# Patient Record
Sex: FEMALE | Race: WHITE | URBAN - METROPOLITAN AREA
[De-identification: names, ages, dates, MRNs, and addresses within clinical notes are randomized per-mention and may not be internally consistent; named-entity substitution may affect disease eponyms.]

---

## 2017-01-19 ENCOUNTER — DOCTOR'S OFFICE (OUTPATIENT)
Dept: URBAN - METROPOLITAN AREA CLINIC 137 | Facility: CLINIC | Age: 52
Setting detail: OPHTHALMOLOGY
End: 2017-01-19
Payer: COMMERCIAL

## 2017-01-19 ENCOUNTER — RX ONLY (RX ONLY)
Age: 52
End: 2017-01-19

## 2017-01-19 DIAGNOSIS — H25.13: ICD-10-CM

## 2017-01-19 DIAGNOSIS — E10.3313: ICD-10-CM

## 2017-01-19 DIAGNOSIS — H52.4: ICD-10-CM

## 2017-01-19 PROCEDURE — 92004 COMPRE OPH EXAM NEW PT 1/>: CPT | Performed by: OPHTHALMOLOGY

## 2017-01-19 ASSESSMENT — REFRACTION_AUTOREFRACTION
OD_SPHERE: -0.25
OS_SPHERE: NO
OD_CYLINDER: +2.25
OS_CYLINDER: CENTER
OD_AXIS: 152

## 2017-01-19 ASSESSMENT — REFRACTION_CURRENTRX
OD_ADD: +1.75
OD_AXIS: 175
OD_SPHERE: -0.50
OD_OVR_VA: 20/
OS_OVR_VA: 20/
OD_VPRISM_DIRECTION: BF
OS_CYLINDER: +1.25
OD_OVR_VA: 20/
OS_SPHERE: -1.25
OS_AXIS: 178
OS_OVR_VA: 20/
OS_VPRISM_DIRECTION: BF
OD_OVR_VA: 20/
OD_CYLINDER: +0.75
OS_OVR_VA: 20/
OS_ADD: +1.75

## 2017-01-19 ASSESSMENT — REFRACTION_MANIFEST
OD_VA3: 20/
OD_VA2: 20/
OS_VA3: 20/
OD_ADD: +1.75
OS_VA1: 20/NI
OD_CYLINDER: +0.75
OS_ADD: +1.75
OS_VA1: 20/
OS_VA2: 20/
OS_VA1: 20/
OU_VA: 20/
OD_VA1: 20/NI
OD_VA2: 20/NI
OD_VA2: 20/
OD_VA1: 20/
OS_CYLINDER: +1.25
OS_VA2: 20/NI
OU_VA: 20/
OD_VA3: 20/
OD_VA3: 20/
OU_VA: 20/
OS_AXIS: 178
OD_VA1: 20/
OD_SPHERE: -0.50
OS_VA3: 20/
OD_AXIS: 175
OS_SPHERE: -1.25
OS_VA3: 20/
OS_VA2: 20/

## 2017-01-19 ASSESSMENT — SPHEQUIV_DERIVED
OD_SPHEQUIV: -0.125
OS_SPHEQUIV: -0.625
OD_SPHEQUIV: 0.875

## 2017-01-19 ASSESSMENT — VISUAL ACUITY
OS_BCVA: 20/60-1
OD_BCVA: 20/60

## 2017-01-19 ASSESSMENT — CONFRONTATIONAL VISUAL FIELD TEST (CVF)
OD_FINDINGS: FULL
OS_FINDINGS: FULL

## 2017-02-10 ENCOUNTER — DOCTOR'S OFFICE (OUTPATIENT)
Dept: URBAN - METROPOLITAN AREA CLINIC 136 | Facility: CLINIC | Age: 52
Setting detail: OPHTHALMOLOGY
End: 2017-02-10
Payer: COMMERCIAL

## 2017-02-10 DIAGNOSIS — H25.13: ICD-10-CM

## 2017-02-10 DIAGNOSIS — E10.3513: ICD-10-CM

## 2017-02-10 PROCEDURE — 99215 OFFICE O/P EST HI 40 MIN: CPT | Performed by: OPHTHALMOLOGY

## 2017-02-10 PROCEDURE — 92134 CPTRZ OPH DX IMG PST SGM RTA: CPT | Performed by: OPHTHALMOLOGY

## 2017-02-10 ASSESSMENT — REFRACTION_MANIFEST
OS_VA1: 20/
OS_AXIS: 178
OU_VA: 20/
OD_VA3: 20/
OD_VA1: 20/
OS_ADD: +1.75
OD_VA2: 20/
OD_CYLINDER: +0.75
OD_VA1: 20/
OS_SPHERE: -1.25
OU_VA: 20/
OD_VA2: 20/
OD_VA3: 20/
OD_VA1: 20/NI
OD_AXIS: 175
OD_VA3: 20/
OU_VA: 20/
OD_VA2: 20/NI
OS_VA3: 20/
OD_ADD: +1.75
OS_VA3: 20/
OS_VA1: 20/
OS_VA3: 20/
OS_CYLINDER: +1.25
OS_VA2: 20/
OS_VA2: 20/
OD_SPHERE: -0.50
OS_VA1: 20/NI
OS_VA2: 20/NI

## 2017-02-10 ASSESSMENT — SPHEQUIV_DERIVED
OS_SPHEQUIV: -0.625
OD_SPHEQUIV: 0.875
OD_SPHEQUIV: -0.125

## 2017-02-10 ASSESSMENT — REFRACTION_CURRENTRX
OS_SPHERE: -1.25
OD_SPHERE: -0.50
OS_VPRISM_DIRECTION: BF
OS_OVR_VA: 20/
OD_OVR_VA: 20/
OD_AXIS: 175
OD_CYLINDER: +0.75
OD_OVR_VA: 20/
OS_CYLINDER: +1.25
OD_ADD: +1.75
OS_ADD: +1.75
OD_OVR_VA: 20/
OS_OVR_VA: 20/
OD_VPRISM_DIRECTION: BF
OS_OVR_VA: 20/
OS_AXIS: 178

## 2017-02-10 ASSESSMENT — VISUAL ACUITY
OD_BCVA: 20/40-1
OS_BCVA: 20/60-1

## 2017-02-10 ASSESSMENT — CONFRONTATIONAL VISUAL FIELD TEST (CVF)
OD_FINDINGS: FULL
OS_FINDINGS: FULL

## 2017-02-10 ASSESSMENT — REFRACTION_AUTOREFRACTION
OD_CYLINDER: +2.25
OS_CYLINDER: CENTER
OD_AXIS: 152
OD_SPHERE: -0.25
OS_SPHERE: NO

## 2017-04-19 ENCOUNTER — DOCTOR'S OFFICE (OUTPATIENT)
Dept: URBAN - METROPOLITAN AREA CLINIC 136 | Facility: CLINIC | Age: 52
Setting detail: OPHTHALMOLOGY
End: 2017-04-19
Payer: COMMERCIAL

## 2017-04-19 DIAGNOSIS — H25.013: ICD-10-CM

## 2017-04-19 DIAGNOSIS — E10.3513: ICD-10-CM

## 2017-04-19 PROCEDURE — 92014 COMPRE OPH EXAM EST PT 1/>: CPT | Performed by: OPHTHALMOLOGY

## 2017-04-19 ASSESSMENT — REFRACTION_MANIFEST
OS_VA1: 20/
OU_VA: 20/
OS_VA3: 20/
OS_VA2: 20/NI
OS_VA3: 20/
OD_ADD: +1.75
OD_VA3: 20/
OD_VA1: 20/NI
OD_VA2: 20/
OD_VA3: 20/
OS_VA1: 20/
OS_AXIS: 178
OS_CYLINDER: +1.25
OD_CYLINDER: +0.75
OU_VA: 20/
OS_VA2: 20/
OS_VA3: 20/
OD_AXIS: 175
OD_SPHERE: -0.50
OD_VA2: 20/NI
OD_VA1: 20/
OS_SPHERE: -1.25
OS_ADD: +1.75
OS_VA1: 20/NI
OD_VA1: 20/
OD_VA3: 20/
OS_VA2: 20/
OD_VA2: 20/
OU_VA: 20/

## 2017-04-19 ASSESSMENT — REFRACTION_CURRENTRX
OS_OVR_VA: 20/
OS_SPHERE: -1.25
OS_CYLINDER: +1.25
OD_OVR_VA: 20/
OD_AXIS: 175
OS_AXIS: 178
OD_ADD: +1.75
OS_VPRISM_DIRECTION: BF
OD_SPHERE: -0.50
OS_ADD: +1.75
OD_OVR_VA: 20/
OS_OVR_VA: 20/
OD_OVR_VA: 20/
OD_VPRISM_DIRECTION: BF
OD_CYLINDER: +0.75
OS_OVR_VA: 20/

## 2017-04-19 ASSESSMENT — SPHEQUIV_DERIVED
OS_SPHEQUIV: -0.375
OD_SPHEQUIV: 0.5
OS_SPHEQUIV: -0.625
OD_SPHEQUIV: -0.125

## 2017-04-19 ASSESSMENT — AXIALLENGTH_DERIVED
OS_AL: 22.8926
OS_AL: 22.8012

## 2017-04-19 ASSESSMENT — REFRACTION_AUTOREFRACTION
OS_CYLINDER: -1.25
OS_SPHERE: +0.25
OD_AXIS: 074
OD_CYLINDER: -2.50
OD_SPHERE: +1.75
OS_AXIS: 098

## 2017-04-19 ASSESSMENT — VISUAL ACUITY
OS_BCVA: 20/100
OD_BCVA: 20/70+2

## 2017-04-19 ASSESSMENT — SUPERFICIAL PUNCTATE KERATITIS (SPK)
OD_SPK: 2+
OS_SPK: 2+

## 2017-04-19 ASSESSMENT — KERATOMETRY
OS_K2POWER_DIOPTERS: 47.25
OS_AXISANGLE_DEGREES: 088
OS_K1POWER_DIOPTERS: 45.00

## 2017-04-19 ASSESSMENT — CONFRONTATIONAL VISUAL FIELD TEST (CVF)
OD_FINDINGS: FULL
OS_FINDINGS: FULL

## 2017-05-16 ENCOUNTER — DOCTOR'S OFFICE (OUTPATIENT)
Dept: URBAN - METROPOLITAN AREA CLINIC 137 | Facility: CLINIC | Age: 52
Setting detail: OPHTHALMOLOGY
End: 2017-05-16
Payer: COMMERCIAL

## 2017-05-16 DIAGNOSIS — H25.011: ICD-10-CM

## 2017-05-16 PROCEDURE — 92136 OPHTHALMIC BIOMETRY: CPT | Performed by: OPHTHALMOLOGY

## 2017-06-09 ENCOUNTER — AMBUL SURGICAL CARE (OUTPATIENT)
Dept: URBAN - METROPOLITAN AREA SURGERY 32 | Facility: SURGERY | Age: 52
Setting detail: OPHTHALMOLOGY
End: 2017-06-09
Payer: COMMERCIAL

## 2017-06-09 DIAGNOSIS — H25.041: ICD-10-CM

## 2017-06-09 DIAGNOSIS — H25.011: ICD-10-CM

## 2017-06-09 DIAGNOSIS — H25.11: ICD-10-CM

## 2017-06-09 PROCEDURE — 66984 XCAPSL CTRC RMVL W/O ECP: CPT | Performed by: OPHTHALMOLOGY

## 2017-06-09 PROCEDURE — G8907 PT DOC NO EVENTS ON DISCHARG: HCPCS | Performed by: OPHTHALMOLOGY

## 2017-06-09 PROCEDURE — G8918 PT W/O PREOP ORDER IV AB PRO: HCPCS | Performed by: OPHTHALMOLOGY

## 2017-06-10 ENCOUNTER — RX ONLY (RX ONLY)
Age: 52
End: 2017-06-10

## 2017-06-10 ENCOUNTER — DOCTOR'S OFFICE (OUTPATIENT)
Dept: URBAN - METROPOLITAN AREA CLINIC 136 | Facility: CLINIC | Age: 52
Setting detail: OPHTHALMOLOGY
End: 2017-06-10
Payer: COMMERCIAL

## 2017-06-10 DIAGNOSIS — H25.013: ICD-10-CM

## 2017-06-10 DIAGNOSIS — Z96.1: ICD-10-CM

## 2017-06-10 DIAGNOSIS — E10.3513: ICD-10-CM

## 2017-06-10 PROCEDURE — 99024 POSTOP FOLLOW-UP VISIT: CPT | Performed by: OPHTHALMOLOGY

## 2017-06-10 ASSESSMENT — REFRACTION_MANIFEST
OD_VA3: 20/
OD_VA1: 20/
OS_SPHERE: -1.25
OS_VA3: 20/
OS_ADD: +1.75
OS_VA2: 20/NI
OS_VA1: 20/
OD_VA1: 20/NI
OS_VA1: 20/NI
OS_VA3: 20/
OU_VA: 20/
OS_VA3: 20/
OS_AXIS: 178
OD_AXIS: 175
OD_VA3: 20/
OD_SPHERE: -0.50
OD_VA2: 20/NI
OS_VA2: 20/
OU_VA: 20/
OD_VA2: 20/
OD_ADD: +1.75
OS_VA1: 20/
OD_VA1: 20/
OU_VA: 20/
OD_CYLINDER: +0.75
OS_CYLINDER: +1.25
OS_VA2: 20/
OD_VA2: 20/
OD_VA3: 20/

## 2017-06-10 ASSESSMENT — REFRACTION_CURRENTRX
OS_OVR_VA: 20/
OD_CYLINDER: +0.75
OS_AXIS: 178
OD_SPHERE: -0.50
OD_AXIS: 175
OS_ADD: +1.75
OD_OVR_VA: 20/
OS_CYLINDER: +1.25
OD_OVR_VA: 20/
OS_SPHERE: -1.25
OD_VPRISM_DIRECTION: BF
OS_OVR_VA: 20/
OS_OVR_VA: 20/
OS_VPRISM_DIRECTION: BF
OD_OVR_VA: 20/
OD_ADD: +1.75

## 2017-06-10 ASSESSMENT — KERATOMETRY
OS_K2POWER_DIOPTERS: 47.25
OS_K1POWER_DIOPTERS: 45.00
OS_AXISANGLE_DEGREES: 088

## 2017-06-10 ASSESSMENT — REFRACTION_AUTOREFRACTION
OS_AXIS: 098
OS_CYLINDER: -1.25
OS_SPHERE: +0.25
OD_AXIS: 89
OD_CYLINDER: -2.00
OD_SPHERE: +1.50

## 2017-06-10 ASSESSMENT — AXIALLENGTH_DERIVED
OS_AL: 22.8926
OS_AL: 22.8012

## 2017-06-10 ASSESSMENT — VISUAL ACUITY
OS_BCVA: 20/40-1
OD_BCVA: 20/70+2

## 2017-06-10 ASSESSMENT — SUPERFICIAL PUNCTATE KERATITIS (SPK)
OS_SPK: 2+
OD_SPK: 2+

## 2017-06-10 ASSESSMENT — SPHEQUIV_DERIVED
OS_SPHEQUIV: -0.625
OS_SPHEQUIV: -0.375
OD_SPHEQUIV: -0.125
OD_SPHEQUIV: 0.5

## 2017-06-21 ENCOUNTER — DOCTOR'S OFFICE (OUTPATIENT)
Dept: URBAN - METROPOLITAN AREA CLINIC 137 | Facility: CLINIC | Age: 52
Setting detail: OPHTHALMOLOGY
End: 2017-06-21

## 2017-06-21 DIAGNOSIS — E10.3593: ICD-10-CM

## 2017-06-21 DIAGNOSIS — Z96.1: ICD-10-CM

## 2017-06-21 PROCEDURE — 99024 POSTOP FOLLOW-UP VISIT: CPT | Performed by: OPHTHALMOLOGY

## 2017-06-21 ASSESSMENT — REFRACTION_CURRENTRX
OD_OVR_VA: 20/
OS_CYLINDER: +1.25
OS_VPRISM_DIRECTION: BF
OD_AXIS: 175
OD_OVR_VA: 20/
OS_SPHERE: -1.25
OS_OVR_VA: 20/
OD_CYLINDER: +0.75
OS_AXIS: 178
OD_ADD: +1.75
OS_OVR_VA: 20/
OD_OVR_VA: 20/
OS_ADD: +1.75
OD_SPHERE: -0.50
OD_VPRISM_DIRECTION: BF
OS_OVR_VA: 20/

## 2017-06-21 ASSESSMENT — SPHEQUIV_DERIVED
OS_SPHEQUIV: -0.625
OD_SPHEQUIV: 0.5
OS_SPHEQUIV: -0.375
OD_SPHEQUIV: -0.125

## 2017-06-21 ASSESSMENT — REFRACTION_MANIFEST
OS_VA1: 20/
OS_CYLINDER: +1.25
OD_VA3: 20/
OD_VA2: 20/
OS_VA3: 20/
OS_VA1: 20/
OD_VA3: 20/
OS_VA1: 20/NI
OS_SPHERE: -1.25
OD_VA3: 20/
OS_AXIS: 178
OU_VA: 20/
OD_VA1: 20/NI
OS_VA3: 20/
OS_VA2: 20/
OD_ADD: +1.75
OD_VA2: 20/
OU_VA: 20/
OS_VA3: 20/
OD_SPHERE: -0.50
OU_VA: 20/
OD_CYLINDER: +0.75
OD_AXIS: 175
OS_VA2: 20/NI
OD_VA1: 20/
OD_VA2: 20/NI
OS_VA2: 20/
OS_ADD: +1.75
OD_VA1: 20/

## 2017-06-21 ASSESSMENT — VISUAL ACUITY
OD_BCVA: 20/70+2
OS_BCVA: 20/200

## 2017-06-21 ASSESSMENT — REFRACTION_AUTOREFRACTION
OD_AXIS: 89
OD_CYLINDER: -2.00
OD_SPHERE: +1.50
OS_CYLINDER: -1.25
OS_SPHERE: +0.25
OS_AXIS: 098

## 2017-06-21 ASSESSMENT — SUPERFICIAL PUNCTATE KERATITIS (SPK)
OS_SPK: 2+
OD_SPK: 2+

## 2017-07-05 ENCOUNTER — AMBUL SURGICAL CARE (OUTPATIENT)
Dept: URBAN - METROPOLITAN AREA SURGERY 32 | Facility: SURGERY | Age: 52
Setting detail: OPHTHALMOLOGY
End: 2017-07-05
Payer: COMMERCIAL

## 2017-07-05 DIAGNOSIS — H25.012: ICD-10-CM

## 2017-07-05 DIAGNOSIS — H25.042: ICD-10-CM

## 2017-07-05 DIAGNOSIS — H25.12: ICD-10-CM

## 2017-07-05 PROCEDURE — 66984 XCAPSL CTRC RMVL W/O ECP: CPT | Performed by: OPHTHALMOLOGY

## 2017-07-05 PROCEDURE — G8918 PT W/O PREOP ORDER IV AB PRO: HCPCS | Performed by: OPHTHALMOLOGY

## 2017-07-05 PROCEDURE — G8907 PT DOC NO EVENTS ON DISCHARG: HCPCS | Performed by: OPHTHALMOLOGY

## 2017-07-06 ENCOUNTER — DOCTOR'S OFFICE (OUTPATIENT)
Dept: URBAN - METROPOLITAN AREA CLINIC 137 | Facility: CLINIC | Age: 52
Setting detail: OPHTHALMOLOGY
End: 2017-07-06
Payer: COMMERCIAL

## 2017-07-06 DIAGNOSIS — Z96.1: ICD-10-CM

## 2017-07-06 PROCEDURE — 99024 POSTOP FOLLOW-UP VISIT: CPT | Performed by: OPHTHALMOLOGY

## 2017-07-06 ASSESSMENT — REFRACTION_MANIFEST
OS_AXIS: 178
OS_ADD: +1.75
OS_VA2: 20/
OD_ADD: +1.75
OS_VA3: 20/
OD_VA1: 20/
OU_VA: 20/
OS_SPHERE: -1.25
OU_VA: 20/
OS_VA1: 20/
OD_VA3: 20/
OS_VA2: 20/
OD_SPHERE: -0.50
OD_VA1: 20/NI
OD_VA2: 20/
OD_AXIS: 175
OD_VA2: 20/
OD_VA2: 20/NI
OD_VA3: 20/
OS_VA3: 20/
OS_CYLINDER: +1.25
OU_VA: 20/
OS_VA1: 20/
OS_VA2: 20/NI
OD_VA3: 20/
OD_VA1: 20/
OS_VA1: 20/NI
OS_VA3: 20/
OD_CYLINDER: +0.75

## 2017-07-06 ASSESSMENT — VISUAL ACUITY
OD_BCVA: 20/40-1
OS_BCVA: 20/200

## 2017-07-06 ASSESSMENT — REFRACTION_CURRENTRX
OD_AXIS: 175
OS_VPRISM_DIRECTION: BF
OD_OVR_VA: 20/
OD_ADD: +1.75
OS_AXIS: 178
OS_OVR_VA: 20/
OD_CYLINDER: +0.75
OD_OVR_VA: 20/
OS_SPHERE: -1.25
OS_OVR_VA: 20/
OS_CYLINDER: +1.25
OD_SPHERE: -0.50
OS_ADD: +1.75
OS_OVR_VA: 20/
OD_OVR_VA: 20/
OD_VPRISM_DIRECTION: BF

## 2017-07-06 ASSESSMENT — SPHEQUIV_DERIVED
OD_SPHEQUIV: -0.125
OS_SPHEQUIV: -0.625
OS_SPHEQUIV: -0.375
OD_SPHEQUIV: 0.5

## 2017-07-06 ASSESSMENT — REFRACTION_AUTOREFRACTION
OS_CYLINDER: -1.25
OD_SPHERE: +1.50
OD_CYLINDER: -2.00
OD_AXIS: 89
OS_SPHERE: +0.25
OS_AXIS: 098

## 2017-07-06 ASSESSMENT — SUPERFICIAL PUNCTATE KERATITIS (SPK)
OD_SPK: 2+
OS_SPK: 2+

## 2017-07-17 ENCOUNTER — DOCTOR'S OFFICE (OUTPATIENT)
Dept: URBAN - METROPOLITAN AREA CLINIC 137 | Facility: CLINIC | Age: 52
Setting detail: OPHTHALMOLOGY
End: 2017-07-17

## 2017-07-17 DIAGNOSIS — Z96.1: ICD-10-CM

## 2017-07-17 PROCEDURE — 99024 POSTOP FOLLOW-UP VISIT: CPT | Performed by: OPHTHALMOLOGY

## 2017-07-17 ASSESSMENT — REFRACTION_CURRENTRX
OD_OVR_VA: 20/
OD_CYLINDER: +0.75
OS_CYLINDER: +1.25
OD_ADD: +1.75
OD_VPRISM_DIRECTION: BF
OS_OVR_VA: 20/
OS_SPHERE: -1.25
OD_AXIS: 175
OS_AXIS: 178
OD_OVR_VA: 20/
OS_VPRISM_DIRECTION: BF
OS_ADD: +1.75
OD_OVR_VA: 20/
OD_SPHERE: -0.50

## 2017-07-17 ASSESSMENT — REFRACTION_MANIFEST
OS_VA3: 20/
OD_VA3: 20/
OD_VA2: 20/
OD_VA1: 20/
OD_VA2: 20/
OS_VA3: 20/
OD_VA3: 20/
OS_VA2: 20/
OS_VA1: 20/
OD_VA2: 20/NI
OS_SPHERE: -1.25
OS_VA1: 20/
OU_VA: 20/
OS_CYLINDER: +1.25
OD_ADD: +1.75
OU_VA: 20/
OD_AXIS: 175
OU_VA: 20/
OD_SPHERE: -0.50
OD_CYLINDER: +0.75
OS_VA2: 20/NI
OD_VA1: 20/NI
OS_VA1: 20/NI
OS_VA3: 20/
OS_VA2: 20/
OD_VA1: 20/
OS_ADD: +1.75
OD_VA3: 20/
OS_AXIS: 178

## 2017-07-17 ASSESSMENT — VISUAL ACUITY
OD_BCVA: 20/40
OS_BCVA: 20/50

## 2017-07-17 ASSESSMENT — SUPERFICIAL PUNCTATE KERATITIS (SPK)
OS_SPK: 2+
OD_SPK: 2+

## 2017-07-17 ASSESSMENT — REFRACTION_AUTOREFRACTION
OD_SPHERE: +1.50
OD_AXIS: 89
OD_CYLINDER: -2.00
OS_SPHERE: +0.25
OS_CYLINDER: -1.25
OS_AXIS: 098

## 2017-07-17 ASSESSMENT — SPHEQUIV_DERIVED
OD_SPHEQUIV: -0.125
OS_SPHEQUIV: -0.625
OD_SPHEQUIV: 0.5
OS_SPHEQUIV: -0.375

## 2017-08-23 ENCOUNTER — DOCTOR'S OFFICE (OUTPATIENT)
Dept: URBAN - METROPOLITAN AREA CLINIC 137 | Facility: CLINIC | Age: 52
Setting detail: OPHTHALMOLOGY
End: 2017-08-23
Payer: COMMERCIAL

## 2017-08-23 DIAGNOSIS — H25.012: ICD-10-CM

## 2017-08-23 PROCEDURE — 92136 OPHTHALMIC BIOMETRY: CPT | Performed by: OPHTHALMOLOGY

## 2017-09-05 ENCOUNTER — DOCTOR'S OFFICE (OUTPATIENT)
Dept: URBAN - METROPOLITAN AREA CLINIC 137 | Facility: CLINIC | Age: 52
Setting detail: OPHTHALMOLOGY
End: 2017-09-05
Payer: COMMERCIAL

## 2017-09-05 DIAGNOSIS — H52.4: ICD-10-CM

## 2017-09-05 PROCEDURE — 92015 DETERMINE REFRACTIVE STATE: CPT | Performed by: OPHTHALMOLOGY

## 2017-09-05 ASSESSMENT — REFRACTION_CURRENTRX
OD_AXIS: 175
OS_CYLINDER: +1.25
OD_OVR_VA: 20/
OD_SPHERE: -0.50
OS_SPHERE: -1.25
OD_VPRISM_DIRECTION: BF
OS_OVR_VA: 20/
OS_OVR_VA: 20/
OS_ADD: +1.75
OD_CYLINDER: +0.75
OD_OVR_VA: 20/
OS_VPRISM_DIRECTION: BF
OD_ADD: +1.75
OS_AXIS: 178
OD_OVR_VA: 20/
OS_OVR_VA: 20/

## 2017-09-05 ASSESSMENT — REFRACTION_OUTSIDERX
OS_VA3: 20/
OD_SPHERE: -0.25
OD_AXIS: 178
OS_VA1: 20/30-1
OD_ADD: +2.50
OS_SPHERE: -1.00
OD_VA3: 20/
OD_VA1: 20/30
OS_VA2: 20/20(J1+)
OS_ADD: +2.50
OD_VA2: 20/20(J1+)
OS_CYLINDER: +1.50
OD_CYLINDER: +1.50
OU_VA: 20/30-1
OS_AXIS: 167

## 2017-09-05 ASSESSMENT — VISUAL ACUITY
OD_BCVA: 20/40
OS_BCVA: 20/30

## 2017-09-05 ASSESSMENT — REFRACTION_MANIFEST
OD_VA3: 20/
OD_VA2: 20/
OS_VA1: 20/
OS_VA2: 20/
OD_VA1: 20/
OU_VA: 20/
OD_VA1: 20/
OD_VA3: 20/
OS_VA2: 20/
OU_VA: 20/
OD_VA2: 20/
OS_VA3: 20/
OS_VA3: 20/
OS_VA1: 20/

## 2017-09-05 ASSESSMENT — KERATOMETRY
OS_AXISANGLE_DEGREES: 088
OS_K1POWER_DIOPTERS: 45.00
OS_K2POWER_DIOPTERS: 47.25

## 2017-09-05 ASSESSMENT — SUPERFICIAL PUNCTATE KERATITIS (SPK)
OS_SPK: 2+
OD_SPK: 2+

## 2017-09-05 ASSESSMENT — REFRACTION_AUTOREFRACTION
OS_CYLINDER: +1.50
OD_CYLINDER: +1.75
OS_SPHERE: -0.25
OD_AXIS: 168
OD_SPHERE: -0.50
OS_AXIS: 004

## 2017-09-05 ASSESSMENT — SPHEQUIV_DERIVED
OS_SPHEQUIV: 0.5
OD_SPHEQUIV: 0.375

## 2017-09-05 ASSESSMENT — AXIALLENGTH_DERIVED: OS_AL: 22.4872

## 2017-09-08 ENCOUNTER — DOCTOR'S OFFICE (OUTPATIENT)
Dept: URBAN - METROPOLITAN AREA CLINIC 137 | Facility: CLINIC | Age: 52
Setting detail: OPHTHALMOLOGY
End: 2017-09-08
Payer: COMMERCIAL

## 2017-09-08 DIAGNOSIS — Z96.1: ICD-10-CM

## 2017-09-08 DIAGNOSIS — E10.3593: ICD-10-CM

## 2017-09-08 DIAGNOSIS — H52.4: ICD-10-CM

## 2017-09-08 PROCEDURE — 99024 POSTOP FOLLOW-UP VISIT: CPT | Performed by: OPHTHALMOLOGY

## 2017-09-08 ASSESSMENT — CONFRONTATIONAL VISUAL FIELD TEST (CVF)
OD_FINDINGS: FULL
OS_FINDINGS: FULL

## 2017-09-08 ASSESSMENT — SUPERFICIAL PUNCTATE KERATITIS (SPK)
OS_SPK: 2+
OD_SPK: 2+

## 2017-09-10 ASSESSMENT — REFRACTION_OUTSIDERX
OD_VA1: 20/30
OD_AXIS: 178
OS_SPHERE: -1.00
OS_CYLINDER: +1.50
OS_ADD: +2.50
OS_VA2: 20/20(J1+)
OD_VA2: 20/20(J1+)
OD_CYLINDER: +1.50
OD_SPHERE: -0.25
OS_VA3: 20/
OD_ADD: +2.50
OD_VA3: 20/
OS_AXIS: 167
OU_VA: 20/30-1
OS_VA1: 20/30-1

## 2017-09-10 ASSESSMENT — REFRACTION_MANIFEST
OD_VA3: 20/
OU_VA: 20/
OS_VA3: 20/
OS_VA1: 20/
OS_VA1: 20/
OU_VA: 20/
OD_VA2: 20/
OS_VA2: 20/
OD_VA3: 20/
OD_VA1: 20/
OS_VA3: 20/
OD_VA1: 20/
OD_VA2: 20/
OS_VA2: 20/

## 2017-09-10 ASSESSMENT — REFRACTION_AUTOREFRACTION
OS_SPHERE: -0.25
OD_AXIS: 168
OD_CYLINDER: +1.75
OD_SPHERE: -0.50
OS_CYLINDER: +1.50
OS_AXIS: 004

## 2017-09-10 ASSESSMENT — SPHEQUIV_DERIVED
OD_SPHEQUIV: 0.375
OS_SPHEQUIV: 0.5

## 2017-09-10 ASSESSMENT — REFRACTION_CURRENTRX
OS_OVR_VA: 20/
OD_CYLINDER: +0.75
OD_OVR_VA: 20/
OS_CYLINDER: +1.25
OS_AXIS: 178
OS_OVR_VA: 20/
OD_VPRISM_DIRECTION: BF
OD_OVR_VA: 20/
OS_SPHERE: -1.25
OS_OVR_VA: 20/
OD_AXIS: 175
OD_OVR_VA: 20/
OD_SPHERE: -0.50
OD_ADD: +1.75
OS_VPRISM_DIRECTION: BF
OS_ADD: +1.75

## 2017-09-10 ASSESSMENT — VISUAL ACUITY
OS_BCVA: 20/30
OD_BCVA: 20/40

## 2017-11-06 ENCOUNTER — DOCTOR'S OFFICE (OUTPATIENT)
Dept: URBAN - METROPOLITAN AREA CLINIC 136 | Facility: CLINIC | Age: 52
Setting detail: OPHTHALMOLOGY
End: 2017-11-06
Payer: COMMERCIAL

## 2017-11-06 DIAGNOSIS — E10.3593: ICD-10-CM

## 2017-11-06 DIAGNOSIS — Z96.1: ICD-10-CM

## 2017-11-06 PROCEDURE — 92014 COMPRE OPH EXAM EST PT 1/>: CPT | Performed by: OPHTHALMOLOGY

## 2017-11-06 ASSESSMENT — REFRACTION_CURRENTRX
OD_OVR_VA: 20/
OS_ADD: +1.75
OS_OVR_VA: 20/
OD_CYLINDER: +0.75
OS_OVR_VA: 20/
OD_SPHERE: -0.50
OD_OVR_VA: 20/
OD_ADD: +1.75
OD_OVR_VA: 20/
OD_VPRISM_DIRECTION: BF
OS_OVR_VA: 20/
OS_SPHERE: -1.25
OS_VPRISM_DIRECTION: BF
OD_AXIS: 175
OS_AXIS: 178
OS_CYLINDER: +1.25

## 2017-11-06 ASSESSMENT — REFRACTION_MANIFEST
OD_VA2: 20/
OD_VA3: 20/
OS_VA3: 20/
OD_VA2: 20/
OD_VA1: 20/
OS_VA2: 20/
OS_VA3: 20/
OD_VA1: 20/
OD_VA3: 20/
OU_VA: 20/
OU_VA: 20/
OS_VA2: 20/
OS_VA1: 20/
OS_VA1: 20/

## 2017-11-06 ASSESSMENT — REFRACTION_AUTOREFRACTION
OS_CYLINDER: +1.50
OD_AXIS: 168
OD_CYLINDER: +1.75
OS_AXIS: 004
OD_SPHERE: -0.50
OS_SPHERE: -0.25

## 2017-11-06 ASSESSMENT — REFRACTION_OUTSIDERX
OD_VA3: 20/
OS_VA3: 20/
OU_VA: 20/30-1
OS_VA1: 20/30-1
OS_CYLINDER: +1.50
OD_ADD: +2.50
OD_VA1: 20/30
OS_SPHERE: -1.00
OD_AXIS: 178
OD_SPHERE: -0.25
OD_CYLINDER: +1.50
OS_AXIS: 167
OS_VA2: 20/20(J1+)
OD_VA2: 20/20(J1+)
OS_ADD: +2.50

## 2017-11-06 ASSESSMENT — SUPERFICIAL PUNCTATE KERATITIS (SPK)
OD_SPK: 2+
OS_SPK: 2+

## 2017-11-06 ASSESSMENT — SPHEQUIV_DERIVED
OS_SPHEQUIV: 0.5
OD_SPHEQUIV: 0.375

## 2017-11-06 ASSESSMENT — VISUAL ACUITY
OS_BCVA: 20/30
OD_BCVA: 20/40

## 2017-11-06 ASSESSMENT — CONFRONTATIONAL VISUAL FIELD TEST (CVF)
OD_FINDINGS: FULL
OS_FINDINGS: FULL

## 2017-12-08 ENCOUNTER — RX ONLY (RX ONLY)
Age: 52
End: 2017-12-08

## 2017-12-08 ENCOUNTER — DOCTOR'S OFFICE (OUTPATIENT)
Dept: URBAN - METROPOLITAN AREA CLINIC 137 | Facility: CLINIC | Age: 52
Setting detail: OPHTHALMOLOGY
End: 2017-12-08
Payer: COMMERCIAL

## 2017-12-08 DIAGNOSIS — E10.3593: ICD-10-CM

## 2017-12-08 DIAGNOSIS — H01.001: ICD-10-CM

## 2017-12-08 DIAGNOSIS — H01.002: ICD-10-CM

## 2017-12-08 DIAGNOSIS — Z96.1: ICD-10-CM

## 2017-12-08 DIAGNOSIS — H01.005: ICD-10-CM

## 2017-12-08 DIAGNOSIS — H01.004: ICD-10-CM

## 2017-12-08 PROCEDURE — 92014 COMPRE OPH EXAM EST PT 1/>: CPT | Performed by: OPHTHALMOLOGY

## 2017-12-08 PROCEDURE — 92134 CPTRZ OPH DX IMG PST SGM RTA: CPT | Performed by: OPHTHALMOLOGY

## 2017-12-08 ASSESSMENT — LID EXAM ASSESSMENTS
OD_BLEPHARITIS: RLL RUL
OS_BLEPHARITIS: LLL LUL

## 2017-12-08 ASSESSMENT — CONFRONTATIONAL VISUAL FIELD TEST (CVF)
OD_FINDINGS: FULL
OS_FINDINGS: FULL

## 2017-12-08 ASSESSMENT — SUPERFICIAL PUNCTATE KERATITIS (SPK)
OS_SPK: 2+
OD_SPK: 2+

## 2017-12-10 ASSESSMENT — REFRACTION_MANIFEST
OS_VA2: 20/
OS_VA1: 20/
OD_VA3: 20/
OS_VA2: 20/
OS_VA3: 20/
OU_VA: 20/
OS_VA3: 20/
OD_VA2: 20/
OD_VA3: 20/
OS_VA1: 20/
OD_VA1: 20/
OD_VA1: 20/
OU_VA: 20/
OD_VA2: 20/

## 2017-12-10 ASSESSMENT — REFRACTION_OUTSIDERX
OD_SPHERE: -0.25
OU_VA: 20/30-1
OS_VA3: 20/
OD_CYLINDER: +1.50
OD_ADD: +2.50
OS_ADD: +2.50
OS_VA2: 20/20(J1+)
OD_AXIS: 178
OS_CYLINDER: +1.50
OD_VA2: 20/20(J1+)
OS_VA1: 20/30-1
OD_VA1: 20/30
OD_VA3: 20/
OS_AXIS: 167
OS_SPHERE: -1.00

## 2017-12-10 ASSESSMENT — REFRACTION_CURRENTRX
OD_OVR_VA: 20/
OD_ADD: +1.75
OS_OVR_VA: 20/
OD_AXIS: 175
OD_OVR_VA: 20/
OS_SPHERE: -1.25
OS_OVR_VA: 20/
OD_CYLINDER: +0.75
OS_ADD: +1.75
OS_CYLINDER: +1.25
OD_SPHERE: -0.50
OD_VPRISM_DIRECTION: BF
OD_OVR_VA: 20/
OS_OVR_VA: 20/
OS_AXIS: 178
OS_VPRISM_DIRECTION: BF

## 2017-12-10 ASSESSMENT — SPHEQUIV_DERIVED
OS_SPHEQUIV: 0.5
OD_SPHEQUIV: 0.375

## 2017-12-10 ASSESSMENT — REFRACTION_AUTOREFRACTION
OD_AXIS: 168
OS_AXIS: 004
OS_SPHERE: -0.25
OD_SPHERE: -0.50
OD_CYLINDER: +1.75
OS_CYLINDER: +1.50

## 2017-12-10 ASSESSMENT — VISUAL ACUITY
OS_BCVA: 20/30
OD_BCVA: 20/40

## 2018-03-23 ENCOUNTER — DOCTOR'S OFFICE (OUTPATIENT)
Dept: URBAN - METROPOLITAN AREA CLINIC 137 | Facility: CLINIC | Age: 53
Setting detail: OPHTHALMOLOGY
End: 2018-03-23
Payer: COMMERCIAL

## 2018-03-23 DIAGNOSIS — E10.3593: ICD-10-CM

## 2018-03-23 DIAGNOSIS — Z96.1: ICD-10-CM

## 2018-03-23 PROBLEM — H52.4 PRESBYOPIA: Status: ACTIVE | Noted: 2017-02-10

## 2018-03-23 PROBLEM — H47.393: Status: ACTIVE | Noted: 2018-03-23

## 2018-03-23 PROCEDURE — 92134 CPTRZ OPH DX IMG PST SGM RTA: CPT | Performed by: OPHTHALMOLOGY

## 2018-03-23 PROCEDURE — 92012 INTRM OPH EXAM EST PATIENT: CPT | Performed by: OPHTHALMOLOGY

## 2018-03-23 ASSESSMENT — SUPERFICIAL PUNCTATE KERATITIS (SPK)
OS_SPK: 2+
OD_SPK: 2+

## 2018-03-23 ASSESSMENT — LID EXAM ASSESSMENTS
OD_BLEPHARITIS: RLL RUL
OS_BLEPHARITIS: LLL LUL

## 2018-03-23 ASSESSMENT — CONFRONTATIONAL VISUAL FIELD TEST (CVF)
OD_FINDINGS: FULL
OS_FINDINGS: FULL

## 2018-03-25 ASSESSMENT — REFRACTION_CURRENTRX
OD_OVR_VA: 20/
OS_AXIS: 178
OS_ADD: +1.75
OS_OVR_VA: 20/
OS_SPHERE: -1.25
OD_AXIS: 175
OD_VPRISM_DIRECTION: BF
OS_OVR_VA: 20/
OD_ADD: +1.75
OD_OVR_VA: 20/
OS_VPRISM_DIRECTION: BF
OS_OVR_VA: 20/
OD_OVR_VA: 20/
OS_CYLINDER: +1.25
OD_CYLINDER: +0.75
OD_SPHERE: -0.50

## 2018-03-25 ASSESSMENT — REFRACTION_MANIFEST
OD_VA2: 20/
OS_VA3: 20/
OD_VA3: 20/
OD_VA1: 20/
OS_VA3: 20/
OS_VA2: 20/
OD_VA2: 20/
OU_VA: 20/
OS_VA1: 20/
OS_VA2: 20/
OU_VA: 20/
OS_VA1: 20/
OD_VA3: 20/
OD_VA1: 20/

## 2018-03-25 ASSESSMENT — REFRACTION_AUTOREFRACTION
OD_SPHERE: -0.50
OD_CYLINDER: +1.75
OS_AXIS: 004
OD_AXIS: 168
OS_CYLINDER: +1.50
OS_SPHERE: -0.25

## 2018-03-25 ASSESSMENT — REFRACTION_OUTSIDERX
OS_VA3: 20/
OU_VA: 20/30-1
OS_CYLINDER: +1.50
OS_VA2: 20/20(J1+)
OD_CYLINDER: +1.50
OD_VA2: 20/20(J1+)
OD_VA3: 20/
OD_ADD: +2.50
OD_VA1: 20/30
OS_SPHERE: -1.00
OD_AXIS: 178
OD_SPHERE: -0.25
OS_VA1: 20/30-1
OS_AXIS: 167
OS_ADD: +2.50

## 2018-03-25 ASSESSMENT — VISUAL ACUITY
OS_BCVA: 20/25-2
OD_BCVA: 20/40

## 2018-03-25 ASSESSMENT — SPHEQUIV_DERIVED
OD_SPHEQUIV: 0.375
OS_SPHEQUIV: 0.5

## 2018-08-13 ENCOUNTER — HOSPITAL ENCOUNTER (EMERGENCY)
Facility: HOSPITAL | Age: 53
Discharge: HOME | End: 2018-08-13
Attending: EMERGENCY MEDICINE
Payer: COMMERCIAL

## 2018-08-13 VITALS
DIASTOLIC BLOOD PRESSURE: 67 MMHG | TEMPERATURE: 98.7 F | OXYGEN SATURATION: 98 % | HEART RATE: 72 BPM | BODY MASS INDEX: 44.13 KG/M2 | RESPIRATION RATE: 18 BRPM | SYSTOLIC BLOOD PRESSURE: 155 MMHG | WEIGHT: 249.12 LBS

## 2018-08-13 DIAGNOSIS — R42 LIGHTHEADEDNESS: Primary | ICD-10-CM

## 2018-08-13 LAB
ANION GAP SERPL CALCULATED.3IONS-SCNC: 8 MMOL/L (ref 4–13)
BASOPHILS # BLD AUTO: 0.02 THOUSANDS/ΜL (ref 0–0.1)
BASOPHILS NFR BLD AUTO: 0 % (ref 0–1)
BUN SERPL-MCNC: 34 MG/DL (ref 5–25)
CALCIUM SERPL-MCNC: 8.5 MG/DL (ref 8.3–10.1)
CHLORIDE SERPL-SCNC: 96 MMOL/L (ref 100–108)
CO2 SERPL-SCNC: 30 MMOL/L (ref 21–32)
CREAT SERPL-MCNC: 3.18 MG/DL (ref 0.6–1.3)
EOSINOPHIL # BLD AUTO: 0.5 THOUSAND/ΜL (ref 0–0.61)
EOSINOPHIL NFR BLD AUTO: 6 % (ref 0–6)
ERYTHROCYTE [DISTWIDTH] IN BLOOD BY AUTOMATED COUNT: 15.9 % (ref 11.6–15.1)
GFR SERPL CREATININE-BSD FRML MDRD: 16 ML/MIN/1.73SQ M
GLUCOSE SERPL-MCNC: 254 MG/DL (ref 65–140)
HCT VFR BLD AUTO: 24 % (ref 34.8–46.1)
HGB BLD-MCNC: 7.6 G/DL (ref 11.5–15.4)
IMM GRANULOCYTES # BLD AUTO: 0.06 THOUSAND/UL (ref 0–0.2)
IMM GRANULOCYTES NFR BLD AUTO: 1 % (ref 0–2)
INR PPP: 0.97 (ref 0.86–1.17)
LYMPHOCYTES # BLD AUTO: 2.12 THOUSANDS/ΜL (ref 0.6–4.47)
LYMPHOCYTES NFR BLD AUTO: 23 % (ref 14–44)
MCH RBC QN AUTO: 34.2 PG (ref 26.8–34.3)
MCHC RBC AUTO-ENTMCNC: 31.7 G/DL (ref 31.4–37.4)
MCV RBC AUTO: 108 FL (ref 82–98)
MONOCYTES # BLD AUTO: 0.99 THOUSAND/ΜL (ref 0.17–1.22)
MONOCYTES NFR BLD AUTO: 11 % (ref 4–12)
NEUTROPHILS # BLD AUTO: 5.36 THOUSANDS/ΜL (ref 1.85–7.62)
NEUTS SEG NFR BLD AUTO: 59 % (ref 43–75)
NRBC BLD AUTO-RTO: 0 /100 WBCS
PLATELET # BLD AUTO: 189 THOUSANDS/UL (ref 149–390)
PMV BLD AUTO: 9.8 FL (ref 8.9–12.7)
POTASSIUM SERPL-SCNC: 3.8 MMOL/L (ref 3.5–5.3)
PROTHROMBIN TIME: 12.6 SECONDS (ref 11.8–14.2)
RBC # BLD AUTO: 2.22 MILLION/UL (ref 3.81–5.12)
SODIUM SERPL-SCNC: 134 MMOL/L (ref 136–145)
WBC # BLD AUTO: 9.05 THOUSAND/UL (ref 4.31–10.16)

## 2018-08-13 PROCEDURE — 85025 COMPLETE CBC W/AUTO DIFF WBC: CPT | Performed by: EMERGENCY MEDICINE

## 2018-08-13 PROCEDURE — 99284 EMERGENCY DEPT VISIT MOD MDM: CPT

## 2018-08-13 PROCEDURE — 93005 ELECTROCARDIOGRAM TRACING: CPT

## 2018-08-13 PROCEDURE — 36415 COLL VENOUS BLD VENIPUNCTURE: CPT | Performed by: EMERGENCY MEDICINE

## 2018-08-13 PROCEDURE — 85610 PROTHROMBIN TIME: CPT | Performed by: EMERGENCY MEDICINE

## 2018-08-13 PROCEDURE — 80048 BASIC METABOLIC PNL TOTAL CA: CPT | Performed by: EMERGENCY MEDICINE

## 2018-08-13 NOTE — DISCHARGE INSTRUCTIONS
Diagnosis; lighTeadedness    - activity aS TOLERATED     - CONTINUE TO TAKE ALL OF YOUR MEDICATIONS AS BEFORE    - PLEASE RETURN TO  THE ER FOR ANY NEW/ WORSENING/CONCERNING SYMPTOMS TO YOU

## 2018-08-13 NOTE — ED NOTES
[facility] called with p/u time for 8pm  Pt working on arranging transport at this time      Scar Kearney RN  08/13/18 [address] [place] [address]

## 2018-08-13 NOTE — ED NOTES
Pt states to "always need central lines" on previous visits to hospital  Pt limb alert on left arm   Midline extension paged at this time      Jackson Wood, RN  08/13/18 9615

## 2018-08-13 NOTE — ED NOTES
[facility] called at this time to arrange for transport   Will call back     Dewayne Ramires RN  08/13/18 1339

## 2018-08-13 NOTE — ED PROCEDURE NOTE
PROCEDURE  ECG 12 Lead Documentation  Date/Time: 8/13/2018 1:00 PM  Performed by: Blane Woodson  Authorized by: Sima SYLVESTER     Indications / Diagnosis:  Cp/left arm pasin/ ligtheaded  ECG reviewed by me, the ED Provider: yes    Patient location:  ED and bedside  Previous ECG:     Previous ECG:  Unavailable    Comparison to cardiac monitor: Yes    Interpretation:     Interpretation: non-specific    Rate:     ECG rate:  73    ECG rate assessment: normal    Rhythm:     Rhythm: sinus rhythm    Ectopy:     Ectopy: none    QRS:     QRS axis:  Normal    QRS intervals:  Normal  Conduction:     Conduction: normal    ST segments:     ST segments:  Normal  T waves:     T waves: flattening      Flattening:  III and V1  Other findings:     Other findings: U wave    Comments:      No ecg signs of  Ischemia/ injury / r heart strain / hyperkalemia    - ems ecg reviewed by er md- no chart-- no change         Abel Stephen MD  08/13/18 4255

## 2018-08-13 NOTE — ED PROVIDER NOTES
History  Chief Complaint   Patient presents with    Post-op Problem     pt had port placed on right chest one week ago  pt had a graft added to previous fistula on left arm one week ago  pt states to have dialysis today, but had to end early due to cramping  pt has c/o swelling/pressure in neck, pressure in left arm      50 yr female with esrd on hd-- has r hd cvp access- recently had left av fistulal put in --  At hd today c/o having carmapign in body /legs stopped 1 hr short-- c/o neck and left arm pressure since left av fistula nto new- cramping at hd is not new-- no other comps         History provided by:  Patient   used: No        None       Past Medical History:   Diagnosis Date    Asthma     Cardiac disease     CHF    [facility]     CPAP (continuous positive airway pressure) dependence     Diabetes mellitus ([facility] [address] )     type 1    Dialysis patient ([facility] )     Disease of thyroid gland     hypothyroidism    GERD (gastroesophageal reflux disease)     Hyperlipidemia     Hypertension     Migraine     Psychiatric disorder     PTSD, Depression, panic attacks    Renal disorder     stage 4 kidney disease       Past Surgical History:   Procedure Laterality Date    CATARACT EXTRACTION, BILATERAL       SECTION      CHOLECYSTECTOMY      HERNIA REPAIR      RETINOPATHY SURGERY      TONSILECTOMY AND ADNOIDECTOMY      TONSILLECTOMY      TYMPANOSTOMY TUBE PLACEMENT         History reviewed  No pertinent family history  I have reviewed and agree with the history as documented  Social History   Substance Use Topics    Smoking status: Never Smoker    Smokeless tobacco: Never Used    Alcohol use No        Review of Systems   Constitutional: Negative  HENT: Negative  Eyes: Negative  Respiratory: Negative  Cardiovascular: Negative  Gastrointestinal: Negative  Endocrine: Negative  Genitourinary: Negative  Musculoskeletal: Negative  Skin: Negative  Allergic/Immunologic: Negative  Neurological: Negative  Hematological: Negative  Psychiatric/Behavioral: Negative  Physical Exam  Physical Exam   Constitutional: She is oriented to person, place, and time  No distress  avss- htnsive-- pulse ox 98 % on ra- interpretation is normal- no intervention   HENT:   Head: Normocephalic and atraumatic  Eyes: Conjunctivae and EOM are normal  Pupils are equal, round, and reactive to light  Right eye exhibits no discharge  Left eye exhibits no discharge  No scleral icterus  Mm pink   Neck: Normal range of motion  Neck supple  No JVD present  No tracheal deviation present  No thyromegaly present  Cardiovascular: Normal rate, regular rhythm, normal heart sounds and intact distal pulses  Exam reveals no gallop and no friction rub  No murmur heard  Pulmonary/Chest: Effort normal and breath sounds normal  No stridor  No respiratory distress  She has no wheezes  She has no rales  She exhibits no tenderness  r cvp hd access site- no d/c- no signs of infection    Abdominal: Soft  Bowel sounds are normal  She exhibits no distension and no mass  There is no tenderness  There is no rebound and no guarding  No hernia  Musculoskeletal: Normal range of motion  She exhibits no tenderness or deformity  Pos left av fistula site sutures c/d/i- no signs of cellulitis/abscess- pos thrill/bruit   Lymphadenopathy:     She has no cervical adenopathy  Neurological: She is alert and oriented to person, place, and time  No cranial nerve deficit or sensory deficit  She exhibits normal muscle tone  Coordination normal    Skin: Skin is warm  Capillary refill takes less than 2 seconds  No rash noted  She is not diaphoretic  No erythema  No pallor  Psychiatric: She has a normal mood and affect  Her behavior is normal    Nursing note and vitals reviewed        Vital Signs  ED Triage Vitals   Temperature Pulse Respirations Blood Pressure SpO2   08/13/18 1120 08/13/18 1118 08/13/18 1118 08/13/18 1120 08/13/18 1118   98 7 °F (37 1 °C) 75 18 142/65 100 %      Temp Source Heart Rate Source Patient Position - Orthostatic VS BP Location FiO2 (%)   08/13/18 1120 08/13/18 1118 08/13/18 1118 08/13/18 1118 --   Oral Monitor Sitting Right arm       Pain Score       08/13/18 1118       6           Vitals:    08/13/18 1118 08/13/18 1120 08/13/18 1256   BP:  142/65 155/67   Pulse: 75  72   Patient Position - Orthostatic VS: Sitting  Lying       Visual Acuity      ED Medications  Medications - No data to display    Diagnostic Studies  Results Reviewed     Procedure Component Value Units Date/Time    CBC and differential [[id number]]  (Abnormal) Collected:  08/13/18 1245    Lab Status:  Final result Specimen:  Blood from Arm, Right Updated:  08/13/18 1250     WBC 9 05 Thousand/uL      RBC 2 22 (L) Million/uL      Hemoglobin 7 6 (L) g/dL      Hematocrit 24 0 (L) %       (H) fL      MCH 34 2 pg      MCHC 31 7 g/dL      RDW 15 9 (H) %      MPV 9 8 fL      Platelets 699 Thousands/uL      nRBC 0 /100 WBCs      Neutrophils Relative 59 %      Immat GRANS % 1 %      Lymphocytes Relative 23 %      Monocytes Relative 11 %      Eosinophils Relative 6 %      Basophils Relative 0 %      Neutrophils Absolute 5 36 Thousands/µL      Immature Grans Absolute 0 06 Thousand/uL      Lymphocytes Absolute 2 12 Thousands/µL      Monocytes Absolute 0 99 Thousand/µL      Eosinophils Absolute 0 50 Thousand/µL      Basophils Absolute 0 02 Thousands/µL     Basic metabolic panel [[id number]] Collected:  08/13/18 1245    Lab Status: In process Specimen:  Blood from Arm, Right Updated:  08/13/18 [address] [[id number]] Collected:  08/13/18 1245    Lab Status:   In process Specimen:  Blood from Arm, Right Updated:  08/13/18 1247                 No orders to display              Procedures  Procedures       Phone Contacts  ED Phone Contact    ED Course  ED Course as of Aug 13 1337   Mon Aug 13, 2018 0 - er md note- recent lvh labs/ imaging studies rev iewed by er md     26 - ER MD NOTE- PT IS INCREASING COUMADIN FROM 2 TO 5 MFG AS PER PT-                                 MDM  CritCare Time    Disposition  Final diagnoses:   None     ED Disposition     None      Follow-up Information    None         Patient's Medications    No medications on file     No discharge procedures on file      ED Provider  Electronically Signed by           Moris Sebastian MD  08/16/18 9772

## 2018-08-14 LAB
ATRIAL RATE: 73 BPM
P AXIS: 47 DEGREES
PR INTERVAL: 174 MS
QRS AXIS: 25 DEGREES
QRSD INTERVAL: 76 MS
QT INTERVAL: 368 MS
QTC INTERVAL: 405 MS
T WAVE AXIS: 51 DEGREES
VENTRICULAR RATE: 73 BPM

## 2018-08-14 PROCEDURE — 93010 ELECTROCARDIOGRAM REPORT: CPT | Performed by: INTERNAL MEDICINE

## 2019-07-20 ENCOUNTER — HOSPITAL ENCOUNTER (INPATIENT)
Facility: HOSPITAL | Age: 54
LOS: 3 days | Discharge: NURSING, REHAB OR LONG-TERM CARE | DRG: 637 | End: 2019-07-23
Attending: EMERGENCY MEDICINE | Admitting: INTERNAL MEDICINE
Payer: COMMERCIAL

## 2019-07-20 DIAGNOSIS — I48.0 PAF (PAROXYSMAL ATRIAL FIBRILLATION) (HCC): ICD-10-CM

## 2019-07-20 DIAGNOSIS — J45.909 ASTHMA: ICD-10-CM

## 2019-07-20 DIAGNOSIS — Z99.2 ESRD (END STAGE RENAL DISEASE) ON DIALYSIS (HCC): ICD-10-CM

## 2019-07-20 DIAGNOSIS — IMO0002 UNCONTROLLED TYPE 1 DIABETES MELLITUS: ICD-10-CM

## 2019-07-20 DIAGNOSIS — N18.6 ESRD (END STAGE RENAL DISEASE) ON DIALYSIS (HCC): ICD-10-CM

## 2019-07-20 DIAGNOSIS — E78.5 HYPERLIPIDEMIA: ICD-10-CM

## 2019-07-20 DIAGNOSIS — R73.9 HYPERGLYCEMIA: Primary | ICD-10-CM

## 2019-07-20 DIAGNOSIS — G62.9 NEUROPATHY: ICD-10-CM

## 2019-07-20 PROBLEM — I16.0 HYPERTENSIVE URGENCY: Status: ACTIVE | Noted: 2019-07-20

## 2019-07-20 PROBLEM — I10 HYPERTENSION: Status: RESOLVED | Noted: 2019-07-20 | Resolved: 2019-07-20

## 2019-07-20 PROBLEM — I10 HYPERTENSION: Status: ACTIVE | Noted: 2019-07-20

## 2019-07-20 PROBLEM — E87.1 HYPONATREMIA: Status: ACTIVE | Noted: 2019-07-20

## 2019-07-20 LAB
ANION GAP SERPL CALCULATED.3IONS-SCNC: 6 MMOL/L (ref 4–13)
BASE EXCESS BLDA CALC-SCNC: 9 MMOL/L (ref -2–3)
BETA-HYDROXYBUTYRATE: 0.1 MMOL/L
BUN BLD-MCNC: 65 MG/DL (ref 5–25)
BUN SERPL-MCNC: 43 MG/DL (ref 5–25)
CA-I BLD-SCNC: 0.91 MMOL/L (ref 1.12–1.32)
CALCIUM SERPL-MCNC: 9 MG/DL (ref 8.3–10.1)
CHLORIDE BLD-SCNC: 90 MMOL/L (ref 100–108)
CHLORIDE SERPL-SCNC: 92 MMOL/L (ref 100–108)
CO2 SERPL-SCNC: 30 MMOL/L (ref 21–32)
CREAT BLD-MCNC: 4.7 MG/DL (ref 0.6–1.3)
CREAT SERPL-MCNC: 4.42 MG/DL (ref 0.6–1.3)
GFR SERPL CREATININE-BSD FRML MDRD: 10 ML/MIN/1.73SQ M
GFR SERPL CREATININE-BSD FRML MDRD: 11 ML/MIN/1.73SQ M
GLUCOSE SERPL-MCNC: 173 MG/DL (ref 65–140)
GLUCOSE SERPL-MCNC: 220 MG/DL (ref 65–140)
GLUCOSE SERPL-MCNC: 321 MG/DL (ref 65–140)
GLUCOSE SERPL-MCNC: 476 MG/DL (ref 65–140)
GLUCOSE SERPL-MCNC: 486 MG/DL (ref 65–140)
GLUCOSE SERPL-MCNC: 64 MG/DL (ref 65–140)
GLUCOSE SERPL-MCNC: 65 MG/DL (ref 65–140)
GLUCOSE SERPL-MCNC: 77 MG/DL (ref 65–140)
HCO3 BLDA-SCNC: 32.3 MMOL/L (ref 24–30)
HCT VFR BLD CALC: 37 % (ref 34.8–46.1)
HGB BLDA-MCNC: 12.6 G/DL (ref 11.5–15.4)
PCO2 BLD: 32 MMOL/L (ref 21–32)
PCO2 BLD: 33 MMOL/L (ref 21–32)
PCO2 BLD: 38.5 MM HG (ref 42–50)
PH BLD: 7.53 [PH] (ref 7.3–7.4)
PO2 BLD: 45 MM HG (ref 35–45)
POTASSIUM BLD-SCNC: >8 MMOL/L (ref 3.5–5.3)
POTASSIUM SERPL-SCNC: 5.3 MMOL/L (ref 3.5–5.3)
SAO2 % BLD FROM PO2: 85 % (ref 95–98)
SODIUM BLD-SCNC: 126 MMOL/L (ref 136–145)
SODIUM SERPL-SCNC: 128 MMOL/L (ref 136–145)
SPECIMEN SOURCE: ABNORMAL
SPECIMEN SOURCE: ABNORMAL

## 2019-07-20 PROCEDURE — 82948 REAGENT STRIP/BLOOD GLUCOSE: CPT

## 2019-07-20 PROCEDURE — 36415 COLL VENOUS BLD VENIPUNCTURE: CPT | Performed by: EMERGENCY MEDICINE

## 2019-07-20 PROCEDURE — 96374 THER/PROPH/DIAG INJ IV PUSH: CPT

## 2019-07-20 PROCEDURE — 80048 BASIC METABOLIC PNL TOTAL CA: CPT | Performed by: EMERGENCY MEDICINE

## 2019-07-20 PROCEDURE — 82803 BLOOD GASES ANY COMBINATION: CPT

## 2019-07-20 PROCEDURE — 99223 1ST HOSP IP/OBS HIGH 75: CPT | Performed by: NURSE PRACTITIONER

## 2019-07-20 PROCEDURE — 96375 TX/PRO/DX INJ NEW DRUG ADDON: CPT

## 2019-07-20 PROCEDURE — 85014 HEMATOCRIT: CPT

## 2019-07-20 PROCEDURE — 99285 EMERGENCY DEPT VISIT HI MDM: CPT

## 2019-07-20 PROCEDURE — 80047 BASIC METABLC PNL IONIZED CA: CPT

## 2019-07-20 PROCEDURE — 87081 CULTURE SCREEN ONLY: CPT | Performed by: EMERGENCY MEDICINE

## 2019-07-20 PROCEDURE — 82010 KETONE BODYS QUAN: CPT | Performed by: EMERGENCY MEDICINE

## 2019-07-20 PROCEDURE — 99285 EMERGENCY DEPT VISIT HI MDM: CPT | Performed by: EMERGENCY MEDICINE

## 2019-07-20 RX ORDER — ACETAMINOPHEN 325 MG/1
650 TABLET ORAL EVERY 6 HOURS PRN
Status: DISCONTINUED | OUTPATIENT
Start: 2019-07-20 | End: 2019-07-23 | Stop reason: HOSPADM

## 2019-07-20 RX ORDER — ONDANSETRON 2 MG/ML
4 INJECTION INTRAMUSCULAR; INTRAVENOUS ONCE
Status: COMPLETED | OUTPATIENT
Start: 2019-07-20 | End: 2019-07-20

## 2019-07-20 RX ORDER — HEPARIN SODIUM 5000 [USP'U]/ML
5000 INJECTION, SOLUTION INTRAVENOUS; SUBCUTANEOUS EVERY 8 HOURS SCHEDULED
Status: DISCONTINUED | OUTPATIENT
Start: 2019-07-21 | End: 2019-07-21

## 2019-07-20 RX ORDER — DEXTROSE MONOHYDRATE 25 G/50ML
25 INJECTION, SOLUTION INTRAVENOUS ONCE
Status: DISCONTINUED | OUTPATIENT
Start: 2019-07-20 | End: 2019-07-20

## 2019-07-20 RX ORDER — ONDANSETRON 2 MG/ML
4 INJECTION INTRAMUSCULAR; INTRAVENOUS EVERY 6 HOURS PRN
Status: DISCONTINUED | OUTPATIENT
Start: 2019-07-20 | End: 2019-07-23 | Stop reason: HOSPADM

## 2019-07-20 RX ORDER — SODIUM CHLORIDE 9 MG/ML
75 INJECTION, SOLUTION INTRAVENOUS CONTINUOUS
Status: DISCONTINUED | OUTPATIENT
Start: 2019-07-20 | End: 2019-07-21

## 2019-07-20 RX ORDER — HYDRALAZINE HYDROCHLORIDE 20 MG/ML
10 INJECTION INTRAMUSCULAR; INTRAVENOUS EVERY 6 HOURS PRN
Status: DISCONTINUED | OUTPATIENT
Start: 2019-07-20 | End: 2019-07-23 | Stop reason: HOSPADM

## 2019-07-20 RX ORDER — DEXTROSE MONOHYDRATE 25 G/50ML
50 INJECTION, SOLUTION INTRAVENOUS ONCE
Status: COMPLETED | OUTPATIENT
Start: 2019-07-20 | End: 2019-07-20

## 2019-07-20 RX ADMIN — INSULIN HUMAN 10 UNITS: 100 INJECTION, SOLUTION PARENTERAL at 18:15

## 2019-07-20 RX ADMIN — DEXTROSE 50 % IN WATER (D50W) INTRAVENOUS SYRINGE 50 ML: at 22:48

## 2019-07-20 RX ADMIN — SODIUM CHLORIDE 75 ML/HR: 0.9 INJECTION, SOLUTION INTRAVENOUS at 17:40

## 2019-07-20 RX ADMIN — ONDANSETRON 4 MG: 2 INJECTION INTRAMUSCULAR; INTRAVENOUS at 17:40

## 2019-07-20 NOTE — ED PROVIDER NOTES
History  Chief Complaint   Patient presents with    Hyperglycemia - no symptoms     pt presents to ED via EMS from [facility] for hyperglycemia  Pt BS in ambulance was 486     Patient brought over by ambulance from [facility] for elevated blood sugar above 500 just pta  She had low BS this morning at dialysis and they stopped her insulin pump  She went on her way, ate lunch earlier, developed mild nausea this afternoon  They never restarted the insulin pump until the elevated BS just pta before dinner  Prior to Admission Medications   Prescriptions Last Dose Informant Patient Reported? Taking?    Ascorbic Acid (VITAMIN C) 1000 MG tablet   Yes Yes   Sig: Take 1,000 mg by mouth daily   FLUoxetine (PROzac) 40 MG capsule   Yes Yes   Sig: Take 40 mg by mouth daily   HYDROcodone-acetaminophen (NORCO) 5-325 mg per tablet   Yes Yes   Sig: Take 1 tablet by mouth every 8 (eight) hours as needed for pain   acetaminophen (TYLENOL) 325 mg tablet   Yes Yes   Sig: Take 650 mg by mouth every 6 (six) hours as needed for mild pain   albuterol (PROVENTIL HFA,VENTOLIN HFA) 90 mcg/act inhaler   Yes Yes   Sig: Inhale 2 puffs every 6 (six) hours as needed for wheezing   allopurinol (ZYLOPRIM) 100 mg tablet   Yes Yes   Sig: Take 100 mg by mouth daily   atorvastatin (LIPITOR) 20 mg tablet   Yes Yes   Sig: Take 20 mg by mouth daily   b complex vitamins capsule   Yes Yes   Sig: Take 1 capsule by mouth daily   bisacodyl (DULCOLAX) 5 mg EC tablet   Yes Yes   Sig: Take 5 mg by mouth daily as needed for constipation   cholecalciferol (VITAMIN D3) 1,000 units tablet   Yes Yes   Sig: Take 1,000 Units by mouth daily   diltiazem (TIAZAC) 180 MG 24 hr capsule   Yes Yes   Sig: Take 180 mg by mouth daily   diphenhydrAMINE (BENADRYL) 25 mg tablet   Yes Yes   Sig: Take 25 mg by mouth every 6 (six) hours as needed for itching   famotidine (PEPCID) 20 mg tablet   Yes Yes   Sig: Take 20 mg by mouth daily   fluticasone-salmeterol (ADVAIR DISKUS, WIXELA INHUB) 100-50 mcg/dose inhaler   Yes Yes   Sig: Inhale 1 puff 2 (two) times a day Rinse mouth after use  insulin lispro (HumaLOG) 100 units/mL injection   Yes Yes   Sig: Inject under the skin 3 (three) times a day before meals   levothyroxine 75 mcg tablet   Yes Yes   Sig: Take 75 mcg by mouth daily   loratadine (CLARITIN) 10 mg tablet   Yes Yes   Sig: Take 10 mg by mouth daily   omeprazole (PriLOSEC) 20 mg delayed release capsule   Yes Yes   Sig: Take 20 mg by mouth 2 (two) times a day   ondansetron (ZOFRAN) 4 mg tablet   Yes Yes   Sig: Take 4 mg by mouth every 6 (six) hours as needed for nausea or vomiting   sevelamer carbonate (RENVELA) 800 mg tablet   Yes Yes   Sig: Take 800 mg by mouth 2 (two) times a day   traMADol (ULTRAM) 50 mg tablet   Yes Yes   Sig: Take 50 mg by mouth every 6 (six) hours as needed for moderate pain      Facility-Administered Medications: None       Past Medical History:   Diagnosis Date    Asthma     Cardiac disease     CHF    [facility]     CPAP (continuous positive airway pressure) dependence     Diabetes mellitus ([facility])     type 1    [facility]     Disease of thyroid gland     hypothyroidism    GERD (gastroesophageal reflux disease)     Hyperlipidemia     Hypertension     Migraine     Psychiatric disorder     PTSD, Depression, panic attacks    Renal disorder     stage 4 kidney disease       Past Surgical History:   Procedure Laterality Date    CATARACT EXTRACTION, BILATERAL       SECTION      CHOLECYSTECTOMY      HERNIA REPAIR      RETINOPATHY SURGERY      TONSILECTOMY AND ADNOIDECTOMY      TONSILLECTOMY      TYMPANOSTOMY TUBE PLACEMENT         History reviewed  No pertinent family history  I have reviewed and agree with the history as documented      Social History     Tobacco Use    Smoking status: Never Smoker    Smokeless tobacco: Never Used   Substance Use Topics    Alcohol use: Not Currently    Drug use: No        Review of Systems   All other systems reviewed and are negative  Physical Exam  Physical Exam   Constitutional: She is oriented to person, place, and time  She appears well-developed and well-nourished  HENT:   Mouth/Throat: Oropharynx is clear and moist    Eyes: Pupils are equal, round, and reactive to light  Conjunctivae and EOM are normal    Neck: Normal range of motion  Neck supple  No spinous process tenderness present  Cardiovascular: Normal rate, regular rhythm, normal heart sounds and intact distal pulses  Pulmonary/Chest: Effort normal and breath sounds normal  No respiratory distress  She has no wheezes  Abdominal: Soft  Bowel sounds are normal  She exhibits no distension  There is no tenderness  Musculoskeletal: Normal range of motion  boot on right lower leg   Neurological: She is alert and oriented to person, place, and time  She has normal strength  No sensory deficit  GCS eye subscore is 4  GCS verbal subscore is 5  GCS motor subscore is 6  Skin: Skin is warm and dry  No rash noted  Psychiatric: She has a normal mood and affect  Nursing note and vitals reviewed        Vital Signs  ED Triage Vitals   Temperature Pulse Respirations Blood Pressure SpO2   07/20/19 1729 07/20/19 1729 07/20/19 1729 07/20/19 1745 07/20/19 1729   98 7 °F (37 1 °C) 87 17 (!) 198/81 99 %      Temp Source Heart Rate Source Patient Position - Orthostatic VS BP Location FiO2 (%)   07/20/19 1729 07/20/19 1729 07/20/19 1729 07/20/19 1729 --   Temporal Monitor Lying Left leg       Pain Score       07/20/19 1729       No Pain           Vitals:    07/20/19 2115 07/20/19 2245 07/20/19 2357 07/21/19 0715   BP: (!) 219/86 (!) 219/91 143/71 135/63   Pulse:  83 82 88   Patient Position - Orthostatic VS:   Lying Lying         Visual Acuity      ED Medications  Medications   sodium chloride 0 9 % infusion (75 mL/hr Intravenous New Bag 7/21/19 0015)   ondansetron (ZOFRAN) injection 4 mg (has no administration in time range)   heparin (porcine) subcutaneous injection 5,000 Units (5,000 Units Subcutaneous Not Given 7/21/19 0509)   insulin lispro (HumaLOG) 100 units/mL subcutaneous injection 1-5 Units (has no administration in time range)   insulin lispro (HumaLOG) 100 units/mL subcutaneous injection 1-5 Units (1 Units Subcutaneous Given 7/21/19 0019)   acetaminophen (TYLENOL) tablet 650 mg (has no administration in time range)   insulin regular (HumuLIN R,NovoLIN R) 1 Units/mL in sodium chloride 0 9 % 100 mL infusion (1 Units/hr Intravenous Rate/Dose Change 7/21/19 0704)   hydrALAZINE (APRESOLINE) injection 10 mg (has no administration in time range)   ondansetron (ZOFRAN) injection 4 mg (4 mg Intravenous Given 7/20/19 1740)   insulin regular (HumuLIN R,NovoLIN R) injection 10 Units (10 Units Intravenous Given 7/20/19 1815)   dextrose 50 % IV solution 50 mL (50 mL Intravenous Given 7/20/19 2248)       Diagnostic Studies  Results Reviewed     Procedure Component Value Units Date/Time    Basic metabolic panel [[id number]] Collected:  07/21/19 0455    Lab Status:  No result Specimen:  Blood from Vein     Hemoglobin A1C w/ EAG Estimation [[id number]] Collected:  07/21/19 0453    Lab Status:  No result Specimen:  Blood from Vein     Fingerstick Glucose (POCT) [[id number]]  (Abnormal) Collected:  07/20/19 2307    Lab Status:  Final result Updated:  07/20/19 2308     POC Glucose 220 mg/dl     MRSA culture [[id number]] Collected:  07/20/19 2251    Lab Status:   In process Specimen:  Nares from Nose Updated:  07/20/19 2259    Fingerstick Glucose (POCT) [[id number]]  (Abnormal) Collected:  07/20/19 2226    Lab Status:  Final result Updated:  07/20/19 2228     POC Glucose 64 mg/dl     Fingerstick Glucose (POCT) [[id number]]  (Normal) Collected:  07/20/19 2215    Lab Status:  Final result Updated:  07/20/19 2215     POC Glucose 65 mg/dl     Fingerstick Glucose (POCT) [[id number]]  (Normal) Collected:  07/20/19 2159    Lab Status:  Final result Updated:  07/20/19 2200     POC Glucose 77 mg/dl     Fingerstick Glucose (POCT) [[id number]]  (Abnormal) Collected:  07/20/19 1845    Lab Status:  Final result Updated:  07/20/19 1845     POC Glucose 321 mg/dl     Beta Hydroxybutyrate [[id number]]  (Normal) Collected:  07/20/19 1740    Lab Status:  Final result Specimen:  Blood from Vein Updated:  07/20/19 1821     BETA-HYDROXYBUTYRATE 0 1 mmol/L     Basic metabolic panel [[id number]]  (Abnormal) Collected:  07/20/19 1745    Lab Status:  Final result Specimen:  Blood from Arm, Left Updated:  07/20/19 1814     Sodium 128 mmol/L      Potassium 5 3 mmol/L      Chloride 92 mmol/L      CO2 30 mmol/L      ANION GAP 6 mmol/L      BUN 43 mg/dL      Creatinine 4 42 mg/dL      Glucose 486 mg/dL      Calcium 9 0 mg/dL      eGFR 11 ml/min/1 73sq m     Narrative:       [place] guidelines for Chronic Kidney Disease (CKD):     Stage 1 with normal or high GFR (GFR > 90 mL/min/1 73 square meters)    Stage 2 Mild CKD (GFR = 60-89 mL/min/1 73 square meters)    Stage 3A Moderate CKD (GFR = 45-59 mL/min/1 73 square meters)    Stage 3B Moderate CKD (GFR = 30-44 mL/min/1 73 square meters)    Stage 4 Severe CKD (GFR = 15-29 mL/min/1 73 square meters)    Stage 5 End Stage CKD (GFR <15 mL/min/1 73 square meters)  Note: GFR calculation is accurate only with a steady state creatinine    POCT Blood Gas (G3+) [[id number]]  (Abnormal) Collected:  07/20/19 1740    Lab Status:  Final result Specimen:  Venous Updated:  07/20/19 1804     ph, Mario ISTAT 7 532     pCO2, Mario i-STAT 38 5 mm HG      pO2, Mario i-STAT 45 0 mm HG      BE, i-STAT 9 mmol/L      HCO3, Mario i-STAT 32 3 mmol/L      CO2, i-STAT 33 mmol/L      O2 Sat, i-STAT 85 %      Specimen Type VENOUS    POCT Chem 8+ [[id number]]  (Abnormal) Collected:  07/20/19 1739    Lab Status:  Final result Specimen:  Venous Updated:  07/20/19 1744     SODIUM, I-STAT 126 mmol/l      Potassium, i-STAT >8 0 mmol/L      Chloride, istat 90 mmol/L      CO2, i-STAT 32 mmol/L      Anion Gap, i-STAT -- mmol/L      Calcium, Ionized i-STAT 0 91 mmol/L      BUN, I-STAT 65 mg/dl      Creatinine, i-STAT 4 7 mg/dl      eGFR 10 ml/min/1 73sq m      Glucose, i-STAT 476 mg/dl      Hct, i-STAT 37 %      Hgb, i-STAT 12 6 g/dl      Specimen Type VENOUS    Narrative:       National Kidney Disease Foundation guidelines for Chronic Kidney Disease (CKD):     Stage 1 with normal or high GFR (GFR > 90 mL/min/1 73 square meters)    Stage 2 Mild CKD (GFR = 60-89 mL/min/1 73 square meters)    Stage 3A Moderate CKD (GFR = 45-59 mL/min/1 73 square meters)    Stage 3B Moderate CKD (GFR = 30-44 mL/min/1 73 square meters)    Stage 4 Severe CKD (GFR = 15-29 mL/min/1 73 square meters)    Stage 5 End Stage CKD (GFR <15 mL/min/1 73 square meters)  Note: GFR calculation is accurate only with a steady state creatinine                 No orders to display              Procedures  Procedures       ED Course                               MDM  Number of Diagnoses or Management Options  Hyperglycemia: new and requires workup     Amount and/or Complexity of Data Reviewed  Clinical lab tests: ordered and reviewed  Obtain history from someone other than the patient: yes    Patient Progress  Patient progress: improved      Disposition  Final diagnoses:   Hyperglycemia     Time reflects when diagnosis was documented in both MDM as applicable and the Disposition within this note     Time User Action Codes Description Comment    7/20/2019  7:12 PM Missael Rayo Add [R73 9] Hyperglycemia     7/20/2019 10:52 PM Rober Garcia Add [E10 65] Uncontrolled type 1 diabetes mellitus ([facility] [address] )     7/20/2019 10:52 PM Rober Garcia Add [N18 6,  Z99 2] ESRD (end stage renal disease) on dialysis [facility])       ED Disposition     ED Disposition Condition Date/Time Comment    Admit Stable Sat Jul 20, 2019 10:41 PM Case was discussed with **NP covering Dr Yodit Allen* and the patient's admission status was agreed to be Admission Status: observation status to the service of Dr Funmilayo Black   Follow-up Information    None         Current Discharge Medication List      CONTINUE these medications which have NOT CHANGED    Details   acetaminophen (TYLENOL) 325 mg tablet Take 650 mg by mouth every 6 (six) hours as needed for mild pain      albuterol (PROVENTIL HFA,VENTOLIN HFA) 90 mcg/act inhaler Inhale 2 puffs every 6 (six) hours as needed for wheezing    Comments: Substitution to a formulary equivalent within the same pharmaceutical class is authorized  allopurinol (ZYLOPRIM) 100 mg tablet Take 100 mg by mouth daily      Ascorbic Acid (VITAMIN C) 1000 MG tablet Take 1,000 mg by mouth daily      atorvastatin (LIPITOR) 20 mg tablet Take 20 mg by mouth daily      b complex vitamins capsule Take 1 capsule by mouth daily      bisacodyl (DULCOLAX) 5 mg EC tablet Take 5 mg by mouth daily as needed for constipation      cholecalciferol (VITAMIN D3) 1,000 units tablet Take 1,000 Units by mouth daily      diltiazem (TIAZAC) 180 MG 24 hr capsule Take 180 mg by mouth daily      diphenhydrAMINE (BENADRYL) 25 mg tablet Take 25 mg by mouth every 6 (six) hours as needed for itching      famotidine (PEPCID) 20 mg tablet Take 20 mg by mouth daily      FLUoxetine (PROzac) 40 MG capsule Take 40 mg by mouth daily      fluticasone-salmeterol (ADVAIR DISKUS, WIXELA INHUB) 100-50 mcg/dose inhaler Inhale 1 puff 2 (two) times a day Rinse mouth after use  Comments: Substitution to a formulary equivalent within the same pharmaceutical class is authorized        HYDROcodone-acetaminophen (NORCO) 5-325 mg per tablet Take 1 tablet by mouth every 8 (eight) hours as needed for pain      insulin lispro (HumaLOG) 100 units/mL injection Inject under the skin 3 (three) times a day before meals      levothyroxine 75 mcg tablet Take 75 mcg by mouth daily      loratadine (CLARITIN) 10 mg tablet Take 10 mg by mouth daily omeprazole (PriLOSEC) 20 mg delayed release capsule Take 20 mg by mouth 2 (two) times a day      ondansetron (ZOFRAN) 4 mg tablet Take 4 mg by mouth every 6 (six) hours as needed for nausea or vomiting      sevelamer carbonate (RENVELA) 800 mg tablet Take 800 mg by mouth 2 (two) times a day      traMADol (ULTRAM) 50 mg tablet Take 50 mg by mouth every 6 (six) hours as needed for moderate pain           No discharge procedures on file      ED Provider  Electronically Signed by           Aura Jansen DO  07/21/19 5670

## 2019-07-21 PROBLEM — I82.90 THROMBUS: Status: ACTIVE | Noted: 2019-07-21

## 2019-07-21 PROBLEM — E66.01 MORBID OBESITY (HCC): Status: ACTIVE | Noted: 2019-07-21

## 2019-07-21 LAB
ANION GAP SERPL CALCULATED.3IONS-SCNC: 10 MMOL/L (ref 4–13)
ANION GAP SERPL CALCULATED.3IONS-SCNC: 4 MMOL/L (ref 4–13)
BUN SERPL-MCNC: 50 MG/DL (ref 5–25)
BUN SERPL-MCNC: 52 MG/DL (ref 5–25)
CALCIUM SERPL-MCNC: 9 MG/DL (ref 8.3–10.1)
CALCIUM SERPL-MCNC: 9.2 MG/DL (ref 8.3–10.1)
CHLORIDE SERPL-SCNC: 96 MMOL/L (ref 100–108)
CHLORIDE SERPL-SCNC: 97 MMOL/L (ref 100–108)
CO2 SERPL-SCNC: 29 MMOL/L (ref 21–32)
CO2 SERPL-SCNC: 32 MMOL/L (ref 21–32)
CREAT SERPL-MCNC: 4.85 MG/DL (ref 0.6–1.3)
CREAT SERPL-MCNC: 5.06 MG/DL (ref 0.6–1.3)
EST. AVERAGE GLUCOSE BLD GHB EST-MCNC: 186 MG/DL
GFR SERPL CREATININE-BSD FRML MDRD: 10 ML/MIN/1.73SQ M
GFR SERPL CREATININE-BSD FRML MDRD: 9 ML/MIN/1.73SQ M
GLUCOSE SERPL-MCNC: 126 MG/DL (ref 65–140)
GLUCOSE SERPL-MCNC: 144 MG/DL (ref 65–140)
GLUCOSE SERPL-MCNC: 172 MG/DL (ref 65–140)
GLUCOSE SERPL-MCNC: 190 MG/DL (ref 65–140)
GLUCOSE SERPL-MCNC: 227 MG/DL (ref 65–140)
GLUCOSE SERPL-MCNC: 337 MG/DL (ref 65–140)
GLUCOSE SERPL-MCNC: 351 MG/DL (ref 65–140)
GLUCOSE SERPL-MCNC: 352 MG/DL (ref 65–140)
GLUCOSE SERPL-MCNC: 380 MG/DL (ref 65–140)
GLUCOSE SERPL-MCNC: 63 MG/DL (ref 65–140)
GLUCOSE SERPL-MCNC: 79 MG/DL (ref 65–140)
GLUCOSE SERPL-MCNC: 95 MG/DL (ref 65–140)
HBA1C MFR BLD: 8.1 % (ref 4.2–6.3)
POTASSIUM SERPL-SCNC: 4.5 MMOL/L (ref 3.5–5.3)
POTASSIUM SERPL-SCNC: 4.6 MMOL/L (ref 3.5–5.3)
SODIUM SERPL-SCNC: 133 MMOL/L (ref 136–145)
SODIUM SERPL-SCNC: 135 MMOL/L (ref 136–145)

## 2019-07-21 PROCEDURE — 83036 HEMOGLOBIN GLYCOSYLATED A1C: CPT | Performed by: NURSE PRACTITIONER

## 2019-07-21 PROCEDURE — 99222 1ST HOSP IP/OBS MODERATE 55: CPT | Performed by: INTERNAL MEDICINE

## 2019-07-21 PROCEDURE — 99233 SBSQ HOSP IP/OBS HIGH 50: CPT | Performed by: INTERNAL MEDICINE

## 2019-07-21 PROCEDURE — 80048 BASIC METABOLIC PNL TOTAL CA: CPT | Performed by: NURSE PRACTITIONER

## 2019-07-21 PROCEDURE — 94760 N-INVAS EAR/PLS OXIMETRY 1: CPT

## 2019-07-21 PROCEDURE — 94660 CPAP INITIATION&MGMT: CPT

## 2019-07-21 PROCEDURE — 82948 REAGENT STRIP/BLOOD GLUCOSE: CPT

## 2019-07-21 RX ORDER — ALBUTEROL SULFATE 90 UG/1
2 AEROSOL, METERED RESPIRATORY (INHALATION) 4 TIMES DAILY
Status: DISCONTINUED | OUTPATIENT
Start: 2019-07-21 | End: 2019-07-21

## 2019-07-21 RX ORDER — LEVOTHYROXINE SODIUM 0.07 MG/1
75 TABLET ORAL
Status: DISCONTINUED | OUTPATIENT
Start: 2019-07-21 | End: 2019-07-23 | Stop reason: HOSPADM

## 2019-07-21 RX ORDER — VITAMIN B COMPLEX
1 CAPSULE ORAL DAILY
COMMUNITY

## 2019-07-21 RX ORDER — MELATONIN
1000 DAILY
COMMUNITY

## 2019-07-21 RX ORDER — LEVOTHYROXINE SODIUM 0.07 MG/1
75 TABLET ORAL DAILY
COMMUNITY

## 2019-07-21 RX ORDER — ATORVASTATIN CALCIUM 40 MG/1
40 TABLET, FILM COATED ORAL
Status: DISCONTINUED | OUTPATIENT
Start: 2019-07-21 | End: 2019-07-23 | Stop reason: HOSPADM

## 2019-07-21 RX ORDER — LORATADINE 10 MG/1
10 TABLET ORAL DAILY
COMMUNITY

## 2019-07-21 RX ORDER — FLUTICASONE PROPIONATE 50 MCG
1 SPRAY, SUSPENSION (ML) NASAL DAILY
Status: DISCONTINUED | OUTPATIENT
Start: 2019-07-21 | End: 2019-07-23 | Stop reason: HOSPADM

## 2019-07-21 RX ORDER — DILTIAZEM HYDROCHLORIDE 180 MG/1
180 CAPSULE, COATED, EXTENDED RELEASE ORAL DAILY
Status: DISCONTINUED | OUTPATIENT
Start: 2019-07-21 | End: 2019-07-23 | Stop reason: HOSPADM

## 2019-07-21 RX ORDER — ALBUTEROL SULFATE 90 UG/1
2 AEROSOL, METERED RESPIRATORY (INHALATION) EVERY 6 HOURS PRN
COMMUNITY

## 2019-07-21 RX ORDER — PANTOPRAZOLE SODIUM 40 MG/1
40 TABLET, DELAYED RELEASE ORAL
Status: DISCONTINUED | OUTPATIENT
Start: 2019-07-21 | End: 2019-07-23 | Stop reason: HOSPADM

## 2019-07-21 RX ORDER — HYDROCODONE BITARTRATE AND ACETAMINOPHEN 5; 325 MG/1; MG/1
1 TABLET ORAL EVERY 8 HOURS PRN
Status: ON HOLD | COMMUNITY
End: 2019-07-23 | Stop reason: SDUPTHER

## 2019-07-21 RX ORDER — HYDROCODONE BITARTRATE AND ACETAMINOPHEN 5; 325 MG/1; MG/1
1 TABLET ORAL EVERY 6 HOURS PRN
Status: DISCONTINUED | OUTPATIENT
Start: 2019-07-21 | End: 2019-07-23 | Stop reason: HOSPADM

## 2019-07-21 RX ORDER — ATORVASTATIN CALCIUM 20 MG/1
20 TABLET, FILM COATED ORAL DAILY
COMMUNITY
End: 2019-07-23 | Stop reason: HOSPADM

## 2019-07-21 RX ORDER — ALLOPURINOL 100 MG/1
100 TABLET ORAL DAILY
COMMUNITY

## 2019-07-21 RX ORDER — GABAPENTIN 300 MG/1
300 CAPSULE ORAL
Status: DISCONTINUED | OUTPATIENT
Start: 2019-07-21 | End: 2019-07-23 | Stop reason: HOSPADM

## 2019-07-21 RX ORDER — SEVELAMER CARBONATE 800 MG/1
800 TABLET, FILM COATED ORAL 2 TIMES DAILY
COMMUNITY

## 2019-07-21 RX ORDER — MULTIVIT WITH MINERALS/LUTEIN
1000 TABLET ORAL DAILY
COMMUNITY

## 2019-07-21 RX ORDER — DIPHENHYDRAMINE HCL 25 MG
25 TABLET ORAL EVERY 6 HOURS PRN
COMMUNITY
End: 2019-11-22 | Stop reason: HOSPADM

## 2019-07-21 RX ORDER — ACETAMINOPHEN 325 MG/1
650 TABLET ORAL EVERY 6 HOURS PRN
COMMUNITY

## 2019-07-21 RX ORDER — FLUTICASONE PROPIONATE 50 MCG
1 SPRAY, SUSPENSION (ML) NASAL 2 TIMES DAILY
COMMUNITY

## 2019-07-21 RX ORDER — TRAMADOL HYDROCHLORIDE 50 MG/1
50 TABLET ORAL EVERY 6 HOURS PRN
COMMUNITY
End: 2019-07-23 | Stop reason: HOSPADM

## 2019-07-21 RX ORDER — ALBUTEROL SULFATE 90 UG/1
2 AEROSOL, METERED RESPIRATORY (INHALATION) EVERY 4 HOURS PRN
Status: DISCONTINUED | OUTPATIENT
Start: 2019-07-21 | End: 2019-07-23 | Stop reason: HOSPADM

## 2019-07-21 RX ORDER — FAMOTIDINE 20 MG/1
20 TABLET, FILM COATED ORAL DAILY
COMMUNITY

## 2019-07-21 RX ORDER — ONDANSETRON 4 MG/1
4 TABLET, FILM COATED ORAL EVERY 6 HOURS PRN
COMMUNITY
End: 2019-11-22 | Stop reason: HOSPADM

## 2019-07-21 RX ORDER — FLUOXETINE HYDROCHLORIDE 40 MG/1
40 CAPSULE ORAL DAILY
COMMUNITY

## 2019-07-21 RX ORDER — DILTIAZEM HYDROCHLORIDE 180 MG/1
180 CAPSULE, EXTENDED RELEASE ORAL DAILY
COMMUNITY
End: 2019-09-10 | Stop reason: HOSPADM

## 2019-07-21 RX ORDER — SEVELAMER HYDROCHLORIDE 800 MG/1
800 TABLET, FILM COATED ORAL
Status: DISCONTINUED | OUTPATIENT
Start: 2019-07-21 | End: 2019-07-23 | Stop reason: HOSPADM

## 2019-07-21 RX ORDER — OMEPRAZOLE 20 MG/1
40 CAPSULE, DELAYED RELEASE ORAL DAILY
COMMUNITY

## 2019-07-21 RX ORDER — FLUOXETINE HYDROCHLORIDE 20 MG/1
40 CAPSULE ORAL DAILY
Status: DISCONTINUED | OUTPATIENT
Start: 2019-07-21 | End: 2019-07-23 | Stop reason: HOSPADM

## 2019-07-21 RX ORDER — FAMOTIDINE 20 MG/1
20 TABLET, FILM COATED ORAL DAILY
Status: DISCONTINUED | OUTPATIENT
Start: 2019-07-22 | End: 2019-07-23 | Stop reason: HOSPADM

## 2019-07-21 RX ORDER — FLUTICASONE FUROATE AND VILANTEROL 200; 25 UG/1; UG/1
1 POWDER RESPIRATORY (INHALATION)
Status: DISCONTINUED | OUTPATIENT
Start: 2019-07-21 | End: 2019-07-23 | Stop reason: HOSPADM

## 2019-07-21 RX ADMIN — Medication 9 UNITS/HR: at 23:34

## 2019-07-21 RX ADMIN — SEVELAMER HYDROCHLORIDE 800 MG: 800 TABLET, FILM COATED PARENTERAL at 12:55

## 2019-07-21 RX ADMIN — SEVELAMER HYDROCHLORIDE 800 MG: 800 TABLET, FILM COATED PARENTERAL at 16:55

## 2019-07-21 RX ADMIN — ONDANSETRON 4 MG: 2 INJECTION INTRAMUSCULAR; INTRAVENOUS at 19:19

## 2019-07-21 RX ADMIN — HYDROCODONE BITARTRATE AND ACETAMINOPHEN 1 TABLET: 5; 325 TABLET ORAL at 20:40

## 2019-07-21 RX ADMIN — DILTIAZEM HYDROCHLORIDE 180 MG: 180 CAPSULE, COATED, EXTENDED RELEASE ORAL at 08:44

## 2019-07-21 RX ADMIN — SODIUM CHLORIDE 75 ML/HR: 0.9 INJECTION, SOLUTION INTRAVENOUS at 00:15

## 2019-07-21 RX ADMIN — FLUTICASONE PROPIONATE 1 SPRAY: 50 SPRAY, METERED NASAL at 12:33

## 2019-07-21 RX ADMIN — PANTOPRAZOLE SODIUM 40 MG: 40 TABLET, DELAYED RELEASE ORAL at 19:14

## 2019-07-21 RX ADMIN — Medication 1 UNITS/HR: at 00:21

## 2019-07-21 RX ADMIN — FLUOXETINE 40 MG: 20 CAPSULE ORAL at 08:44

## 2019-07-21 RX ADMIN — APIXABAN 2.5 MG: 2.5 TABLET, FILM COATED ORAL at 18:24

## 2019-07-21 RX ADMIN — INSULIN LISPRO 3 UNITS: 100 INJECTION, SOLUTION INTRAVENOUS; SUBCUTANEOUS at 22:51

## 2019-07-21 RX ADMIN — ATORVASTATIN CALCIUM 40 MG: 40 TABLET, FILM COATED ORAL at 16:54

## 2019-07-21 RX ADMIN — GABAPENTIN 300 MG: 300 CAPSULE ORAL at 22:51

## 2019-07-21 RX ADMIN — INSULIN LISPRO 1 UNITS: 100 INJECTION, SOLUTION INTRAVENOUS; SUBCUTANEOUS at 00:19

## 2019-07-21 RX ADMIN — LEVOTHYROXINE SODIUM 75 MCG: 75 TABLET ORAL at 08:44

## 2019-07-21 RX ADMIN — SEVELAMER HYDROCHLORIDE 800 MG: 800 TABLET, FILM COATED PARENTERAL at 08:59

## 2019-07-21 RX ADMIN — APIXABAN 2.5 MG: 2.5 TABLET, FILM COATED ORAL at 08:44

## 2019-07-21 NOTE — ED NOTES
BS continuing to drop, verbal order for IV   Transport canceled      Filippo Salazar RN  07/20/19 8002

## 2019-07-21 NOTE — CONSULTS
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, who was admitted to [facility] after presenting with uncontrolled diabetes on 07/20  A renal consultation is requested today for assistance in the management of ESRD  1) ESRD TTS    - pt follows with [address] [facility] currently while in rehab  - TTS schedule  - next HD on Tuesday - to note, Saturdays treatment was shortened by half an hour   - will need to obtain outpatient records for outpatient dialysis prescription for Tuesday  - access - LUE AVF is the fistula to utilize (has a maturing RUE AVF)  - BMP in AM    2) abnormal blood sugars    - history of type I DM  - Endocrine consulted  - on insulin gtt    3) HTN    - improved with restarting home meds    4) electrolytes    -Potassium on initial I-STAT was greater than 8, with hyperglycemia  Potassium improved to 5 3  And has remained in normal range since  Unclear if initial potassium was accurate? 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, who was admitted to [facility] after presenting with uncontrolled diabetes on 07/20  A renal consultation is requested today for assistance in the management of ESRD  Patient was noted to have hypoglycemia at dialysis and was given dextrose  Patient states that dialysis was short by half an hour  Was sent back to rehab    At rehab, the patient's blood sugars again decreased, and therefore patient was advised to hold the insulin pump  Then the patient was given food and went to sleep the blood sugars increased  And the patient was sent to the ER  Patient is currently a nursing home due to fractures of lower extremities after a fall      PAST MEDICAL HISTORY:  Past Medical History:   Diagnosis Date    Asthma     Cardiac disease     CHF    [facility]     CPAP (continuous positive airway pressure) dependence     Diabetes mellitus ([facility] )     type 1    Dialysis patient ([place] [address] )     Disease of thyroid gland     hypothyroidism    GERD (gastroesophageal reflux disease)     Hyperlipidemia     Hypertension     Migraine     Psychiatric disorder     PTSD, Depression, panic attacks    Renal disorder     stage 4 kidney disease       PAST SURGICAL HISTORY:  Past Surgical History:   Procedure Laterality Date    CATARACT EXTRACTION, BILATERAL       SECTION      CHOLECYSTECTOMY      HERNIA REPAIR      RETINOPATHY SURGERY      TONSILECTOMY AND ADNOIDECTOMY      TONSILLECTOMY      TYMPANOSTOMY TUBE PLACEMENT         ALLERGIES:  Allergies   Allergen Reactions    Codeine Edema    Shellfish Allergy Edema    Hydrocodone Itching and Vomiting    Morphine Itching and Vomiting    Oxycodone Itching and Vomiting       SOCIAL HISTORY:  Social History     Substance and Sexual Activity   Alcohol Use Not Currently     Social History     Substance and Sexual Activity   Drug Use No     Social History     Tobacco Use   Smoking Status Never Smoker   Smokeless Tobacco Never Used       FAMILY HISTORY:  Family History   Problem Relation Age of Onset    Pancreatic cancer Mother     Stroke Father        MEDICATIONS:    Current Facility-Administered Medications:     acetaminophen (TYLENOL) tablet 650 mg, 650 mg, Oral, Q6H PRN, CARLOS Merritt    albuterol (PROVENTIL HFA,VENTOLIN HFA) inhaler 2 puff, 2 puff, Inhalation, Q4H PRN, MD Soraya Kendall apixaban (ELIQUIS) tablet 2 5 mg, 2 5 mg, Oral, BID, Jared Franco MD, 2 5 mg at 07/21/19 0844    atorvastatin (LIPITOR) tablet 40 mg, 40 mg, Oral, Daily With Gurvinder Melvin MD, 40 mg at 07/21/19 1654    diltiazem (CARDIZEM CD) 24 hr capsule 180 mg, 180 mg, Oral, Daily, Jared Franco MD, 180 mg at 07/21/19 0844    FLUoxetine (PROzac) capsule 40 mg, 40 mg, Oral, Daily, Jared Franco MD, 40 mg at 07/21/19 0844    fluticasone (FLONASE) 50 mcg/act nasal spray 1 spray, 1 spray, Each Nare, Daily, Jared Franco MD, 1 spray at 07/21/19 1233    fluticasone-vilanterol (BREO ELLIPTA) 200-25 MCG/INH inhaler 1 puff, 1 puff, Inhalation, Daily, Jared Franco MD    hydrALAZINE (APRESOLINE) injection 10 mg, 10 mg, Intravenous, Q6H PRN, CARLOS Mason    insulin lispro (HumaLOG) 100 units/mL subcutaneous injection 1-5 Units, 1-5 Units, Subcutaneous, TID AC **AND** Fingerstick Glucose (POCT), , , TID AC, CARLOS Denny    insulin lispro (HumaLOG) 100 units/mL subcutaneous injection 1-5 Units, 1-5 Units, Subcutaneous, HS, CARLOS Denny, 1 Units at 07/21/19 0019    insulin regular (HumuLIN R,NovoLIN R) 1 Units/mL in sodium chloride 0 9 % 100 mL infusion, 0 3-21 Units/hr, Intravenous, Titrated, CARLOS Denny, Last Rate: 0 3 mL/hr at 07/21/19 1653, 0 3 Units/hr at 07/21/19 1653    levothyroxine tablet 75 mcg, 75 mcg, Oral, Early Morning, Jared Franco MD, 75 mcg at 07/21/19 0844    ondansetron (ZOFRAN) injection 4 mg, 4 mg, Intravenous, Q6H PRN, CARLOS Mason    sevelamer (RENAGEL) tablet 800 mg, 800 mg, Oral, TID With Meals, Jared Franco MD, 800 mg at 07/21/19 1655    REVIEW OF SYSTEMS:    All the systems were reviewed and were negative except as documented on the HPI      PHYSICAL EXAM:  Current Weight: Weight - Scale: 111 kg (244 lb 14 9 oz)  First Weight: Weight - Scale: 113 kg (249 lb 1 9 oz)  Vitals:    07/20/19 2357 07/21/19 0311 07/21/19 0715 07/21/19 1534   BP: 143/71 135/63 163/65   BP Location: Left leg  Left leg Right leg   Pulse: 82  88 85   Resp: 16  18 13   Temp: 98 3 °F (36 8 °C)  98 9 °F (37 2 °C) 98 6 °F (37 °C)   TempSrc: Oral  Oral Oral   SpO2: 98% 96% 99% 97%   Weight: 111 kg (244 lb 14 9 oz)      Height: 5' 4" (1 626 m)          Intake/Output Summary (Last 24 hours) at 7/21/2019 1713  Last data filed at 7/21/2019 5801  Gross per 24 hour   Intake 2047 5 ml   Output --   Net 2047 5 ml     Physical Exam  General: NAD  Skin: no rash  Eyes: anicteric sclera  ENT: moist mucous membrane  Neck: supple  Chest: CTA b/l, no ronchii, no wheeze, no rubs, no rales  CVS: s1s2, no murmur, no gallop, no rub  Abdomen: soft, nontender, nl sounds  Extremities: no edema LE b/l; RLE in boot  : no allen  Neuro: AAOX3  Psych: normal affect      Invasive Devices:      Lab Results:   Results from last 7 days   Lab Units 07/21/19  0455 07/21/19  0037 07/20/19  1745  07/20/19  1739   I STAT HEMOGLOBIN g/dl  --   --   --   --  12 6   HEMATOCRIT, ISTAT %  --   --   --   --  37   POTASSIUM mmol/L 4 6 4 5 5 3  --   --    CHLORIDE mmol/L 96* 97* 92*  --   --    CO2 mmol/L 29 32 30  --   --    CO2, I-STAT   --   --   --    < > 32   BUN mg/dL 52* 50* 43*  --   --    CREATININE mg/dL 5 06* 4 85* 4 42*  --   --    CALCIUM mg/dL 9 2 9 0 9 0  --   --    GLUCOSE, ISTAT mg/dl  --   --   --   --  476*    < > = values in this interval not displayed

## 2019-07-21 NOTE — ED NOTES
After removal of IV for transport patient stated that she felt dizzy  Checked patient sugar and it was 77   Gave patient orange juice and will recheck sugar in 15 minutes before sending patient back home      Ck Morales RN  07/20/19 6629

## 2019-07-21 NOTE — ASSESSMENT & PLAN NOTE
No results found for: HGBA1C    Recent Labs     07/20/19  2354 07/21/19  0203 07/21/19  0435 07/21/19  0700   POCGLU 173* 126 79 172*       Blood Sugar Average: Last 72 hrs:  (P) 144 [id number]     Patient has  type 1 diabetes with end-stage renal disease and neuropathy  Her blood sugars in her home prior to nursing home placement for rehab were running less than 150  She admits to not being able to control her diet and her blood sugars as she usually does at home in the nursing home setting  Will continue IV insulin drip pending endocrinology evaluation  She tells me that her Humalog cartridge is almost depleted and she needs other supplies associated with her insulin pump  Will ask Endocrinology to manage this      Will continue diabetic diet

## 2019-07-21 NOTE — ASSESSMENT & PLAN NOTE
No results found for: HGBA1C    Recent Labs     07/20/19  1845 07/20/19  2159 07/20/19  2215 07/20/19  2226   POCGLU 321* 77 65 64*       Blood Sugar Average: Last 72 hrs:  (P) 131 75     Patient was initially low while patient was at dialysis  Insulin pump was stopped  Patient then became hyperglycemic with blood glucose greater than 500 before dinner this evening  Pump was restarted by facility staff  Patient was given 10 units of regular insulin  Blood sugar dropped to 77 and then 64  Last blood glucose 222 after receiving juice and crackers  Patient follows with Dr Starr Duran and has appointment scheduled for 07/30/2019  · Suspend insulin pump  · Initiate regular insulin drip per noncritical care protocol  · Monitor blood glucose q 2 hours  · Inpatient consult to Endocrinology

## 2019-07-21 NOTE — ASSESSMENT & PLAN NOTE
Initial sodium 126  Patient was started on normal saline at 75 mL/hour  · Continue normal saline at 75 mL/hour  · Repeat BMP  · Maximum rate of correction should be no more than 8 mEq per liter in a 24 hour

## 2019-07-21 NOTE — ASSESSMENT & PLAN NOTE
Patient has systolic blood pressures more than 200, she had hypertensive urgency on admission  Blood pressure nicely came down to 973 systolic this morning    Will continue diltiazem  mg daily

## 2019-07-21 NOTE — ASSESSMENT & PLAN NOTE
Patient has history of paroxysmal atrial fibrillation, will continue diltiazem currently she is in sinus rhythm  Will continue Eliquis that she claims she is taking at home    She denies any signs of bleeding

## 2019-07-21 NOTE — ED CARE HANDOFF
Emergency Department Sign Out Note        Sign out and transfer of care from Dr Jesse Lawson  See Separate Emergency Department note  The patient, Jayson Doyle, was evaluated by the previous provider for blood sugar alterations  Workup Completed:  Blood sugar low again = 64 and symptomatic    ED Course / Workup Pending (followup): Admit for observation and blood sugar adjustment                             Procedures  MDM    Disposition  Final diagnoses:   Hyperglycemia     Time reflects when diagnosis was documented in both MDM as applicable and the Disposition within this note     Time User Action Codes Description Comment    7/20/2019  7:12 PM Jesse Lawson, [address] [place] [address] [R73 9] Hyperglycemia       ED Disposition     ED Disposition Condition Date/Time Comment    Discharge Stable Sat Jul 20, 2019  7:12 PM Jayson Doyle discharge to home/self care  Follow-up Information    None       Patient's Medications    No medications on file     No discharge procedures on file         ED Provider  Electronically Signed by     Angelica Choi DO  07/21/19 6571

## 2019-07-21 NOTE — PLAN OF CARE
Problem: Potential for Falls  Goal: Patient will remain free of falls  Description  INTERVENTIONS:  - Assess patient frequently for physical needs  -  Identify cognitive and physical deficits and behaviors that affect risk of falls    -  [place] fall precautions as indicated by assessment   - Educate patient/family on patient safety including physical limitations  - Instruct patient to call for assistance with activity based on assessment  - Modify environment to reduce risk of injury  - Consider OT/PT consult to assist with strengthening/mobility  Outcome: Progressing     Problem: PAIN - ADULT  Goal: Verbalizes/displays adequate comfort level or baseline comfort level  Description  Interventions:  - Encourage patient to monitor pain and request assistance  - Assess pain using appropriate pain scale  - Administer analgesics based on type and severity of pain and evaluate response  - Implement non-pharmacological measures as appropriate and evaluate response  - Consider cultural and social influences on pain and pain management  - Notify physician/advanced practitioner if interventions unsuccessful or patient reports new pain  Outcome: Progressing     Problem: INFECTION - ADULT  Goal: Absence or prevention of progression during hospitalization  Description  INTERVENTIONS:  - Assess and monitor for signs and symptoms of infection  - Monitor lab/diagnostic results  - Monitor all insertion sites, i e  indwelling lines, tubes, and drains  - Monitor endotracheal (as able) and nasal secretions for changes in amount and color  - [place] appropriate cooling/warming therapies per order  - Administer medications as ordered  - Instruct and encourage patient and family to use good hand hygiene technique  - Identify and instruct in appropriate isolation precautions for identified infection/condition  Outcome: Progressing  Goal: Absence of fever/infection during neutropenic period  Description  INTERVENTIONS:  - Monitor WBC  - Implement neutropenic guidelines  Outcome: Progressing     Problem: SAFETY ADULT  Goal: Patient will remain free of falls  Description  INTERVENTIONS:  - Assess patient frequently for physical needs  -  Identify cognitive and physical deficits and behaviors that affect risk of falls    -  [place] fall precautions as indicated by assessment   - Educate patient/family on patient safety including physical limitations  - Instruct patient to call for assistance with activity based on assessment  - Modify environment to reduce risk of injury  - Consider OT/PT consult to assist with strengthening/mobility  Outcome: Progressing  Goal: Maintain or return to baseline ADL function  Description  INTERVENTIONS:  -  Assess patient's ability to carry out ADLs; assess patient's baseline for ADL function and identify physical deficits which impact ability to perform ADLs (bathing, care of mouth/teeth, toileting, grooming, dressing, etc )  - Assess/evaluate cause of self-care deficits   - Assess range of motion  - Assess patient's mobility; develop plan if impaired  - Assess patient's need for assistive devices and provide as appropriate  - Encourage maximum independence but intervene and supervise when necessary  ¯ Involve family in performance of ADLs  ¯ Assess for home care needs following discharge   ¯ Request OT consult to assist with ADL evaluation and planning for discharge  ¯ Provide patient education as appropriate  Outcome: Progressing  Goal: Maintain or return mobility status to optimal level  Description  INTERVENTIONS:  - Assess patient's baseline mobility status (ambulation, transfers, stairs, etc )    - Identify cognitive and physical deficits and behaviors that affect mobility  - Identify mobility aids required to assist with transfers and/or ambulation (gait belt, sit-to-stand, lift, walker, cane, etc )  - [place] fall precautions as indicated by assessment  - Record patient progress and toleration of activity level on Mobility SBAR; progress patient to next Phase/Stage  - Instruct patient to call for assistance with activity based on assessment  - Request Rehabilitation consult to assist with strengthening/weightbearing, etc   Outcome: Progressing     Problem: DISCHARGE PLANNING  Goal: Discharge to home or other facility with appropriate resources  Description  INTERVENTIONS:  - Identify barriers to discharge w/patient and caregiver  - Arrange for needed discharge resources and transportation as appropriate  - Identify discharge learning needs (meds, wound care, etc )  - Arrange for interpretive services to assist at discharge as needed  - Refer to Case Management Department for coordinating discharge planning if the patient needs post-hospital services based on physician/advanced practitioner order or complex needs related to functional status, cognitive ability, or social support system  Outcome: Progressing     Problem: Knowledge Deficit  Goal: Patient/family/caregiver demonstrates understanding of disease process, treatment plan, medications, and discharge instructions  Description  Complete learning assessment and assess knowledge base    Interventions:  - Provide teaching at level of understanding  - Provide teaching via preferred learning methods  Outcome: Progressing     Problem: METABOLIC, FLUID AND ELECTROLYTES - ADULT  Goal: Glucose maintained within target range  Description  INTERVENTIONS:  - Monitor Blood Glucose as ordered  - Assess for signs and symptoms of hyperglycemia and hypoglycemia  - Administer ordered medications to maintain glucose within target range  - Assess nutritional intake and initiate nutrition service referral as needed  Outcome: Progressing

## 2019-07-21 NOTE — ASSESSMENT & PLAN NOTE
Patient currently receives dialysis on Tuesday Thursday Saturday       Will ask Nephrology to manage dialysis

## 2019-07-21 NOTE — UTILIZATION REVIEW
Initial Clinical Review    Admission: Date/Time/Statement: 7/20/19 @ 2323     Orders Placed This Encounter   Procedures    Inpatient Admission     Standing Status:   Standing     Number of Occurrences:   1     Order Specific Question:   Admitting Physician     Answer:   Vernetta Runner     Order Specific Question:   Level of Care     Answer:   Med Surg [16]     Order Specific Question:   Estimated length of stay     Answer:   More than 2 Midnights     Order Specific Question:   Certification     Answer:   I certify that inpatient services are medically necessary for this patient for a duration of greater than two midnights  See H&P and MD Progress Notes for additional information about the patient's course of treatment  ED Arrival Information     Expected Arrival Acuity Means of Arrival Escorted By Service Admission Type    - 7/20/2019 17:28 [facility]    Arrival Complaint    -        Chief Complaint   Patient presents with    Hyperglycemia - no symptoms     pt presents to ED via EMS from [facility] for hyperglycemia  Pt BS in ambulance was 486     Assessment/Plan: MS DE PAZ IS A 47 YO FEMALE WITH A H/O TYPE 1 DM PRESENTS TO THE ED VIA EMS WITH UNCONTROLLED DIABETES  SHE WAS AT DIALYSIS AND HER GLUCOSE WAS LOW AND HER INSULIN PUMP WAS TURNED OFF  SHE RETURNED TO HER FACILITY AND HER BLOOD SUGAR WAS > 500 AND HER PUMP WAS RESTARTED  SHE WAS SENT TO ED FOR EVALUATION  IN ED SHE GOT 10 U INSULIN AND BLOOD GLUCOSE WAS 64/  SHE WAS DIZZY AND GIVEN DEXTROSE  SHE IS AT THE FACILITY FOR TREATMENT OF REHAB FOR ORTHOPEDIC INJURIES  SHE IS ADMITTED TO INPATIENT STATUS [facility] UNCONTROLLED TYPE 1 DM - INSULIN DRIP, STOP INSULIN PUMP, POC GLUCOSE Q 2 HR, CONS ENDOCRINE  HYPONATREMIA - IV FLUIDS  HTN URGENCY - IV HYDRALAZINE PRN WITH BP PARAMETERS  PMH:  ESRD, PAF, S/P THROMBUS        ED Triage Vitals   Temperature Pulse Respirations Blood Pressure SpO2   07/20/19 1729 07/20/19 1729 07/20/19 1729 07/20/19 1745 07/20/19 1729   98 7 °F (37 1 °C) 87 17 (!) 198/81 99 %      Temp Source Heart Rate Source Patient Position - Orthostatic VS BP Location FiO2 (%)   07/20/19 1729 07/20/19 1729 07/20/19 1729 07/20/19 1729 --   Temporal Monitor Lying Left leg       Pain Score       07/20/19 1729       No Pain        Wt Readings from Last 1 Encounters:   07/20/19 111 kg (244 lb 14 9 oz)     Additional Vital Signs:   07/21/19 0715  98 9 °F (37 2 °C)  88  18  135/63  99 %  None (Room air)   07/21/19 0311  --  --  --  --  96 %  --   07/20/19 2357  98 3 °F (36 8 °C)  82  16  143/71  98 %  None (Room air)   07/20/19 2245  --  83  19  219/91Abnormal   96 %  --   07/20/19 2115  --  --  --  219/86Abnormal   --  --   07/20/19 2045  --  86  --  192/80Abnormal   95 %  --   07/20/19 2030  --  85  --  183/75Abnormal   94 %  --   07/20/19 2015  --  83  --  185/76Abnormal   93 %  --   07/20/19 2000  --  85  --  189/76Abnormal   97 %  --   07/20/19 1945  --  --  --  174/70Abnormal   --  --   07/20/19 1930  --  --  --  207/90Abnormal   --  --   07/20/19 1915  --  83  --  216/90Abnormal   93 %  --   07/20/19 1900  --  --  --  197/86Abnormal   --  --   07/20/19 1845  --  85  20  181/79Abnormal   97 %  --   07/20/19 1830  --  80  19  175/81Abnormal   96 %  --   07/20/19 1815  --  87  20  189/75Abnormal   95 %  --   07/20/19 1800  --  83  19  201/80Abnormal   93 %  --   07/20/19 1745  --  84  20  198/81Abnormal   100 %  --     Pertinent Labs/Diagnostic Test Results:     Results from last 7 days   Lab Units 07/20/19 1739   I STAT HEMOGLOBIN g/dl 12 6   HEMATOCRIT, ISTAT % 37     Results from last 7 days   Lab Units 07/21/19  0455 07/21/19  0037 07/20/19  1745 07/20/19  1740 07/20/19 1739   SODIUM mmol/L 135* 133* 128*  --   --    POTASSIUM mmol/L 4 6 4 5 5 3  --   --    CHLORIDE mmol/L 96* 97* 92*  --   --    CO2 mmol/L 29 32 30  --   --    CO2, I-STAT mmol/L  --   --   --  33* 32   ANION GAP mmol/L 10 4 6  -- --    BUN mg/dL 52* 50* 43*  --   --    CREATININE mg/dL 5 06* 4 85* 4 42*  --   --    EGFR ml/min/1 73sq m 9 10 11  --  10   CALCIUM mg/dL 9 2 9 0 9 0  --   --    CALCIUM, IONIZED, ISTAT mmol/L  --   --   --   --  0 91*     Results from last 7 days   Lab Units 07/21/19  1331 07/21/19  1116 07/21/19  0923 07/21/19  0700 07/21/19  0435 07/21/19  0203 07/20/19  2354 07/20/19  2307 07/20/19  2226 07/20/19  2215   POC GLUCOSE mg/dl 227* 337* 380* 172* 79 126 173* 220* 64* 65     Results from last 7 days   Lab Units 07/21/19  0455 07/21/19  0037 07/20/19  1745   GLUCOSE RANDOM mg/dL 63* 144* 486*     Results from last 7 days   Lab Units 07/21/19  0453   HEMOGLOBIN A1C % 8 1*   EAG mg/dl 186     Results from last 7 days   Lab Units 07/20/19  1740   PH, CHERRY I-STAT  7 532*   PCO2, CHERRY ISTAT mm HG 38 5*   PO2, CHERRY ISTAT mm HG 45 0   HCO3, CHERRY ISTAT mmol/L 32 3*   I STAT BASE EXC mmol/L 9*   I STAT O2 SAT % 85*     ED Treatment:   Medication Administration from 07/20/2019 1728 to 07/20/2019 2327    Date/Time Order Dose Route Action   07/20/2019 1740 ondansetron (ZOFRAN) injection 4 mg 4 mg Intravenous Given   07/20/2019 1740 sodium chloride 0 9 % infusion 75 mL/hr Intravenous New Bag   07/20/2019 1815 insulin regular (HumuLIN R,NovoLIN R) injection 10 Units 10 Units Intravenous Given   07/20/2019 2248 dextrose 50 % IV solution 50 mL 50 mL Intravenous Given        Past Medical History:   Diagnosis Date    Asthma     Cardiac disease     CHF    [facility]est[facility]     CPAP (continuous positive airway pressure) dependence     Diabetes mellitus ([facility] )     type 1    Dialysis patient ([facility] )     Disease of thyroid gland     hypothyroidism    GERD (gastroesophageal reflux disease)     Hyperlipidemia     Hypertension     Migraine     Psychiatric disorder     PTSD, Depression, panic attacks    Renal disorder     stage 4 kidney disease     Present on Admission:   Uncontrolled type 1 diabetes mellitus ([facility] )   Hypertensive urgency   Morbid obesity (HCC)    Admitting Diagnosis: High blood sugar [R73 9]  Hyperglycemia [R73 9]  ESRD (end stage renal disease) on dialysis ([facility] ) [N18 6, Z99 2]  Uncontrolled type 1 diabetes mellitus ([facility] ) [E10 65]     Age/Sex: 48 y o  female  Admission Orders:    Current Facility-Administered Medications:  acetaminophen 650 mg Oral    albuterol 2 puff Inhalation    apixaban 2 5 mg Oral    atorvastatin 40 mg Oral    diltiazem 180 mg Oral    FLUoxetine 40 mg Oral    fluticasone 1 spray Each Nare    fluticasone-vilanterol 1 puff Inhalation    hydrALAZINE 10 mg Intravenous    insulin lispro 1-5 Units Subcutaneous    insulin lispro 1-5 Units Subcutaneous    insulin regular (HumuLIN R,NovoLIN R) infusion 0 3-21 Units/hr Intravenous Last Rate: 7 Units/hr (07/21/19 1143)   levothyroxine 75 mcg Oral    ondansetron 4 mg Intravenous    sevelamer 800 mg Oral      UP W/ ASSIST   POC GLUCOSE Q  2HR  UP WITH ASSIST   ADA DIET WITH FL RESTRICTION   IP CONSULT TO INTERNAL MEDICINE  IP CONSULT TO ENDOCRINOLOGY  IP CONSULT TO NEPHROLOGY  CPAP AT [facility] Utilization Review Department  Phone: [phone]; Fax [phone]  [email or web address]  ATTENTION: Please call with any questions or concerns to [phone]  and carefully listen to the prompts so that you are directed to the right person  Send all requests for admission clinical reviews, approved or denied determinations and any other requests to fax [phone]   All voicemails are confidential

## 2019-07-21 NOTE — H&P
H&P- Royal Canseco 1965, 48 y o  female MRN: [id number]    Unit/Bed#: [address] [place] [address] Encounter: [id number]    Primary Care Provider: Shanell Topete DO   Date and time admitted to hospital: 7/20/2019  5:30 PM        * Uncontrolled type 1 diabetes mellitus [facility])  Assessment & Plan  No results found for: HGBA1C    Recent Labs     07/20/19  1845 07/20/19  2159 07/20/19  2215 07/20/19  2226   POCGLU 321* 77 65 64*       Blood Sugar Average: Last 72 hrs:  (P) 131 75     Patient was initially low while patient was at dialysis  Insulin pump was stopped  Patient then became hyperglycemic with blood glucose greater than 500 before dinner this evening  Pump was restarted by facility staff  Patient was given 10 units of regular insulin  Blood sugar dropped to 77 and then 64  Last blood glucose 222 after receiving juice and crackers  Patient follows with Dr Blake Gaspar and has appointment scheduled for 07/30/2019  · Suspend insulin pump  · Initiate regular insulin drip per noncritical care protocol  · Monitor blood glucose q 2 hours  · Inpatient consult to Endocrinology  Hyponatremia  Assessment & Plan  Initial sodium 126  Patient was started on normal saline at 75 mL/hour  · Continue normal saline at 75 mL/hour  · Repeat BMP  · Maximum rate of correction should be no more than 8 mEq per liter in a 24 hour  Hypertensive urgency  Assessment & Plan  Blood pressure ranging from 190s to 200s over 70 to 80s while in the ED  Patient does not receive any antihypertensive medications  Upon arrival to floor blood pressure was 143/71  Patient believes blood pressure was elevated due to anxiety  · Monitor BP per nursing unit  · Administer hydralazine 10 mg IV q 6 hours p r n  SBP greater than 170    ESRD (end stage renal disease) on dialysis [facility])  Assessment & Plan  Patient currently receives dialysis on Tuesday Thursday Saturday  Last dialysis treatment 07/20/2019  Patient sees Dr Wing Nolen    Patient has left brachiocephalic fistula which is currently being used and right fistula which has not matured  · Inpatient consult to Nephrology      PAF (paroxysmal atrial fibrillation) [facility])  Assessment & Plan  Currently in sinus rhythm  · Continue diltiazem 180 mg p o  Daily    Thrombus  Assessment & Plan  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   · Continue Eliquis    VTE Prophylaxis: Apixaban (Eliquis)  / reason for no mechanical VTE prophylaxis pt has bilateral LE fractures   Code Status: Full code  POLST: There is no POLST form on file for this patient (pre-hospital)  Discussion with family: No family present at time of admission    Anticipated Length of Stay:  Patient will be admitted on an Inpatient basis with an anticipated length of stay of  > 2 midnights  Justification for Hospital Stay: IV medication    Total Time for Visit, including Counseling / Coordination of Care: 30 minutes  Greater than 50% of this total time spent on direct patient counseling and coordination of care  Chief Complaint:   hyperglycemia    History of Present Illness:    Michele Mallory is a 48 y o  female with history of type 1 diabetes mellitus, end-stage renal disease on hemodialysis, hypertension, CHF, and 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 who presents with uncontrolled diabetes  According to patient her blood sugar was low while at dialysis this and her insulin pump was stopped  Patient returned to [facility] after dialysis  Prior to dinner her blood sugar glucose was greater than 500  Her insulin pump was restarted and she was sent to ED for evaluation  While in the ED she received 10 units of insulin and blood glucose dropped to 64  Patient became dizzy  She was then given dextrose  Initially patient was to be transferred back to [facility] but it was felt to be unsafe and she was admitted for blood glucose control    Patient is currently a resident of [facility] due to multiple fractures to bilateral lower extremities from of fall at home  Patient states that since being at [facility] her blood glucose has been uncontrolled due to the food she is being given  She states the giver peanut butter and jelly sandwiches multiple times a day and do not pay attention to her blood sugars  Review of Systems:    Review of Systems   Constitutional: Negative for appetite change, chills and fever  Respiratory: Negative for apnea, cough and shortness of breath  Cardiovascular: Negative for chest pain, palpitations and leg swelling  Gastrointestinal: Positive for nausea (Resolved)  Negative for abdominal distention, abdominal pain, constipation, diarrhea and vomiting  Genitourinary: Negative for dysuria  Musculoskeletal:        Occasional bilateral lower extremity pain due to fractures   Neurological: Positive for dizziness (Resolved)  Negative for seizures, facial asymmetry, weakness, light-headedness, numbness and headaches  Psychiatric/Behavioral: Negative for agitation and confusion  The patient is not nervous/anxious  All other systems reviewed and are negative        Past Medical and Surgical History:     Past Medical History:   Diagnosis Date    Asthma     Cardiac disease     CHF    [facility]) [facility]     CPAP (continuous positive airway pressure) dependence     Diabetes mellitus ([facility])     type 1    Dialysis patient ([facility] )     Disease of thyroid gland     hypothyroidism    GERD (gastroesophageal reflux disease)     Hyperlipidemia     Hypertension     Migraine     Psychiatric disorder     PTSD, Depression, panic attacks    Renal disorder     stage 4 kidney disease       Past Surgical History:   Procedure Laterality Date    CATARACT EXTRACTION, BILATERAL       SECTION      CHOLECYSTECTOMY      HERNIA REPAIR      RETINOPATHY SURGERY      TONSILECTOMY AND ADNOIDECTOMY      TONSILLECTOMY      TYMPANOSTOMY TUBE PLACEMENT Meds/Allergies:    Prior to Admission medications    Not on File     I have reviewed home medications with a medical source (PCP, Pharmacy, other)  Allergies: Allergies   Allergen Reactions    Codeine Edema    Shellfish Allergy Edema    Hydrocodone Itching and Vomiting    Morphine Itching and Vomiting    Oxycodone Itching and Vomiting       Social History:     Marital Status: /Civil Union   Occupation:  Disabled  Patient Pre-hospital Living Situation:  At [facility] for rehab  Patient Pre-hospital Level of Mobility:  Independent  Patient Pre-hospital Diet Restrictions:  Low carb  Substance Use History:   Social History     Substance and Sexual Activity   Alcohol Use No     Social History     Tobacco Use   Smoking Status Never Smoker   Smokeless Tobacco Never Used     Social History     Substance and Sexual Activity   Drug Use No       Family History:    non-contributory    Physical Exam:     Vitals:   Blood Pressure: 143/71 (07/20/19 2357)  Pulse: 82 (07/20/19 2357)  Temperature: 98 3 °F (36 8 °C) (07/20/19 2357)  Temp Source: Oral (07/20/19 2357)  Respirations: 16 (07/20/19 2357)  Height: 5' 4" (162 6 cm) (07/20/19 2357)  Weight - Scale: 111 kg (244 lb 14 9 oz) (07/20/19 2357)  SpO2: 98 % (07/20/19 2357)    Physical Exam   Constitutional: She is oriented to person, place, and time  She appears well-developed and well-nourished  No distress  HENT:   Head: Normocephalic and atraumatic  Eyes: Pupils are equal, round, and reactive to light  EOM are normal    Neck: Normal range of motion  Neck supple  Cardiovascular: Normal rate, regular rhythm, normal heart sounds and intact distal pulses  Exam reveals no gallop and no friction rub  No murmur heard  Pulmonary/Chest: Effort normal and breath sounds normal  No respiratory distress  She has no wheezes  She has no rales  Abdominal: Soft  Bowel sounds are normal  She exhibits no distension  There is no tenderness     Musculoskeletal: Normal range of motion  She exhibits no edema  Multiple fractures to bilateral lower extremities  Patient has walking boot on right lower extremity   Neurological: She is alert and oriented to person, place, and time  Skin: Skin is warm and dry  Psychiatric: She has a normal mood and affect  Judgment normal    Nursing note and vitals reviewed  Additional Data:     Lab Results: I have personally reviewed pertinent reports  Results from last 7 days   Lab Units 07/20/19  1739   I STAT HEMOGLOBIN g/dl 12 6   HEMATOCRIT, ISTAT % 37     Results from last 7 days   Lab Units 07/20/19  1745   SODIUM mmol/L 128*   POTASSIUM mmol/L 5 3   CHLORIDE mmol/L 92*   CO2 mmol/L 30   BUN mg/dL 43*   CREATININE mg/dL 4 42*   ANION GAP mmol/L 6   CALCIUM mg/dL 9 0   GLUCOSE RANDOM mg/dL 486*         Results from last 7 days   Lab Units 07/20/19  2354 07/20/19  2307 07/20/19  2226 07/20/19  2215 07/20/19  2159 07/20/19  1845   POC GLUCOSE mg/dl 173* 220* 64* 65 77 321*           EKG, Pathology, and Other Studies Reviewed on Admission:   · EKG:     Allscripts / Epic Records Reviewed: Yes     ** Please Note: This note has been constructed using a voice recognition system   **

## 2019-07-21 NOTE — ASSESSMENT & PLAN NOTE
Blood pressure ranging from 190s to 200s over 70 to 80s while in the ED  Patient does not receive any antihypertensive medications  Upon arrival to floor blood pressure was 143/71  Patient believes blood pressure was elevated due to anxiety  · Monitor BP per nursing unit  · Administer hydralazine 10 mg IV q 6 hours p r n   SBP greater than 170

## 2019-07-21 NOTE — ED NOTES
Rechecked patients sugar after giving crackers and juice  Patients sugar now 65  Doctor notified  More juice and crackers provided        Reta Lanes, RN  07/20/19 5544

## 2019-07-21 NOTE — PLAN OF CARE
Problem: Potential for Falls  Goal: Patient will remain free of falls  Description  INTERVENTIONS:  - Assess patient frequently for physical needs  -  Identify cognitive and physical deficits and behaviors that affect risk of falls    -  [place] fall precautions as indicated by assessment   - Educate patient/family on patient safety including physical limitations  - Instruct patient to call for assistance with activity based on assessment  - Modify environment to reduce risk of injury  - Consider OT/PT consult to assist with strengthening/mobility  Outcome: Progressing     Problem: PAIN - ADULT  Goal: Verbalizes/displays adequate comfort level or baseline comfort level  Description  Interventions:  - Encourage patient to monitor pain and request assistance  - Assess pain using appropriate pain scale  - Administer analgesics based on type and severity of pain and evaluate response  - Implement non-pharmacological measures as appropriate and evaluate response  - Consider cultural and social influences on pain and pain management  - Notify physician/advanced practitioner if interventions unsuccessful or patient reports new pain  Outcome: Progressing     Problem: INFECTION - ADULT  Goal: Absence or prevention of progression during hospitalization  Description  INTERVENTIONS:  - Assess and monitor for signs and symptoms of infection  - Monitor lab/diagnostic results  - Monitor all insertion sites, i e  indwelling lines, tubes, and drains  - Monitor endotracheal (as able) and nasal secretions for changes in amount and color  - [place] appropriate cooling/warming therapies per order  - Administer medications as ordered  - Instruct and encourage patient and family to use good hand hygiene technique  - Identify and instruct in appropriate isolation precautions for identified infection/condition  Outcome: Progressing  Goal: Absence of fever/infection during neutropenic period  Description  INTERVENTIONS:  - Monitor WBC  - Implement neutropenic guidelines  Outcome: Progressing     Problem: SAFETY ADULT  Goal: Patient will remain free of falls  Description  INTERVENTIONS:  - Assess patient frequently for physical needs  -  Identify cognitive and physical deficits and behaviors that affect risk of falls    -  [place] fall precautions as indicated by assessment   - Educate patient/family on patient safety including physical limitations  - Instruct patient to call for assistance with activity based on assessment  - Modify environment to reduce risk of injury  - Consider OT/PT consult to assist with strengthening/mobility  Outcome: Progressing  Goal: Maintain or return to baseline ADL function  Description  INTERVENTIONS:  -  Assess patient's ability to carry out ADLs; assess patient's baseline for ADL function and identify physical deficits which impact ability to perform ADLs (bathing, care of mouth/teeth, toileting, grooming, dressing, etc )  - Assess/evaluate cause of self-care deficits   - Assess range of motion  - Assess patient's mobility; develop plan if impaired  - Assess patient's need for assistive devices and provide as appropriate  - Encourage maximum independence but intervene and supervise when necessary  ¯ Involve family in performance of ADLs  ¯ Assess for home care needs following discharge   ¯ Request OT consult to assist with ADL evaluation and planning for discharge  ¯ Provide patient education as appropriate  Outcome: Progressing  Goal: Maintain or return mobility status to optimal level  Description  INTERVENTIONS:  - Assess patient's baseline mobility status (ambulation, transfers, stairs, etc )    - Identify cognitive and physical deficits and behaviors that affect mobility  - Identify mobility aids required to assist with transfers and/or ambulation (gait belt, sit-to-stand, lift, walker, cane, etc )  - [place] fall precautions as indicated by assessment  - Record patient progress and toleration of activity level on Mobility SBAR; progress patient to next Phase/Stage  - Instruct patient to call for assistance with activity based on assessment  - Request Rehabilitation consult to assist with strengthening/weightbearing, etc   Outcome: Progressing     Problem: DISCHARGE PLANNING  Goal: Discharge to home or other facility with appropriate resources  Description  INTERVENTIONS:  - Identify barriers to discharge w/patient and caregiver  - Arrange for needed discharge resources and transportation as appropriate  - Identify discharge learning needs (meds, wound care, etc )  - Arrange for interpretive services to assist at discharge as needed  - Refer to Case Management Department for coordinating discharge planning if the patient needs post-hospital services based on physician/advanced practitioner order or complex needs related to functional status, cognitive ability, or social support system  Outcome: Progressing     Problem: Knowledge Deficit  Goal: Patient/family/caregiver demonstrates understanding of disease process, treatment plan, medications, and discharge instructions  Description  Complete learning assessment and assess knowledge base    Interventions:  - Provide teaching at level of understanding  - Provide teaching via preferred learning methods  Outcome: Progressing     Problem: METABOLIC, FLUID AND ELECTROLYTES - ADULT  Goal: Glucose maintained within target range  Description  INTERVENTIONS:  - Monitor Blood Glucose as ordered  - Assess for signs and symptoms of hyperglycemia and hypoglycemia  - Administer ordered medications to maintain glucose within target range  - Assess nutritional intake and initiate nutrition service referral as needed  Outcome: Progressing     Problem: Prexisting or High Potential for Compromised Skin Integrity  Goal: Skin integrity is maintained or improved  Description  INTERVENTIONS:  - Identify patients at risk for skin breakdown  - Assess and monitor skin integrity  - Assess and monitor nutrition and hydration status  - Monitor labs (i e  albumin)  - Assess for incontinence   - Turn and reposition patient  - Assist with mobility/ambulation  - Relieve pressure over bony prominences  - Avoid friction and shearing  - Provide appropriate hygiene as needed including keeping skin clean and dry  - Evaluate need for skin moisturizer/barrier cream  - Collaborate with interdisciplinary team (i e  Nutrition, Rehabilitation, etc )   - Patient/family teaching  Outcome: Progressing     Problem: Nutrition/Hydration-ADULT  Goal: Nutrient/Hydration intake appropriate for improving, restoring or maintaining nutritional needs  Description  Monitor and assess patient's nutrition/hydration status for malnutrition (ex- brittle hair, bruises, dry skin, pale skin and conjunctiva, muscle wasting, smooth red tongue, and disorientation)  Collaborate with interdisciplinary team and initiate plan and interventions as ordered  Monitor patient's weight and dietary intake as ordered or per policy  Utilize nutrition screening tool and intervene per policy  Determine patient's food preferences and provide high-protein, high-caloric foods as appropriate       INTERVENTIONS:  - Monitor oral intake, urinary output, labs, and treatment plans  - Assess nutrition and hydration status and recommend course of action  - Evaluate amount of meals eaten  - Assist patient with eating if necessary   - Allow adequate time for meals  - Recommend/ encourage appropriate diets, oral nutritional supplements, and vitamin/mineral supplements  - Order, calculate, and assess calorie counts as needed  - Recommend, monitor, and adjust tube feedings and TPN/PPN based on assessed needs  - Assess need for intravenous fluids  - Provide specific nutrition/hydration education as appropriate  - Include patient/family/caregiver in decisions related to nutrition  Outcome: Progressing

## 2019-07-21 NOTE — MALNUTRITION/BMI
This medical record reflects one or more clinical indicators suggestive of malnutrition and/or morbid obesity  Malnutrition Findings:              BMI Findings:  BMI Classifications: Morbid Obesity 40-44 9     Body mass index is 42 04 kg/m²  See Nutrition note dated 07/21/2019 for additional details  Completed nutrition assessment is viewable in the nutrition documentation

## 2019-07-21 NOTE — ASSESSMENT & PLAN NOTE
Patient currently receives dialysis on Tuesday Thursday Saturday  Last dialysis treatment 07/20/2019  Patient sees Dr El Fiore  Patient has left brachiocephalic fistula which is currently being used and right fistula which has not matured    · Inpatient consult to Nephrology

## 2019-07-22 LAB
GLUCOSE SERPL-MCNC: 101 MG/DL (ref 65–140)
GLUCOSE SERPL-MCNC: 109 MG/DL (ref 65–140)
GLUCOSE SERPL-MCNC: 111 MG/DL (ref 65–140)
GLUCOSE SERPL-MCNC: 134 MG/DL (ref 65–140)
GLUCOSE SERPL-MCNC: 159 MG/DL (ref 65–140)
GLUCOSE SERPL-MCNC: 171 MG/DL (ref 65–140)
GLUCOSE SERPL-MCNC: 229 MG/DL (ref 65–140)
GLUCOSE SERPL-MCNC: 256 MG/DL (ref 65–140)
GLUCOSE SERPL-MCNC: 269 MG/DL (ref 65–140)
GLUCOSE SERPL-MCNC: 292 MG/DL (ref 65–140)
GLUCOSE SERPL-MCNC: 304 MG/DL (ref 65–140)
GLUCOSE SERPL-MCNC: 73 MG/DL (ref 65–140)
MRSA NOSE QL CULT: NORMAL

## 2019-07-22 PROCEDURE — 99223 1ST HOSP IP/OBS HIGH 75: CPT | Performed by: INTERNAL MEDICINE

## 2019-07-22 PROCEDURE — 82948 REAGENT STRIP/BLOOD GLUCOSE: CPT

## 2019-07-22 PROCEDURE — 94760 N-INVAS EAR/PLS OXIMETRY 1: CPT

## 2019-07-22 PROCEDURE — 99232 SBSQ HOSP IP/OBS MODERATE 35: CPT | Performed by: INTERNAL MEDICINE

## 2019-07-22 PROCEDURE — 99233 SBSQ HOSP IP/OBS HIGH 50: CPT | Performed by: HOSPITALIST

## 2019-07-22 RX ADMIN — FAMOTIDINE 20 MG: 20 TABLET ORAL at 08:34

## 2019-07-22 RX ADMIN — FLUOXETINE 40 MG: 20 CAPSULE ORAL at 08:33

## 2019-07-22 RX ADMIN — ATORVASTATIN CALCIUM 40 MG: 40 TABLET, FILM COATED ORAL at 17:33

## 2019-07-22 RX ADMIN — FLUTICASONE PROPIONATE 1 SPRAY: 50 SPRAY, METERED NASAL at 08:33

## 2019-07-22 RX ADMIN — APIXABAN 2.5 MG: 2.5 TABLET, FILM COATED ORAL at 08:34

## 2019-07-22 RX ADMIN — SEVELAMER HYDROCHLORIDE 800 MG: 800 TABLET, FILM COATED PARENTERAL at 11:33

## 2019-07-22 RX ADMIN — GABAPENTIN 300 MG: 300 CAPSULE ORAL at 22:04

## 2019-07-22 RX ADMIN — DILTIAZEM HYDROCHLORIDE 180 MG: 180 CAPSULE, COATED, EXTENDED RELEASE ORAL at 08:34

## 2019-07-22 RX ADMIN — APIXABAN 2.5 MG: 2.5 TABLET, FILM COATED ORAL at 17:33

## 2019-07-22 RX ADMIN — PANTOPRAZOLE SODIUM 40 MG: 40 TABLET, DELAYED RELEASE ORAL at 17:34

## 2019-07-22 RX ADMIN — SEVELAMER HYDROCHLORIDE 800 MG: 800 TABLET, FILM COATED PARENTERAL at 17:34

## 2019-07-22 RX ADMIN — LEVOTHYROXINE SODIUM 75 MCG: 75 TABLET ORAL at 05:59

## 2019-07-22 RX ADMIN — SEVELAMER HYDROCHLORIDE 800 MG: 800 TABLET, FILM COATED PARENTERAL at 08:37

## 2019-07-22 RX ADMIN — PANTOPRAZOLE SODIUM 40 MG: 40 TABLET, DELAYED RELEASE ORAL at 08:34

## 2019-07-22 NOTE — ASSESSMENT & PLAN NOTE
Lab Results   Component Value Date    HGBA1C 8 1 (H) 07/21/2019       Recent Labs     07/22/19  0558 07/22/19  0745 07/22/19  1022 07/22/19  1118   POCGLU 109 171* 269* 292*       Blood Sugar Average: Last 72 hrs:  (P) 902 [id number]     -Patient has  type 1 diabetes with end-stage renal disease and neuropathy   -Her blood sugars in her home prior to nursing home placement for rehab were running less than 150   -She admits to not being able to control her diet and her blood sugars as she usually does at home in the nursing home setting   -continue IV insulin drip pending endocrinology evaluation  She reports that her Humalog cartridge is almost depleted and she needs other supplies associated with her insulin pump  Will ask Endocrinology to manage this    -diabetic diet

## 2019-07-22 NOTE — PROGRESS NOTES
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   · Continue Eliquis    Hypertensive urgency  Assessment & Plan  -continue Cardizem CD 180mg daily   -patient had hypertensive urgency on admission, now resolved        PAF (paroxysmal atrial fibrillation) ([facility] [address] )  Assessment & Plan  -cont Cardizem/Eliquis    ESRD (end stage renal disease) on dialysis [facility])  Assessment & Plan  -cont HD Tuesday Thursday Saturday    -appreciate renal    Hyponatremia  Assessment & Plan  -Na now 135      * Uncontrolled type 1 diabetes mellitus [facility])  Assessment & Plan  Lab Results   Component Value Date    HGBA1C 8 1 (H) 07/21/2019       Recent Labs     07/22/19  0558 07/22/19  0745 07/22/19  1022 07/22/19  1118   POCGLU 109 171* 269* 292*       Blood Sugar Average: Last 72 hrs:  (P) 801 [id number]     -Patient has  type 1 diabetes with end-stage renal disease and neuropathy   -Her blood sugars in her home prior to nursing home placement for rehab were running less than 150   -She admits to not being able to control her diet and her blood sugars as she usually does at home in the nursing home setting   -continue IV insulin drip pending endocrinology evaluation  She reports that her Humalog cartridge is almost depleted and she needs other supplies associated with her insulin pump  Will ask Endocrinology to manage this  -diabetic diet      VTE Pharmacologic Prophylaxis:   Pharmacologic: Apixaban (Eliquis)  Mechanical VTE Prophylaxis in Place: No    Patient Centered Rounds: I have performed bedside rounds with nursing staff today      Education and Discussions with Family / Patient:  Patient and family at bedside    Time Spent for Care: 20 minutes  More than 50% of total time spent on counseling and coordination of care as described above  Current Length of Stay: 2 day(s)    Current Patient Status: Inpatient   Certification Statement: The patient will continue to require additional inpatient hospital stay due to hyperglycemia/ESRD    Discharge Plan: tomorrow    Code Status: Level 1 - Full Code      Subjective:   Patient without acute complaints  Objective:     Vitals:   Temp (24hrs), Av 4 °F (36 9 °C), Min:98 1 °F (36 7 °C), Max:98 6 °F (37 °C)    Temp:  [98 1 °F (36 7 °C)-98 6 °F (37 °C)] 98 1 °F (36 7 °C)  HR:  [85-89] 87  Resp:  [13-20] 20  BP: (138-163)/(55-66) 138/66  SpO2:  [96 %-98 %] 98 %  Body mass index is 42 04 kg/m²  Input and Output Summary (last 24 hours):        Intake/Output Summary (Last 24 hours) at 2019 1254  Last data filed at 2019 0900  Gross per 24 hour   Intake 300 ml   Output --   Net 300 ml       Physical Exam:     Physical Exam  Gen: NAD, AAOx3, well developed, well nourished  Eyes: EOMI, PERRLA, no scleral icterus  ENMT:  Oropharynx clear of erythema or exudates, no nasal discharge, no otic discharge, moist mucous membranes  Neck:  Supple  Lymph:  No anterior or posterior cervical or supraclavicular lymphadenopathy  Cardiovascular:  Regular rate and rhythm, normal S1-S2, no murmurs, rubs, or gallops  Lungs:  Clear to auscultation bilaterally, no wheezes, or rales, or rhonchi  Abdomen:  Positive bowel sounds, soft, nontender, nondistended, no palpable organomegaly   Skin:  Intact, no obvious lesions or rashes, no edema  Neuro: Cranial nerves 2-12 are intact, non-focal, 5/5 strength in all 4 extremities      Additional Data:     Labs:    Results from last 7 days   Lab Units 19  1739   I STAT HEMOGLOBIN g/dl 12 6   HEMATOCRIT, ISTAT % 37     Results from last 7 days   Lab Units 19  0455   SODIUM mmol/L 135*   POTASSIUM mmol/L 4  6   CHLORIDE mmol/L 96*   CO2 mmol/L 29   BUN mg/dL 52*   CREATININE mg/dL 5 06*   ANION GAP mmol/L 10   CALCIUM mg/dL 9 2   GLUCOSE RANDOM mg/dL 63*         Results from last 7 days   Lab Units 07/22/19  1118 07/22/19  1022 07/22/19  0745 07/22/19  0558 07/22/19  0421 07/22/19  0206 07/22/19  0006 07/21/19  2148 07/21/19  2029 07/21/19  1807 07/21/19  1646 07/21/19  1331   POC GLUCOSE mg/dl 292* 269* 171* 109 101 73 159* 352* 351* 190* 95 227*     Results from last 7 days   Lab Units 07/21/19  0453   HEMOGLOBIN A1C % 8 1*               * I Have Reviewed All Lab Data Listed Above  * Additional Pertinent Lab Tests Reviewed:  [address] Admission Reviewed      Recent Cultures (last 7 days):           Last 24 Hours Medication List:     Current Facility-Administered Medications:  acetaminophen 650 mg Oral Q6H PRN CARLOS Norman    albuterol 2 puff Inhalation Q4H PRN Mane Nolan MD    apixaban 2 5 mg Oral BID Mane Nolan MD    atorvastatin 40 mg Oral Daily With Nilo France MD    diltiazem 180 mg Oral Daily Mane Nolan MD    famotidine 20 mg Oral Daily CARLOS Denny    FLUoxetine 40 mg Oral Daily Mane Nolan MD    fluticasone 1 spray Each Nare Daily Mane Nolan MD    fluticasone-vilanterol 1 puff Inhalation Daily Mane Nolan MD    gabapentin 300 mg Oral HS CARLOS Denny    hydrALAZINE 10 mg Intravenous Q6H PRN CARLOS Norman    HYDROcodone-acetaminophen 1 tablet Oral Q6H PRN CARLOS Wylie    insulin lispro 1-5 Units Subcutaneous TID AC CARLOS Denny    insulin lispro 1-5 Units Subcutaneous HS CARLOS Denny    insulin regular (HumuLIN R,NovoLIN R) infusion 0 3-21 Units/hr Intravenous Titrated CARLOS Denny Last Rate: 10 Units/hr (07/22/19 1226)   levothyroxine 75 mcg Oral Early Morning Mane Nolan MD    ondansetron 4 mg Intravenous Q6H PRN CARLOS Denny    pantoprazole 40 mg Oral BID GIL Melendez CARLOS Garcia    sevelamer 800 mg Oral TID With Meals Alysha Holloway MD         Today, Patient Was Seen By: Miladis Morris MD    ** Please Note: Dictation voice to text software may have been used in the creation of this document   **

## 2019-07-22 NOTE — PLAN OF CARE
Problem: Potential for Falls  Goal: Patient will remain free of falls  Description  INTERVENTIONS:  - Assess patient frequently for physical needs  -  Identify cognitive and physical deficits and behaviors that affect risk of falls    -  [place] fall precautions as indicated by assessment   - Educate patient/family on patient safety including physical limitations  - Instruct patient to call for assistance with activity based on assessment  - Modify environment to reduce risk of injury  - Consider OT/PT consult to assist with strengthening/mobility  Outcome: Progressing     Problem: PAIN - ADULT  Goal: Verbalizes/displays adequate comfort level or baseline comfort level  Description  Interventions:  - Encourage patient to monitor pain and request assistance  - Assess pain using appropriate pain scale  - Administer analgesics based on type and severity of pain and evaluate response  - Implement non-pharmacological measures as appropriate and evaluate response  - Consider cultural and social influences on pain and pain management  - Notify physician/advanced practitioner if interventions unsuccessful or patient reports new pain  Outcome: Progressing     Problem: INFECTION - ADULT  Goal: Absence or prevention of progression during hospitalization  Description  INTERVENTIONS:  - Assess and monitor for signs and symptoms of infection  - Monitor lab/diagnostic results  - Monitor all insertion sites, i e  indwelling lines, tubes, and drains  - Monitor endotracheal (as able) and nasal secretions for changes in amount and color  - [place] appropriate cooling/warming therapies per order  - Administer medications as ordered  - Instruct and encourage patient and family to use good hand hygiene technique  - Identify and instruct in appropriate isolation precautions for identified infection/condition  Outcome: Progressing  Goal: Absence of fever/infection during neutropenic period  Description  INTERVENTIONS:  - Monitor WBC  - Implement neutropenic guidelines  Outcome: Progressing     Problem: SAFETY ADULT  Goal: Patient will remain free of falls  Description  INTERVENTIONS:  - Assess patient frequently for physical needs  -  Identify cognitive and physical deficits and behaviors that affect risk of falls    -  [place] fall precautions as indicated by assessment   - Educate patient/family on patient safety including physical limitations  - Instruct patient to call for assistance with activity based on assessment  - Modify environment to reduce risk of injury  - Consider OT/PT consult to assist with strengthening/mobility  Outcome: Progressing  Goal: Maintain or return to baseline ADL function  Description  INTERVENTIONS:  -  Assess patient's ability to carry out ADLs; assess patient's baseline for ADL function and identify physical deficits which impact ability to perform ADLs (bathing, care of mouth/teeth, toileting, grooming, dressing, etc )  - Assess/evaluate cause of self-care deficits   - Assess range of motion  - Assess patient's mobility; develop plan if impaired  - Assess patient's need for assistive devices and provide as appropriate  - Encourage maximum independence but intervene and supervise when necessary  ¯ Involve family in performance of ADLs  ¯ Assess for home care needs following discharge   ¯ Request OT consult to assist with ADL evaluation and planning for discharge  ¯ Provide patient education as appropriate  Outcome: Progressing  Goal: Maintain or return mobility status to optimal level  Description  INTERVENTIONS:  - Assess patient's baseline mobility status (ambulation, transfers, stairs, etc )    - Identify cognitive and physical deficits and behaviors that affect mobility  - Identify mobility aids required to assist with transfers and/or ambulation (gait belt, sit-to-stand, lift, walker, cane, etc )  - [place] fall precautions as indicated by assessment  - Record patient progress and toleration of activity level on Mobility SBAR; progress patient to next Phase/Stage  - Instruct patient to call for assistance with activity based on assessment  - Request Rehabilitation consult to assist with strengthening/weightbearing, etc   Outcome: Progressing     Problem: DISCHARGE PLANNING  Goal: Discharge to home or other facility with appropriate resources  Description  INTERVENTIONS:  - Identify barriers to discharge w/patient and caregiver  - Arrange for needed discharge resources and transportation as appropriate  - Identify discharge learning needs (meds, wound care, etc )  - Arrange for interpretive services to assist at discharge as needed  - Refer to Case Management Department for coordinating discharge planning if the patient needs post-hospital services based on physician/advanced practitioner order or complex needs related to functional status, cognitive ability, or social support system  Outcome: Progressing     Problem: Knowledge Deficit  Goal: Patient/family/caregiver demonstrates understanding of disease process, treatment plan, medications, and discharge instructions  Description  Complete learning assessment and assess knowledge base    Interventions:  - Provide teaching at level of understanding  - Provide teaching via preferred learning methods  Outcome: Progressing     Problem: METABOLIC, FLUID AND ELECTROLYTES - ADULT  Goal: Glucose maintained within target range  Description  INTERVENTIONS:  - Monitor Blood Glucose as ordered  - Assess for signs and symptoms of hyperglycemia and hypoglycemia  - Administer ordered medications to maintain glucose within target range  - Assess nutritional intake and initiate nutrition service referral as needed  Outcome: Progressing     Problem: Prexisting or High Potential for Compromised Skin Integrity  Goal: Skin integrity is maintained or improved  Description  INTERVENTIONS:  - Identify patients at risk for skin breakdown  - Assess and monitor skin integrity  - Assess and monitor nutrition and hydration status  - Monitor labs (i e  albumin)  - Assess for incontinence   - Turn and reposition patient  - Assist with mobility/ambulation  - Relieve pressure over bony prominences  - Avoid friction and shearing  - Provide appropriate hygiene as needed including keeping skin clean and dry  - Evaluate need for skin moisturizer/barrier cream  - Collaborate with interdisciplinary team (i e  Nutrition, Rehabilitation, etc )   - Patient/family teaching  Outcome: Progressing     Problem: Nutrition/Hydration-ADULT  Goal: Nutrient/Hydration intake appropriate for improving, restoring or maintaining nutritional needs  Description  Monitor and assess patient's nutrition/hydration status for malnutrition (ex- brittle hair, bruises, dry skin, pale skin and conjunctiva, muscle wasting, smooth red tongue, and disorientation)  Collaborate with interdisciplinary team and initiate plan and interventions as ordered  Monitor patient's weight and dietary intake as ordered or per policy  Utilize nutrition screening tool and intervene per policy  Determine patient's food preferences and provide high-protein, high-caloric foods as appropriate       INTERVENTIONS:  - Monitor oral intake, urinary output, labs, and treatment plans  - Assess nutrition and hydration status and recommend course of action  - Evaluate amount of meals eaten  - Assist patient with eating if necessary   - Allow adequate time for meals  - Recommend/ encourage appropriate diets, oral nutritional supplements, and vitamin/mineral supplements  - Order, calculate, and assess calorie counts as needed  - Recommend, monitor, and adjust tube feedings and TPN/PPN based on assessed needs  - Assess need for intravenous fluids  - Provide specific nutrition/hydration education as appropriate  - Include patient/family/caregiver in decisions related to nutrition  Outcome: Progressing

## 2019-07-22 NOTE — PLAN OF CARE
Problem: Potential for Falls  Goal: Patient will remain free of falls  Description  INTERVENTIONS:  - Assess patient frequently for physical needs  -  Identify cognitive and physical deficits and behaviors that affect risk of falls    -  [place] fall precautions as indicated by assessment   - Educate patient/family on patient safety including physical limitations  - Instruct patient to call for assistance with activity based on assessment  - Modify environment to reduce risk of injury  - Consider OT/PT consult to assist with strengthening/mobility  Outcome: Progressing     Problem: PAIN - ADULT  Goal: Verbalizes/displays adequate comfort level or baseline comfort level  Description  Interventions:  - Encourage patient to monitor pain and request assistance  - Assess pain using appropriate pain scale  - Administer analgesics based on type and severity of pain and evaluate response  - Implement non-pharmacological measures as appropriate and evaluate response  - Consider cultural and social influences on pain and pain management  - Notify physician/advanced practitioner if interventions unsuccessful or patient reports new pain  Outcome: Progressing     Problem: INFECTION - ADULT  Goal: Absence or prevention of progression during hospitalization  Description  INTERVENTIONS:  - Assess and monitor for signs and symptoms of infection  - Monitor lab/diagnostic results  - Monitor all insertion sites, i e  indwelling lines, tubes, and drains  - Monitor endotracheal (as able) and nasal secretions for changes in amount and color  - [place] appropriate cooling/warming therapies per order  - Administer medications as ordered  - Instruct and encourage patient and family to use good hand hygiene technique  - Identify and instruct in appropriate isolation precautions for identified infection/condition  Outcome: Progressing     Problem: SAFETY ADULT  Goal: Patient will remain free of falls  Description  INTERVENTIONS:  - Assess patient frequently for physical needs  -  Identify cognitive and physical deficits and behaviors that affect risk of falls    -  [place] fall precautions as indicated by assessment   - Educate patient/family on patient safety including physical limitations  - Instruct patient to call for assistance with activity based on assessment  - Modify environment to reduce risk of injury  - Consider OT/PT consult to assist with strengthening/mobility  Outcome: Progressing  Goal: Maintain or return to baseline ADL function  Description  INTERVENTIONS:  -  Assess patient's ability to carry out ADLs; assess patient's baseline for ADL function and identify physical deficits which impact ability to perform ADLs (bathing, care of mouth/teeth, toileting, grooming, dressing, etc )  - Assess/evaluate cause of self-care deficits   - Assess range of motion  - Assess patient's mobility; develop plan if impaired  - Assess patient's need for assistive devices and provide as appropriate  - Encourage maximum independence but intervene and supervise when necessary  ¯ Involve family in performance of ADLs  ¯ Assess for home care needs following discharge   ¯ Request OT consult to assist with ADL evaluation and planning for discharge  ¯ Provide patient education as appropriate  Outcome: Progressing  Goal: Maintain or return mobility status to optimal level  Description  INTERVENTIONS:  - Assess patient's baseline mobility status (ambulation, transfers, stairs, etc )    - Identify cognitive and physical deficits and behaviors that affect mobility  - Identify mobility aids required to assist with transfers and/or ambulation (gait belt, sit-to-stand, lift, walker, cane, etc )  - [place] fall precautions as indicated by assessment  - Record patient progress and toleration of activity level on Mobility SBAR; progress patient to next Phase/Stage  - Instruct patient to call for assistance with activity based on assessment  - Request Rehabilitation consult to assist with strengthening/weightbearing, etc   Outcome: Progressing     Problem: DISCHARGE PLANNING  Goal: Discharge to home or other facility with appropriate resources  Description  INTERVENTIONS:  - Identify barriers to discharge w/patient and caregiver  - Arrange for needed discharge resources and transportation as appropriate  - Identify discharge learning needs (meds, wound care, etc )  - Arrange for interpretive services to assist at discharge as needed  - Refer to Case Management Department for coordinating discharge planning if the patient needs post-hospital services based on physician/advanced practitioner order or complex needs related to functional status, cognitive ability, or social support system  Outcome: Progressing     Problem: Knowledge Deficit  Goal: Patient/family/caregiver demonstrates understanding of disease process, treatment plan, medications, and discharge instructions  Description  Complete learning assessment and assess knowledge base    Interventions:  - Provide teaching at level of understanding  - Provide teaching via preferred learning methods  Outcome: Progressing     Problem: METABOLIC, FLUID AND ELECTROLYTES - ADULT  Goal: Glucose maintained within target range  Description  INTERVENTIONS:  - Monitor Blood Glucose as ordered  - Assess for signs and symptoms of hyperglycemia and hypoglycemia  - Administer ordered medications to maintain glucose within target range  - Assess nutritional intake and initiate nutrition service referral as needed  Outcome: Progressing     Problem: Prexisting or High Potential for Compromised Skin Integrity  Goal: Skin integrity is maintained or improved  Description  INTERVENTIONS:  - Identify patients at risk for skin breakdown  - Assess and monitor skin integrity  - Assess and monitor nutrition and hydration status  - Monitor labs (i e  albumin)  - Assess for incontinence   - Turn and reposition patient  - Assist with mobility/ambulation  - Relieve pressure over bony prominences  - Avoid friction and shearing  - Provide appropriate hygiene as needed including keeping skin clean and dry  - Evaluate need for skin moisturizer/barrier cream  - Collaborate with interdisciplinary team (i e  Nutrition, Rehabilitation, etc )   - Patient/family teaching  Outcome: Progressing     Problem: Nutrition/Hydration-ADULT  Goal: Nutrient/Hydration intake appropriate for improving, restoring or maintaining nutritional needs  Description  Monitor and assess patient's nutrition/hydration status for malnutrition (ex- brittle hair, bruises, dry skin, pale skin and conjunctiva, muscle wasting, smooth red tongue, and disorientation)  Collaborate with interdisciplinary team and initiate plan and interventions as ordered  Monitor patient's weight and dietary intake as ordered or per policy  Utilize nutrition screening tool and intervene per policy  Determine patient's food preferences and provide high-protein, high-caloric foods as appropriate       INTERVENTIONS:  - Monitor oral intake, urinary output, labs, and treatment plans  - Assess nutrition and hydration status and recommend course of action  - Evaluate amount of meals eaten  - Assist patient with eating if necessary   - Allow adequate time for meals  - Recommend/ encourage appropriate diets, oral nutritional supplements, and vitamin/mineral supplements  - Order, calculate, and assess calorie counts as needed  - Recommend, monitor, and adjust tube feedings and TPN/PPN based on assessed needs  - Assess need for intravenous fluids  - Provide specific nutrition/hydration education as appropriate  - Include patient/family/caregiver in decisions related to nutrition  Outcome: Progressing

## 2019-07-22 NOTE — PROGRESS NOTES
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 who is admitted after presenting with uncontrolled diabetes    1  End-stage kidney disease on hemodialysis every Tuesday Thursday Saturday  2  Abnormal blood sugar  3  Hypertension  4  Hyperkalemia  5   CKD bone mineral disease    -next hemodialysis is on Tuesday  -continue glycemic control  -blood pressures are stable  -electrolytes and acid-base status acceptable  -continue phosphorus binding    SUBJECTIVE:    -patient was seen today breathing is stable denies any chest pain shortness of breath fevers chills    OBJECTIVE:  Current Weight: Weight - Scale: 111 kg (244 lb 14 9 oz)  Vitals:    07/22/19 1543   BP: 127/60   Pulse: 82   Resp: 15   Temp: 97 5 °F (36 4 °C)   SpO2: 99%       Intake/Output Summary (Last 24 hours) at 7/22/2019 1616  Last data filed at 7/22/2019 1424  Gross per 24 hour   Intake 448 5 ml   Output --   Net 448 5 ml       General: conscious, cooperative, in not acute distress, obese  Eyes: conjunctivae pink, anicteric sclerae  ENT: lips and mucous membranes moist  Neck: supple, no JVD  Chest: clear breath sounds bilateral, no crackles, ronchus or wheezings  CVS: distinct S1 & S2, normal rate, regular rhythm  Abdomen: soft, non-tender, non-distended, normoactive bowel sounds  Extremities: no edema of both legs right leg in boot, left upper extremity AV fistula with a positive thrill  Skin: no rash  Neuro: awake, alert, oriented        Medications:    Current Facility-Administered Medications:     acetaminophen (TYLENOL) tablet 650 mg, 650 mg, Oral, Q6H PRN, CARLOS Block    albuterol (PROVENTIL HFA,VENTOLIN HFA) inhaler 2 puff, 2 puff, Inhalation, Q4H PRN, Mg Ha MD    apixaban [facility]) tablet 2 5 mg, 2 5 mg, Oral, BID, Mg Ha MD, 2 5 mg at 07/22/19 0834    atorvastatin (LIPITOR) tablet 40 mg, 40 mg, Oral, Daily With Linden Ulrich MD, 40 mg at 07/21/19 1654    diltiazem (CARDIZEM CD) 24 hr capsule 180 mg, 180 mg, Oral, Daily, Mg Ha MD, 180 mg at 07/22/19 0834    famotidine (PEPCID) tablet 20 mg, 20 mg, Oral, Daily, CARLOS Denny, 20 mg at 07/22/19 0834    FLUoxetine (PROzac) capsule 40 mg, 40 mg, Oral, Daily, Mg Ha MD, 40 mg at 07/22/19 0833    fluticasone (FLONASE) 50 mcg/act nasal spray 1 spray, 1 spray, Each Nare, Daily, Mg Ha MD, 1 spray at 07/22/19 0833    fluticasone-vilanterol (BREO ELLIPTA) 200-25 MCG/INH inhaler 1 puff, 1 puff, Inhalation, Daily, Mg Ha MD    gabapentin (NEURONTIN) capsule 300 mg, 300 mg, Oral, HS, CARLOS Denny, 300 mg at 07/21/19 2251    hydrALAZINE (APRESOLINE) injection 10 mg, 10 mg, Intravenous, Q6H PRN, CARLOS Osborne    HYDROcodone-acetaminophen (NORCO) 5-325 mg per tablet 1 tablet, 1 tablet, Oral, Q6H PRN, CARLOS Osborne, 1 tablet at 07/21/19 2040    insulin lispro (HumaLOG) 100 units/mL subcutaneous injection 1-5 Units, 1-5 Units, Subcutaneous, TID AC **AND** Fingerstick Glucose (POCT), , , TID AC, CARLOS Denny    insulin lispro (HumaLOG) 100 units/mL subcutaneous injection 1-5 Units, 1-5 Units, Subcutaneous, HS, ACRLOS Denny, 3 Units at 07/21/19 2251    insulin regular (HumuLIN R,NovoLIN R) 1 Units/mL in sodium chloride 0 9 % 100 mL infusion, 0 3-21 Units/hr, Intravenous, Titrated, CARLOS Denny, Last Rate: 2 mL/hr at 07/22/19 1531, 2 Units/hr at 07/22/19 1531    levothyroxine tablet 75 mcg, 75 mcg, Oral, Early Morning, Mg Ha MD, 75 mcg at 07/22/19 0559    ondansetron (ZOFRAN) injection 4 mg, 4 mg, Intravenous, Q6H PRN, CARLOS Osborne, 4 mg at 07/21/19 6869   pantoprazole (PROTONIX) EC tablet 40 mg, 40 mg, Oral, BID AC, CARLOS Denny, 40 mg at 07/22/19 0834    sevelamer (RENAGEL) tablet 800 mg, 800 mg, Oral, TID With Meals, Ibrahima Burkett MD, 800 mg at 07/22/19 1133    Invasive Devices:      Lab Results:   Results from last 7 days   Lab Units 07/21/19  0455 07/21/19  0037 07/20/19  1745 07/20/19  1740 07/20/19  1739   I STAT HEMOGLOBIN g/dl  --   --   --   --  12 6   HEMATOCRIT, ISTAT %  --   --   --   --  37   POTASSIUM mmol/L 4 6 4 5 5 3  --   --    CHLORIDE mmol/L 96* 97* 92*  --   --    CO2 mmol/L 29 32 30  --   --    CO2, I-STAT mmol/L  --   --   --  33* 32   BUN mg/dL 52* 50* 43*  --   --    CREATININE mg/dL 5 06* 4 85* 4 42*  --   --    CALCIUM mg/dL 9 2 9 0 9 0  --   --    GLUCOSE, ISTAT mg/dl  --   --   --   --  476*       Previous work up:  Please see previous notes

## 2019-07-22 NOTE — SOCIAL WORK
LOS: 2  Patient is not a 30 day readmission  Or a Medicare Bundled patient  Met with patient to discuss the role of Care Management  Patient is a resident of [facility] and has been there for 2 weeks after being transferred from [place] [address] in [place]  She reports exhausting her SNF benefits from her insurance and is now a MA 15 day bed hold  She needs assistance with ADL's and uses a WC as she can not manitain balance being NWB status  She has no POA/AD, information on POA/AD given to patient by CM  Patient  Received dialysis treatment at [facility] in [facility]  Patient is interested in going back to an SNF in the [facility]  Provided a list of SNF's and she will make a decision on where she wants referrals sent

## 2019-07-22 NOTE — CONSULTS
Consultation - Delvis Alvarado 48 y o  female MRN: [id number]    Unit/Bed#: [address] [place] [address] Encounter: [id number]      Assessment/Plan     Assessment: This is a 48y o -year-old female with diabetes with hyperglycemia  She is a type 1 diabetic admitted with hyperglycemia  Blood sugars now in the 100-200 range  Will transition back to insulin pump therapy and discontinue IV insulin  Of note, hemoglobin A1c of 8 1% on 07/21/2019 continues to show poor control of her diabetes  Plan:  1  Will initiate insulin pump therapy utilizing Humalog insulin and then discontinue IV insulin 1 hour after pump initiated  2  Patient will maintain her on insulin pump based on the basal and bolus rates within the pump  3  Patient will be adjusting her insulin to carbohydrate ratio from 1 unit per 2 g carbohydrate to 1 unit per 3 g carbohydrate to prevent hypoglycemia post meals  4  Continue to check blood sugars at least 4 times a day the patient feels hypoglycemic and needs an extra test     CC: Diabetes Consult    History of Present Illness     HPI: Delvis Alvarado is a 48y o  year old female with type 1 diabetes for 36 years  She was admitted with hyperglycemia when she had been hypoglycemic at dialysis in the pump was discontinued temporarily and then never restarted a blood sugar went over 500  She was sent from [facility]  She is on insulin at home  She is on insulin pump therapy with a [employer] insulin pump  Basal rate from 12:00 a m  To 3:00 a m  1 2 units/hour, 3:00 a m  To 9:00 p m  1 7 units/hour, and 9:00 p m  to 12:00 a m  1 35 units/hour  Basal rates include an insulin carbohydrate ratio of 1 unit per 2 g carbohydrates all day  Insulin sensitivity factor is 1 unit for 20 blood sugar points for a target glucose of 110-130 from 12:00 a m  to 8:00 a m  And 1 unit per 10 blood sugar points for a target blood glucose of 100-120 from 8:00 a m   To 10:00 p m  and 1 unit per 20 blood sugar points for a target blood glucose of 110-130 from 10:00 p m  to 12:00 a m  With an insulin action of 4 hours  She denies any polyuria, polydipsia, nocturia and blurry vision  She has chronic numbness and tingling of her feet with burning and pain  She recently fractured her left femur and her right foot and lower leg in multiple places with from a fall  She denies heart attack, stroke and claudication but does admit to neuropathy, nephropathy and retinopathy  She admits to hypoglycemia at the nursing home, but not at her home residence  She reports that she was checking her blood sugars 4 times a day and 90% of the time she was hypoglycemic at the nursing home because they do not feet her properly, do not check her blood sugars at the right times, and do not treat low blood sugars appropriately  She is followed by [facility]  Inpatient consult to Endocrinology  Consult performed by: Keiry Young MD  Consult ordered by: CARLOS Fuller          Review of Systems   Constitutional: Negative for appetite change, fatigue and unexpected weight change  HENT: Negative for trouble swallowing  Eyes: Negative for visual disturbance  Respiratory: Negative for chest tightness and shortness of breath  Cardiovascular: Negative for chest pain  Gastrointestinal: Negative for abdominal pain, constipation, diarrhea and nausea  Endocrine: Negative for polydipsia, polyphagia and polyuria  No nocturia  Musculoskeletal:        Has a right walking boot  Skin: Negative for wound  Neurological: Positive for numbness  Negative for dizziness, weakness, light-headedness and headaches  Chronic numbness and tingling and burning and pain of her feet  Psychiatric/Behavioral: Negative for sleep disturbance         Historical Information   Past Medical History:   Diagnosis Date    Asthma     Cardiac disease     CHF    CHF (congestive heart failure) (HCC)     CPAP (continuous positive airway pressure) dependence     [facility]     type 1    D[facility] patient ([facility] )     Disease of thyroid gland     hypothyroidism    GERD (gastroesophageal reflux disease)     Hyperlipidemia     Hypertension     Migraine     Psychiatric disorder     PTSD, Depression, panic attacks    Renal disorder     stage 4 kidney disease     Past Surgical History:   Procedure Laterality Date    CATARACT EXTRACTION, BILATERAL       SECTION      CHOLECYSTECTOMY      HERNIA REPAIR      RETINOPATHY SURGERY      TONSILECTOMY AND ADNOIDECTOMY      TONSILLECTOMY      TYMPANOSTOMY TUBE PLACEMENT       Social History   Social History     Substance and Sexual Activity   Alcohol Use Not Currently     Social History     Substance and Sexual Activity   Drug Use No     Social History     Tobacco Use   Smoking Status Never Smoker   Smokeless Tobacco Never Used     Family History:   Family History   Problem Relation Age of Onset    Pancreatic cancer Mother     Stroke Father        Meds/Allergies   Current Facility-Administered Medications   Medication Dose Route Frequency Provider Last Rate Last Dose    acetaminophen (TYLENOL) tablet 650 mg  650 mg Oral Q6H PRN CARLOS Denny        albuterol (PROVENTIL HFA,VENTOLIN HFA) inhaler 2 puff  2 puff Inhalation Q4H PRN Meng Calvo MD        apixaban (ELIQUIS) tablet 2 5 mg  2 5 mg Oral BID Meng Calvo MD   2 5 mg at 19 0834    atorvastatin (LIPITOR) tablet 40 mg  40 mg Oral Daily With Rodríguez Perkins MD   40 mg at 19 1654    diltiazem (CARDIZEM CD) 24 hr capsule 180 mg  180 mg Oral Daily Meng Calvo MD   180 mg at 19 0834    famotidine (PEPCID) tablet 20 mg  20 mg Oral Daily CARLOS Denny   20 mg at 19 0834    FLUoxetine (PROzac) capsule 40 mg  40 mg Oral Daily Meng Calvo MD   40 mg at 19 0833    fluticasone (FLONASE) 50 mcg/act nasal spray 1 spray  1 spray Each Nare Daily Meng Calvo MD   1 spray at 07/22/19 0833    fluticasone-vilanterol (BREO ELLIPTA) 200-25 MCG/INH inhaler 1 puff  1 puff Inhalation Daily Remedios Fuentes MD        gabapentin (NEURONTIN) capsule 300 mg  300 mg Oral HS CARLOS Denny   300 mg at 07/21/19 2251    hydrALAZINE (APRESOLINE) injection 10 mg  10 mg Intravenous Q6H PRN CARLOS Gann        HYDROcodone-acetaminophen (NORCO) 5-325 mg per tablet 1 tablet  1 tablet Oral Q6H PRN CARLOS Gann   1 tablet at 07/21/19 2040    insulin lispro (HumaLOG) FOR PUMP REFILLS 300 Units  300 Units Subcutaneous Insulin Pump PRN Jillian Albarran MD        insulin regular (HumuLIN R,NovoLIN R) 1 Units/mL in sodium chloride 0 9 % 100 mL infusion  0 3-21 Units/hr Intravenous Titrated Jillian Albarran MD        levothyroxine tablet 75 mcg  75 mcg Oral Early Morning Remedios Fuentes MD   75 mcg at 07/22/19 0559    ondansetron (ZOFRAN) injection 4 mg  4 mg Intravenous Q6H PRN CARLOS Gann   4 mg at 07/21/19 1919    pantoprazole (PROTONIX) EC tablet 40 mg  40 mg Oral BID AC CARLOS Denny   40 mg at 07/22/19 0834    PATIENT MAINTAINED INSULIN PUMP 1 each  1 each Subcutaneous Q8H Jillian Albarran MD        sevelamer (RENAGEL) tablet 800 mg  800 mg Oral TID With Meals Remedios Fuentes MD   800 mg at 07/22/19 1133     Allergies   Allergen Reactions    Codeine Edema    Shellfish Allergy Edema    Hydrocodone Itching and Vomiting    Morphine Itching and Vomiting    Oxycodone Itching and Vomiting       Objective   Vitals: Blood pressure 127/60, pulse 82, temperature 97 5 °F (36 4 °C), temperature source Oral, resp  rate 15, height 5' 4" (1 626 m), weight 111 kg (244 lb 14 9 oz), SpO2 99 %      Intake/Output Summary (Last 24 hours) at 7/22/2019 1656  Last data filed at 7/22/2019 1424  Gross per 24 hour   Intake 448 5 ml   Output --   Net 448 5 ml     Invasive Devices     Peripheral Intravenous Line            Peripheral IV 07/20/19 Right  1 day                Physical Exam Constitutional: She is oriented to person, place, and time  She appears well-developed and well-nourished  HENT:   Head: Normocephalic and atraumatic  Eyes: Pupils are equal, round, and reactive to light  Conjunctivae and EOM are normal    Neck: Normal range of motion  Neck supple  No thyromegaly present  Thyroid normal in size  No carotid bruits  Cardiovascular: Normal rate, regular rhythm and normal heart sounds  No murmur heard  Peripheral pulse palpable on the left foot  An able the evaluate the right foot due to a walking boot  Pulmonary/Chest: Effort normal and breath sounds normal  She has no wheezes  Abdominal: Soft  Bowel sounds are normal  There is no tenderness  Musculoskeletal: Normal range of motion  She exhibits no edema or deformity  No ulceration of the left foot  Walking cast on the right foot  Lymphadenopathy:     She has no cervical adenopathy  Neurological: She is alert and oriented to person, place, and time  She has normal reflexes  Left foot light touch sensation grossly intact to the plantar surface  Skin: Skin is warm and dry  No rash noted  Vitals reviewed  The history was obtained from the review of the chart, patient  Lab Results:   Results from last 7 days   Lab Units 07/21/19  0453   HEMOGLOBIN A1C % 8 1*     Lab Results   Component Value Date    WBC 9 05 08/13/2018    HGB 12 6 07/20/2019    HCT 37 07/20/2019     (H) 08/13/2018     08/13/2018     Lab Results   Component Value Date/Time    BUN 52 (H) 07/21/2019 04:55 AM    K 4 6 07/21/2019 04:55 AM    CL 96 (L) 07/21/2019 04:55 AM    CO2 29 07/21/2019 04:55 AM    CO2 33 (H) 07/20/2019 05:40 PM    CREATININE 5 06 (H) 07/21/2019 04:55 AM     No results for input(s): CHOL, HDL, LDL, TRIG, VLDL in the last 72 hours    No results found for: Marella Apley  POC Glucose (mg/dl)   Date Value   07/22/2019 111   07/22/2019 134   07/22/2019 256 (H)   07/22/2019 292 (H)   07/22/2019 269 (H)   07/22/2019 171 (H)   07/22/2019 109   07/22/2019 101   07/22/2019 73   07/22/2019 159 (H)       Imaging Studies: I have personally reviewed pertinent reports  Portions of the record may have been created with voice recognition software

## 2019-07-23 ENCOUNTER — APPOINTMENT (INPATIENT)
Dept: DIALYSIS | Facility: HOSPITAL | Age: 54
DRG: 637 | End: 2019-07-23
Payer: COMMERCIAL

## 2019-07-23 VITALS
OXYGEN SATURATION: 98 % | WEIGHT: 244.93 LBS | RESPIRATION RATE: 18 BRPM | DIASTOLIC BLOOD PRESSURE: 67 MMHG | HEIGHT: 64 IN | TEMPERATURE: 97 F | BODY MASS INDEX: 41.82 KG/M2 | HEART RATE: 97 BPM | SYSTOLIC BLOOD PRESSURE: 155 MMHG

## 2019-07-23 PROBLEM — G62.9 NEUROPATHY: Status: ACTIVE | Noted: 2019-07-23

## 2019-07-23 PROBLEM — E78.5 HYPERLIPIDEMIA: Status: ACTIVE | Noted: 2019-07-23

## 2019-07-23 PROBLEM — I16.0 HYPERTENSIVE URGENCY: Status: RESOLVED | Noted: 2019-07-20 | Resolved: 2019-07-23

## 2019-07-23 PROBLEM — J45.909 ASTHMA: Status: ACTIVE | Noted: 2019-07-23

## 2019-07-23 LAB
GLUCOSE SERPL-MCNC: 101 MG/DL (ref 65–140)
GLUCOSE SERPL-MCNC: 164 MG/DL (ref 65–140)
GLUCOSE SERPL-MCNC: 367 MG/DL (ref 65–140)

## 2019-07-23 PROCEDURE — 94660 CPAP INITIATION&MGMT: CPT

## 2019-07-23 PROCEDURE — 82948 REAGENT STRIP/BLOOD GLUCOSE: CPT

## 2019-07-23 PROCEDURE — 5A1D70Z PERFORMANCE OF URINARY FILTRATION, INTERMITTENT, LESS THAN 6 HOURS PER DAY: ICD-10-PCS | Performed by: INTERNAL MEDICINE

## 2019-07-23 PROCEDURE — 99239 HOSP IP/OBS DSCHRG MGMT >30: CPT | Performed by: HOSPITALIST

## 2019-07-23 PROCEDURE — 94760 N-INVAS EAR/PLS OXIMETRY 1: CPT

## 2019-07-23 RX ORDER — GABAPENTIN 300 MG/1
300 CAPSULE ORAL
Qty: 30 CAPSULE | Refills: 0 | Status: SHIPPED | OUTPATIENT
Start: 2019-07-23

## 2019-07-23 RX ORDER — ALBUTEROL SULFATE 90 UG/1
2 AEROSOL, METERED RESPIRATORY (INHALATION) EVERY 4 HOURS PRN
Qty: 1 INHALER | Refills: 0 | Status: SHIPPED | OUTPATIENT
Start: 2019-07-23

## 2019-07-23 RX ORDER — DILTIAZEM HYDROCHLORIDE 180 MG/1
180 CAPSULE, COATED, EXTENDED RELEASE ORAL DAILY
Qty: 30 CAPSULE | Refills: 0 | Status: SHIPPED | OUTPATIENT
Start: 2019-07-24 | End: 2019-11-22 | Stop reason: HOSPADM

## 2019-07-23 RX ORDER — ATORVASTATIN CALCIUM 40 MG/1
40 TABLET, FILM COATED ORAL
Qty: 30 TABLET | Refills: 0 | Status: SHIPPED | OUTPATIENT
Start: 2019-07-23 | End: 2019-11-22 | Stop reason: HOSPADM

## 2019-07-23 RX ORDER — HYDROCODONE BITARTRATE AND ACETAMINOPHEN 5; 325 MG/1; MG/1
1 TABLET ORAL EVERY 8 HOURS PRN
Qty: 8 TABLET | Refills: 0 | Status: SHIPPED | OUTPATIENT
Start: 2019-07-23 | End: 2019-08-02

## 2019-07-23 RX ADMIN — PANTOPRAZOLE SODIUM 40 MG: 40 TABLET, DELAYED RELEASE ORAL at 05:49

## 2019-07-23 RX ADMIN — APIXABAN 2.5 MG: 2.5 TABLET, FILM COATED ORAL at 08:09

## 2019-07-23 RX ADMIN — DILTIAZEM HYDROCHLORIDE 180 MG: 180 CAPSULE, COATED, EXTENDED RELEASE ORAL at 08:09

## 2019-07-23 RX ADMIN — SEVELAMER HYDROCHLORIDE 800 MG: 800 TABLET, FILM COATED PARENTERAL at 08:08

## 2019-07-23 RX ADMIN — LEVOTHYROXINE SODIUM 75 MCG: 75 TABLET ORAL at 05:48

## 2019-07-23 RX ADMIN — FLUOXETINE 40 MG: 20 CAPSULE ORAL at 08:09

## 2019-07-23 RX ADMIN — ACETAMINOPHEN 650 MG: 325 TABLET, FILM COATED ORAL at 11:04

## 2019-07-23 RX ADMIN — FLUTICASONE PROPIONATE 1 SPRAY: 50 SPRAY, METERED NASAL at 08:35

## 2019-07-23 RX ADMIN — SEVELAMER HYDROCHLORIDE 800 MG: 800 TABLET, FILM COATED PARENTERAL at 12:43

## 2019-07-23 RX ADMIN — FAMOTIDINE 20 MG: 20 TABLET ORAL at 08:09

## 2019-07-23 NOTE — SOCIAL WORK
Continuing to follow patient  She is for discharge today and is not approved at any of the 6 SNFs  where referrals were made, notified patient  And she is requesting referrals be made to SNF's in [place]  Referrals sent to [address] [place] [address], [facility] [place] [facility]\"" at [place]  via [facility]  Wait bed availability  Patient will return to [place] [address] today, her spouse will transport at 3 pm   Patient is requesting a dietician see her at [place] [address]  Notified Hoa Anderson at [place] [address] of request and she will have their dietician see her  Also provided phone number of [facility] for [facility] to patient     Will follow

## 2019-07-23 NOTE — UTILIZATION REVIEW
Rodo Boucher, RN   Registered Nurse   Medical Surgical   Utilization Review   Signed   Date of Service:  7/21/2019  1:52 PM               Signed        Expand All Collapse All      Show:Clear all  [x]Manual[x]Template[x]Copied    Added by:  [x]Nicole Downs RN    []Chelsey for details  Initial Clinical Review     Admission: Date/Time/Statement: 7/20/19 @ 2323            Orders Placed This Encounter   Procedures    Inpatient Admission       Standing Status:   Standing       Number of Occurrences:   1       Order Specific Question:   Admitting Physician       Answer:   Yumiko Spencer [[id number]]       Order Specific Question:   Level of Care       Answer:   Med Surg [16]       Order Specific Question:   Estimated length of stay       Answer:   More than 2 Midnights       Order Specific Question:   Certification       Answer:   I certify that inpatient services are medically necessary for this patient for a duration of greater than two midnights  See H&P and MD Progress Notes for additional information about the patient's course of treatment                 ED Arrival Information      Expected Arrival Acuity Means of Arrival Escorted By Service Admission Type     - 7/20/2019 17:28 [facility]gent [facility]) Hospitalist Urgent     Arrival Complaint     -               Chief Complaint   Patient presents with    Hyperglycemia - no symptoms       pt presents to ED via EMS from [facility] for hyperglycemia  Pt BS in ambulance was 486      Assessment/Plan: MS DE PAZ IS A 49 YO FEMALE WITH A H/O TYPE 1 DM PRESENTS TO THE ED VIA EMS WITH UNCONTROLLED DIABETES  SHE WAS AT DIALYSIS AND HER GLUCOSE WAS LOW AND HER INSULIN PUMP WAS TURNED OFF  SHE RETURNED TO HER FACILITY AND HER BLOOD SUGAR WAS > 500 AND HER PUMP WAS RESTARTED  SHE WAS SENT TO ED FOR EVALUATION  IN ED SHE GOT 10 U INSULIN AND BLOOD GLUCOSE WAS 64/  SHE WAS DIZZY AND GIVEN DEXTROSE    SHE IS AT THE FACILITY FOR TREATMENT OF REHAB FOR ORTHOPEDIC INJURIES  SHE IS ADMITTED TO INPATIENT STATUS [facility] UNCONTROLLED TYPE 1 DM - INSULIN DRIP, STOP INSULIN PUMP, POC GLUCOSE Q 2 HR, CONS ENDOCRINE  HYPONATREMIA - IV FLUIDS  HTN URGENCY - IV HYDRALAZINE PRN WITH BP PARAMETERS    PMH:  ESRD, PAF, S/P THROMBUS                 ED Triage Vitals   Temperature Pulse Respirations Blood Pressure SpO2   07/20/19 1729 07/20/19 1729 07/20/19 1729 07/20/19 1745 07/20/19 1729   98 7 °F (37 1 °C) 87 17 (!) 198/81 99 %       Temp Source Heart Rate Source Patient Position - Orthostatic VS BP Location FiO2 (%)   07/20/19 1729 07/20/19 1729 07/20/19 1729 07/20/19 1729 --   Temporal Monitor Lying Left leg         Pain Score           07/20/19 1729           No Pain                Wt Readings from Last 1 Encounters:   07/20/19 111 kg (244 lb 14 9 oz)      Additional Vital Signs:   07/21/19 0715   98 9 °F (37 2 °C)   88   18   135/63   99 %   None (Room air)   07/21/19 0311   --   --   --   --   96 %   --   07/20/19 2357   98 3 °F (36 8 °C)   82   16   143/71   98 %   None (Room air)   07/20/19 2245   --   83   19   219/91Abnormal    96 %   --   07/20/19 2115   --   --   --   219/86Abnormal    --   --   07/20/19 2045   --   86   --   192/80Abnormal    95 %   --   07/20/19 2030   --   85   --   183/75Abnormal    94 %   --   07/20/19 2015   --   83   --   185/76Abnormal    93 %   --   07/20/19 2000   --   85   --   189/76Abnormal    97 %   --   07/20/19 1945   --   --   --   174/70Abnormal    --   --   07/20/19 1930   --   --   --   207/90Abnormal    --   --   07/20/19 1915   --   83   --   216/90Abnormal    93 %   --   07/20/19 1900   --   --   --   197/86Abnormal    --   --   07/20/19 1845   --   85   20   181/79Abnormal    97 %   --   07/20/19 1830   --   80   19   175/81Abnormal    96 %   --   07/20/19 1815   --   87   20   189/75Abnormal    95 %   --   07/20/19 1800   --   83   19   201/80Abnormal    93 %   --   07/20/19 1745   --   84   20   198/81Abnormal    100 %   --      Pertinent Labs/Diagnostic Test Results:           Results from last 7 days   Lab Units 07/20/19  1739   I STAT HEMOGLOBIN g/dl 12 6   HEMATOCRIT, ISTAT % 37               Results from last 7 days   Lab Units 07/21/19  0455 07/21/19  0037 07/20/19  1745 07/20/19  1740 07/20/19  1739   SODIUM mmol/L 135* 133* 128*  --   --    POTASSIUM mmol/L 4 6 4 5 5 3  --   --    CHLORIDE mmol/L 96* 97* 92*  --   --    CO2 mmol/L 29 32 30  --   --    CO2, I-STAT mmol/L  --   --   --  33* 32   ANION GAP mmol/L 10 4 6  --   --    BUN mg/dL 52* 50* 43*  --   --    CREATININE mg/dL 5 06* 4 85* 4 42*  --   --    EGFR ml/min/1 73sq m 9 10 11  --  10   CALCIUM mg/dL 9 2 9 0 9 0  --   --    CALCIUM, IONIZED, ISTAT mmol/L  --   --   --   --  0 91*                    Results from last 7 days   Lab Units 07/21/19  1331 07/21/19  1116 07/21/19  0923 07/21/19  0700 07/21/19  0435 07/21/19  0203 07/20/19  2354 07/20/19  2307 07/20/19  2226 07/20/19  2215   POC GLUCOSE mg/dl 227* 337* 380* 172* 79 126 173* 220* 64* 65             Results from last 7 days   Lab Units 07/21/19  0455 07/21/19  0037 07/20/19  1745   GLUCOSE RANDOM mg/dL 63* 144* 486*           Results from last 7 days   Lab Units 07/21/19  0453   HEMOGLOBIN A1C % 8 1*   EAG mg/dl 186           Results from last 7 days   Lab Units 07/20/19  1740   PH, CHERRY I-STAT   7 532*   PCO2, CHERRY ISTAT mm HG 38 5*   PO2, CHERRY ISTAT mm HG 45 0   HCO3, CHERRY ISTAT mmol/L 32 3*   I STAT BASE EXC mmol/L 9*   I STAT O2 SAT % 85*      ED Treatment:           Medication Administration from 07/20/2019 1728 to 07/20/2019 2327    Date/Time Order Dose Route Action   07/20/2019 1740 ondansetron (ZOFRAN) injection 4 mg 4 mg Intravenous Given   07/20/2019 1740 sodium chloride 0 9 % infusion 75 mL/hr Intravenous New Bag   07/20/2019 1815 insulin regular (HumuLIN R,NovoLIN R) injection 10 Units 10 Units Intravenous Given   07/20/2019 2241 dextrose 50 % IV solution 50 mL 50 mL Intravenous Given          Medical History Past Medical History:   Diagnosis Date    Asthma      Cardiac disease       CHF    CH[facility]congestive heart failure) [facility]      CPAP (continuous positive airway pressure) dependence      Diabetes mellitus ([facility])       type 1    Dialysis patient ([place] [address] )      Disease of thyroid gland       hypothyroidism    GERD (gastroesophageal reflux disease)      Hyperlipidemia      Hypertension      Migraine      Psychiatric disorder       PTSD, Depression, panic attacks    Renal disorder       stage 4 kidney disease         Present on Admission:   Uncontrolled type 1 diabetes mellitus ([place] [address] )   Hypertensive urgency   Morbid obesity ([facility])     Admitting Diagnosis: High blood sugar [R73 9]  Hyperglycemia [R73 9]  ESRD (end stage renal disease) on dialysis ([place] [address] ) [N18 6, Z99 2]  Uncontrolled type 1 diabetes mellitus ([place] [address] ) [E10 65]      Age/Sex: 48 y o  female  Admission Orders:     Current Facility-Administered Medications:  acetaminophen 650 mg Oral     albuterol 2 puff Inhalation     apixaban 2 5 mg Oral     atorvastatin 40 mg Oral     diltiazem 180 mg Oral     FLUoxetine 40 mg Oral     fluticasone 1 spray Each Nare     fluticasone-vilanterol 1 puff Inhalation     hydrALAZINE 10 mg Intravenous     insulin lispro 1-5 Units Subcutaneous     insulin lispro 1-5 Units Subcutaneous     insulin regular (HumuLIN R,NovoLIN R) infusion 0 3-21 Units/hr Intravenous Last Rate: 7 Units/hr (07/21/19 1143)   levothyroxine 75 mcg Oral     ondansetron 4 mg Intravenous     sevelamer 800 mg Oral        UP W/ ASSIST   POC GLUCOSE Q  2HR  UP WITH ASSIST   ADA DIET WITH FL RESTRICTION   IP CONSULT TO INTERNAL MEDICINE  IP CONSULT TO ENDOCRINOLOGY  IP CONSULT TO NEPHROLOGY  CPAP AT      Network Utilization Review Department  Phone: [phone]; Fax [phone]  [email or web address]  ATTENTION: Please call with any questions or concerns to [phone]  and carefully listen to the prompts so that you are directed to the right person  Send all requests for admission clinical reviews, approved or denied determinations and any other requests to fax [phone]   All voicemails are confidential

## 2019-07-23 NOTE — ASSESSMENT & PLAN NOTE
Lab Results   Component Value Date    HGBA1C 8 1 (H) 07/21/2019       Recent Labs     07/22/19  1830 07/22/19  2043 07/23/19  0725 07/23/19  1035   POCGLU 229* 304* 101 367*       Blood Sugar Average: Last 72 hrs:  (P) 653 [id number]     -Patient has  type 1 diabetes with end-stage renal disease and neuropathy   -Her blood sugars in her home prior to nursing home placement for rehab were running less than 150   -She admits to not being able to control her diet and her blood sugars as she usually does at home in the nursing home setting   -patient was on IV insulin drip previously in admission, transitioned to insulin pump, endocrinology following   -diabetic diet  -insulin pump fell out overnight, morning insulin expected to be high after meal, 20 units Humalog ordered for patient's 1-3 carb coverage   Pump supplies brought in from facility and pump replaced

## 2019-07-23 NOTE — PLAN OF CARE
Problem: Potential for Falls  Goal: Patient will remain free of falls  Description  INTERVENTIONS:  - Assess patient frequently for physical needs  -  Identify cognitive and physical deficits and behaviors that affect risk of falls    -  [place] fall precautions as indicated by assessment   - Educate patient/family on patient safety including physical limitations  - Instruct patient to call for assistance with activity based on assessment  - Modify environment to reduce risk of injury  - Consider OT/PT consult to assist with strengthening/mobility  7/23/2019 1343 by Gurmeet Mckeon RN  Outcome: Adequate for Discharge  7/23/2019 0948 by Gurmeet Mckeon RN  Outcome: Progressing     Problem: PAIN - ADULT  Goal: Verbalizes/displays adequate comfort level or baseline comfort level  Description  Interventions:  - Encourage patient to monitor pain and request assistance  - Assess pain using appropriate pain scale  - Administer analgesics based on type and severity of pain and evaluate response  - Implement non-pharmacological measures as appropriate and evaluate response  - Consider cultural and social influences on pain and pain management  - Notify physician/advanced practitioner if interventions unsuccessful or patient reports new pain  7/23/2019 1343 by Gurmeet Mckeon RN  Outcome: Adequate for Discharge  7/23/2019 0948 by Gurmeet Mckeon RN  Outcome: Progressing     Problem: INFECTION - ADULT  Goal: Absence or prevention of progression during hospitalization  Description  INTERVENTIONS:  - Assess and monitor for signs and symptoms of infection  - Monitor lab/diagnostic results  - Monitor all insertion sites, i e  indwelling lines, tubes, and drains  - Monitor endotracheal (as able) and nasal secretions for changes in amount and color  - [place] appropriate cooling/warming therapies per order  - Administer medications as ordered  - Instruct and encourage patient and family to use good hand hygiene technique  - Identify and instruct in appropriate isolation precautions for identified infection/condition  7/23/2019 1343 by Jordan Trotter RN  Outcome: Adequate for Discharge  7/23/2019 0948 by Jordan Trotter RN  Outcome: Progressing     Problem: SAFETY ADULT  Goal: Patient will remain free of falls  Description  INTERVENTIONS:  - Assess patient frequently for physical needs  -  Identify cognitive and physical deficits and behaviors that affect risk of falls    -  [place] fall precautions as indicated by assessment   - Educate patient/family on patient safety including physical limitations  - Instruct patient to call for assistance with activity based on assessment  - Modify environment to reduce risk of injury  - Consider OT/PT consult to assist with strengthening/mobility  7/23/2019 1343 by Jordan Trotter RN  Outcome: Adequate for Discharge  7/23/2019 0948 by Jordan Trotter RN  Outcome: Progressing  Goal: Maintain or return to baseline ADL function  Description  INTERVENTIONS:  -  Assess patient's ability to carry out ADLs; assess patient's baseline for ADL function and identify physical deficits which impact ability to perform ADLs (bathing, care of mouth/teeth, toileting, grooming, dressing, etc )  - Assess/evaluate cause of self-care deficits   - Assess range of motion  - Assess patient's mobility; develop plan if impaired  - Assess patient's need for assistive devices and provide as appropriate  - Encourage maximum independence but intervene and supervise when necessary  ¯ Involve family in performance of ADLs  ¯ Assess for home care needs following discharge   ¯ Request OT consult to assist with ADL evaluation and planning for discharge  ¯ Provide patient education as appropriate  7/23/2019 1343 by Jordan Trotter RN  Outcome: Adequate for Discharge  7/23/2019 0948 by Jordan Trotter RN  Outcome: Progressing  Goal: Maintain or return mobility status to optimal level  Description  INTERVENTIONS:  - Assess patient's baseline mobility status (ambulation, transfers, stairs, etc )    - Identify cognitive and physical deficits and behaviors that affect mobility  - Identify mobility aids required to assist with transfers and/or ambulation (gait belt, sit-to-stand, lift, walker, cane, etc )  - [place] fall precautions as indicated by assessment  - Record patient progress and toleration of activity level on Mobility SBAR; progress patient to next Phase/Stage  - Instruct patient to call for assistance with activity based on assessment  - Request Rehabilitation consult to assist with strengthening/weightbearing, etc   7/23/2019 1343 by Deborah Murphy RN  Outcome: Adequate for Discharge  7/23/2019 0948 by Deborah Murphy RN  Outcome: Progressing     Problem: DISCHARGE PLANNING  Goal: Discharge to home or other facility with appropriate resources  Description  INTERVENTIONS:  - Identify barriers to discharge w/patient and caregiver  - Arrange for needed discharge resources and transportation as appropriate  - Identify discharge learning needs (meds, wound care, etc )  - Arrange for interpretive services to assist at discharge as needed  - Refer to Case Management Department for coordinating discharge planning if the patient needs post-hospital services based on physician/advanced practitioner order or complex needs related to functional status, cognitive ability, or social support system  7/23/2019 1343 by Deborah Murphy RN  Outcome: Adequate for Discharge  7/23/2019 0948 by Deborah Murphy RN  Outcome: Progressing     Problem: Knowledge Deficit  Goal: Patient/family/caregiver demonstrates understanding of disease process, treatment plan, medications, and discharge instructions  Description  Complete learning assessment and assess knowledge base    Interventions:  - Provide teaching at level of understanding  - Provide teaching via preferred learning methods  7/23/2019 1349 by Maisha Tejada RN  Outcome: Adequate for Discharge  7/23/2019 7523 by Maisha Tejada RN  Outcome: Progressing     Problem: METABOLIC, FLUID AND ELECTROLYTES - ADULT  Goal: Glucose maintained within target range  Description  INTERVENTIONS:  - Monitor Blood Glucose as ordered  - Assess for signs and symptoms of hyperglycemia and hypoglycemia  - Administer ordered medications to maintain glucose within target range  - Assess nutritional intake and initiate nutrition service referral as needed  7/23/2019 1343 by Maisha Tejada RN  Outcome: Adequate for Discharge  7/23/2019 0948 by Maisha Tejada RN  Outcome: Progressing     Problem: Prexisting or High Potential for Compromised Skin Integrity  Goal: Skin integrity is maintained or improved  Description  INTERVENTIONS:  - Identify patients at risk for skin breakdown  - Assess and monitor skin integrity  - Assess and monitor nutrition and hydration status  - Monitor labs (i e  albumin)  - Assess for incontinence   - Turn and reposition patient  - Assist with mobility/ambulation  - Relieve pressure over bony prominences  - Avoid friction and shearing  - Provide appropriate hygiene as needed including keeping skin clean and dry  - Evaluate need for skin moisturizer/barrier cream  - Collaborate with interdisciplinary team (i e  Nutrition, Rehabilitation, etc )   - Patient/family teaching  7/23/2019 1343 by Maisha Tejada RN  Outcome: Adequate for Discharge  7/23/2019 0948 by Maisha Tejada RN  Outcome: Progressing     Problem: Nutrition/Hydration-ADULT  Goal: Nutrient/Hydration intake appropriate for improving, restoring or maintaining nutritional needs  Description  Monitor and assess patient's nutrition/hydration status for malnutrition (ex- brittle hair, bruises, dry skin, pale skin and conjunctiva, muscle wasting, smooth red tongue, and disorientation)  Collaborate with interdisciplinary team and initiate plan and interventions as ordered    Monitor patient's weight and dietary intake as ordered or per policy  Utilize nutrition screening tool and intervene per policy  Determine patient's food preferences and provide high-protein, high-caloric foods as appropriate       INTERVENTIONS:  - Monitor oral intake, urinary output, labs, and treatment plans  - Assess nutrition and hydration status and recommend course of action  - Evaluate amount of meals eaten  - Assist patient with eating if necessary   - Allow adequate time for meals  - Recommend/ encourage appropriate diets, oral nutritional supplements, and vitamin/mineral supplements  - Order, calculate, and assess calorie counts as needed  - Recommend, monitor, and adjust tube feedings and TPN/PPN based on assessed needs  - Assess need for intravenous fluids  - Provide specific nutrition/hydration education as appropriate  - Include patient/family/caregiver in decisions related to nutrition  7/23/2019 1343 by Janet wKon RN  Outcome: Adequate for Discharge  7/23/2019 0948 by Janet Kwon RN  Outcome: Progressing

## 2019-07-23 NOTE — DISCHARGE INSTR - AVS FIRST PAGE
Discharge Instructions    · Follow-up with primary care provider within 1 week  · Continue carbohydrate coverage with new ratio, 1:3      · Continue blood glucose checks at least 4 times a day, more frequently if you feel hypoglycemic

## 2019-07-23 NOTE — DISCHARGE SUMMARY
[address] [facility]  Discharge- Amara Pencil 1965, 48 y o  female MRN: [id number]  Unit/Bed#: [address] [place] [address] Encounter: [id number]  Primary Care Provider: Uri Farrell DO   Date and time admitted to hospital: 7/20/2019  5:30 PM    * Uncontrolled type 1 diabetes mellitus [facility])  Assessment & Plan  Lab Results   Component Value Date    HGBA1C 8 1 (H) 07/21/2019       Recent Labs     07/22/19  1830 07/22/19  2043 07/23/19  0725 07/23/19  1035   POCGLU 229* 304* 101 367*       Blood Sugar Average: Last 72 hrs:  (P) 828 [id number]     -Patient has  type 1 diabetes with end-stage renal disease and neuropathy   -Her blood sugars in her home prior to nursing home placement for rehab were running less than 150   -She admits to not being able to control her diet and her blood sugars as she usually does at home in the nursing home setting   -patient was on IV insulin drip previously in admission, transitioned to insulin pump, endocrinology following   -diabetic diet  -insulin pump fell out overnight, morning insulin expected to be high after meal, 20 units Humalog ordered for patient's 1-3 carb coverage   Pump supplies brought in from facility and pump replaced    Morbid obesity ([facility] [address] )  Assessment & Plan  -BMI is 42 04  -encourage weight loss    Thrombus  Assessment & Plan  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   · Continue Eliquis    PAF (paroxysmal atrial fibrillation) ([facility])  Assessment & Plan  -cont Cardizem/Eliquis    ESRD (end stage renal disease) on dialysis [facility])  Assessment & Plan  -cont HD Tuesday Thursday Saturday    -appreciate renal input, stable for discharge per nephrology    Hyponatremia  Assessment & Plan  -Na now 135      Hypertensive urgency-resolved as of 7/23/2019  Assessment & Plan  -continue Cardizem CD 180mg daily   -patient had hypertensive urgency on admission, now resolved        Discharging Physician / Practitioner: Lia Thomas PA-C  PCP: Sterling Mcfadden DO  Admission Date:   Admission Orders (From admission, onward)    Ordered        07/20/19 2323  Inpatient Admission  Once         07/20/19 2242  Place in Observation (expected length of stay for this patient is less than two midnights)  Once             Discharge Date: 07/23/19    Resolved Problems  Date Reviewed: 7/23/2019          Resolved    Hypertension 7/20/2019     Resolved by  Ariane Fisher    Hypertensive urgency 7/23/2019     Resolved by  Argentina Somers PA-C          Consultations During Hospital Stay:  · Endocrinology  · Nephrology    Procedures Performed:   · Hemodialysis on 7/23    Significant Findings / Test Results:   · Hemoglobin A1c 8 1    Incidental Findings:   · None    Test Results Pending at Discharge (will require follow up): · None     Outpatient Tests Requested:  · Continued monitoring of blood sugar    Complications:  Insulin pump fell out, replaced 7/23    Reason for Admission:  Hyperglycemia    Hospital Course:     Marlin Dias is a 48 y o  female patient with type 1 diabetes mellitus, ESRD on hemodialysis, hypertension, CHF, 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 who originally presented to the hospital on 7/20/2019 due to uncontrolled diabetes  Patient reports blood sugar was low while at dialysis and therefore her insulin pump was stopped  Patient returned to [facility] after dialysis  Blood sugar was elevated above 500  Insulin pump was restarted and she was sent to the emergency department for evaluation  In the ED she received 10 units of insulin and glucose dropped to 64  Patient became dizzy and was given dextrose  Patient was admitted for blood glucose control  Nephrology and Endocrinology were consulted  Patient reports poor blood glucose control at nursing facility secondary to lack of attention and poor food selection  Patient was initially controlled on insulin drip pending endocrinology evaluation    Pump therapy was initiated on 07/22 and insulin drip was stopped 1 hour after  Carbohydrate ratio coverage was adjusted from 1 unit per 2 g carbohydrate to 1 unit per 3 g carbohydrate to prevent hypoglycemia after meals  Blood sugar should continue to be checked at least 4 times a day, with possible additional tests if patient feels hypoglycemic  Patient tolerating hemodialysis well on day of discharge  Endocrinology agrees, safe to discharge following hemodialysis treatment  Please see above list of diagnoses and related plan for additional information  Condition at Discharge: stable     Discharge Day Visit / Exam:     Subjective:  Patient reports she feels well today, her insulin pump fell out overnight  She has requested the supplies from her nursing facility to replace the tubing and reinsert pump  Denies lightheadedness, dizziness, nausea, vomiting  Patient does not desire to return to [facility]  Vitals: Blood Pressure: (!) 175/72 (07/23/19 1130)  Pulse: 103 (07/23/19 1130)  Temperature: (!) 97 °F (36 1 °C) (07/23/19 0721)  Temp Source: Tympanic (07/23/19 0721)  Respirations: 18 (07/22/19 2337)  Height: 5' 4" (162 6 cm) (07/20/19 2357)  Weight - Scale: 111 kg (244 lb 14 9 oz) (07/20/19 2357)  SpO2: 98 % (07/23/19 0721)  Exam:   Physical Exam   Constitutional: She appears well-developed and well-nourished  No distress  HENT:   Head: Normocephalic and atraumatic  Eyes: Conjunctivae are normal  No scleral icterus  Cardiovascular: Normal rate and regular rhythm  No murmur heard  Pulmonary/Chest: Effort normal and breath sounds normal  No respiratory distress  She has no wheezes  She has no rales  Abdominal: Soft  She exhibits no distension  There is no tenderness  Musculoskeletal: She exhibits no edema  LUE AV fistula in place, thrill present   Neurological: She is alert  Skin: Skin is warm and dry  No erythema  Psychiatric: She has a normal mood and affect  Nursing note and vitals reviewed      Discussion with Family: Discussed care plan with patient at bedside  Answered all questions to the best of my ability  Discharge instructions/Information to patient and family:   See after visit summary for information provided to patient and family  Provisions for Follow-Up Care:  See after visit summary for information related to follow-up care and any pertinent home health orders  Disposition:     [address] at [facility] to [facility] SNF:   · Not Applicable to this Patient - Not Applicable to this Patient    Planned Readmission: None     Discharge Statement:  I spent 65 minutes discharging the patient  This time was spent on the day of discharge  I had direct contact with the patient on the day of discharge  Greater than 50% of the total time was spent examining patient, answering all patient questions, arranging and discussing plan of care with patient as well as directly providing post-discharge instructions  Additional time then spent on discharge activities  Discharge Medications:  See after visit summary for reconciled discharge medications provided to patient and family        ** Please Note: This note has been constructed using a voice recognition system **

## 2019-07-23 NOTE — DISCHARGE INSTRUCTIONS
Type 1 Diabetes in Adults   WHAT YOU NEED TO KNOW:   Type 1 diabetes is a disease that affects how your body makes insulin and uses glucose (sugar)  Normally, when the blood sugar level increases, the pancreas makes more insulin  Insulin helps move sugar out of the blood so it can be used for energy  Type 1 diabetes develops because the immune system destroys cells in the pancreas that make insulin  The pancreas cannot make enough insulin, so the blood sugar level continues to rise  A family history of type 1 diabetes may increase your risk for diabetes  DISCHARGE INSTRUCTIONS:   Call 911 if:   · You have chest pain or shortness of breath  Seek care immediately if:   · You have a low blood sugar level and it does not improve with treatment  · Your blood sugar level is above 240 mg/dL and does not come down after you take a dose of insulin  · You have ketones  · You have a fever  · You have nausea or are vomiting and cannot keep any food or liquid down  · You have blurred or double vision  · Your breath has a fruity, sweet smell, or your breathing is shallow  · You have symptoms of a low blood sugar level, such as trouble thinking, sweating, or a pounding heartbeat  Contact your healthcare provider if:   · Your blood sugar levels are higher than your target goals  · You often have low blood sugar levels  · Your skin is red, dry, warm, or swollen  · You have a wound that does not heal      · You have trouble coping with your illness, or you feel anxious or depressed  · You have questions or concerns about your condition or care  Medicines:   · You will need insulin each day  Insulin can be injected or given through an insulin pump  Ask your healthcare provider which method is best for you  You or a family member will be taught how to give insulin injections if this is the best method for you  Your family member can give you the injections if you are not able   Take your insulin as directed  Too much insulin may cause your blood sugar level to go too low  You will be taught how to adjust each insulin dose you take with meals  Always check your blood sugar level before the meal  The dose will be based on your blood sugar level, carbohydrates in the meal, and activity after the meal      · Glucose tablets  may be given to increase your blood sugar if it has dropped too low from treatment  · Take your medicine as directed  Contact your healthcare provider if you think your medicine is not helping or if you have side effects  Tell him or her if you are allergic to any medicine  Keep a list of the medicines, vitamins, and herbs you take  Include the amounts, and when and why you take them  Bring the list or the pill bottles to follow-up visits  Carry your medicine list with you in case of an emergency  Check your blood sugar level as directed: You will be taught how to use a glucose monitor  You will need to check your blood sugar level at least 3 times each day  Ask your healthcare provider when and how often to check during the day  If you check your blood sugar level before a meal , it should be between 80 and 130 mg/dL  If you check your blood sugar level 1 to 2 hours after a meal , it should be less than 180 mg/dL  Ask your healthcare provider if these are good goals for you  You may need to check for ketones in your urine or blood if your level is higher than directed  Write down your results, and show them to your healthcare provider  Your provider may make changes to your medicine, food, or exercise schedules  If your blood sugar level is too low: Your blood sugar level is too low if it goes below 70 mg/dL  If the level is too low, eat or drink 15 grams of fast-acting carbohydrate  These are found naturally in fruits  Fast-acting carbohydrates will raise your blood sugar level quickly   Examples of 15 grams of fast-acting carbohydrate are 4 ounces (½ cup) of fruit juice or 4 ounces of regular soda  Other examples are 2 tablespoons of raisins or 3 to 4 glucose tablets  Check your blood sugar level 15 minutes later  If the level is still low (less than 100 mg/dL), eat another 15 grams of carbohydrate  When the level returns to 100 mg/dL, eat a snack or meal that contains carbohydrates  This will help prevent another drop in blood sugar  Always carefully follow your healthcare provider's instructions on how to treat low blood sugar levels  Wear medical alert identification:  Wear medical alert jewelry or carry a card that says you have diabetes  Ask your healthcare provider where to get these items  Check your feet each day for sores:  Wear shoes and socks that fit correctly  Ask your healthcare provider for more information about foot care  Maintain a healthy weight:  Ask your healthcare provider how much you should weigh  A healthy weight can help you control your diabetes  Ask your provider to help you create a weight loss plan if you are overweight  Together you can set manageable weight loss goals  Follow your meal plan:  A dietitian will help you make a meal plan to keep your blood sugar level steady  Do not skip meals  Your blood sugar level may drop too low if you have taken diabetes medicine and do not eat  · Keep track of carbohydrates (sugar and starchy foods)  Your blood sugar level can get too high if you eat too many carbohydrates  Your dietitian will help you plan meals and snacks that have the right amount of carbohydrates  · Eat low-fat foods , such as skinless chicken and low-fat milk  · Eat less sodium (salt)  Limit high-sodium foods, such as soy sauce, potato chips, and soup  Do not add salt to food you cook  Limit your use of table salt  · Eat high-fiber foods , such as vegetables, whole-grain breads, and beans  · Limit alcohol  Alcohol affects your blood sugar level and can make it harder to manage your diabetes  Limit alcohol to 1 drink a day if you are a woman  Limit alcohol to 2 drinks a day if you are a man  A drink of alcohol is 12 ounces of beer, 5 ounces of wine, or 1½ ounces of liquor  Exercise as directed:  Exercise can help keep your blood sugar level steady, decrease your risk of heart disease, and help you lose weight  Stretch before and after you exercise  Exercise for at least 150 minutes every week  Spread this amount of exercise over at least 3 days a week  Do not skip exercise more than 2 days in a row  Include muscle strengthening activities 2 to 3 days each week  Older adults should include balance training 2 to 3 times each week  Activities that help increase balance include yoga and nancy chi  Work with your healthcare provider to create an exercise plan  · Check your blood sugar level before and after exercise  Healthcare providers may tell you to change the amount of insulin you take or food you eat  If your blood sugar level is high, check your blood or urine for ketones before you exercise  Do not exercise if your blood sugar level is high and you have ketones  · If your blood sugar level is less than 100 mg/dL, have a carbohydrate snack before you exercise  Examples are 4 to 6 crackers, ½ banana, 8 ounces (1 cup) of milk, or 4 ounces (½ cup) of juice  Drink water or liquids that do not contain sugar before, during, and after exercise  Ask your dietitian or healthcare provider which liquids you should drink when you exercise  · Do not sit for longer than 30 minutes  If you cannot walk around, at least stand up  This will help you stay active and keep your blood circulating  Do not smoke:  Nicotine and other chemicals in cigarettes and cigars can cause lung damage and make it more difficult to manage your diabetes  Ask your healthcare provider for information if you currently smoke and need help to quit  Do not use e-cigarettes or smokeless tobacco in place of cigarettes or to help you quit  They still contain nicotine  Check your blood pressure as directed:  Ask your healthcare provider what your blood pressure should be  Most adults with diabetes and high blood pressure should have a systolic blood pressure (first number) less than 140  Your diastolic blood pressure (second number) should be less than 90  Ask about vaccines: You have a higher risk for serious illness if you get the flu, pneumonia, or hepatitis  Ask your healthcare provider if you should get a flu, pneumonia, or hepatitis B vaccine, and when to get the vaccine  Follow up with your healthcare provider as directed: You may need to return to have your A1c checked every 3 months  Your healthcare provider will tell you the A1c level that is right for you  Most people should have an A1c less than 7%  You will need to return at least 1 time each year to have your feet checked  You will need an eye exam 1 time each year to check for retinopathy  You will also need urine tests every year to check for kidney problems  Write down your questions so you remember to ask them during your visits  © 2017 [facility] Information is for End User's use only and may not be sold, redistributed or otherwise used for commercial purposes  All illustrations and images included in CareNotes® are the copyrighted property of [employer] M [employer]  or Abilio Leong  The above information is an  only  It is not intended as medical advice for individual conditions or treatments  Talk to your doctor, nurse or pharmacist before following any medical regimen to see if it is safe and effective for you

## 2019-07-23 NOTE — SOCIAL WORK
Continuing to follow patient  Met with patient, she is requesting referrals be sent to 6 SNF 's in the [facility] kassy as she would like an SNF closer to her family  Referrals sent to [facility], [facility], [address], [address] [facility] [place], [facility], Rogelio Dominguez, Kary Wen, [place] and [facility]  Wait bed availability

## 2019-07-23 NOTE — PLAN OF CARE
Problem: Potential for Falls  Goal: Patient will remain free of falls  Description  INTERVENTIONS:  - Assess patient frequently for physical needs  -  Identify cognitive and physical deficits and behaviors that affect risk of falls    -  [place] fall precautions as indicated by assessment   - Educate patient/family on patient safety including physical limitations  - Instruct patient to call for assistance with activity based on assessment  - Modify environment to reduce risk of injury  - Consider OT/PT consult to assist with strengthening/mobility  Outcome: Progressing     Problem: PAIN - ADULT  Goal: Verbalizes/displays adequate comfort level or baseline comfort level  Description  Interventions:  - Encourage patient to monitor pain and request assistance  - Assess pain using appropriate pain scale  - Administer analgesics based on type and severity of pain and evaluate response  - Implement non-pharmacological measures as appropriate and evaluate response  - Consider cultural and social influences on pain and pain management  - Notify physician/advanced practitioner if interventions unsuccessful or patient reports new pain  Outcome: Progressing     Problem: INFECTION - ADULT  Goal: Absence or prevention of progression during hospitalization  Description  INTERVENTIONS:  - Assess and monitor for signs and symptoms of infection  - Monitor lab/diagnostic results  - Monitor all insertion sites, i e  indwelling lines, tubes, and drains  - Monitor endotracheal (as able) and nasal secretions for changes in amount and color  - [place] appropriate cooling/warming therapies per order  - Administer medications as ordered  - Instruct and encourage patient and family to use good hand hygiene technique  - Identify and instruct in appropriate isolation precautions for identified infection/condition  Outcome: Progressing     Problem: SAFETY ADULT  Goal: Patient will remain free of falls  Description  INTERVENTIONS:  - Assess patient frequently for physical needs  -  Identify cognitive and physical deficits and behaviors that affect risk of falls    -  [place] fall precautions as indicated by assessment   - Educate patient/family on patient safety including physical limitations  - Instruct patient to call for assistance with activity based on assessment  - Modify environment to reduce risk of injury  - Consider OT/PT consult to assist with strengthening/mobility  Outcome: Progressing  Goal: Maintain or return to baseline ADL function  Description  INTERVENTIONS:  -  Assess patient's ability to carry out ADLs; assess patient's baseline for ADL function and identify physical deficits which impact ability to perform ADLs (bathing, care of mouth/teeth, toileting, grooming, dressing, etc )  - Assess/evaluate cause of self-care deficits   - Assess range of motion  - Assess patient's mobility; develop plan if impaired  - Assess patient's need for assistive devices and provide as appropriate  - Encourage maximum independence but intervene and supervise when necessary  ¯ Involve family in performance of ADLs  ¯ Assess for home care needs following discharge   ¯ Request OT consult to assist with ADL evaluation and planning for discharge  ¯ Provide patient education as appropriate  Outcome: Progressing  Goal: Maintain or return mobility status to optimal level  Description  INTERVENTIONS:  - Assess patient's baseline mobility status (ambulation, transfers, stairs, etc )    - Identify cognitive and physical deficits and behaviors that affect mobility  - Identify mobility aids required to assist with transfers and/or ambulation (gait belt, sit-to-stand, lift, walker, cane, etc )  - [place] fall precautions as indicated by assessment  - Record patient progress and toleration of activity level on Mobility SBAR; progress patient to next Phase/Stage  - Instruct patient to call for assistance with activity based on assessment  - Request Rehabilitation consult to assist with strengthening/weightbearing, etc   Outcome: Progressing     Problem: DISCHARGE PLANNING  Goal: Discharge to home or other facility with appropriate resources  Description  INTERVENTIONS:  - Identify barriers to discharge w/patient and caregiver  - Arrange for needed discharge resources and transportation as appropriate  - Identify discharge learning needs (meds, wound care, etc )  - Arrange for interpretive services to assist at discharge as needed  - Refer to Case Management Department for coordinating discharge planning if the patient needs post-hospital services based on physician/advanced practitioner order or complex needs related to functional status, cognitive ability, or social support system  Outcome: Progressing     Problem: Knowledge Deficit  Goal: Patient/family/caregiver demonstrates understanding of disease process, treatment plan, medications, and discharge instructions  Description  Complete learning assessment and assess knowledge base    Interventions:  - Provide teaching at level of understanding  - Provide teaching via preferred learning methods  Outcome: Progressing     Problem: METABOLIC, FLUID AND ELECTROLYTES - ADULT  Goal: Glucose maintained within target range  Description  INTERVENTIONS:  - Monitor Blood Glucose as ordered  - Assess for signs and symptoms of hyperglycemia and hypoglycemia  - Administer ordered medications to maintain glucose within target range  - Assess nutritional intake and initiate nutrition service referral as needed  Outcome: Progressing     Problem: Prexisting or High Potential for Compromised Skin Integrity  Goal: Skin integrity is maintained or improved  Description  INTERVENTIONS:  - Identify patients at risk for skin breakdown  - Assess and monitor skin integrity  - Assess and monitor nutrition and hydration status  - Monitor labs (i e  albumin)  - Assess for incontinence   - Turn and reposition patient  - Assist with mobility/ambulation  - Relieve pressure over bony prominences  - Avoid friction and shearing  - Provide appropriate hygiene as needed including keeping skin clean and dry  - Evaluate need for skin moisturizer/barrier cream  - Collaborate with interdisciplinary team (i e  Nutrition, Rehabilitation, etc )   - Patient/family teaching  Outcome: Progressing     Problem: Nutrition/Hydration-ADULT  Goal: Nutrient/Hydration intake appropriate for improving, restoring or maintaining nutritional needs  Description  Monitor and assess patient's nutrition/hydration status for malnutrition (ex- brittle hair, bruises, dry skin, pale skin and conjunctiva, muscle wasting, smooth red tongue, and disorientation)  Collaborate with interdisciplinary team and initiate plan and interventions as ordered  Monitor patient's weight and dietary intake as ordered or per policy  Utilize nutrition screening tool and intervene per policy  Determine patient's food preferences and provide high-protein, high-caloric foods as appropriate       INTERVENTIONS:  - Monitor oral intake, urinary output, labs, and treatment plans  - Assess nutrition and hydration status and recommend course of action  - Evaluate amount of meals eaten  - Assist patient with eating if necessary   - Allow adequate time for meals  - Recommend/ encourage appropriate diets, oral nutritional supplements, and vitamin/mineral supplements  - Order, calculate, and assess calorie counts as needed  - Recommend, monitor, and adjust tube feedings and TPN/PPN based on assessed needs  - Assess need for intravenous fluids  - Provide specific nutrition/hydration education as appropriate  - Include patient/family/caregiver in decisions related to nutrition  Outcome: Progressing

## 2019-07-24 NOTE — UTILIZATION REVIEW
Auth:  [id number]    Notification of Discharge  This is a Notification of Discharge from our facility [address]  Please be advised that this patient has been discharge from our facility  Below you will find the admission and discharge date and time including the patients disposition  PRESENTATION DATE: 7/20/2019  5:30 PM  OBS ADMISSION DATE:   IP ADMISSION DATE: 7/20/19 2323   DISCHARGE DATE: 7/23/2019  2:36 PM  DISPOSITION: Non [facility]/S[facility] Non [facility]   Admission Orders listed below:  Admission Orders (From admission, onward)    Ordered        07/20/19 2323  Inpatient Admission  Once         07/20/19 2242  Place in Observation (expected length of stay for this patient is less than two midnights)  Once             Please contact the UR Department if additional information is required to close this patient's authorization/case  [address]  Utilization Review Department  Phone: [phone]; Fax [phone]  [email or web address]  ATTENTION: Please call with any questions or concerns to [phone]  and carefully listen to the prompts so that you are directed to the right person  Send all requests for admission clinical reviews, approved or denied determinations and any other requests to fax [phone]   All voicemails are confidential

## 2019-09-08 ENCOUNTER — APPOINTMENT (EMERGENCY)
Dept: RADIOLOGY | Facility: HOSPITAL | Age: 54
DRG: 637 | End: 2019-09-08
Payer: COMMERCIAL

## 2019-09-08 ENCOUNTER — HOSPITAL ENCOUNTER (INPATIENT)
Facility: HOSPITAL | Age: 54
LOS: 2 days | Discharge: HOME HEALTH | DRG: 637 | End: 2019-09-10
Attending: EMERGENCY MEDICINE | Admitting: INTERNAL MEDICINE
Payer: COMMERCIAL

## 2019-09-08 DIAGNOSIS — N18.6 ESRD (END STAGE RENAL DISEASE) ON DIALYSIS (HCC): ICD-10-CM

## 2019-09-08 DIAGNOSIS — E16.2 HYPOGLYCEMIA: Primary | ICD-10-CM

## 2019-09-08 DIAGNOSIS — I25.10 CAD IN NATIVE ARTERY: ICD-10-CM

## 2019-09-08 DIAGNOSIS — E10.641 UNCONTROLLED TYPE 1 DIABETES MELLITUS WITH HYPOGLYCEMIA AND COMA (HCC): ICD-10-CM

## 2019-09-08 DIAGNOSIS — T68.XXXA HYPOTHERMIA, INITIAL ENCOUNTER: ICD-10-CM

## 2019-09-08 DIAGNOSIS — Z99.2 ESRD (END STAGE RENAL DISEASE) ON DIALYSIS (HCC): ICD-10-CM

## 2019-09-08 PROBLEM — A41.9 SEPSIS (HCC): Status: ACTIVE | Noted: 2019-09-08

## 2019-09-08 PROBLEM — E03.9 HYPOTHYROID: Status: ACTIVE | Noted: 2019-09-08

## 2019-09-08 LAB
ALBUMIN SERPL BCP-MCNC: 2.9 G/DL (ref 3.5–5)
ALP SERPL-CCNC: 181 U/L (ref 46–116)
ALT SERPL W P-5'-P-CCNC: 34 U/L (ref 12–78)
ANION GAP SERPL CALCULATED.3IONS-SCNC: 13 MMOL/L (ref 4–13)
APTT PPP: 26 SECONDS (ref 23–37)
AST SERPL W P-5'-P-CCNC: 22 U/L (ref 5–45)
BASOPHILS # BLD AUTO: 0.09 THOUSANDS/ΜL (ref 0–0.1)
BASOPHILS NFR BLD AUTO: 1 % (ref 0–1)
BILIRUB SERPL-MCNC: 0.37 MG/DL (ref 0.2–1)
BILIRUB UR QL STRIP: NEGATIVE
BUN SERPL-MCNC: 94 MG/DL (ref 5–25)
CALCIUM SERPL-MCNC: 8.9 MG/DL (ref 8.3–10.1)
CHLORIDE SERPL-SCNC: 104 MMOL/L (ref 100–108)
CLARITY UR: CLEAR
CO2 SERPL-SCNC: 20 MMOL/L (ref 21–32)
COLOR UR: YELLOW
CREAT SERPL-MCNC: 7.9 MG/DL (ref 0.6–1.3)
EOSINOPHIL # BLD AUTO: 0.33 THOUSAND/ΜL (ref 0–0.61)
EOSINOPHIL NFR BLD AUTO: 2 % (ref 0–6)
ERYTHROCYTE [DISTWIDTH] IN BLOOD BY AUTOMATED COUNT: 13.7 % (ref 11.6–15.1)
GFR SERPL CREATININE-BSD FRML MDRD: 5 ML/MIN/1.73SQ M
GLUCOSE SERPL-MCNC: 143 MG/DL (ref 65–140)
GLUCOSE SERPL-MCNC: 178 MG/DL (ref 65–140)
GLUCOSE SERPL-MCNC: 182 MG/DL (ref 65–140)
GLUCOSE SERPL-MCNC: 259 MG/DL (ref 65–140)
GLUCOSE SERPL-MCNC: 464 MG/DL (ref 65–140)
GLUCOSE SERPL-MCNC: 47 MG/DL (ref 65–140)
GLUCOSE SERPL-MCNC: 493 MG/DL (ref 65–140)
GLUCOSE SERPL-MCNC: 64 MG/DL (ref 65–140)
GLUCOSE UR STRIP-MCNC: ABNORMAL MG/DL
HCT VFR BLD AUTO: 31.3 % (ref 34.8–46.1)
HGB BLD-MCNC: 9.7 G/DL (ref 11.5–15.4)
HGB UR QL STRIP.AUTO: ABNORMAL
IMM GRANULOCYTES # BLD AUTO: 0.15 THOUSAND/UL (ref 0–0.2)
IMM GRANULOCYTES NFR BLD AUTO: 1 % (ref 0–2)
INR PPP: 1.05 (ref 0.84–1.19)
KETONES UR STRIP-MCNC: NEGATIVE MG/DL
LACTATE SERPL-SCNC: 0.6 MMOL/L (ref 0.5–2)
LACTATE SERPL-SCNC: 1.2 MMOL/L (ref 0.5–2)
LEUKOCYTE ESTERASE UR QL STRIP: ABNORMAL
LYMPHOCYTES # BLD AUTO: 4.51 THOUSANDS/ΜL (ref 0.6–4.47)
LYMPHOCYTES NFR BLD AUTO: 30 % (ref 14–44)
MCH RBC QN AUTO: 33.8 PG (ref 26.8–34.3)
MCHC RBC AUTO-ENTMCNC: 31 G/DL (ref 31.4–37.4)
MCV RBC AUTO: 109 FL (ref 82–98)
MONOCYTES # BLD AUTO: 1.83 THOUSAND/ΜL (ref 0.17–1.22)
MONOCYTES NFR BLD AUTO: 12 % (ref 4–12)
NEUTROPHILS # BLD AUTO: 8.24 THOUSANDS/ΜL (ref 1.85–7.62)
NEUTS SEG NFR BLD AUTO: 54 % (ref 43–75)
NITRITE UR QL STRIP: NEGATIVE
NRBC BLD AUTO-RTO: 0 /100 WBCS
PH UR STRIP.AUTO: 5.5 [PH] (ref 4.5–8)
PLATELET # BLD AUTO: 249 THOUSANDS/UL (ref 149–390)
PMV BLD AUTO: 10.2 FL (ref 8.9–12.7)
POTASSIUM SERPL-SCNC: 3.6 MMOL/L (ref 3.5–5.3)
PROCALCITONIN SERPL-MCNC: 0.19 NG/ML
PROCALCITONIN SERPL-MCNC: 0.23 NG/ML
PROT SERPL-MCNC: 7.9 G/DL (ref 6.4–8.2)
PROT UR STRIP-MCNC: >=300 MG/DL
PROTHROMBIN TIME: 13.3 SECONDS (ref 11.6–14.5)
RBC # BLD AUTO: 2.87 MILLION/UL (ref 3.81–5.12)
SODIUM SERPL-SCNC: 137 MMOL/L (ref 136–145)
SP GR UR STRIP.AUTO: 1.02 (ref 1–1.03)
UROBILINOGEN UR QL STRIP.AUTO: 0.2 E.U./DL
WBC # BLD AUTO: 15.15 THOUSAND/UL (ref 4.31–10.16)

## 2019-09-08 PROCEDURE — 83605 ASSAY OF LACTIC ACID: CPT | Performed by: INTERNAL MEDICINE

## 2019-09-08 PROCEDURE — 96365 THER/PROPH/DIAG IV INF INIT: CPT

## 2019-09-08 PROCEDURE — 02HV33Z INSERTION OF INFUSION DEVICE INTO SUPERIOR VENA CAVA, PERCUTANEOUS APPROACH: ICD-10-PCS | Performed by: FAMILY MEDICINE

## 2019-09-08 PROCEDURE — 84145 PROCALCITONIN (PCT): CPT | Performed by: INTERNAL MEDICINE

## 2019-09-08 PROCEDURE — 87040 BLOOD CULTURE FOR BACTERIA: CPT | Performed by: EMERGENCY MEDICINE

## 2019-09-08 PROCEDURE — 82948 REAGENT STRIP/BLOOD GLUCOSE: CPT

## 2019-09-08 PROCEDURE — 96375 TX/PRO/DX INJ NEW DRUG ADDON: CPT

## 2019-09-08 PROCEDURE — 99285 EMERGENCY DEPT VISIT HI MDM: CPT

## 2019-09-08 PROCEDURE — 96367 TX/PROPH/DG ADDL SEQ IV INF: CPT

## 2019-09-08 PROCEDURE — 36415 COLL VENOUS BLD VENIPUNCTURE: CPT | Performed by: EMERGENCY MEDICINE

## 2019-09-08 PROCEDURE — 99223 1ST HOSP IP/OBS HIGH 75: CPT | Performed by: INTERNAL MEDICINE

## 2019-09-08 PROCEDURE — 85610 PROTHROMBIN TIME: CPT | Performed by: EMERGENCY MEDICINE

## 2019-09-08 PROCEDURE — 85730 THROMBOPLASTIN TIME PARTIAL: CPT | Performed by: EMERGENCY MEDICINE

## 2019-09-08 PROCEDURE — 99285 EMERGENCY DEPT VISIT HI MDM: CPT | Performed by: EMERGENCY MEDICINE

## 2019-09-08 PROCEDURE — 93005 ELECTROCARDIOGRAM TRACING: CPT

## 2019-09-08 PROCEDURE — 80053 COMPREHEN METABOLIC PANEL: CPT | Performed by: EMERGENCY MEDICINE

## 2019-09-08 PROCEDURE — 96368 THER/DIAG CONCURRENT INF: CPT

## 2019-09-08 PROCEDURE — 84145 PROCALCITONIN (PCT): CPT | Performed by: EMERGENCY MEDICINE

## 2019-09-08 PROCEDURE — 71045 X-RAY EXAM CHEST 1 VIEW: CPT

## 2019-09-08 PROCEDURE — 83605 ASSAY OF LACTIC ACID: CPT | Performed by: EMERGENCY MEDICINE

## 2019-09-08 PROCEDURE — 99223 1ST HOSP IP/OBS HIGH 75: CPT | Performed by: PHYSICIAN ASSISTANT

## 2019-09-08 PROCEDURE — 85025 COMPLETE CBC W/AUTO DIFF WBC: CPT | Performed by: EMERGENCY MEDICINE

## 2019-09-08 RX ORDER — DOCUSATE SODIUM 100 MG/1
100 CAPSULE, LIQUID FILLED ORAL 2 TIMES DAILY
Status: DISCONTINUED | OUTPATIENT
Start: 2019-09-08 | End: 2019-09-10 | Stop reason: HOSPADM

## 2019-09-08 RX ORDER — FLUTICASONE FUROATE AND VILANTEROL 100; 25 UG/1; UG/1
1 POWDER RESPIRATORY (INHALATION) DAILY
Status: DISCONTINUED | OUTPATIENT
Start: 2019-09-09 | End: 2019-09-10 | Stop reason: HOSPADM

## 2019-09-08 RX ORDER — CLOPIDOGREL BISULFATE 75 MG/1
75 TABLET ORAL DAILY
Status: DISCONTINUED | OUTPATIENT
Start: 2019-09-09 | End: 2019-09-10 | Stop reason: HOSPADM

## 2019-09-08 RX ORDER — GABAPENTIN 300 MG/1
300 CAPSULE ORAL
Status: DISCONTINUED | OUTPATIENT
Start: 2019-09-08 | End: 2019-09-10 | Stop reason: HOSPADM

## 2019-09-08 RX ORDER — SENNOSIDES 8.6 MG
2 TABLET ORAL DAILY PRN
Status: DISCONTINUED | OUTPATIENT
Start: 2019-09-08 | End: 2019-09-10 | Stop reason: HOSPADM

## 2019-09-08 RX ORDER — CALCITRIOL 0.5 UG/1
0.5 CAPSULE, LIQUID FILLED ORAL DAILY
COMMUNITY

## 2019-09-08 RX ORDER — LORATADINE 10 MG/1
10 TABLET ORAL DAILY
Status: DISCONTINUED | OUTPATIENT
Start: 2019-09-09 | End: 2019-09-10 | Stop reason: HOSPADM

## 2019-09-08 RX ORDER — CALCITRIOL 0.25 UG/1
0.5 CAPSULE, LIQUID FILLED ORAL DAILY
Status: DISCONTINUED | OUTPATIENT
Start: 2019-09-09 | End: 2019-09-10 | Stop reason: HOSPADM

## 2019-09-08 RX ORDER — ATORVASTATIN CALCIUM 40 MG/1
40 TABLET, FILM COATED ORAL
Status: DISCONTINUED | OUTPATIENT
Start: 2019-09-08 | End: 2019-09-10 | Stop reason: HOSPADM

## 2019-09-08 RX ORDER — CALCIUM CARBONATE 200(500)MG
1000 TABLET,CHEWABLE ORAL DAILY PRN
Status: DISCONTINUED | OUTPATIENT
Start: 2019-09-08 | End: 2019-09-10 | Stop reason: HOSPADM

## 2019-09-08 RX ORDER — ACETAMINOPHEN 325 MG/1
650 TABLET ORAL EVERY 6 HOURS PRN
Status: DISCONTINUED | OUTPATIENT
Start: 2019-09-08 | End: 2019-09-10 | Stop reason: HOSPADM

## 2019-09-08 RX ORDER — DEXTROSE 10 % IN WATER 10 %
250 INTRAVENOUS SOLUTION INTRAVENOUS ONCE
Status: DISCONTINUED | OUTPATIENT
Start: 2019-09-08 | End: 2019-09-08

## 2019-09-08 RX ORDER — DEXTROSE MONOHYDRATE 25 G/50ML
25 INJECTION, SOLUTION INTRAVENOUS ONCE
Status: COMPLETED | OUTPATIENT
Start: 2019-09-08 | End: 2019-09-08

## 2019-09-08 RX ORDER — ALBUTEROL SULFATE 90 UG/1
2 AEROSOL, METERED RESPIRATORY (INHALATION) EVERY 6 HOURS PRN
Status: DISCONTINUED | OUTPATIENT
Start: 2019-09-08 | End: 2019-09-10 | Stop reason: HOSPADM

## 2019-09-08 RX ORDER — DEXTROSE 10 % IN WATER 10 %
150 INTRAVENOUS SOLUTION INTRAVENOUS ONCE
Status: COMPLETED | OUTPATIENT
Start: 2019-09-08 | End: 2019-09-08

## 2019-09-08 RX ORDER — DEXTROSE MONOHYDRATE 25 G/50ML
INJECTION, SOLUTION INTRAVENOUS
Status: DISPENSED
Start: 2019-09-08 | End: 2019-09-08

## 2019-09-08 RX ORDER — SEVELAMER HYDROCHLORIDE 800 MG/1
800 TABLET, FILM COATED ORAL
Status: DISCONTINUED | OUTPATIENT
Start: 2019-09-08 | End: 2019-09-10 | Stop reason: HOSPADM

## 2019-09-08 RX ORDER — FLUOXETINE HYDROCHLORIDE 20 MG/1
40 CAPSULE ORAL DAILY
Status: DISCONTINUED | OUTPATIENT
Start: 2019-09-09 | End: 2019-09-10 | Stop reason: HOSPADM

## 2019-09-08 RX ORDER — CLOPIDOGREL BISULFATE 75 MG/1
75 TABLET ORAL DAILY
COMMUNITY
Start: 2019-09-07 | End: 2020-09-06

## 2019-09-08 RX ORDER — DILTIAZEM HYDROCHLORIDE 180 MG/1
180 CAPSULE, COATED, EXTENDED RELEASE ORAL DAILY
Status: DISCONTINUED | OUTPATIENT
Start: 2019-09-09 | End: 2019-09-10 | Stop reason: HOSPADM

## 2019-09-08 RX ORDER — MELATONIN
1000 DAILY
Status: DISCONTINUED | OUTPATIENT
Start: 2019-09-09 | End: 2019-09-10 | Stop reason: HOSPADM

## 2019-09-08 RX ORDER — ASCORBIC ACID 500 MG
1000 TABLET ORAL DAILY
Status: DISCONTINUED | OUTPATIENT
Start: 2019-09-09 | End: 2019-09-10 | Stop reason: HOSPADM

## 2019-09-08 RX ORDER — PANTOPRAZOLE SODIUM 20 MG/1
20 TABLET, DELAYED RELEASE ORAL
Status: DISCONTINUED | OUTPATIENT
Start: 2019-09-09 | End: 2019-09-10 | Stop reason: HOSPADM

## 2019-09-08 RX ORDER — ONDANSETRON 2 MG/ML
4 INJECTION INTRAMUSCULAR; INTRAVENOUS EVERY 6 HOURS PRN
Status: DISCONTINUED | OUTPATIENT
Start: 2019-09-08 | End: 2019-09-10 | Stop reason: HOSPADM

## 2019-09-08 RX ORDER — ALLOPURINOL 100 MG/1
100 TABLET ORAL DAILY
Status: DISCONTINUED | OUTPATIENT
Start: 2019-09-09 | End: 2019-09-10 | Stop reason: HOSPADM

## 2019-09-08 RX ORDER — FAMOTIDINE 20 MG/1
20 TABLET, FILM COATED ORAL DAILY
Status: DISCONTINUED | OUTPATIENT
Start: 2019-09-09 | End: 2019-09-10 | Stop reason: HOSPADM

## 2019-09-08 RX ORDER — LEVOTHYROXINE SODIUM 0.07 MG/1
75 TABLET ORAL
Status: DISCONTINUED | OUTPATIENT
Start: 2019-09-09 | End: 2019-09-10 | Stop reason: HOSPADM

## 2019-09-08 RX ADMIN — DEXTROSE 150 ML: 10 SOLUTION INTRAVENOUS at 12:00

## 2019-09-08 RX ADMIN — DOCUSATE SODIUM 100 MG: 100 CAPSULE, LIQUID FILLED ORAL at 18:23

## 2019-09-08 RX ADMIN — DEXTROSE 50 % IN WATER (D50W) INTRAVENOUS SYRINGE 25 ML: at 11:58

## 2019-09-08 RX ADMIN — CEFEPIME HYDROCHLORIDE 2000 MG: 2 INJECTION, POWDER, FOR SOLUTION INTRAVENOUS at 12:48

## 2019-09-08 RX ADMIN — SODIUM CHLORIDE 75 ML/HR: 234 INJECTION INTRAMUSCULAR; INTRAVENOUS; SUBCUTANEOUS at 13:24

## 2019-09-08 RX ADMIN — SODIUM CHLORIDE 4 UNITS/HR: 9 INJECTION, SOLUTION INTRAVENOUS at 18:23

## 2019-09-08 RX ADMIN — ATORVASTATIN CALCIUM 40 MG: 40 TABLET, FILM COATED ORAL at 18:22

## 2019-09-08 RX ADMIN — VANCOMYCIN HYDROCHLORIDE 1750 MG: 10 INJECTION, POWDER, LYOPHILIZED, FOR SOLUTION INTRAVENOUS at 13:30

## 2019-09-08 RX ADMIN — GABAPENTIN 300 MG: 300 CAPSULE ORAL at 22:04

## 2019-09-08 RX ADMIN — SEVELAMER HYDROCHLORIDE 800 MG: 800 TABLET, FILM COATED PARENTERAL at 18:27

## 2019-09-08 RX ADMIN — APIXABAN 5 MG: 5 TABLET, FILM COATED ORAL at 18:23

## 2019-09-08 NOTE — ED NOTES
called asking for an update; resident placed on the line with the         Candace Freitas, TONI  09/08/19 1257

## 2019-09-08 NOTE — ASSESSMENT & PLAN NOTE
Likely due to insulin pump malfunction  Insulin pump has been removed  Place on IV insulin infusion  Monitor glucose q 2 hours  Renal diet    Consult Endocrinology to evaluate insulin pump

## 2019-09-08 NOTE — ED NOTES
Resident notified of pt's current condition and vitals  Sergio ashley requested verbally and donned at [phone]       Alexia Jesus RN  09/08/19 8359

## 2019-09-08 NOTE — ED PROVIDER NOTES
History  Chief Complaint   Patient presents with    Hypoglycemia - Symptomatic     Pt found unresponsive by family with a blood sugar in the 40's  Given 150 mls D10 by EMS, recheck blood sugar 84 on arrival   Pt given juice and crackers on arrival      HPI  48 y o  Female with PMH type 1 DM, HTN, HLD, CAD, CHF, Asthma, ESRD on dialysis (M/W/F) presents to the emergency department via EMS with concern for hypoglycemia  Pt was reportedly found by a family member unresponsive in her home  EMS was called, and they found her glucose to be in the 40s  They disconnected her insulin pump and gave 150 mL D10  On arrival to the ED pt's glucose was    84 and she was alert and responsive, but drowsy  She complained of some mild lightheadedness  Denied nausea, vomiting, chest pain, abdominal pain, headache, fever, chills  States she has been feeling generally unwell for the last few days with decreased PO intake  She had and cath and two cardiac stents placed on 9/6/19, and prior to that took two days worth of prednisone  Pt reports that prednisone initially made her glucose high, so she adjusted her insulin dose at that time, however she adjusted it back to normal after the surgery  Prior to Admission Medications   Prescriptions Last Dose Informant Patient Reported? Taking?    Ascorbic Acid (VITAMIN C) 1000 MG tablet Not Taking at Unknown time  Yes No   Sig: Take 1,000 mg by mouth daily   FLUoxetine (PROzac) 40 MG capsule   Yes Yes   Sig: Take 40 mg by mouth daily   Ferric Citrate (AURYXIA) 1  MG(Fe) TABS   Yes Yes   Sig: Take by mouth   PATIENT MAINTAINED INSULIN PUMP   No Yes   Sig: Inject 1 each under the skin every 8 (eight) hours   acetaminophen (TYLENOL) 325 mg tablet   Yes Yes   Sig: Take 650 mg by mouth every 6 (six) hours as needed for mild pain   albuterol (PROVENTIL HFA,VENTOLIN HFA) 90 mcg/act inhaler Not Taking at Unknown time  Yes No   Sig: Inhale 2 puffs every 6 (six) hours as needed for wheezing albuterol (PROVENTIL HFA,VENTOLIN HFA) 90 mcg/act inhaler   No Yes   Sig: Inhale 2 puffs every 4 (four) hours as needed for wheezing   allopurinol (ZYLOPRIM) 100 mg tablet   Yes Yes   Sig: Take 100 mg by mouth daily   apixaban (ELIQUIS) 2 5 mg   No Yes   Sig: Take 1 tablet (2 5 mg total) by mouth 2 (two) times a day   Patient taking differently: Take 5 mg by mouth 2 (two) times a day    atorvastatin (LIPITOR) 40 mg tablet   No Yes   Sig: Take 1 tablet (40 mg total) by mouth daily with dinner   Patient taking differently: Take 80 mg by mouth daily with dinner    b complex vitamins capsule   Yes Yes   Sig: Take 1 capsule by mouth daily   bisacodyl (DULCOLAX) 5 mg EC tablet   Yes Yes   Sig: Take 5 mg by mouth daily as needed for constipation   calcitriol (ROCALTROL) 0 5 MCG capsule   Yes Yes   Sig: Take 0 5 mcg by mouth daily   cholecalciferol (VITAMIN D3) 1,000 units tablet   Yes Yes   Sig: Take 1,000 Units by mouth daily   clopidogrel (PLAVIX) 75 mg tablet   Yes Yes   Sig: Take 75 mg by mouth daily   diltiazem (CARDIZEM CD) 180 mg 24 hr capsule   No Yes   Sig: Take 1 capsule (180 mg total) by mouth daily   diltiazem (TIAZAC) 180 MG 24 hr capsule   Yes Yes   Sig: Take 180 mg by mouth daily   diphenhydrAMINE (BENADRYL) 25 mg tablet   Yes No   Sig: Take 25 mg by mouth every 6 (six) hours as needed for itching   famotidine (PEPCID) 20 mg tablet   Yes No   Sig: Take 20 mg by mouth daily   fluticasone (FLONASE) 50 mcg/act nasal spray   Yes Yes   Si spray into each nostril 2 (two) times a day   fluticasone-salmeterol (ADVAIR DISKUS, WIXELA INHUB) 100-50 mcg/dose inhaler   Yes No   Sig: Inhale 1 puff 2 (two) times a day Rinse mouth after use    gabapentin (NEURONTIN) 300 mg capsule   No No   Sig: Take 1 capsule (300 mg total) by mouth daily at bedtime   insulin lispro (HumaLOG) 100 units/mL   No Yes   Si Units by Subcutaneous Insulin Pump route as needed (when pump is empty)   Patient taking differently: 300 Units by Subcutaneous Insulin Pump route as needed (when pump is empty)    levothyroxine 75 mcg tablet   Yes Yes   Sig: Take 75 mcg by mouth daily   loratadine (CLARITIN) 10 mg tablet   Yes Yes   Sig: Take 10 mg by mouth daily   omeprazole (PriLOSEC) 20 mg delayed release capsule   Yes No   Sig: Take 40 mg by mouth daily    ondansetron (ZOFRAN) 4 mg tablet   Yes No   Sig: Take 4 mg by mouth every 6 (six) hours as needed for nausea or vomiting   sevelamer carbonate (RENVELA) 800 mg tablet   Yes No   Sig: Take 800 mg by mouth 2 (two) times a day      Facility-Administered Medications: None       Past Medical History:   Diagnosis Date    Asthma     Cardiac disease     CHF    [facility]     CPAP (continuous positive airway pressure) dependence     [facility]     type 1    [facility] patient ([facility] )     Disease of thyroid gland     hypothyroidism    GERD (gastroesophageal reflux disease)     Hyperlipidemia     Hypertension     Migraine     Psychiatric disorder     PTSD, Depression, panic attacks    Renal disorder     stage 4 kidney disease       Past Surgical History:   Procedure Laterality Date    CATARACT EXTRACTION, BILATERAL       SECTION      CHOLECYSTECTOMY      HERNIA REPAIR      RETINOPATHY SURGERY      TONSILECTOMY AND ADNOIDECTOMY      TONSILLECTOMY      TYMPANOSTOMY TUBE PLACEMENT         Family History   Problem Relation Age of Onset    Pancreatic cancer Mother     Stroke Father      I have reviewed and agree with the history as documented  Social History     Tobacco Use    Smoking status: Never Smoker    Smokeless tobacco: Never Used   Substance Use Topics    Alcohol use: Not Currently    Drug use: No        Review of Systems   Constitutional: Positive for appetite change  Negative for chills and fever  HENT: Negative for congestion, rhinorrhea and sore throat  Respiratory: Negative for chest tightness and shortness of breath  Cardiovascular: Negative for chest pain  Gastrointestinal: Negative for abdominal pain, diarrhea, nausea and vomiting  Genitourinary: Negative for dysuria and hematuria  Musculoskeletal: Negative for arthralgias and myalgias  Skin: Negative for rash  Neurological: Positive for light-headedness  Negative for dizziness, syncope, weakness, numbness and headaches  Psychiatric/Behavioral: Negative for confusion  All other systems reviewed and are negative  Physical Exam  ED Triage Vitals   Temperature Pulse Respirations Blood Pressure SpO2   09/08/19 1148 09/08/19 1051 09/08/19 1051 09/08/19 1053 09/08/19 1051   (!) 92 8 °F (33 8 °C) (!) 54 18 155/62 94 %      Temp Source Heart Rate Source Patient Position - Orthostatic VS BP Location FiO2 (%)   09/08/19 1148 09/08/19 1154 09/08/19 1154 09/08/19 1053 --   Rectal Monitor Lying Right leg       Pain Score       09/08/19 1051       4             Orthostatic Vital Signs  Vitals:    09/08/19 1415 09/08/19 1445 09/08/19 1500 09/08/19 1600   BP: 113/54 113/56 107/51 113/54   Pulse: 56 58 58 60   Patient Position - Orthostatic VS:    Lying       Physical Exam   Constitutional: She is oriented to person, place, and time  She appears well-developed and well-nourished  No distress  HENT:   Head: Normocephalic and atraumatic  Right Ear: External ear normal    Left Ear: External ear normal    Nose: Nose normal    Eyes: Pupils are equal, round, and reactive to light  Conjunctivae and EOM are normal    Neck: Normal range of motion  Neck supple  Cardiovascular: Normal rate, regular rhythm, normal heart sounds and intact distal pulses  Exam reveals no gallop and no friction rub  No murmur heard  Pulmonary/Chest: Effort normal and breath sounds normal  No respiratory distress  She has no wheezes  She has no rhonchi  She has no rales  Abdominal: Soft  Bowel sounds are normal  She exhibits no distension and no mass  There is no tenderness   There is no rebound and no guarding  Musculoskeletal: Normal range of motion  Lymphadenopathy:     She has no cervical adenopathy  Neurological: She is alert and oriented to person, place, and time  She has normal strength  No cranial nerve deficit or sensory deficit  Pt is alert and oriented and answers questions appropriately but appears drowsy  Skin: Skin is warm and dry  She is not diaphoretic  Fistula right upper extremity with palpable thrill    Psychiatric: She has a normal mood and affect  Nursing note and vitals reviewed        ED Medications  Medications   sodium chloride (concentrated) 154 mEq in dextrose 10 % 1,000 mL infusion (75 mL/hr Intravenous New Bag 9/8/19 1324)   acetaminophen (TYLENOL) tablet 650 mg (has no administration in time range)   albuterol (PROVENTIL HFA,VENTOLIN HFA) inhaler 2 puff (has no administration in time range)   allopurinol (ZYLOPRIM) tablet 100 mg (has no administration in time range)   apixaban (ELIQUIS) tablet 5 mg (has no administration in time range)   ascorbic acid (VITAMIN C) tablet 1,000 mg (has no administration in time range)   atorvastatin (LIPITOR) tablet 40 mg (has no administration in time range)   multivitamin stress formula tablet 1 tablet (has no administration in time range)   calcitriol (ROCALTROL) capsule 0 5 mcg (has no administration in time range)   cholecalciferol (VITAMIN D3) tablet 1,000 Units (has no administration in time range)   diltiazem (CARDIZEM CD) 24 hr capsule 180 mg (has no administration in time range)   famotidine (PEPCID) tablet 20 mg (has no administration in time range)   FLUoxetine (PROzac) capsule 40 mg (has no administration in time range)   fluticasone-vilanterol (BREO ELLIPTA) 100-25 mcg/inh inhaler 1 puff (has no administration in time range)   gabapentin (NEURONTIN) capsule 300 mg (has no administration in time range)   levothyroxine tablet 75 mcg (has no administration in time range)   loratadine (CLARITIN) tablet 10 mg (has no administration in time range)   pantoprazole (PROTONIX) EC tablet 20 mg (has no administration in time range)   sevelamer (RENAGEL) tablet 800 mg (has no administration in time range)   docusate sodium (COLACE) capsule 100 mg (has no administration in time range)   senna (SENOKOT) tablet 17 2 mg (has no administration in time range)   ondansetron (ZOFRAN) injection 4 mg (has no administration in time range)   calcium carbonate (TUMS) chewable tablet 1,000 mg (has no administration in time range)   acetaminophen (TYLENOL) tablet 650 mg (has no administration in time range)   cefepime (MAXIPIME) 500 mg in sodium chloride 0 9 % 50 mL IVPB (has no administration in time range)   insulin regular (HumuLIN R,NovoLIN R) 1 Units/mL in sodium chloride 0 9 % 100 mL infusion (has no administration in time range)   clopidogrel (PLAVIX) tablet 75 mg (has no administration in time range)   dextrose 50 % IV solution 25 mL (25 mL Intravenous Given 9/8/19 1158)   dextrose infusion 10 % bolus (0 mL Intravenous Stopped 9/8/19 1227)   cefepime (MAXIPIME) 2 g/50 mL dextrose IVPB (0 mg Intravenous Stopped 9/8/19 1323)   vancomycin (VANCOCIN) 1,750 mg in sodium chloride 0 9 % 500 mL IVPB (1,750 mg Intravenous New Bag 9/8/19 1330)       Diagnostic Studies  Results Reviewed     Procedure Component Value Units Date/Time    Fingerstick Glucose (POCT) [[id number]]  (Abnormal) Collected:  09/08/19 1216    Lab Status:  Final result Updated:  09/08/19 1603     POC Glucose 143 mg/dl     Procalcitonin [[id number]]  (Normal) Collected:  09/08/19 1247    Lab Status:  Final result Specimen:  Blood from Arm, Left Updated:  09/08/19 1336     Procalcitonin 0 19 ng/ml     Lactic acid x2 [[id number]]  (Normal) Collected:  09/08/19 1238    Lab Status:  Final result Specimen:  Blood from Central Venous Line Updated:  09/08/19 1317     LACTIC ACID 0 6 mmol/L     Narrative:       Result may be elevated if tourniquet was used during collection      Blood culture #1 [[id number]] Collected:  09/08/19 1246    Lab Status: In process Specimen:  Blood from Arm, Left Updated:  09/08/19 1250    Blood culture #2 [[id number]] Collected:  09/08/19 1239    Lab Status:   In process Specimen:  Blood from Central Venous Line Updated:  09/08/19 1247    Comprehensive metabolic panel [[id number]]  (Abnormal) Collected:  09/08/19 1140    Lab Status:  Final result Specimen:  Blood from Arm, Right Updated:  09/08/19 1209     Sodium 137 mmol/L      Potassium 3 6 mmol/L      Chloride 104 mmol/L      CO2 20 mmol/L      ANION GAP 13 mmol/L      BUN 94 mg/dL      Creatinine 7 90 mg/dL      Glucose 64 mg/dL      Calcium 8 9 mg/dL      AST 22 U/L      ALT 34 U/L      Alkaline Phosphatase 181 U/L      Total Protein 7 9 g/dL      Albumin 2 9 g/dL      Total Bilirubin 0 37 mg/dL      eGFR 5 ml/min/1 73sq m     Narrative:       [place] guidelines for Chronic Kidney Disease (CKD):     Stage 1 with normal or high GFR (GFR > 90 mL/min/1 73 square meters)    Stage 2 Mild CKD (GFR = 60-89 mL/min/1 73 square meters)    Stage 3A Moderate CKD (GFR = 45-59 mL/min/1 73 square meters)    Stage 3B Moderate CKD (GFR = 30-44 mL/min/1 73 square meters)    Stage 4 Severe CKD (GFR = 15-29 mL/min/1 73 square meters)    Stage 5 End Stage CKD (GFR <15 mL/min/1 73 square meters)  Note: GFR calculation is accurate only with a steady state creatinine    Protime-INR [[id number]]  (Normal) Collected:  09/08/19 1140    Lab Status:  Final result Specimen:  Blood from Arm, Right Updated:  09/08/19 1201     Protime 13 3 seconds      INR 1 05    APTT [[id number]]  (Normal) Collected:  09/08/19 1140    Lab Status:  Final result Specimen:  Blood from Arm, Right Updated:  09/08/19 1201     PTT 26 seconds     Urine Microscopic [[id number]] Collected:  09/08/19 1152    Lab Status:  No result Specimen:  Urine, Indwelling Coley Catheter     CBC and differential [[id number]]  (Abnormal) Collected:  09/08/19 1140    Lab Status:  Final result Specimen:  Blood from Arm, Right Updated:  09/08/19 1151     WBC 15 15 Thousand/uL      RBC 2 87 Million/uL      Hemoglobin 9 7 g/dL      Hematocrit 31 3 %       fL      MCH 33 8 pg      MCHC 31 0 g/dL      RDW 13 7 %      MPV 10 2 fL      Platelets 320 Thousands/uL      nRBC 0 /100 WBCs      Neutrophils Relative 54 %      Immat GRANS % 1 %      Lymphocytes Relative 30 %      Monocytes Relative 12 %      Eosinophils Relative 2 %      Basophils Relative 1 %      Neutrophils Absolute 8 24 Thousands/µL      Immature Grans Absolute 0 15 Thousand/uL      Lymphocytes Absolute 4 51 Thousands/µL      Monocytes Absolute 1 83 Thousand/µL      Eosinophils Absolute 0 33 Thousand/µL      Basophils Absolute 0 09 Thousands/µL     ED Urine Macroscopic [[id number]]  (Abnormal) Collected:  09/08/19 1152    Lab Status:  Final result Specimen:  Urine Updated:  09/08/19 1151     Color, UA Yellow     Clarity, UA Clear     pH, UA 5 5     Leukocytes, UA Trace     Nitrite, UA Negative     Protein, UA >=300 mg/dl      Glucose,  (1/2%) mg/dl      Ketones, UA Negative mg/dl      Urobilinogen, UA 0 2 E U /dl      Bilirubin, UA Negative     Blood, UA Moderate     Specific [place], UA 1 020    Narrative:       CLINITEK RESULT    Fingerstick Glucose (POCT) [[id number]]  (Abnormal) Collected:  09/08/19 1147    Lab Status:  Final result Updated:  09/08/19 1148     POC Glucose 178 mg/dl     POCT urinalysis dipstick [[id number]]     Lab Status:  No result Specimen:  Urine     Lactic acid x2 [[id number]]     Lab Status:  No result Specimen:  Blood     Fingerstick Glucose (POCT) [[id number]]  (Abnormal) Collected:  09/08/19 1114    Lab Status:  Final result Updated:  09/08/19 1115     POC Glucose 47 mg/dl                  XR chest 1 view portable   ED Interpretation by Melly Mora MD (09/08 1224)   R IJ central line   No ptx or pneumonia       CT chest abdomen pelvis wo contrast    (Results Pending) Procedures  Central Line  Date/Time: 9/8/2019 12:17 PM  Performed by: Roshni Gibbs MD  Authorized by: Roshni Gibbs MD     Patient location:  ED  Consent:     Consent obtained:  Emergent situation and verbal    Consent given by:  Patient    Risks discussed:  Arterial puncture, incorrect placement, pneumothorax, infection and bleeding    Alternatives discussed:  Alternative treatment  Universal protocol:     Procedure explained and questions answered to patient or proxy's satisfaction: yes      Patient identity confirmed:  Verbally with patient and arm band  Pre-procedure details:     Hand hygiene: Hand hygiene performed prior to insertion      Sterile barrier technique: All elements of maximal sterile technique followed      Skin preparation:  2% chlorhexidine    Skin preparation agent: Skin preparation agent completely dried prior to procedure    Indications:     Central line indications: no peripheral vascular access      Central line indications comment:  Limited peripheral access  Anesthesia (see MAR for exact dosages): Anesthesia method:  Local infiltration    Local anesthetic:  Lidocaine 1% w/o epi  Procedure details:     Location:  Right internal jugular    Vessel type: vein      Laterality:  Right    Approach: percutaneous technique used      Patient position:  Reverse Trendelenburg    Catheter type:  Triple lumen 20cm    Catheter size:  7 Fr    Landmarks identified: yes      Ultrasound guidance: yes      Sterile ultrasound techniques: Sterile gel and sterile probe covers were used      Number of attempts:  1    Successful placement: yes    Post-procedure details:     Post-procedure:  Dressing applied and line sutured    Assessment:  Blood return through all ports, no pneumothorax on x-ray, placement verified by x-ray and free fluid flow    Post-procedure complications: none      Patient tolerance of procedure:   Tolerated well, no immediate complications    Observer: Yes      Observer name: Alyssa Ibrahim, Medical Student            ED Course  ED Course as of Sep 08 1812   Jeffrey Pollack Sep 08, 2019   1215 Temperature(!): 91 6 °F (33 1 °C)   1422 Evaluated by medical critical care attending Dr Yary Vasquez  He believes the pt appropriate for step down 1, but critical care will follow      1425 Admitted to [address] [place] [address]                      Initial Sepsis Screening     [address] Name 09/08/19 1225                Is the patient's history suggestive of a new or worsening infection? (!) Yes (Proceed)  -HK        Suspected source of infection  suspect infection, source unknown  -HK        Are two or more of the following signs & symptoms of infection both present and new to the patient? (!) Yes (Proceed)  -HK        Indicate SIRS criteria  Hyperthemia > 38 3C (100 9F); Leukocytosis (WBC > [address]69 IJL)  -HK        If the answer is yes to both questions, suspicion of sepsis is present          If severe sepsis is present AND tissue hypoperfusion perists in the hour after fluid resuscitation or lactate > 4, the patient meets criteria for SEPTIC SHOCK          Are any of the following organ dysfunction criteria present within 6 hours of suspected infection and SIRS criteria that are NOT considered to be chronic conditions? No  -HK        Organ dysfunction          Date of presentation of severe sepsis          Time of presentation of severe sepsis          Tissue hypoperfusion persists in the hour after crystalloid fluid administration, evidenced, by either:          Was hypotension present within one hour of the conclusion of crystalloid fluid administration?         Date of presentation of septic shock          Time of presentation of septic shock            User Key  (r) = Recorded By, (t) = Taken By, (c) = Cosigned By    [address] Name Provider Stefani Egan MD Resident                  MDM  Number of Diagnoses or Management Options  Hypoglycemia:   Hypothermia, initial encounter:   Hypoglycemia   Pt initially with glucose 84 in the ED, but dropped to 57  Pt received 25 mL D50, 150 mL D10W, and was started on D10NS infusion  Difficult IV access  Central line was obtained  Hypothermia  Septic work up  No clear source  Broad spectrum antibiotics  Beckie Kanner hugger  Will admit        Disposition  Final diagnoses:   Hypoglycemia   Hypothermia, initial encounter     Time reflects when diagnosis was documented in both MDM as applicable and the Disposition within this note     Time User Action Codes Description Comment    9/8/2019  2:17 PM Gillian Bansal Add [E16 2] Hypoglycemia     9/8/2019  2:17 PM Wenda Soulier  XXXA] Hypothermia, initial encounter     9/8/2019  3:07 PM An Irving Add [N18 6,  Z99 2] ESRD (end stage renal disease) on dialysis [facility])       ED Disposition     ED Disposition Condition Date/Time Comment    Admit Stable Sun Sep 8, 2019  6:10 PM Case was discussed with Dr Cathi Sal and the patient's admission status was agreed to be Admission Status: inpatient status to the service of Dr Cathi Sal   Follow-up Information    None         Current Discharge Medication List      CONTINUE these medications which have NOT CHANGED    Details   acetaminophen (TYLENOL) 325 mg tablet Take 650 mg by mouth every 6 (six) hours as needed for mild pain      !! albuterol (PROVENTIL HFA,VENTOLIN HFA) 90 mcg/act inhaler Inhale 2 puffs every 4 (four) hours as needed for wheezing  Qty: 1 Inhaler, Refills: 0    Comments: Substitution to a formulary equivalent within the same pharmaceutical class is authorized    Associated Diagnoses: Asthma      allopurinol (ZYLOPRIM) 100 mg tablet Take 100 mg by mouth daily      apixaban (ELIQUIS) 2 5 mg Take 1 tablet (2 5 mg total) by mouth 2 (two) times a day  Qty: 60 tablet, Refills: 0    Associated Diagnoses: PAF (paroxysmal atrial fibrillation) ([facility])      atorvastatin (LIPITOR) 40 mg tablet Take 1 tablet (40 mg total) by mouth daily with dinner  Qty: 30 tablet, Refills: 0 Associated Diagnoses: Hyperlipidemia      b complex vitamins capsule Take 1 capsule by mouth daily      bisacodyl (DULCOLAX) 5 mg EC tablet Take 5 mg by mouth daily as needed for constipation      calcitriol (ROCALTROL) 0 5 MCG capsule Take 0 5 mcg by mouth daily      cholecalciferol (VITAMIN D3) 1,000 units tablet Take 1,000 Units by mouth daily      clopidogrel (PLAVIX) 75 mg tablet Take 75 mg by mouth daily      diltiazem (CARDIZEM CD) 180 mg 24 hr capsule Take 1 capsule (180 mg total) by mouth daily  Qty: 30 capsule, Refills: 0    Associated Diagnoses: PAF (paroxysmal atrial fibrillation) ([facility])      diltiazem (TIAZAC) 180 MG 24 hr capsule Take 180 mg by mouth daily      Ferric Citrate (AURYXIA) 1  MG(Fe) TABS Take by mouth      FLUoxetine (PROzac) 40 MG capsule Take 40 mg by mouth daily      fluticasone (FLONASE) 50 mcg/act nasal spray 1 spray into each nostril 2 (two) times a day      insulin lispro (HumaLOG) 100 units/mL 300 Units by Subcutaneous Insulin Pump route as needed (when pump is empty)  Qty: 10 mL, Refills: 0    Associated Diagnoses: Uncontrolled type 1 diabetes mellitus ([facility])      levothyroxine 75 mcg tablet Take 75 mcg by mouth daily      loratadine (CLARITIN) 10 mg tablet Take 10 mg by mouth daily      PATIENT MAINTAINED INSULIN PUMP Inject 1 each under the skin every 8 (eight) hours  Qty: 1 each, Refills: 0    Associated Diagnoses: Uncontrolled type 1 diabetes mellitus ([address] )      ! ! albuterol (PROVENTIL HFA,VENTOLIN HFA) 90 mcg/act inhaler Inhale 2 puffs every 6 (six) hours as needed for wheezing    Comments: Substitution to a formulary equivalent within the same pharmaceutical class is authorized        Ascorbic Acid (VITAMIN C) 1000 MG tablet Take 1,000 mg by mouth daily      diphenhydrAMINE (BENADRYL) 25 mg tablet Take 25 mg by mouth every 6 (six) hours as needed for itching      famotidine (PEPCID) 20 mg tablet Take 20 mg by mouth daily      fluticasone-salmeterol (ADVAIR DISKUS, Fortunato Galloway INHUB) 100-50 mcg/dose inhaler Inhale 1 puff 2 (two) times a day Rinse mouth after use  Comments: Substitution to a formulary equivalent within the same pharmaceutical class is authorized  gabapentin (NEURONTIN) 300 mg capsule Take 1 capsule (300 mg total) by mouth daily at bedtime  Qty: 30 capsule, Refills: 0    Associated Diagnoses: Neuropathy      omeprazole (PriLOSEC) 20 mg delayed release capsule Take 40 mg by mouth daily       ondansetron (ZOFRAN) 4 mg tablet Take 4 mg by mouth every 6 (six) hours as needed for nausea or vomiting      sevelamer carbonate (RENVELA) 800 mg tablet Take 800 mg by mouth 2 (two) times a day       !! - Potential duplicate medications found  Please discuss with provider  No discharge procedures on file  ED Provider  Attending physically available and evaluated [facility]  I managed the patient along with the ED Attending      Electronically Signed by         Amina Dougherty MD  09/08/19 9606

## 2019-09-08 NOTE — ASSESSMENT & PLAN NOTE
Lab Results   Component Value Date    HGBA1C 8 1 (H) 07/21/2019       Recent Labs     09/08/19  1114 09/08/19  1147   POCGLU 47* 178*       Blood Sugar Average: Last 72 hrs:  (P) 112 5   Renal diet  Insulin infusion as insulin pump has been removed  Awaiting Endocrinology evaluation

## 2019-09-08 NOTE — ED NOTES
Pharmacy contacted regarding maintenance fluids   Fluids are being created and will be sent       Rafael Chandra RN  09/08/19 6886

## 2019-09-08 NOTE — ASSESSMENT & PLAN NOTE
Lab Results   Component Value Date    HGBA1C 8 1 (H) 07/21/2019       Recent Labs     09/08/19  1114 09/08/19  1147   POCGLU 47* 178*       Blood Sugar Average: Last 72 hrs:  (P) 112 5     - Hold insulin for now, start q6h glucose checks

## 2019-09-08 NOTE — ED ATTENDING ATTESTATION
Carmen Warren DO, saw and evaluated the patient  I have discussed the patient with the resident/non-physician practitioner and agree with the resident's/non-physician practitioner's findings, Plan of Care, and MDM as documented in the resident's/non-physician practitioner's note, except where noted  All available labs and Radiology studies were reviewed  At this point I agree with the current assessment done in the Emergency Department  I have conducted an independent evaluation of this patient including a focused history and a physical exam     ED Note - Jose Segura 48 y o  female MRN: [id number]  Unit/Bed#: ED 27 Encounter: [id number]    History of Present Illness   HPI  Jose Segura is a 48 y o  female who presents for evaluation of hypoglycemia  Report that patient was found unresponsive at home  Initial blood sugar approximately 40 mg/dL  Patient received D10 normal saline by EMS with clinical improvement  Patient voices no specific complaints but describes that she has not been feeling well for the last day or so with decreased p o  Intake  And generalized weakness and fatigue  Patient recently completed a course of prednisone and states that she did adjust her insulin regimen accordingly  No lesions or skin breakdown reported  Patient has any chest pain, shortness breath, nausea vomiting, dizziness  REVIEW OF SYSTEMS  See HPI for further details  12 systems reviewed and otherwise negative except as noted     Historical Information     PAST MEDICAL HISTORY  Past Medical History:   Diagnosis Date    Asthma     Cardiac disease     CHF    [facility]) [facility]     CPAP (continuous positive airway pressure) dependence     Diabetes mellitus ([facility] )     type 1    Dialysis patient ([facility] )     Disease of thyroid gland     hypothyroidism    GERD (gastroesophageal reflux disease)     Hyperlipidemia     Hypertension     Migraine     Psychiatric disorder     PTSD, Depression, panic attacks    Renal disorder     stage 4 kidney disease       FAMILY HISTORY  Family History   Problem Relation Age of Onset    Pancreatic cancer Mother     Stroke Father        SOCIAL HISTORY  Social History     Socioeconomic History    Marital status: /Civil Union     Spouse name: None    Number of children: None    Years of education: None    Highest education level: None   Occupational History    None   Social Needs    Financial resource strain: None    Food insecurity:     Worry: None     Inability: None    Transportation needs:     Medical: None     Non-medical: None   Tobacco Use    Smoking status: Never Smoker    Smokeless tobacco: Never Used   Substance and Sexual Activity    Alcohol use: Not Currently    Drug use: No    Sexual activity: Never   Lifestyle    Physical activity:     Days per week: None     Minutes per session: None    Stress: None   Relationships    Social connections:     Talks on phone: None     Gets together: None     Attends Yazidi service: None     Active member of club or organization: None     Attends meetings of clubs or organizations: None     Relationship status: None    Intimate partner violence:     Fear of current or ex partner: None     Emotionally abused: None     Physically abused: None     Forced sexual activity: None   Other Topics Concern    None   Social History Narrative    None       SURGICAL HISTORY  Past Surgical History:   Procedure Laterality Date    CATARACT EXTRACTION, BILATERAL       SECTION      CHOLECYSTECTOMY      HERNIA REPAIR      RETINOPATHY SURGERY      TONSILECTOMY AND ADNOIDECTOMY      TONSILLECTOMY      TYMPANOSTOMY TUBE PLACEMENT       Meds/Allergies     CURRENT MEDICATIONS    Current Facility-Administered Medications:     dextrose 50 % IV solution **ADS Override Pull**, , , ,     dextrose infusion 10 % bolus, 250 mL, Intravenous, Once, Nate Miranda MD    Current Outpatient Medications:    acetaminophen (TYLENOL) 325 mg tablet, Take 650 mg by mouth every 6 (six) hours as needed for mild pain, Disp: , Rfl:     albuterol (PROVENTIL HFA,VENTOLIN HFA) 90 mcg/act inhaler, Inhale 2 puffs every 6 (six) hours as needed for wheezing, Disp: , Rfl:     albuterol (PROVENTIL HFA,VENTOLIN HFA) 90 mcg/act inhaler, Inhale 2 puffs every 4 (four) hours as needed for wheezing, Disp: 1 Inhaler, Rfl: 0    allopurinol (ZYLOPRIM) 100 mg tablet, Take 100 mg by mouth daily, Disp: , Rfl:     apixaban (ELIQUIS) 2 5 mg, Take 1 tablet (2 5 mg total) by mouth 2 (two) times a day, Disp: 60 tablet, Rfl: 0    Ascorbic Acid (VITAMIN C) 1000 MG tablet, Take 1,000 mg by mouth daily, Disp: , Rfl:     atorvastatin (LIPITOR) 40 mg tablet, Take 1 tablet (40 mg total) by mouth daily with dinner, Disp: 30 tablet, Rfl: 0    b complex vitamins capsule, Take 1 capsule by mouth daily, Disp: , Rfl:     bisacodyl (DULCOLAX) 5 mg EC tablet, Take 5 mg by mouth daily as needed for constipation, Disp: , Rfl:     cholecalciferol (VITAMIN D3) 1,000 units tablet, Take 1,000 Units by mouth daily, Disp: , Rfl:     diltiazem (CARDIZEM CD) 180 mg 24 hr capsule, Take 1 capsule (180 mg total) by mouth daily, Disp: 30 capsule, Rfl: 0    diltiazem (TIAZAC) 180 MG 24 hr capsule, Take 180 mg by mouth daily, Disp: , Rfl:     diphenhydrAMINE (BENADRYL) 25 mg tablet, Take 25 mg by mouth every 6 (six) hours as needed for itching, Disp: , Rfl:     famotidine (PEPCID) 20 mg tablet, Take 20 mg by mouth daily, Disp: , Rfl:     FLUoxetine (PROzac) 40 MG capsule, Take 40 mg by mouth daily, Disp: , Rfl:     fluticasone (FLONASE) 50 mcg/act nasal spray, 1 spray into each nostril 2 (two) times a day, Disp: , Rfl:     fluticasone-salmeterol (ADVAIR DISKUS, WIXELA INHUB) 100-50 mcg/dose inhaler, Inhale 1 puff 2 (two) times a day Rinse mouth after use , Disp: , Rfl:     gabapentin (NEURONTIN) 300 mg capsule, Take 1 capsule (300 mg total) by mouth daily at bedtime, Disp: 30 capsule, Rfl: 0    insulin lispro (HumaLOG) 100 units/mL, 300 Units by Subcutaneous Insulin Pump route as needed (when pump is empty), Disp: 10 mL, Rfl: 0    levothyroxine 75 mcg tablet, Take 75 mcg by mouth daily, Disp: , Rfl:     loratadine (CLARITIN) 10 mg tablet, Take 10 mg by mouth daily, Disp: , Rfl:     omeprazole (PriLOSEC) 20 mg delayed release capsule, Take 20 mg by mouth 2 (two) times a day, Disp: , Rfl:     ondansetron (ZOFRAN) 4 mg tablet, Take 4 mg by mouth every 6 (six) hours as needed for nausea or vomiting, Disp: , Rfl:     PATIENT MAINTAINED INSULIN PUMP, Inject 1 each under the skin every 8 (eight) hours, Disp: 1 each, Rfl: 0    sevelamer carbonate (RENVELA) 800 mg tablet, Take 800 mg by mouth 2 (two) times a day, Disp: , Rfl:     (Not in a hospital admission)    ALLERGIES  Allergies   Allergen Reactions    Codeine Edema    Shellfish Allergy Edema    Hydrocodone Itching and Vomiting    Morphine Itching and Vomiting    Oxycodone Itching and Vomiting     Objective     PHYSICAL EXAM    VITAL SIGNS: Blood pressure 155/62, pulse (!) 54, resp  rate 18, height 5' 4" (1 626 m), weight 118 kg (261 lb 0 4 oz), SpO2 94 %  Constitutional:  Appears well developed and well nourished, no acute distress, non-toxic appearance   Eyes:  PERRL, EOMI, conjunctivae pink, sclerae non-icteric    HENT:  Normocephalic/Atraumatic, no rhinorrhea, mucous membranes moist   Neck: normal range of motion, no tenderness, supple   Respiratory:  No respiratory distress, normal breath sounds   Cardiovascular:  Normal rate, normal rhythm, LUE fistula with thrill/ bruit  GI:  Soft, non-tender, non-distended, no mass, no rebound, no guarding   :  No CVAT, no flank ecchymosis, perineum unremarkable    Musculoskeletal:  No swelling or edema, no tenderness, no deformities  Integument:  Pink, warm, dry, Well hydrated, no rash, no erythema, no bullae   Lymphatic:  No cervical/ tonsillar/ submandibular lymphadenopathy noted   Neurologic:  Awake, Alert & oriented x 3, CN 2-12 intact, no focal neurological deficits  Psychiatric:  Speech and behavior appropriate       ED COURSE and MDM:    Assessment/Plan   Assessment:  Fransisco Nuñez is a 48 y o  female presents for evaluation of hypoglycemia    Plan:  Labs, IV fluids, imaging as appropriate, infectious/ sepsis workup, Antibiotics/ antivirals, symptom management, disposition as appropriate  CRITICAL CARE TIME: 0 minutes      Portions of the record may have been created with voice recognition software  Occasional wrong word or "sound a like" substitutions may have occurred due to the inherent limitations of voice recognition software       ED Provider  Electronically Signed by

## 2019-09-08 NOTE — ASSESSMENT & PLAN NOTE
- Unknown source, broad spectrum abx started after cultures obtained  - UA negative  - Pt denies any recent symptoms  Lactic 0 6, HD stable

## 2019-09-08 NOTE — ASSESSMENT & PLAN NOTE
Unclear etiology at this time  Sepsis presentation may be secondary due to severe hypoglycemia  Urine does not appear to be infected  Chest x-ray shows no acute cardiopulmonary disease  Check CT scan of chest abdomen and pelvis  Await blood cultures  Empiric cefepime  Received 1 dose of vancomycin in the ED  Consider Infectious Disease consultation if CT scan is unremarkable  John Paul ashley for hypothermia

## 2019-09-08 NOTE — ASSESSMENT & PLAN NOTE
- Likely d/t poor PO intake as of recent without insulin pump changes  - Pt given 150ml of D50 by EMS on scene, started on NSS+D10 in ED, glucose stable    - Start Glucose check q6h and prn if pt symptomatic  - If Glucose remains elevated consider d/c dextrose solution, monitor and potentially restart short acting insulin  - Monitor PO intake closely

## 2019-09-08 NOTE — H&P
H&P- Mu Sanchez 1965, 48 y o  female MRN: [id number]    Unit/Bed#: [facility]O Encounter: [id number]    Primary Care Provider: Francois Hugo DO   Date and time admitted to hospital: 9/8/2019 10:45 AM        * Sepsis [facility])  Assessment & Plan  Unclear etiology at this time  Sepsis presentation may be secondary due to severe hypoglycemia  Urine does not appear to be infected  Chest x-ray shows no acute cardiopulmonary disease  Check CT scan of chest abdomen and pelvis  Await blood cultures  Empiric cefepime  Received 1 dose of vancomycin in the ED  Consider Infectious Disease consultation if CT scan is unremarkable  Elsa ashley for hypothermia  Hypoglycemia  Assessment & Plan  Likely due to insulin pump malfunction  Insulin pump has been removed  Place on IV insulin infusion  Monitor glucose q 2 hours  Renal diet  Consult Endocrinology to evaluate insulin pump    Hypothyroid  Assessment & Plan  Continue levothyroxine    CAD with recent stent  Assessment & Plan  Continue Plavix, Cardizem, Eliquis    Neuropathy  Assessment & Plan  Continue gabapentin    Morbid obesity ([facility] )  Assessment & Plan  nutrition counseling when mental status improves    PAF (paroxysmal atrial fibrillation) ([facility])  Assessment & Plan  Eliquis for anticoagulation  Diltiazem for rate control    ESRD (end stage renal disease) on dialysis [facility])  Assessment & Plan  Consult Nephrology for inpatient hemodialysis    Uncontrolled type 1 diabetes mellitus ([facility] )  Assessment & Plan  Lab Results   Component Value Date    HGBA1C 8 1 (H) 07/21/2019       Recent Labs     09/08/19  1114 09/08/19  1147   POCGLU 47* 178*       Blood Sugar Average: Last 72 hrs:  (P) 112 5   Renal diet  Insulin infusion as insulin pump has been removed  Awaiting Endocrinology evaluation          History of Present Illness     HPI:  Mu Sanchez is a 48 y o  female who presents with hypoglycemia    History gathering is limited due to the patient's change in mental status and lack of bystanders at the bedside  The patient is somnolent but arousable  Her blood sugars were low today  Denies any changes in her insulin orders  Denies skipping any meals  Denies any vomiting or diarrhea  She reports she was recently admitted to [facility] where she received a cardiac stent  She denies any other recent complaints including fever, chills, cough, abdominal pain, diarrhea, vomiting, rash, dysuria      Review of Systems   Unable to perform ROS: Acuity of condition       Historical Information   Past Medical History:   Diagnosis Date    Asthma     Cardiac disease     CHF    CHF [facility]congestive heart failure) ([facility])     CPAP (continuous positive airway pressure) dependence     Diabetes mellitus ([facility] )     type 1    Dialysis patient ([facility] )     Disease of thyroid gland     hypothyroidism    GERD (gastroesophageal reflux disease)     Hyperlipidemia     Hypertension     Migraine     Psychiatric disorder     PTSD, Depression, panic attacks    Renal disorder     stage 4 kidney disease     Past Surgical History:   Procedure Laterality Date    CATARACT EXTRACTION, BILATERAL       SECTION      CHOLECYSTECTOMY      HERNIA REPAIR      RETINOPATHY SURGERY      TONSILECTOMY AND ADNOIDECTOMY      TONSILLECTOMY      TYMPANOSTOMY TUBE PLACEMENT       Social History   Social History     Substance and Sexual Activity   Alcohol Use Not Currently     Social History     Substance and Sexual Activity   Drug Use No     Social History     Tobacco Use   Smoking Status Never Smoker   Smokeless Tobacco Never Used     Family History: non-contributory    Meds/Allergies   all medications and allergies reviewed  Allergies   Allergen Reactions    Codeine Edema    Shellfish Allergy Edema    Hydrocodone Itching and Vomiting    Morphine Itching and Vomiting    Oxycodone Itching and Vomiting       Objective   Vitals: Blood pressure 113/56, pulse 58, temperature (!) 94 3 °F (34 6 °C), resp  rate 16, height 5' 4" (1 626 m), weight 118 kg (261 lb 0 4 oz), SpO2 98 %  Intake/Output Summary (Last 24 hours) at 9/8/2019 1522  Last data filed at 9/8/2019 1323  Gross per 24 hour   Intake 208 33 ml   Output 50 ml   Net 158 33 ml       Invasive Devices     Central Venous Catheter Line            CVC Central Lines 09/08/19 Triple Right Internal jugular less than 1 day          Peripheral Intravenous Line            Peripheral IV 09/08/19 Right Wrist less than 1 day          Line            Hemodialysis AV Fistula Left Upper arm -- days          Drain            Urethral Catheter Temperature probe less than 1 day                Physical Exam   Constitutional: No distress  Morbidly obese middle-aged female, lying in bed, somnolent   HENT:   Head: Normocephalic and atraumatic  Eyes: Pupils are equal, round, and reactive to light  EOM are normal    Neck: Neck supple  Central line present in right jugular vein   Cardiovascular: Normal rate and regular rhythm  Pulmonary/Chest: Effort normal  She has no wheezes  She has no rales  Abdominal: Soft  She exhibits no distension  There is no tenderness  Musculoskeletal: She exhibits no edema  Neurological: No cranial nerve deficit  Patient is somnolent but arousable to vocal stimuli  Oriented to person place and time  Moving all 4 extremities  Rapidly falls back asleep   Skin: Skin is warm and dry  Capillary refill takes less than 2 seconds  No rash noted  She is not diaphoretic  No erythema  Lab Results: I have personally reviewed pertinent reports  Imaging: I have personally reviewed pertinent reports  EKG, Pathology, and Other Studies: I have personally reviewed pertinent reports  Code Status: Prior  Advance Directive and Living Will:      Power of :    POLST:      Counseling / Coordination of Care  Total floor / unit time spent today 45 minutes    Greater than 50% of total time was spent with the patient and / or family counseling and / or coordination of care  A description of the counseling / coordination of care:  Discussed plan of care with the patient the bedside

## 2019-09-08 NOTE — ASSESSMENT & PLAN NOTE
- 9/7 PCI with Stenting of Proximal and Distal LAD at [facility]  - continue atorvastatin, asa, and plavix

## 2019-09-09 ENCOUNTER — APPOINTMENT (INPATIENT)
Dept: RADIOLOGY | Facility: HOSPITAL | Age: 54
DRG: 637 | End: 2019-09-09
Payer: COMMERCIAL

## 2019-09-09 ENCOUNTER — APPOINTMENT (INPATIENT)
Dept: DIALYSIS | Facility: HOSPITAL | Age: 54
DRG: 637 | End: 2019-09-09
Payer: COMMERCIAL

## 2019-09-09 PROBLEM — E10.641: Status: ACTIVE | Noted: 2019-07-20

## 2019-09-09 PROBLEM — E10.649 UNCONTROLLED TYPE 1 DIABETES MELLITUS WITH HYPOGLYCEMIA (HCC): Status: ACTIVE | Noted: 2019-07-20

## 2019-09-09 LAB
ALBUMIN SERPL BCP-MCNC: 2.5 G/DL (ref 3.5–5)
ALP SERPL-CCNC: 173 U/L (ref 46–116)
ALT SERPL W P-5'-P-CCNC: 31 U/L (ref 12–78)
ANION GAP SERPL CALCULATED.3IONS-SCNC: 13 MMOL/L (ref 4–13)
AST SERPL W P-5'-P-CCNC: 15 U/L (ref 5–45)
ATRIAL RATE: 53 BPM
BASOPHILS # BLD AUTO: 0.04 THOUSANDS/ΜL (ref 0–0.1)
BASOPHILS NFR BLD AUTO: 0 % (ref 0–1)
BILIRUB SERPL-MCNC: 0.4 MG/DL (ref 0.2–1)
BUN SERPL-MCNC: 107 MG/DL (ref 5–25)
CALCIUM SERPL-MCNC: 8.4 MG/DL (ref 8.3–10.1)
CHLORIDE SERPL-SCNC: 103 MMOL/L (ref 100–108)
CO2 SERPL-SCNC: 20 MMOL/L (ref 21–32)
CREAT SERPL-MCNC: 8.93 MG/DL (ref 0.6–1.3)
EOSINOPHIL # BLD AUTO: 0.35 THOUSAND/ΜL (ref 0–0.61)
EOSINOPHIL NFR BLD AUTO: 3 % (ref 0–6)
ERYTHROCYTE [DISTWIDTH] IN BLOOD BY AUTOMATED COUNT: 13.7 % (ref 11.6–15.1)
GFR SERPL CREATININE-BSD FRML MDRD: 5 ML/MIN/1.73SQ M
GLUCOSE SERPL-MCNC: 101 MG/DL (ref 65–140)
GLUCOSE SERPL-MCNC: 130 MG/DL (ref 65–140)
GLUCOSE SERPL-MCNC: 131 MG/DL (ref 65–140)
GLUCOSE SERPL-MCNC: 157 MG/DL (ref 65–140)
GLUCOSE SERPL-MCNC: 186 MG/DL (ref 65–140)
GLUCOSE SERPL-MCNC: 244 MG/DL (ref 65–140)
GLUCOSE SERPL-MCNC: 270 MG/DL (ref 65–140)
GLUCOSE SERPL-MCNC: 286 MG/DL (ref 65–140)
GLUCOSE SERPL-MCNC: 303 MG/DL (ref 65–140)
GLUCOSE SERPL-MCNC: 363 MG/DL (ref 65–140)
GLUCOSE SERPL-MCNC: 436 MG/DL (ref 65–140)
HCT VFR BLD AUTO: 26.6 % (ref 34.8–46.1)
HGB BLD-MCNC: 8.5 G/DL (ref 11.5–15.4)
IMM GRANULOCYTES # BLD AUTO: 0.1 THOUSAND/UL (ref 0–0.2)
IMM GRANULOCYTES NFR BLD AUTO: 1 % (ref 0–2)
LYMPHOCYTES # BLD AUTO: 2.98 THOUSANDS/ΜL (ref 0.6–4.47)
LYMPHOCYTES NFR BLD AUTO: 23 % (ref 14–44)
MCH RBC QN AUTO: 34.4 PG (ref 26.8–34.3)
MCHC RBC AUTO-ENTMCNC: 32 G/DL (ref 31.4–37.4)
MCV RBC AUTO: 108 FL (ref 82–98)
MONOCYTES # BLD AUTO: 1.29 THOUSAND/ΜL (ref 0.17–1.22)
MONOCYTES NFR BLD AUTO: 10 % (ref 4–12)
NEUTROPHILS # BLD AUTO: 8.36 THOUSANDS/ΜL (ref 1.85–7.62)
NEUTS SEG NFR BLD AUTO: 63 % (ref 43–75)
NRBC BLD AUTO-RTO: 0 /100 WBCS
P AXIS: 38 DEGREES
PLATELET # BLD AUTO: 192 THOUSANDS/UL (ref 149–390)
PMV BLD AUTO: 10.5 FL (ref 8.9–12.7)
POTASSIUM SERPL-SCNC: 4 MMOL/L (ref 3.5–5.3)
PR INTERVAL: 192 MS
PROCALCITONIN SERPL-MCNC: 0.17 NG/ML
PROT SERPL-MCNC: 7 G/DL (ref 6.4–8.2)
QRS AXIS: 41 DEGREES
QRSD INTERVAL: 76 MS
QT INTERVAL: 444 MS
QTC INTERVAL: 416 MS
RBC # BLD AUTO: 2.47 MILLION/UL (ref 3.81–5.12)
SODIUM SERPL-SCNC: 136 MMOL/L (ref 136–145)
T WAVE AXIS: 55 DEGREES
VENTRICULAR RATE: 53 BPM
WBC # BLD AUTO: 13.12 THOUSAND/UL (ref 4.31–10.16)

## 2019-09-09 PROCEDURE — 71250 CT THORAX DX C-: CPT

## 2019-09-09 PROCEDURE — 85025 COMPLETE CBC W/AUTO DIFF WBC: CPT | Performed by: INTERNAL MEDICINE

## 2019-09-09 PROCEDURE — 80053 COMPREHEN METABOLIC PANEL: CPT | Performed by: INTERNAL MEDICINE

## 2019-09-09 PROCEDURE — 99222 1ST HOSP IP/OBS MODERATE 55: CPT | Performed by: INTERNAL MEDICINE

## 2019-09-09 PROCEDURE — G8981 BODY POS CURRENT STATUS: HCPCS

## 2019-09-09 PROCEDURE — 84145 PROCALCITONIN (PCT): CPT | Performed by: INTERNAL MEDICINE

## 2019-09-09 PROCEDURE — 74176 CT ABD & PELVIS W/O CONTRAST: CPT

## 2019-09-09 PROCEDURE — 99223 1ST HOSP IP/OBS HIGH 75: CPT | Performed by: INTERNAL MEDICINE

## 2019-09-09 PROCEDURE — 99232 SBSQ HOSP IP/OBS MODERATE 35: CPT | Performed by: INTERNAL MEDICINE

## 2019-09-09 PROCEDURE — 82948 REAGENT STRIP/BLOOD GLUCOSE: CPT

## 2019-09-09 PROCEDURE — G8982 BODY POS GOAL STATUS: HCPCS

## 2019-09-09 PROCEDURE — 97163 PT EVAL HIGH COMPLEX 45 MIN: CPT

## 2019-09-09 PROCEDURE — 90935 HEMODIALYSIS ONE EVALUATION: CPT | Performed by: INTERNAL MEDICINE

## 2019-09-09 PROCEDURE — 93010 ELECTROCARDIOGRAM REPORT: CPT | Performed by: INTERNAL MEDICINE

## 2019-09-09 RX ORDER — GABAPENTIN 300 MG/1
300 CAPSULE ORAL ONCE
Status: COMPLETED | OUTPATIENT
Start: 2019-09-09 | End: 2019-09-09

## 2019-09-09 RX ORDER — ASPIRIN 81 MG/1
81 TABLET, CHEWABLE ORAL DAILY
Status: DISCONTINUED | OUTPATIENT
Start: 2019-09-09 | End: 2019-09-10 | Stop reason: HOSPADM

## 2019-09-09 RX ADMIN — SODIUM CHLORIDE 75 ML/HR: 234 INJECTION INTRAMUSCULAR; INTRAVENOUS; SUBCUTANEOUS at 04:11

## 2019-09-09 RX ADMIN — PANTOPRAZOLE SODIUM 20 MG: 20 TABLET, DELAYED RELEASE ORAL at 06:10

## 2019-09-09 RX ADMIN — CLOPIDOGREL BISULFATE 75 MG: 75 TABLET ORAL at 08:41

## 2019-09-09 RX ADMIN — ASPIRIN 81 MG 81 MG: 81 TABLET ORAL at 18:12

## 2019-09-09 RX ADMIN — APIXABAN 5 MG: 5 TABLET, FILM COATED ORAL at 18:10

## 2019-09-09 RX ADMIN — SEVELAMER HYDROCHLORIDE 800 MG: 800 TABLET, FILM COATED PARENTERAL at 12:26

## 2019-09-09 RX ADMIN — OXYCODONE HYDROCHLORIDE AND ACETAMINOPHEN 1000 MG: 500 TABLET ORAL at 08:41

## 2019-09-09 RX ADMIN — METOPROLOL TARTRATE 25 MG: 25 TABLET, FILM COATED ORAL at 21:26

## 2019-09-09 RX ADMIN — ATORVASTATIN CALCIUM 40 MG: 40 TABLET, FILM COATED ORAL at 18:10

## 2019-09-09 RX ADMIN — VITAMIN D, TAB 1000IU (100/BT) 1000 UNITS: 25 TAB at 08:41

## 2019-09-09 RX ADMIN — ALLOPURINOL 100 MG: 100 TABLET ORAL at 08:41

## 2019-09-09 RX ADMIN — CALCITRIOL CAPSULES 0.25 MCG 0.5 MCG: 0.25 CAPSULE ORAL at 08:41

## 2019-09-09 RX ADMIN — DOCUSATE SODIUM 100 MG: 100 CAPSULE, LIQUID FILLED ORAL at 18:10

## 2019-09-09 RX ADMIN — GABAPENTIN 300 MG: 300 CAPSULE ORAL at 18:26

## 2019-09-09 RX ADMIN — SEVELAMER HYDROCHLORIDE 800 MG: 800 TABLET, FILM COATED PARENTERAL at 18:11

## 2019-09-09 RX ADMIN — APIXABAN 5 MG: 5 TABLET, FILM COATED ORAL at 08:41

## 2019-09-09 RX ADMIN — LEVOTHYROXINE SODIUM 75 MCG: 75 TABLET ORAL at 06:10

## 2019-09-09 RX ADMIN — GABAPENTIN 300 MG: 300 CAPSULE ORAL at 21:26

## 2019-09-09 RX ADMIN — FLUOXETINE 40 MG: 20 CAPSULE ORAL at 08:41

## 2019-09-09 RX ADMIN — SEVELAMER HYDROCHLORIDE 800 MG: 800 TABLET, FILM COATED PARENTERAL at 08:41

## 2019-09-09 RX ADMIN — DILTIAZEM HYDROCHLORIDE 180 MG: 180 CAPSULE, COATED, EXTENDED RELEASE ORAL at 08:41

## 2019-09-09 RX ADMIN — ZINC 1 TABLET: TAB ORAL at 08:41

## 2019-09-09 RX ADMIN — FAMOTIDINE 20 MG: 20 TABLET ORAL at 08:41

## 2019-09-09 RX ADMIN — LORATADINE 10 MG: 10 TABLET ORAL at 08:41

## 2019-09-09 NOTE — OCCUPATIONAL THERAPY NOTE
Occupational Therapy Cancellation Note  OT orders received, pt's chart reviewed  Pt off the floor at dialysis and unavailable for OT evaluation  OT to continue to follow and re-attempt initial OT evaluation as time permits      HAWA Humphreys, OTR/L

## 2019-09-09 NOTE — SOCIAL WORK
Met with pt to discuss role of CM and to discuss any needs pt may have prior to d/c  Pt lives with her  and son in a 2 1/2 SH with a ramp to enter  Pt has bedroom and bathroom on the first floor  Pt performs ADLs independently pta  Pt ambulates in a wheelchair  Pt has a cane and rolling walker and states she is trying to transition from the wc to the cane/walker  Pt has past hx of VNA with [facility]  Pt has past hx of STR at [address] [facility], and one other facility  Pt is dx with depression  She is not currently followed by any provides  Pt states she has IP admissions "years ago" but nothing recent  Pt denies any hx of tx for D&A  Pt PCP is Dr Rudy Cordova with [facility]  Pt preferred pharmacy is [facility] in [facility]  Pt is a current dialysis pt at [facility] in [place]  Chair time is M,W, F at 7am      Contact: Karrie Perez () [phone]  No POA or Living Will  Pt family will transport home at time of d/c      CM reviewed d/c planning process including the following: identifying help at home, patient preference for d/c planning needs, Discharge Lounge, [facility] Meds to Bed program, availability of treatment team to discuss questions or concerns patient and/or family may have regarding understanding medications and recognizing signs and symptoms once discharged  CM also encouraged patient to follow up with all recommended appointments after discharge  Patient advised of importance for patient and family to participate in managing patients medical well being

## 2019-09-09 NOTE — PLAN OF CARE
Problem: PHYSICAL THERAPY ADULT  Goal: Performs mobility at highest level of function for planned discharge setting  See evaluation for individualized goals  Description  Treatment/Interventions: LE strengthening/ROM, Therapeutic exercise, Endurance training, Bed mobility, Continued evaluation, Spoke to nursing          See flowsheet documentation for full assessment, interventions and recommendations  Note:   Prognosis: Good  Problem List: Decreased strength, Decreased endurance, Decreased mobility, Obesity  Assessment: Bed level assessment/mobilization initiated  Pt is 48 y o  female admitted with hx of  hypoglycemia and change in mental status and Dx including sepsis; undergoing w/u  Pt 's comorbidities affecting POC include: ESRD (HD MWF), DM1, CAD with stent placement 9/7, CHF, A fib, PTSD, HTN and hx of (L) knee (fem condyle) and (R) foot fx (per pt) in March 2019 and personal factors of: mainly uses w/c at home for mobility and wears a boot on (R) LE and knee and ankle braces on (L) LE  Pt's clinical presentation is currently  unstable/unpredictable which is evident in ongoing telem monitoring, abn lab values, additional testing pending (CT C/A/P) and inability to mobilize further OOB at this time due to pt's boot and braces are currently not available  Pt presents w/ generalized weakness, incl decreased LE strength (likely premorbid in light of relative immobility), decreased functional endurance and activity tolerance, min bed mob deficits and inability to progress further w/ OOB mobility  Will cont to follow pt in PT for bed level activities at this time w/ progression to OOB mobility trials when clinically appropriate (PT to see the pt next visit); Otherwise, pt expressed no concerns about returning home w/ family support upon D/C when medically cleared; home PT follow up may need to be considered pending progress; will follow          Recommendation: Home PT, Home with family support          See flowsheet documentation for full assessment

## 2019-09-09 NOTE — CONSULTS
Consultation - Nephrology   Mary Strange 48 y o  female MRN: [id number]  Unit/Bed#: [facility] 431-01 Encounter: [id number]      Assessment/Plan:  1  End-stage renal disease, maintenance hemodialysis Monday Wednesday Friday, [facility]  2  Hypoglycemia, endocrinology consult is pending, currently on insulin drip as well as dextrose infusion  3  Concern for possible sepsis, empiric antibiotic treatment started  CT of the chest abdomen pelvis is pending  4  Recent coronary artery stenting, LAD  5  Anemia of chronic disease, hemoglobin at 8 5, last hemoglobin 10 7, continue monitor closely  6  [place] bone disorder, continue with calcitriol, continue with Renagel    Plan:  · Plan for hemodialysis today  · Endocrinology consult pending, wean dextrose as well as insulin  · Blood pressure appears stable  · Empiric antibiotic treatment, cultures pending  · Anticipating CT of the chest abdomen pelvis    History of Present Illness   Physician Requesting Consult: Harper Castle MD  Reason for Consult / Principal Problem:  End-stage renal disease  HPI: Mary Strange is a 48y o  year old female who presents with hypoglycemia, change in mental status  Patient is a 72-year-old female with known history of end-stage renal disease on maintenance hemodialysis Monday Wednesday Friday  Patient recently was admitted at [facility] for coronary disease and stent placement  9/8 patient was found to be unresponsive, blood sugars at that time were noted to be in the 40s  Mental status improved after dextrose  Patient was also found to be hypothermic and lethargic  Patient has been started on empiric antibiotic treatment given the possibility of sepsis  Currently patient is awake alert  Denies any fever  Denies any chest pain  Denies any shortness of breath or cough  Denies any abdominal pain  No reports of nausea vomiting diarrhea      History obtained from chart review and the patient    Review of Systems    Review of Systems: pertinent findings as noted above otherwise negative    Historical Information   Patient Active Problem List   Diagnosis    Uncontrolled type 1 diabetes mellitus ([place] [address] )    Hyponatremia    ESRD (end stage renal disease) on dialysis ([place] [address] )    [facility]    Thrombus    Morbid obesity ([place] [address] )    Neuropathy    Hyperlipidemia    Asthma    CAD with recent stent    Hypoglycemia    Hypothyroid    Sepsis ([place] [address] )    Hypothermia     Past Medical History:   Diagnosis Date    Asthma     Cardiac disease     CHF    [facility]congestive [facility] failure) [facility]     CPAP (continuous positive airway pressure) dependence     Diabetes mellitus ([place] [address] )     type 1    Dialysis patient ([place] [address] )     Disease of thyroid gland     hypothyroidism    GERD (gastroesophageal reflux disease)     Hyperlipidemia     Hypertension     Migraine     Psychiatric disorder     PTSD, Depression, panic attacks    Renal disorder     stage 4 kidney disease     Past Surgical History:   Procedure Laterality Date    CATARACT EXTRACTION, BILATERAL       SECTION      CHOLECYSTECTOMY      HERNIA REPAIR      RETINOPATHY SURGERY      TONSILECTOMY AND ADNOIDECTOMY      TONSILLECTOMY      TYMPANOSTOMY TUBE PLACEMENT       Social History   Social History     Substance and Sexual Activity   Alcohol Use Not Currently    Binge frequency: Never     Social History     Substance and Sexual Activity   Drug Use No     Social History     Tobacco Use   Smoking Status Never Smoker   Smokeless Tobacco Never Used     Family History   Problem Relation Age of Onset    Pancreatic cancer Mother     Stroke Father        Meds/Allergies   current meds:   Current Facility-Administered Medications   Medication Dose Route Frequency    acetaminophen (TYLENOL) tablet 650 mg  650 mg Oral Q6H PRN    acetaminophen (TYLENOL) tablet 650 mg  650 mg Oral Q6H PRN    albuterol (PROVENTIL HFA,VENTOLIN HFA) inhaler 2 puff  2 puff Inhalation Q6H PRN    allopurinol (ZYLOPRIM) tablet 100 mg  100 mg Oral Daily    apixaban (ELIQUIS) tablet 5 mg  5 mg Oral BID    ascorbic acid (VITAMIN C) tablet 1,000 mg  1,000 mg Oral Daily    atorvastatin (LIPITOR) tablet 40 mg  40 mg Oral Daily With Dinner    calcitriol (ROCALTROL) capsule 0 5 mcg  0 5 mcg Oral Daily    calcium carbonate (TUMS) chewable tablet 1,000 mg  1,000 mg Oral Daily PRN    cefepime (MAXIPIME) 500 mg in sodium chloride 0 9 % 50 mL IVPB  500 mg Intravenous Q24H    cholecalciferol (VITAMIN D3) tablet 1,000 Units  1,000 Units Oral Daily    clopidogrel (PLAVIX) tablet 75 mg  75 mg Oral Daily    diltiazem (CARDIZEM CD) 24 hr capsule 180 mg  180 mg Oral Daily    docusate sodium (COLACE) capsule 100 mg  100 mg Oral BID    famotidine (PEPCID) tablet 20 mg  20 mg Oral Daily    FLUoxetine (PROzac) capsule 40 mg  40 mg Oral Daily    fluticasone-vilanterol (BREO ELLIPTA) 100-25 mcg/inh inhaler 1 puff  1 puff Inhalation Daily    gabapentin (NEURONTIN) capsule 300 mg  300 mg Oral HS    insulin regular (HumuLIN R,NovoLIN R) 1 Units/mL in sodium chloride 0 9 % 100 mL infusion  0 3-21 Units/hr Intravenous Titrated    levothyroxine tablet 75 mcg  75 mcg Oral Early Morning    loratadine (CLARITIN) tablet 10 mg  10 mg Oral Daily    multivitamin stress formula tablet 1 tablet  1 tablet Oral Daily    ondansetron (ZOFRAN) injection 4 mg  4 mg Intravenous Q6H PRN    pantoprazole (PROTONIX) EC tablet 20 mg  20 mg Oral Early Morning    senna (SENOKOT) tablet 17 2 mg  2 tablet Oral Daily PRN    sevelamer (RENAGEL) tablet 800 mg  800 mg Oral TID With Meals    sodium chloride (concentrated) 154 mEq in dextrose 10 % 1,000 mL infusion  75 mL/hr Intravenous Continuous       Allergies   Allergen Reactions    Codeine Edema    Shellfish Allergy Edema    Hydrocodone Itching and Vomiting    Morphine Itching and Vomiting    Oxycodone Itching and Vomiting         Objective   /72 (BP Location: Left leg)   Pulse 74   Temp 98 8 °F (37 1 °C) (Oral)   Resp 18   Ht 5' 4" (1 626 m)   Wt 115 kg (253 lb 14 4 oz)   SpO2 99%   BMI 43 58 kg/m²     Intake/Output Summary (Last 24 hours) at 9/9/2019 1132  Last data filed at 9/9/2019 1000  Gross per 24 hour   Intake 2483 27 ml   Output 200 ml   Net 2283 27 ml       Current Weight: Weight - Scale: 115 kg (253 lb 14 4 oz)    Physical Exam   Constitutional: She is oriented to person, place, and time  No distress  HENT:   Head: Normocephalic  Neck: Neck supple  Cardiovascular: Normal rate and regular rhythm  Pulmonary/Chest: Effort normal and breath sounds normal    Abdominal: Soft  She exhibits distension  There is no tenderness  Musculoskeletal: She exhibits no edema  Neurological: She is alert and oriented to person, place, and time  Skin: Skin is warm and dry  No rash noted  Psychiatric: She has a normal mood and affect           Lab Results:    Results from last 7 days   Lab Units 09/09/19  0525   WBC Thousand/uL 13 12*   HEMOGLOBIN g/dL 8 5*   HEMATOCRIT % 26 6*   PLATELETS Thousands/uL 192     Results from last 7 days   Lab Units 09/09/19  0525   POTASSIUM mmol/L 4 0   CHLORIDE mmol/L 103   CO2 mmol/L 20*   BUN mg/dL 107*   CREATININE mg/dL 8 93*   CALCIUM mg/dL 8 4

## 2019-09-09 NOTE — MALNUTRITION/BMI
This medical record reflects one or more clinical indicators suggestive of morbid obesity  BMI Findings:  BMI Classifications: Morbid Obesity 40-44 9     Body mass index is 43 58 kg/m²  See Nutrition note dated 9/9/19 for additional details  Completed nutrition assessment is viewable in the nutrition documentation

## 2019-09-09 NOTE — UTILIZATION REVIEW
Initial Clinical Review    Admission: Date/Time/Statement: Inpatient Admission Orders (From admission, onward)     Ordered        09/08/19 1428  Inpatient Admission (expected length of stay for this patient Order details is greater than two midnights)  Once                   Orders Placed This Encounter   Procedures    Inpatient Admission     Standing Status:   Standing     Number of Occurrences:   1     Order Specific Question:   Admitting Physician     Answer:   Bimal Cohen     Order Specific Question:   Level of Care     Answer:   Level 1 Stepdown [13]     Order Specific Question:   Estimated length of stay     Answer:   More than 2 Midnights     Order Specific Question:   Certification     Answer:   I certify that inpatient services are medically necessary for this patient for a duration of greater than two midnights  See H&P and MD Progress Notes for additional information about the patient's course of treatment  ED Arrival Information     Expected Arrival Acuity Means of Arrival Escorted By Service Admission Type    - 9/8/2019 10:45 Immediate Ambulance [facility] Hospitalist Emergency    Arrival Complaint    diabetic        Chief Complaint   Patient presents with    Hypoglycemia - Symptomatic     Pt found unresponsive by family with a blood sugar in the 40's  Given 150 mls D10 by EMS, recheck blood sugar 84 on arrival   Pt given juice and crackers on arrival      Assessment/Plan: 48year old female, presented to the ED from home via EMS  Admitted as Inpatient due to hypoglucemia  Sepsis:  Unclear etiology at this time  Sepsis presentation may be secondary due to severe hypoglycemia  Urine does not appear to be infected  Chest x-ray shows no acute cardiopulmonary disease  Check CT scan of chest abdomen and pelvis  Await blood cultures  Empiric cefepime  Received 1 dose of vancomycin in the ED  Consider Infectious Disease consultation if CT scan is unremarkable    Douglas Pritchard gabi for hypothermia  Hypoglycemia:  Likely due to insulin pump malfunction  Insulin pump has been removed  Place on IV insulin infusion  Monitor glucose q 2 hours  Renal diet  Consult Endocrinology to evaluate insulin pump        ED Triage Vitals   Temperature Pulse Respirations Blood Pressure SpO2   09/08/19 1148 09/08/19 1051 09/08/19 1051 09/08/19 1053 09/08/19 1051   (!) 92 8 °F (33 8 °C) (!) 54 18 155/62 94 %      Temp Source Heart Rate Source Patient Position - Orthostatic VS BP Location FiO2 (%)   09/08/19 1148 09/08/19 1154 09/08/19 1154 09/08/19 1053 --   Rectal Monitor Lying Right leg       Pain Score       09/08/19 1051       4        Wt Readings from Last 1 Encounters:   09/09/19 115 kg (253 lb 14 4 oz)     Date and Time Eye Opening Best Verbal Response Best Motor Response [place] Coma Scale Score   09/09/19 0800 4 5 6 15   09/09/19 0400 4 5 6 15   09/09/19 0000 4 5 6 15   09/08/19 2000 4 5 6 15   09/08/19 1600 4 5 6 15   09/08/19 1208 3 5 6 14       Additional Vital Signs:   Date/Time  Temp  Pulse  Resp  BP  MAP (mmHg)  SpO2  O2 Device  Patient Position - Orthostatic VS   09/08/19 2318  98 5 °F (36 9 °C)  69  16  130/58  84  97 %  None (Room air)  Lying   09/08/19 1941  98 °F (36 7 °C)  72  23Abnormal   121/56  80  98 %  None (Room air)  Lying   09/08/19 1800  96 8 °F (36 °C)Abnormal   66  36Abnormal   107/55  80  99 %       09/08/19 1700  96 4 °F (35 8 °C)Abnormal   64  12  98/46Abnormal   64  98 %       09/08/19 1600  95 7 °F (35 4 °C)Abnormal   60  15  113/54  81  99 %  None (Room air)  Lying   09/08/19 1500  94 6 °F (34 8 °C)Abnormal   58    107/51  74  99 %       09/08/19 1445  94 3 °F (34 6 °C)Abnormal   58    113/56    98 %       09/08/19 1415  93 6 °F (34 2 °C)Abnormal   56    113/54    99 %       09/08/19 1330  93 °F (33 9 °C)Abnormal   55  16  103/51    98 %  None (Room air)  Sitting   09/08/19 1257  92 7 °F (33 7 °C)Abnormal   53Abnormal   16   108/54    100 %  None (Room air)  Sitting   Resp: periods of apnea at 09/08/19 1257   09/08/19 1227  92 5 °F (33 6 °C)Abnormal   52Abnormal   16  111/53    100 %    Lying   09/08/19 1157              None (Room air)     09/08/19 1154  91 6 °F (33 1 °C)Abnormal   51Abnormal   16  131/60    94 %    Lying   09/08/19 1148  92 8 °F (33 8 °C)Abnormal                  09/08/19 1053        155/62           09/08/19 1051    54Abnormal   18      94 %           Pertinent Labs/Diagnostic Test Results:   Results from last 7 days   Lab Units 09/09/19  0525 09/08/19  1140   WBC Thousand/uL 13 12* 15 15*   HEMOGLOBIN g/dL 8 5* 9 7*   HEMATOCRIT % 26 6* 31 3*   PLATELETS Thousands/uL 192 249   NEUTROS ABS Thousands/µL 8 36* 8 24*     Results from last 7 days   Lab Units 09/09/19  0525 09/08/19  1140   SODIUM mmol/L 136 137   POTASSIUM mmol/L 4 0 3 6   CHLORIDE mmol/L 103 104   CO2 mmol/L 20* 20*   ANION GAP mmol/L 13 13   BUN mg/dL 107* 94*   CREATININE mg/dL 8 93* 7 90*   EGFR ml/min/1 73sq m 5 5   CALCIUM mg/dL 8 4 8 9     Results from last 7 days   Lab Units 09/09/19  0525 09/08/19  1140   AST U/L 15 22   ALT U/L 31 34   ALK PHOS U/L 173* 181*   TOTAL PROTEIN g/dL 7 0 7 9   ALBUMIN g/dL 2 5* 2 9*   TOTAL BILIRUBIN mg/dL 0 40 0 37     Results from last 7 days   Lab Units 09/09/19  0801 09/09/19  0608 09/09/19  0402 09/09/19  0155 09/09/19  0001 09/08/19  2143 09/08/19 2008 09/08/19  1736 09/08/19  1618 09/08/19  1216   POC GLUCOSE mg/dl 130 101 186* 286* 436* 464* 493* 259* 182* 143*     Results from last 7 days   Lab Units 09/09/19  0525 09/08/19  1140   GLUCOSE RANDOM mg/dL 131 64*     BETA-HYDROXYBUTYRATE   Date Value Ref Range Status   07/20/2019 0 1 <0 6 mmol/L Final      Results from last 7 days   Lab Units 09/08/19  1140   PROTIME seconds 13 3   INR  1 05   PTT seconds 26     Results from last 7 days   Lab Units 09/09/19  0525 09/08/19  1823 09/08/19  1247   PROCALCITONIN ng/ml 0 17 0 23 0 19     Results from last 7 days   Lab Units 09/08/19  1823 09/08/19  1238   LACTIC ACID mmol/L 1 2 0 6     Results from last 7 days   Lab Units 09/08/19  1152   CLARITY UA  Clear   COLOR UA  Yellow   SPEC GRAV UA  1 020   PH UA  5 5   GLUCOSE UA mg/dl 500 (1/2%)*   KETONES UA mg/dl Negative   BLOOD UA  Moderate*   PROTEIN UA mg/dl >=300*   NITRITE UA  Negative   BILIRUBIN UA  Negative   UROBILINOGEN UA E U /dl 0 2   LEUKOCYTES UA  Trace*     ED Treatment:   Medication Administration from 09/08/2019 1045 to 09/08/2019 1532       Date/Time Order Dose Route Action     09/08/2019 1158 dextrose 50 % IV solution 25 mL 25 mL Intravenous Given     09/08/2019 1200 dextrose infusion 10 % bolus 150 mL Intravenous New Bag     09/08/2019 1248 cefepime (MAXIPIME) 2 g/50 mL dextrose IVPB 2,000 mg Intravenous New Bag     09/08/2019 1330 vancomycin (VANCOCIN) 1,750 mg in sodium chloride 0 9 % 500 mL IVPB 1,750 mg Intravenous New Bag     09/08/2019 1324 sodium chloride (concentrated) 154 mEq in dextrose 10 % 1,000 mL infusion 75 mL/hr Intravenous New Bag        Past Medical History:   Diagnosis Date    Asthma     Cardiac disease     CHF    [facility]     CPAP (continuous positive airway pressure) dependence     Diabetes mellitus ([place] [address] )     type 1    Dialysis patient ([place] [address] )     Disease of thyroid gland     hypothyroidism    GERD (gastroesophageal reflux disease)     Hyperlipidemia     Hypertension     Migraine     Psychiatric disorder     PTSD, Depression, panic attacks    Renal disorder     stage 4 kidney disease     Present on Admission:   Uncontrolled type 1 diabetes mellitus ([place] [address] )   PAF (paroxysmal atrial fibrillation) ([place] [address] )   Morbid obesity ([place] [address] )   Neuropathy   Hypoglycemia   Sepsis ([place] [address] )      Admitting Diagnosis: Hypoglycemia [E16 2]  Diabetic acetonemia ([place] [address] ) [E10 10]  ESRD (end stage renal disease) on dialysis ([place] [address] ) [N18 6, Z99 2]  Hypothermia, initial encounter [T68  XXXA]  Age/Sex: 48 y o  female  Admission Orders:  Current Facility-Administered Medications:  acetaminophen 650 mg Oral Q6H PRN   acetaminophen 650 mg Oral Q6H PRN   albuterol 2 puff Inhalation Q6H PRN   allopurinol 100 mg Oral Daily   apixaban 5 mg Oral BID   vitamin C 1,000 mg Oral Daily   atorvastatin 40 mg Oral Daily With Dinner   calcitriol 0 5 mcg Oral Daily   calcium carbonate 1,000 mg Oral Daily PRN   cefepime 500 mg Intravenous Q24H   cholecalciferol 1,000 Units Oral Daily   clopidogrel 75 mg Oral Daily   diltiazem 180 mg Oral Daily   docusate sodium 100 mg Oral BID   famotidine 20 mg Oral Daily   FLUoxetine 40 mg Oral Daily   fluticasone-vilanterol 1 puff Inhalation Daily   gabapentin 300 mg Oral HS   insulin regular (HumuLIN R,NovoLIN R) infusion 0 3-21 Units/hr Intravenous Titrated   levothyroxine 75 mcg Oral Early Morning   loratadine 10 mg Oral Daily   multivitamin stress formula 1 tablet Oral Daily   ondansetron 4 mg Intravenous Q6H PRN   pantoprazole 20 mg Oral Early Morning   senna 2 tablet Oral Daily PRN   sevelamer 800 mg Oral TID With Meals   dextrose 10 % and sodium chloride 0 9 % 75 mL/hr Intravenous Continuous   Fld restriction 1800ml  [place] SCDs  IP CONSULT TO MEDICAL CRITICAL CARE  IP CONSULT TO ENDOCRINOLOGY  IP CONSULT TO NEPHROLOGY  IP CONSULT TO CASE MANAGEMENT    Network Utilization Review Department  Phone: [phone]; Fax [phone]  [email or web address]  ATTENTION: Please call with any questions or concerns to [phone]  and carefully listen to the prompts so that you are directed to the right person  Send all requests for admission clinical reviews, approved or denied determinations and any other requests to fax [phone]   All voicemails are confidential

## 2019-09-09 NOTE — ASSESSMENT & PLAN NOTE
To lad on 9/6/2019  Continue Plavix, Lipitor, Cardizem, Eliquis  Patient not on aspirin/beta-blocker? ?? As per discharge instructions from [facility] recently she was supposed to be on aspirin metoprolol  Will restart aspirin after hemodialysis today  Also start metoprolol 25 b i d

## 2019-09-09 NOTE — PLAN OF CARE
Problem: Prexisting or High Potential for Compromised Skin Integrity  Goal: Skin integrity is maintained or improved  Description  INTERVENTIONS:  - Identify patients at risk for skin breakdown  - Assess and monitor skin integrity  - Assess and monitor nutrition and hydration status  - Monitor labs   - Assess for incontinence   - Turn and reposition patient  - Assist with mobility/ambulation  - Relieve pressure over bony prominences  - Avoid friction and shearing  - Provide appropriate hygiene as needed including keeping skin clean and dry  - Evaluate need for skin moisturizer/barrier cream  - Collaborate with interdisciplinary team   - Patient/family teaching  - Consider wound care consult   Outcome: Progressing     Problem: PAIN - ADULT  Goal: Verbalizes/displays adequate comfort level or baseline comfort level  Description  Interventions:  - Encourage patient to monitor pain and request assistance  - Assess pain using appropriate pain scale  - Administer analgesics based on type and severity of pain and evaluate response  - Implement non-pharmacological measures as appropriate and evaluate response  - Consider cultural and social influences on pain and pain management  - Notify physician/advanced practitioner if interventions unsuccessful or patient reports new pain  Outcome: Progressing     Problem: INFECTION - ADULT  Goal: Absence or prevention of progression during hospitalization  Description  INTERVENTIONS:  - Assess and monitor for signs and symptoms of infection  - Monitor lab/diagnostic results  - Monitor all insertion sites, i e  indwelling lines, tubes, and drains  - Monitor endotracheal if appropriate and nasal secretions for changes in amount and color  - [place] appropriate cooling/warming therapies per order  - Administer medications as ordered  - Instruct and encourage patient and family to use good hand hygiene technique  - Identify and instruct in appropriate isolation precautions for identified infection/condition  Outcome: Progressing  Goal: Absence of fever/infection during neutropenic period  Description  INTERVENTIONS:  - Monitor WBC    Outcome: Progressing     Problem: SAFETY ADULT  Goal: Patient will remain free of falls  Description  INTERVENTIONS:  - Assess patient frequently for physical needs  -  Identify cognitive and physical deficits and behaviors that affect risk of falls    -  [place] fall precautions as indicated by assessment   - Educate patient/family on patient safety including physical limitations  - Instruct patient to call for assistance with activity based on assessment  - Modify environment to reduce risk of injury  - Consider OT/PT consult to assist with strengthening/mobility  Outcome: Progressing  Goal: Maintain or return to baseline ADL function  Description  INTERVENTIONS:  -  Assess patient's ability to carry out ADLs; assess patient's baseline for ADL function and identify physical deficits which impact ability to perform ADLs (bathing, care of mouth/teeth, toileting, grooming, dressing, etc )  - Assess/evaluate cause of self-care deficits   - Assess range of motion  - Assess patient's mobility; develop plan if impaired  - Assess patient's need for assistive devices and provide as appropriate  - Encourage maximum independence but intervene and supervise when necessary  - Involve family in performance of ADLs  - Assess for home care needs following discharge   - Consider OT consult to assist with ADL evaluation and planning for discharge  - Provide patient education as appropriate  Outcome: Progressing  Goal: Maintain or return mobility status to optimal level  Description  INTERVENTIONS:  - Assess patient's baseline mobility status (ambulation, transfers, stairs, etc )    - Identify cognitive and physical deficits and behaviors that affect mobility  - Identify mobility aids required to assist with transfers and/or ambulation (gait belt, sit-to-stand, lift, walker, cane, etc )  - [place] fall precautions as indicated by assessment  - Record patient progress and toleration of activity level on Mobility SBAR; progress patient to next Phase/Stage  - Instruct patient to call for assistance with activity based on assessment  - Consider rehabilitation consult to assist with strengthening/weightbearing, etc   Outcome: Progressing     Problem: DISCHARGE PLANNING  Goal: Discharge to home or other facility with appropriate resources  Description  INTERVENTIONS:  - Identify barriers to discharge w/patient and caregiver  - Arrange for needed discharge resources and transportation as appropriate  - Identify discharge learning needs (meds, wound care, etc )  - Arrange for interpretive services to assist at discharge as needed  - Refer to Case Management Department for coordinating discharge planning if the patient needs post-hospital services based on physician/advanced practitioner order or complex needs related to functional status, cognitive ability, or social support system  Outcome: Progressing     Problem: Knowledge Deficit  Goal: Patient/family/caregiver demonstrates understanding of disease process, treatment plan, medications, and discharge instructions  Description  Complete learning assessment and assess knowledge base    Interventions:  - Provide teaching at level of understanding  - Provide teaching via preferred learning methods  Outcome: Progressing

## 2019-09-09 NOTE — ASSESSMENT & PLAN NOTE
Unclear etiology  Sepsis presentation may be secondary due to severe hypoglycemia  Urine does not appear to be infected  Chest x-ray shows no acute cardiopulmonary disease  CT scan of chest abdomen and pelvis - pending  Await blood cultures  Procalcitonin has been less than 0 25  Will discontinue antibiotics for now as patient does not have any other signs or symptoms  Follow up on CT chest abdomen pelvis and blood cultures  If continues to spike fever or has any other signs of infection can restart antibiotics

## 2019-09-09 NOTE — PHYSICAL THERAPY NOTE
Physical Therapy Evaluation     Patient's Name: Cammie Lindsey    Admitting Diagnosis  Hypoglycemia [E16 2]  Diabetic acetonemia ([place] [address] ) [E10 10]  ESRD (end stage renal disease) on dialysis ([place] [address] ) [N18 6, Z99 2]  Hypothermia, initial encounter Adamarismyranda Johnna  XXXA]    Problem List  Patient Active Problem List   Diagnosis    Uncontrolled type 1 diabetes mellitus (HCC)    Hyponatremia    ESRD (end stage renal disease) on dialysis ([place] [address] )    [facility]    Thrombus    Morbid obesity ([place] [address] )    Neuropathy    Hyperlipidemia    Asthma    CAD with recent stent    Hypoglycemia    Hypothyroid    Sepsis ([place] [address] )    Hypothermia       Past Medical History  Past Medical History:   Diagnosis Date    Asthma     Cardiac disease     CHF    [facility]congestive heart failure) [facility]     CPAP (continuous positive airway pressure) dependence     Diabetes mellitus ([place] [address] )     type 1    Dialysis patient ([place] [address] )     Disease of thyroid gland     hypothyroidism    GERD (gastroesophageal reflux disease)     Hyperlipidemia     Hypertension     Migraine     Psychiatric disorder     PTSD, Depression, panic attacks    Renal disorder     stage 4 kidney disease       Past Surgical History  Past Surgical History:   Procedure Laterality Date    CATARACT EXTRACTION, BILATERAL       SECTION      CHOLECYSTECTOMY      HERNIA REPAIR      RETINOPATHY SURGERY      TONSILECTOMY AND ADNOIDECTOMY      TONSILLECTOMY      TYMPANOSTOMY TUBE PLACEMENT            19 0910   Note Type   Note type Eval only  (bed level)   Pain Assessment   Pain Assessment No/denies pain   Home Living   Type of [address] [place] [address] Two level; Able to live on main level with bedroom/bathroom  (1st floor set-up w/ no ANTONINA)   Home Equipment Walker; Wheelchair-manual   Prior Function   Level of [place] Independent with ADLs and functional mobility  (short distance amb w/ rw but mainly uses a w/c)   Lives With Spouse; Son   Josef Efrain From Family   Comments Pt reports she was told by a physician to minimize walking on (R) foot due to chronic fx ("charcot foot fracture", per pt); pt does wear (R) LE/foot boot (? cam boot) for support when mobilizing at home; pt also wears (L) ankle soft brace for stability and (L) knee hinged brace as needed for comfort (mainly when outside) due to hx of femoral condyle fx (denies WB restrictions at this time); all bracing is currently not available but pt is aware to ask family to bring it in; RN informed about the above  Restrictions/Precautions   Braces or Orthoses   (currently not available but will need it to mobilize OOB)   Other Precautions Multiple lines;Telemetry; Fall Risk;Limb alert  ((L) UE HD fistula; (R) UE fistula is "maturing")   General   Additional Pertinent History Cleared for assessment (spoke to nsg)   Family/Caregiver Present   (rehab aide is present during the session)   Cognition   Overall Cognitive Status WFL   Arousal/Participation Cooperative   Orientation Level Oriented to person;Oriented to place;Oriented to situation   Memory Within functional limits   Following Commands Follows one step commands without difficulty   Comments Pt is observed in bed; reports she was resting; agreeable to demonstrate mobility skills; states she has been amb to the BR (did not have her braces on);   RUE Assessment   RUE Assessment WFL  (AROM)   RUE Strength   RUE Overall Strength Within Functional Limits - able to perform ADL tasks with strength   LUE Assessment   LUE Assessment WFL  (AROM)   LUE Strength   LUE Overall Strength Within Functional Limits - able to perform ADL tasks with strength   RLE Assessment   RLE Assessment WFL  (AROM)   Strength RLE   RLE Overall Strength   (at least fair at the hip and ankle; fair + knee (grossly))   LLE Assessment   LLE Assessment WFL  (AROM)   Strength LLE   LLE Overall Strength   (fair t/o)   Bed Mobility   Rolling R 5  Supervision   Additional items Assist x 1;Verbal cues   Rolling L 5  Supervision   Additional items Assist x 1;Verbal cues   Supine to Sit 4  Minimal assistance   Additional items Assist x 1;Verbal cues; Increased time required   Sit to Supine 5  Supervision   Additional items Assist x 1;Verbal cues   Transfers   Sit to Stand Unable to assess  (pending bracing/boot from home)   Balance   Static Sitting Fair +   Dynamic Sitting Fair   Activity Tolerance   Activity Tolerance Patient limited by fatigue; Other (Comment)  (will nened pt's (R) foot boot to mobilize OOB)   Nurse Made Aware spoke to [facility]    Assessment   Prognosis Good   Problem List Decreased strength;Decreased endurance;Decreased mobility;Obesity   Assessment Bed level assessment/mobilization initiated  Pt is 48 y o  female admitted with hx of  hypoglycemia and change in mental status and Dx including sepsis; undergoing w/u  Pt 's comorbidities affecting POC include: ESRD (HD MWF), DM1, CAD with stent placement 9/7, CHF, A fib, PTSD, HTN and hx of (L) knee (fem condyle) and (R) foot fx (per pt) in March 2019 and personal factors of: mainly uses w/c at home for mobility and wears a boot on (R) LE and knee and ankle braces on (L) LE  Pt's clinical presentation is currently  unstable/unpredictable which is evident in ongoing telem monitoring, abn lab values, additional testing pending (CT C/A/P) and inability to mobilize further OOB at this time due to pt's boot and braces are currently not available  Pt presents w/ generalized weakness, incl decreased LE strength (likely premorbid in light of relative immobility), decreased functional endurance and activity tolerance, min bed mob deficits and inability to progress further w/ OOB mobility  Will cont to follow pt in PT for bed level activities at this time w/ progression to OOB mobility trials when clinically appropriate (PT to see the pt next visit);  Otherwise, pt expressed no concerns about returning home w/ family support upon D/C when medically cleared; home PT follow up may need to be considered pending progress; will follow  Goals   Patient Goals to go home   STG Expiration Date 09/14/19   Short Term Goal #1 3-5 days  Pt will achieve (I) level w/ bed mob in order to facilitate safety with OOB and back to bed transitions in the environment and to decrease overall burden of care  Assess OOB transfers/amb as clinically appropriate (PT to see the pt)  Initiate LE therex to max strength and to facilitate progression w/ mobility skills  Treatment Day 0   Plan   Treatment/Interventions LE strengthening/ROM; Therapeutic exercise; Endurance training;Bed mobility;Continued evaluation;Spoke to nursing   PT Frequency Other (Comment)  (3-5x/wk)   Recommendation   Recommendation Home PT; Home with family support   Modified [place] Scale   Modified [place] Scale 5   Barthel Index   Feeding 10   Bathing 0   Grooming Score 5   Dressing Score 5   Bladder Score 10   Bowels Score 10   Toilet Use Score 5   Transfers (Bed/Chair) Score 0   Mobility (Level Surface) Score 0   Stairs Score 0   Barthel Index Score 45         Alexy Cevallos, PT

## 2019-09-09 NOTE — CONSULTS
Consultation - [facility]    Patient Information: Crista Kincaid 48 y o  female MRN: [id number]  Unit/Bed#: [facility] 431-01 Encounter: [id number]  Admitting Physician: Sourav Carlos MD  PCP: Triny Orourke DO  Date of Admission:  09/09/19    ASSESSMENT:  62-year-old morbidly obese  female with type 1 diabetes mellitus diagnosed at age 14 with end-stage renal disease requiring dialysis, known coronary artery disease with stent placement on 9/7/19, bilateral cataracts and retinopathy requiring laser therapy, was admitted secondary to hypoglycemia  Hospital Problem List:     Principal Problem:    Sepsis ([facility] )  Active Problems:    Uncontrolled type 1 diabetes mellitus ([place] [address] )    ESRD (end stage renal disease) on dialysis ([place] [address] )    PAF (paroxysmal atrial fibrillation) ([facility])    Morbid obesity ([place] [address] )    Neuropathy    CAD with recent stent    Hypoglycemia    Hypothyroid    Hypothermia      PLAN:    · Hypoglycemia - unclear etiology  This was associated with hypothermia and hypotension at presentation  There was no transaminitis  No signs of infection and procalcitonin was negative  Patient denies recurrent episodes of hypoglycemia  She is clinically improved and last blood glucose was 270  She was initiated on an insulin drip along with D10  I have advised her to restart her insulin pump at usual settings and will monitor Accu-Cheks next 24 hours  · Uncontrolled Type 1 diabetes mellitus with A1c 8 1- she was diagnosed at age 14 with known retinopathy,  bilateral cataracts, end-stage renal disease, coronary artery disease and significant bilateral peripheral neuropathy  She is on a [employer] insulin pump and was able to show me her settings  Her daily usage of insulin is approximately 100 units  And given morbid obesity she probably has some degree of insulin resistance  · Morbid obesity with some cushingoid features - she denies any long-term steroids use    Denies any recent weight gain or weight loss  Does admit to poor obstructive sleep apnea using CPAP QHS  Given hypoglycemia, may consider checking a m  Cortisol and further workup for cushings syndrome as outpt  She achieved menopause 10 years ago  Both her ovaries are intact  She also had a nuclear bone scan for elevated ALP that was suggestive of metabolic bone disease  · End-stage renal disease - nephrology is monitoring  · Steatohepatitis - based on ultrasound from  on chart review  Chief Complaint:   Hypoglycemia    of Present Illness:    Tushar Hair is a 48 y o  female who presents with symptomatic hypoglycemia with change in mental status  She had persistent blood glucose less than 60 and required IV dextrose infusion  She recently had a cardiac catheterization on 19 and was discharged after dialysis  Overnight, her hypoglycemia improved and her blood sugar was 270 at which time IV insulin was added to the dextrose  Review of Systems:    Review of Systems   Constitutional: Positive for activity change  Psychiatric/Behavioral: Positive for confusion  All other systems reviewed and are negative        Past Medical and Surgical History:     Past Medical History:   Diagnosis Date    Asthma     Cardiac disease     CHF    [facility]     CPAP (continuous positive airway pressure) dependence     Diabetes mellitus (HCC)     type 1    [facility]     Disease of thyroid gland     hypothyroidism    GERD (gastroesophageal reflux disease)     Hyperlipidemia     Hypertension     Migraine     Psychiatric disorder     PTSD, Depression, panic attacks    Renal disorder     stage 4 kidney disease       Past Surgical History:   Procedure Laterality Date    CATARACT EXTRACTION, BILATERAL       SECTION      CHOLECYSTECTOMY      HERNIA REPAIR      RETINOPATHY SURGERY      TONSILECTOMY AND ADNOIDECTOMY      TONSILLECTOMY      TYMPANOSTOMY TUBE PLACEMENT Meds/Allergies:    PTA meds:   Prior to Admission Medications   Prescriptions Last Dose Informant Patient Reported? Taking?    Ascorbic Acid (VITAMIN C) 1000 MG tablet Not Taking at Unknown time  Yes No   Sig: Take 1,000 mg by mouth daily   FLUoxetine (PROzac) 40 MG capsule   Yes Yes   Sig: Take 40 mg by mouth daily   Ferric Citrate (AURYXIA) 1  MG(Fe) TABS   Yes Yes   Sig: Take by mouth   PATIENT MAINTAINED INSULIN PUMP   No Yes   Sig: Inject 1 each under the skin every 8 (eight) hours   acetaminophen (TYLENOL) 325 mg tablet   Yes Yes   Sig: Take 650 mg by mouth every 6 (six) hours as needed for mild pain   albuterol (PROVENTIL HFA,VENTOLIN HFA) 90 mcg/act inhaler Not Taking at Unknown time  Yes No   Sig: Inhale 2 puffs every 6 (six) hours as needed for wheezing   albuterol (PROVENTIL HFA,VENTOLIN HFA) 90 mcg/act inhaler   No Yes   Sig: Inhale 2 puffs every 4 (four) hours as needed for wheezing   allopurinol (ZYLOPRIM) 100 mg tablet   Yes Yes   Sig: Take 100 mg by mouth daily   apixaban (ELIQUIS) 2 5 mg   No Yes   Sig: Take 1 tablet (2 5 mg total) by mouth 2 (two) times a day   Patient taking differently: Take 5 mg by mouth 2 (two) times a day    atorvastatin (LIPITOR) 40 mg tablet   No Yes   Sig: Take 1 tablet (40 mg total) by mouth daily with dinner   Patient taking differently: Take 80 mg by mouth daily with dinner    b complex vitamins capsule   Yes Yes   Sig: Take 1 capsule by mouth daily   bisacodyl (DULCOLAX) 5 mg EC tablet   Yes Yes   Sig: Take 5 mg by mouth daily as needed for constipation   calcitriol (ROCALTROL) 0 5 MCG capsule   Yes Yes   Sig: Take 0 5 mcg by mouth daily   cholecalciferol (VITAMIN D3) 1,000 units tablet   Yes Yes   Sig: Take 1,000 Units by mouth daily   clopidogrel (PLAVIX) 75 mg tablet   Yes Yes   Sig: Take 75 mg by mouth daily   diltiazem (CARDIZEM CD) 180 mg 24 hr capsule   No Yes   Sig: Take 1 capsule (180 mg total) by mouth daily   diltiazem (TIAZAC) 180 MG 24 hr capsule Yes Yes   Sig: Take 180 mg by mouth daily   diphenhydrAMINE (BENADRYL) 25 mg tablet   Yes No   Sig: Take 25 mg by mouth every 6 (six) hours as needed for itching   famotidine (PEPCID) 20 mg tablet   Yes No   Sig: Take 20 mg by mouth daily   fluticasone (FLONASE) 50 mcg/act nasal spray   Yes Yes   Si spray into each nostril 2 (two) times a day   fluticasone-salmeterol (ADVAIR DISKUS, WIXELA INHUB) 100-50 mcg/dose inhaler   Yes No   Sig: Inhale 1 puff 2 (two) times a day Rinse mouth after use    gabapentin (NEURONTIN) 300 mg capsule   No No   Sig: Take 1 capsule (300 mg total) by mouth daily at bedtime   insulin lispro (HumaLOG) 100 units/mL   No Yes   Si Units by Subcutaneous Insulin Pump route as needed (when pump is empty)   Patient taking differently: 300 Units by Subcutaneous Insulin Pump route as needed (when pump is empty)    levothyroxine 75 mcg tablet   Yes Yes   Sig: Take 75 mcg by mouth daily   loratadine (CLARITIN) 10 mg tablet   Yes Yes   Sig: Take 10 mg by mouth daily   omeprazole (PriLOSEC) 20 mg delayed release capsule   Yes No   Sig: Take 40 mg by mouth daily    ondansetron (ZOFRAN) 4 mg tablet   Yes No   Sig: Take 4 mg by mouth every 6 (six) hours as needed for nausea or vomiting   sevelamer carbonate (RENVELA) 800 mg tablet   Yes No   Sig: Take 800 mg by mouth 2 (two) times a day      Facility-Administered Medications: None       Allergies:    Allergies   Allergen Reactions    Codeine Edema    Shellfish Allergy Edema    Hydrocodone Itching and Vomiting    Morphine Itching and Vomiting    Oxycodone Itching and Vomiting     History:     Marital Status: /Civil Union   Occupation:   Substance Use History:   Social History     Substance and Sexual Activity   Alcohol Use Not Currently    Binge frequency: Never     Social History     Tobacco Use   Smoking Status Never Smoker   Smokeless Tobacco Never Used     Social History     Substance and Sexual Activity   Drug Use No Family History:    Family History   Problem Relation Age of Onset    Pancreatic cancer Mother     Stroke Father     Denies family history of diabetes    Physical Exam:     Vitals:   Blood Pressure: 146/65 (09/09/19 1130)  Pulse: 81 (09/09/19 1130)  Temperature: 98 8 °F (37 1 °C) (09/09/19 1130)  Temp Source: Oral (09/09/19 1130)  Respirations: 20 (09/09/19 1130)  Height: 5' 4" (162 6 cm) (09/08/19 1051)  Weight - Scale: 115 kg (253 lb 14 4 oz) (09/09/19 0550)  SpO2: 100 % (09/09/19 1130)    Physical Exam   Constitutional: She is oriented to person, place, and time  Morbid central obesity with some cushingoid features   Eyes: Pupils are equal, round, and reactive to light  Neck:   Very thick neck   Cardiovascular: Normal rate and normal heart sounds  Pulmonary/Chest: Effort normal and breath sounds normal    Abdominal: Soft  Bowel sounds are normal    Neurological: She is alert and oriented to person, place, and time  Bilateral loss of fine touch to both feet both dorsal and ventral   Bilateral reduced proprioception to both great toes   Skin: Skin is dry  There is pallor           Lab and Imaging Results: I have personally reviewed pertinent films in PACS    Results from last 7 days   Lab Units 09/09/19  0525   WBC Thousand/uL 13 12*   HEMOGLOBIN g/dL 8 5*   HEMATOCRIT % 26 6*   PLATELETS Thousands/uL 192   NEUTROS PCT % 63   LYMPHS PCT % 23   MONOS PCT % 10   EOS PCT % 3     Results from last 7 days   Lab Units 09/09/19  0525   POTASSIUM mmol/L 4 0   CHLORIDE mmol/L 103   CO2 mmol/L 20*   BUN mg/dL 107*   CREATININE mg/dL 8 93*   CALCIUM mg/dL 8 4   ALK PHOS U/L 173*   ALT U/L 31   AST U/L 15     Results from last 7 days   Lab Units 09/08/19  1140   INR  1 05     POC Glucose (mg/dl)   Date Value   09/09/2019 270 (H)   09/09/2019 130   09/09/2019 101   09/09/2019 186 (H)   09/09/2019 286 (H)   09/09/2019 436 (H)   09/08/2019 464 (H)   09/08/2019 493 (H)   09/08/2019 259 (H)   09/08/2019 182 (H) Xr Chest 1 View Portable    Result Date: 9/9/2019  Narrative: CHEST INDICATION:   confirmation of central line  COMPARISON:  None EXAM PERFORMED/VIEWS:  XR CHEST PORTABLE FINDINGS:  There is a right-sided central line with its tip in the SVC  The heart is enlarged  There is pulmonary vascular congestion  There is no pneumothorax  Osseous structures appear within normal limits for patient age  Impression: Right-sided IJ line with its tip in SVC  No pneumothorax identified  Mild pulmonary vascular congestion  Workstation performed: [id number]         ** Please Note: Dragon 360 Dictation voice to text software may have been used in the creation of this document   **

## 2019-09-09 NOTE — ASSESSMENT & PLAN NOTE
Lab Results   Component Value Date    HGBA1C 8 1 (H) 07/21/2019       Recent Labs     09/09/19  0801 09/09/19  1002 09/09/19  1219 09/09/19  1532   POCGLU 130 270* 363* 157*       Blood Sugar Average: Last 72 hrs:  (P) 213 [id number]     Blood glucose better controlled this morning  D10 infusion     Patient placed back on her insulin pump  Mental status improved significantly today  Patient back to her normal self  Appreciate Endocrinology input

## 2019-09-09 NOTE — PROGRESS NOTES
Progress Note Familia Cedillo 1965, 48 y o  female MRN: [id number]    Unit/Bed#: [facility] 431-01 Encounter: [id number]    Primary Care Provider: Damari Ryan DO   Date and time admitted to hospital: 9/8/2019 10:45 AM        * Sepsis [facility])  Assessment & Plan  Unclear etiology  Sepsis presentation may be secondary due to severe hypoglycemia  Urine does not appear to be infected  Chest x-ray shows no acute cardiopulmonary disease  CT scan of chest abdomen and pelvis - pending  Await blood cultures  Procalcitonin has been less than 0 25  Will discontinue antibiotics for now as patient does not have any other signs or symptoms  Follow up on CT chest abdomen pelvis and blood cultures  If continues to spike fever or has any other signs of infection can restart antibiotics  Hypothermia  Assessment & Plan  Etiology unclear  Resolved this morning  Patient off [employer]  Hypothyroid  Assessment & Plan  Continue levothyroxine    CAD with recent stent  Assessment & Plan  To lad on 9/6/2019  Continue Plavix, Lipitor, Cardizem, Eliquis  Patient not on aspirin/beta-blocker? ?? As per discharge instructions from [facility] recently she was supposed to be on aspirin metoprolol  Will restart aspirin after hemodialysis today  Also start metoprolol 25 b i d  PAF (paroxysmal atrial fibrillation) ([facility])  Assessment & Plan  Eliquis for anticoagulation  Diltiazem for rate control    ESRD (end stage renal disease) on dialysis [facility])  Assessment & Plan  Nephrology consulted  Dialysis per Nephrology  Uncontrolled type 1 diabetes mellitus with hypoglycemia and coma [facility])  Assessment & Plan  Lab Results   Component Value Date    HGBA1C 8 1 (H) 07/21/2019       Recent Labs     09/09/19  0801 09/09/19  1002 09/09/19  1219 09/09/19  1532   POCGLU 130 270* 363* 157*       Blood Sugar Average: Last 72 hrs:  (P) 873 [id number]     Blood glucose better controlled this morning  D10 infusion stopped    Pt's pump settings were changed recently after cardiac catheterization as patient was hyperglycemic after receiving prednisone prior to catheterization  Which probably explains her hypoglycemia  Patient placed back on her insulin pump  Mental status improved significantly today  Patient back to her normal self  Appreciate Endocrinology input  VTE Pharmacologic Prophylaxis:   Pharmacologic: Apixaban (Eliquis)  Mechanical VTE Prophylaxis in Place: Yes    Patient Centered Rounds: I have performed bedside rounds with nursing staff today  Discussions with Specialists or Other Care Team Provider:     Education and Discussions with Family / Patient: pt    Time Spent for Care: 30 minutes  More than 50% of total time spent on counseling and coordination of care as described above  Current Length of Stay: 1 day(s)    Current Patient Status: Inpatient   Certification Statement: The patient will continue to require additional inpatient hospital stay due to above    Discharge Plan: pending improveemnt    Code Status: Level 1 - Full Code      Subjective:   Pt seen and examined by me this afternoon  Patient awake and alert  Denied any complaints  Says she is ready to go home  Objective:     Vitals:   Temp (24hrs), Av °F (36 7 °C), Min:96 4 °F (35 8 °C), Max:98 8 °F (37 1 °C)    Temp:  [96 4 °F (35 8 °C)-98 8 °F (37 1 °C)] 98 8 °F (37 1 °C)  HR:  [64-81] 79  Resp:  [12-36] 16  BP: ()/(26-72) 138/26  SpO2:  [97 %-100 %] 100 %  Body mass index is 43 58 kg/m²  Input and Output Summary (last 24 hours): Intake/Output Summary (Last 24 hours) at 2019 1601  Last data filed at 2019 1321  Gross per 24 hour   Intake 2988 02 ml   Output 150 ml   Net 2838 02 ml       Physical Exam:     Physical Exam    Constitutional: Pt appears well-developed and well-nourished  Not in any acute distress  Morbidly obese  Cardiovascular: Normal rate, regular rhythm, normal heart sounds    Exam reveals no gallop and no friction rub  No murmur heard  Pulmonary/Chest: Effort normal and breath sounds normal  No respiratory distress  Pt has no wheezes or rales  Abdominal: Soft  Non-distended, Non-tender  Bowel sounds are normal    Musculoskeletal: Normal range of motion  Neurological: alert and oriented to person, place, and time  Normal strength and sensations  Psychiatric: normal mood and affect  Additional Data:     Labs:    Results from last 7 days   Lab Units 09/09/19  0525   WBC Thousand/uL 13 12*   HEMOGLOBIN g/dL 8 5*   HEMATOCRIT % 26 6*   PLATELETS Thousands/uL 192   NEUTROS PCT % 63   LYMPHS PCT % 23   MONOS PCT % 10   EOS PCT % 3     Results from last 7 days   Lab Units 09/09/19  0525   SODIUM mmol/L 136   POTASSIUM mmol/L 4 0   CHLORIDE mmol/L 103   CO2 mmol/L 20*   BUN mg/dL 107*   CREATININE mg/dL 8 93*   ANION GAP mmol/L 13   CALCIUM mg/dL 8 4   ALBUMIN g/dL 2 5*   TOTAL BILIRUBIN mg/dL 0 40   ALK PHOS U/L 173*   ALT U/L 31   AST U/L 15   GLUCOSE RANDOM mg/dL 131     Results from last 7 days   Lab Units 09/08/19  1140   INR  1 05     Results from last 7 days   Lab Units 09/09/19  1532 09/09/19  1219 09/09/19  1002 09/09/19  0801 09/09/19  0608 09/09/19  0402 09/09/19  0155 09/09/19  0001 09/08/19  2143 09/08/19  2008 09/08/19  1736 09/08/19  1618   POC GLUCOSE mg/dl 157* 363* 270* 130 101 186* 286* 436* 464* 493* 259* 182*         Results from last 7 days   Lab Units 09/09/19  0525 09/08/19  1823 09/08/19  1247 09/08/19  1238   LACTIC ACID mmol/L  --  1 2  --  0 6   PROCALCITONIN ng/ml 0 17 0 23 0 19  --            * I Have Reviewed All Lab Data Listed Above  * Additional Pertinent Lab Tests Reviewed:  [address] Admission Reviewed    Imaging:    Imaging Reports Reviewed Today Include:   Imaging Personally Reviewed by Myself Includes:     Recent Cultures (last 7 days):           Last 24 Hours Medication List:     Current Facility-Administered Medications:  acetaminophen 650 mg Oral Q6H PRN Katlyn Zhang MD   acetaminophen 650 mg Oral Q6H PRN Olena Core, PA-C   albuterol 2 puff Inhalation Q6H PRN Katlyn Zhang MD   allopurinol 100 mg Oral Daily Katlyn Zhang MD   apixaban 5 mg Oral BID Katlyn Zhang MD   vitamin C 1,000 mg Oral Daily Katlyn Zhang MD   aspirin 81 mg Oral Daily Puja Peralta MD   atorvastatin 40 mg Oral Daily With Kip Travis MD   calcitriol 0 5 mcg Oral Daily Katlyn Zhang MD   calcium carbonate 1,000 mg Oral Daily PRN Olena Core, PA-C   cholecalciferol 1,000 Units Oral Daily Katlyn Zhang MD   clopidogrel 75 mg Oral Daily Katlyn Zhang MD   diltiazem 180 mg Oral Daily Katlyn Zhang MD   docusate sodium 100 mg Oral BID ZHEN Galvan-C   famotidine 20 mg Oral Daily Katlyn Zhang MD   FLUoxetine 40 mg Oral Daily Katlyn Zhang MD   fluticasone-vilanterol 1 puff Inhalation Daily Katlyn Zhang MD   gabapentin 300 mg Oral HS Katlyn Zhang MD   insulin lispro 1 Units Subcutaneous Insulin Pump PRN Tonie Price MD   levothyroxine 75 mcg Oral Early Morning Katlyn Zhang MD   loratadine 10 mg Oral Daily Katlyn Zhang MD   metoprolol tartrate 25 mg Oral Q12H [address] Peter Ramos MD   multivitamin stress formula 1 tablet Oral Daily Katlyn Zhang MD   ondansetron 4 mg Intravenous Q6H PRN Olenapedro Khanna, PA-C   pantoprazole 20 mg Oral Early Morning Katlyn Zhang MD   patient maintained insulin pump 1 each Subcutaneous Q8H Tonie Price MD   senna 2 tablet Oral Daily PRN Olenapedro Khanna, PA-MARISELA   sevelamer 800 mg Oral TID With Meals Katlyn Zhang MD        Today, Patient Was Seen By: Puja Peralta MD    ** Please Note: Dictation voice to text software may have been used in the creation of this document   **

## 2019-09-09 NOTE — PLAN OF CARE
Problem: Nutrition/Hydration-ADULT  Goal: Nutrient/Hydration intake appropriate for improving, restoring or maintaining nutritional needs  Description  Monitor and assess patient's nutrition/hydration status for malnutrition  Collaborate with interdisciplinary team and initiate plan and interventions as ordered  Monitor patient's weight and dietary intake as ordered or per policy  Utilize nutrition screening tool and intervene as necessary  Determine patient's food preferences and provide high-protein, high-caloric foods as appropriate  INTERVENTIONS:  - Monitor oral intake, urinary output, labs, and treatment plans  - Assess nutrition and hydration status and recommend course of action  - Evaluate amount of meals eaten  - Assist patient with eating if necessary   - Allow adequate time for meals  - Recommend/ encourage appropriate diets, oral nutritional supplements, and vitamin/mineral supplements  - Order, calculate, and assess calorie counts as needed  - Recommend, monitor, and adjust tube feedings and TPN/PPN based on assessed needs  - Assess need for intravenous fluids  - Provide specific nutrition/hydration education as appropriate  - Include patient/family/caregiver in decisions related to nutrition  Outcome: Progressing    Ultrafiltration goal is 4L  Plan of care discussed with patient and Dr Santy Murdock

## 2019-09-09 NOTE — PROGRESS NOTES
NEPHROLOGY HEMODIALYSIS PROCEDURE NOTE    Seen and examined on hemodialysis  Doing well  Denies any chest pain or shortness of Breath  Currently off insulin as well as dextrose infusion  No abdominal pain      QB: 400  Dialyzer: 160  Sodium: 138  Potassium: 3  Bicarbonate: 35  Ultrafiltration: 4  Medications given on HD: -      Physical Exam:    Vitals:    09/09/19 1530   BP: (!) 138/26   Pulse: 79   Resp: 16   Temp:    SpO2:      General:  No acute distress, currently on hemodialysis  CVS:  Regular  Lungs:  Clear to auscultation  Abdomen:  Obese, soft  Access:  AV access  Extremities:  No significant edema

## 2019-09-10 VITALS
BODY MASS INDEX: 42.42 KG/M2 | TEMPERATURE: 98.8 F | WEIGHT: 248.46 LBS | HEART RATE: 71 BPM | HEIGHT: 64 IN | DIASTOLIC BLOOD PRESSURE: 53 MMHG | SYSTOLIC BLOOD PRESSURE: 112 MMHG | RESPIRATION RATE: 20 BRPM | OXYGEN SATURATION: 93 %

## 2019-09-10 LAB
ANION GAP SERPL CALCULATED.3IONS-SCNC: 9 MMOL/L (ref 4–13)
BASOPHILS # BLD AUTO: 0.07 THOUSANDS/ΜL (ref 0–0.1)
BASOPHILS NFR BLD AUTO: 1 % (ref 0–1)
BUN SERPL-MCNC: 50 MG/DL (ref 5–25)
CALCIUM SERPL-MCNC: 8.6 MG/DL (ref 8.3–10.1)
CHLORIDE SERPL-SCNC: 98 MMOL/L (ref 100–108)
CO2 SERPL-SCNC: 27 MMOL/L (ref 21–32)
CREAT SERPL-MCNC: 5.83 MG/DL (ref 0.6–1.3)
EOSINOPHIL # BLD AUTO: 0.68 THOUSAND/ΜL (ref 0–0.61)
EOSINOPHIL NFR BLD AUTO: 5 % (ref 0–6)
ERYTHROCYTE [DISTWIDTH] IN BLOOD BY AUTOMATED COUNT: 13.8 % (ref 11.6–15.1)
GFR SERPL CREATININE-BSD FRML MDRD: 8 ML/MIN/1.73SQ M
GLUCOSE SERPL-MCNC: 111 MG/DL (ref 65–140)
GLUCOSE SERPL-MCNC: 115 MG/DL (ref 65–140)
GLUCOSE SERPL-MCNC: 117 MG/DL (ref 65–140)
GLUCOSE SERPL-MCNC: 59 MG/DL (ref 65–140)
HCT VFR BLD AUTO: 26.5 % (ref 34.8–46.1)
HGB BLD-MCNC: 8.4 G/DL (ref 11.5–15.4)
IMM GRANULOCYTES # BLD AUTO: 0.11 THOUSAND/UL (ref 0–0.2)
IMM GRANULOCYTES NFR BLD AUTO: 1 % (ref 0–2)
LYMPHOCYTES # BLD AUTO: 3.5 THOUSANDS/ΜL (ref 0.6–4.47)
LYMPHOCYTES NFR BLD AUTO: 23 % (ref 14–44)
MCH RBC QN AUTO: 33.6 PG (ref 26.8–34.3)
MCHC RBC AUTO-ENTMCNC: 31.7 G/DL (ref 31.4–37.4)
MCV RBC AUTO: 106 FL (ref 82–98)
MONOCYTES # BLD AUTO: 1.3 THOUSAND/ΜL (ref 0.17–1.22)
MONOCYTES NFR BLD AUTO: 9 % (ref 4–12)
NEUTROPHILS # BLD AUTO: 9.43 THOUSANDS/ΜL (ref 1.85–7.62)
NEUTS SEG NFR BLD AUTO: 61 % (ref 43–75)
NRBC BLD AUTO-RTO: 0 /100 WBCS
PLATELET # BLD AUTO: 192 THOUSANDS/UL (ref 149–390)
PMV BLD AUTO: 10.2 FL (ref 8.9–12.7)
POTASSIUM SERPL-SCNC: 3.7 MMOL/L (ref 3.5–5.3)
RBC # BLD AUTO: 2.5 MILLION/UL (ref 3.81–5.12)
SODIUM SERPL-SCNC: 134 MMOL/L (ref 136–145)
WBC # BLD AUTO: 15.09 THOUSAND/UL (ref 4.31–10.16)

## 2019-09-10 PROCEDURE — 85025 COMPLETE CBC W/AUTO DIFF WBC: CPT | Performed by: INTERNAL MEDICINE

## 2019-09-10 PROCEDURE — 80048 BASIC METABOLIC PNL TOTAL CA: CPT | Performed by: INTERNAL MEDICINE

## 2019-09-10 PROCEDURE — 99239 HOSP IP/OBS DSCHRG MGMT >30: CPT | Performed by: FAMILY MEDICINE

## 2019-09-10 PROCEDURE — 82948 REAGENT STRIP/BLOOD GLUCOSE: CPT

## 2019-09-10 PROCEDURE — 99232 SBSQ HOSP IP/OBS MODERATE 35: CPT | Performed by: INTERNAL MEDICINE

## 2019-09-10 RX ORDER — ASPIRIN 81 MG/1
81 TABLET, CHEWABLE ORAL DAILY
Refills: 0
Start: 2019-09-11

## 2019-09-10 RX ADMIN — CALCITRIOL CAPSULES 0.25 MCG 0.5 MCG: 0.25 CAPSULE ORAL at 08:15

## 2019-09-10 RX ADMIN — ACETAMINOPHEN 650 MG: 325 TABLET ORAL at 00:07

## 2019-09-10 RX ADMIN — LORATADINE 10 MG: 10 TABLET ORAL at 08:15

## 2019-09-10 RX ADMIN — SEVELAMER HYDROCHLORIDE 800 MG: 800 TABLET, FILM COATED PARENTERAL at 11:36

## 2019-09-10 RX ADMIN — OXYCODONE HYDROCHLORIDE AND ACETAMINOPHEN 1000 MG: 500 TABLET ORAL at 08:16

## 2019-09-10 RX ADMIN — METOPROLOL TARTRATE 25 MG: 25 TABLET, FILM COATED ORAL at 08:16

## 2019-09-10 RX ADMIN — VITAMIN D, TAB 1000IU (100/BT) 1000 UNITS: 25 TAB at 08:15

## 2019-09-10 RX ADMIN — ACETAMINOPHEN 650 MG: 325 TABLET ORAL at 06:07

## 2019-09-10 RX ADMIN — PANTOPRAZOLE SODIUM 20 MG: 20 TABLET, DELAYED RELEASE ORAL at 06:07

## 2019-09-10 RX ADMIN — DILTIAZEM HYDROCHLORIDE 180 MG: 180 CAPSULE, COATED, EXTENDED RELEASE ORAL at 08:15

## 2019-09-10 RX ADMIN — ALLOPURINOL 100 MG: 100 TABLET ORAL at 08:15

## 2019-09-10 RX ADMIN — CLOPIDOGREL BISULFATE 75 MG: 75 TABLET ORAL at 08:15

## 2019-09-10 RX ADMIN — ASPIRIN 81 MG 81 MG: 81 TABLET ORAL at 08:16

## 2019-09-10 RX ADMIN — SEVELAMER HYDROCHLORIDE 800 MG: 800 TABLET, FILM COATED PARENTERAL at 08:15

## 2019-09-10 RX ADMIN — APIXABAN 5 MG: 5 TABLET, FILM COATED ORAL at 08:15

## 2019-09-10 RX ADMIN — FAMOTIDINE 20 MG: 20 TABLET ORAL at 08:15

## 2019-09-10 RX ADMIN — FLUOXETINE 40 MG: 20 CAPSULE ORAL at 08:15

## 2019-09-10 RX ADMIN — ZINC 1 TABLET: TAB ORAL at 08:15

## 2019-09-10 RX ADMIN — LEVOTHYROXINE SODIUM 75 MCG: 75 TABLET ORAL at 05:16

## 2019-09-10 NOTE — SOCIAL WORK
Pt is cleared for d/c by DOLLY Bright  RN Juan Keyon was notified of d/c order  Pt is accepted for services by [facility] for her aftercare plan  The pt and her  Dudley Castillo were both informed of d/c   will transport pt home later this day, pickup time TBD  No IMM required  No chart copy required  CM to follow

## 2019-09-10 NOTE — DISCHARGE SUMMARY
[facility] 1965, 48 [facility]  [facility] MRN: [id number]    Unit/Bed#: [facility] 431-01 Encounter: [id number]    Primary Care Provider: Angie Syed DO   Date and time admitted to hospital: 9/8/2019 10:45 AM        * Uncontrolled type 1 diabetes mellitus with hypoglycemia and coma [facility])  Assessment & Plan  Lab Results   Component Value Date    HGBA1C 8 1 (H) 07/21/2019       Recent Labs     09/09/19  2103 09/10/19  0549 09/10/19  1102 09/10/19  1203   POCGLU 244* 111 59* 115       Blood Sugar Average: Last 72 hrs:  (P) 226 35     Blood glucose better controlled this morning  Patient placed back on her insulin pump  Mental status improved significantly today  Patient back to her normal self  Appreciate Endocrinology input  Patient is eager to be discharged  Discussed with Endocrinology  Plan is for this patient to be discharged home with outpatient follow-up with the endocrinologist   Insulin pump setting adjusted by Endocrine    Hypothermia  Assessment & Plan  Likely secondary to hypoglycemia  Resolved this morning  Patient off [employer]  Sepsis [facility])  Assessment & Plan  Unclear etiology  Sepsis presentation may be secondary due to severe hypoglycemia  No definite source of infection on the CT scan  Discussed with the patient about the abnormality seen in the left adnexa and recommended to get a ultrasound as an outpatient  Patient reported that she had similar issues in the past and had an ultrasound done through the primary care physician    Hypothyroid  Assessment & Plan  Continue levothyroxine    Hypoglycemia  Assessment & Plan  Appears that the patient insulin dose was changed recently  It appears that the patient is not compliant with blood sugar monitoring  Insulin pump setting adjusted by Endocrinology    CAD with recent stent  Assessment & Plan  Recent stent placement in [facility]  Continue Plavix, Lipitor, Cardizem, Eliquis  Patient not on aspirin/beta-blocker? ?? As per discharge instructions from [facility] recently she was supposed to be on aspirin metoprolol  Restarted back on the aspirin and metoprolol      Neuropathy  Assessment & Plan  Continue gabapentin    Morbid obesity ([facility] [address] )  Assessment & Plan  TLC as an outpatient    PAF (paroxysmal atrial fibrillation) ([facility] [address] )  Assessment & Plan  Eliquis for anticoagulation  Diltiazem for rate control    ESRD (end stage renal disease) on dialysis [facility])  Assessment & Plan  Nephrology consulted  Dialysis per Nephrology  Discharging Physician / Practitioner: Grey Burnett MD  PCP: Saravanan Reyes DO  Admission Date:   Admission Orders (From admission, onward)     Ordered        09/08/19 1441  Inpatient Admission  Once         09/08/19 1428  Inpatient Admission (expected length of stay for this patient Order details is greater than two midnights)  Once                   Discharge Date: 09/10/19    Resolved Problems  Date Reviewed: 9/10/2019    None          Consultations During Hospital Stay:  · Endocrinology    Procedures Performed:   ·     Significant Findings / Test Results:   · Chest x-ray-no acute cardiopulmonary disease  · CT chest abdomen and pelvis-no definite source of infection  4 5 cm left adnexal lobe last soft tissue density possibly representing an exophytic fibroid or left adnexal lesion  Recommended outpatient ultrasound  Mild CHF or fluid overload    Incidental Findings:   · Left adnexal soft tissue density    Test Results Pending at Discharge (will require follow up):   ·      Outpatient Tests Requested:  · Ultrasound of the pelvis    Complications:      Reason for Admission:  Hypoglycemia and hypothermia    Hospital Course:     Nora Akins is a 48 y o  female patient who originally presented to the hospital on 9/8/2019 due to hypoglycemia  Patient was somnolent but arousable at the time of presentation  Her blood sugar has been running low and she has insulin pump    It appears that the patient was on steroids recently and may have upgraded the insulin pump setting  Patient was also found to be hypothermic at the time of admission patient was started on the antibiotics for concern for sepsis  Her infectious workup came back negative  Patient symptomatically improved with the correction of the blood sugar  Initially patient was started on D10 infusion  Patient with history of type 1 diabetes followed by Endocrinology as an outpatient  Patient was followed by Endocrinology while in the hospital and insulin from sitting was adjusted while in the hospital   It appears that patient recently had an admission to [facility] for coronary artery disease status post stent placement  Patient was started on aspirin and also on metoprolol  She patient remained hemodynamically stable and afebrile  Patient wants to go home today since she has an outpatient appointment  Patient was discharged in a stable condition with outpatient follow-up with the primary care physician  Discussed with the patient about the abnormality seen on the CT scan and recommended to get a ultrasound as an outpatient  For details refer to the chart      Please see above list of diagnoses and related plan for additional information  Condition at Discharge: good     Discharge Day Visit / Exam:     Subjective:  Patient seen and examined  It appears that the patient was hypoglycemic at 11 o'clock blood sugar check improved with diet  Patient reported that she has a different diet at home and also discussed with Endocrinology  Adjusted the insulin pump setting  Patient reported that she wants to go home today since she has an outpatient appointment  Sugar on a regular basis and follow up with the Endocrinology    Vitals: Blood Pressure: 112/53 (09/10/19 0808)  Pulse: 71 (09/10/19 0808)  Temperature: 98 8 °F (37 1 °C) (09/10/19 0808)  Temp Source: Oral (09/10/19 9586)  Respirations: 20 (09/10/19 0808)  Height: 5' 4" (162 6 cm) (09/08/19 1051)  Weight - Scale: 113 kg (248 lb 7 3 oz) (09/10/19 0509)  SpO2: 93 % (09/10/19 0333)  Exam:   Physical Exam   Constitutional: She appears well-developed  No distress  HENT:   Head: Normocephalic and atraumatic  Eyes: Right eye exhibits no discharge  Neck: No JVD present  Cardiovascular: Normal rate and regular rhythm  No murmur heard  Pulmonary/Chest: Effort normal and breath sounds normal    Abdominal: Soft  Bowel sounds are normal    Musculoskeletal: Normal range of motion  She exhibits no edema  Neurological: She is alert  Skin: Skin is warm  She is not diaphoretic  Discussion with Family:  Patient    Discharge instructions/Information to patient and family:   See after visit summary for information provided to patient and family  Provisions for Follow-Up Care:  See after visit summary for information related to follow-up care and any pertinent home health orders  Disposition:     Home      Planned Readmission:  none     Discharge Statement:  I spent  45 minutes discharging the patient  This time was spent on the day of discharge  I had direct contact with the patient on the day of discharge  Greater than 50% of the total time was spent examining patient, answering all patient questions, arranging and discussing plan of care with patient as well as directly providing post-discharge instructions  Additional time then spent on discharge activities  Discharge Medications:  See after visit summary for reconciled discharge medications provided to patient and family        ** Please Note: This note has been constructed using a voice recognition system **

## 2019-09-10 NOTE — ASSESSMENT & PLAN NOTE
Recent stent placement in [facility]  Continue Plavix, Lipitor, Cardizem, Eliquis  Patient not on aspirin/beta-blocker? ?? As per discharge instructions from [facility] recently she was supposed to be on aspirin metoprolol    Restarted back on the aspirin and metoprolol

## 2019-09-10 NOTE — ASSESSMENT & PLAN NOTE
Likely secondary to hypoglycemia  Resolved this morning  Patient off [employer] [facility]

## 2019-09-10 NOTE — ASSESSMENT & PLAN NOTE
Unclear etiology  Sepsis presentation may be secondary due to severe hypoglycemia  No definite source of infection on the CT scan  Discussed with the patient about the abnormality seen in the left adnexa and recommended to get a ultrasound as an outpatient    Patient reported that she had similar issues in the past and had an ultrasound done through the primary care physician

## 2019-09-10 NOTE — PROGRESS NOTES
Assessment:  61-year-old morbidly obese  female with type 1 diabetes mellitus diagnosed at age 14 with end-stage renal disease requiring dialysis, known coronary artery disease with stent placement on 9/7/19, bilateral cataracts and retinopathy requiring laser therapy, was admitted secondary to hypoglycemia  Plan:  1  Type 1 diabetes mellitus - this is associated with the retinopathy, Charcot foot, end-stage renal disease and coronary artery disease  She feels fine however 11:00 a m  blood sugars was 59  Overnight blood glucose levels were unremarkable  Note she has hypoglycemia unawareness  She presently wishes to go home and will follow with Dr Beth Lo on her regular endocrinologist   Continue current insulin pump settings  S:  Reports no symptoms  Feels fine  Note blood sugar was 59 but patient was unaware  O:  Patient seen and examined personally  /53 (BP Location: Left leg)   Pulse 71   Temp 98 8 °F (37 1 °C) (Oral)   Resp 20   Ht 5' 4" (1 626 m)   Wt 113 kg (248 lb 7 3 oz)   SpO2 93%   BMI 42 65 kg/m²     Physical Exam   Constitutional: She is oriented to person, place, and time  HENT:   Head: Normocephalic  Mouth/Throat: Oropharynx is clear and moist    Eyes: Pupils are equal, round, and reactive to light  Neurological: She is alert and oriented to person, place, and time  Skin: There is pallor           Current Facility-Administered Medications:  acetaminophen 650 mg Oral Q6H PRN Jacklyn Orourke MD   acetaminophen 650 mg Oral Q6H PRN Jackie Robert PA-C   albuterol 2 puff Inhalation Q6H PRN Jacklyn Orourke MD   allopurinol 100 mg Oral Daily Jacklyn Orourke MD   apixaban 5 mg Oral BID Jacklyn Orourke MD   vitamin C 1,000 mg Oral Daily Jacklyn Orourke MD   aspirin 81 mg Oral Daily Amanda Devlin MD   atorvastatin 40 mg Oral Daily With Alexandru Basurto MD   calcitriol 0 5 mcg Oral Daily Jacklyn Orourke MD   calcium carbonate 1,000 mg Oral Daily PRN Arie Garcia Basilia Castillo PA-C   cholecalciferol 1,000 Units Oral Daily Paul Gale MD   clopidogrel 75 mg Oral Daily Paul Gale MD   diltiazem 180 mg Oral Daily Paul Gale MD   docusate sodium 100 mg Oral BID Rosemary Torres PA-C   famotidine 20 mg Oral Daily Paul Gale MD   FLUoxetine 40 mg Oral Daily Paul Gale MD   fluticasone-vilanterol 1 puff Inhalation Daily Paul Gale MD   gabapentin 300 mg Oral HS Paul Gale MD   insulin lispro 1 Units Subcutaneous Insulin Pump PRN Gigi Velarde MD   levothyroxine 75 mcg Oral Early Morning Paul Gale MD   loratadine 10 mg Oral Daily Paul Gale MD   metoprolol tartrate 25 mg Oral Q12H [address] Harrie Goodell, MD   multivitamin stress formula 1 tablet Oral Daily Paul Gale MD   ondansetron 4 mg Intravenous Q6H PRN Vangie Sher PA-C   pantoprazole 20 mg Oral Early Morning Paul Gale MD   patient maintained insulin pump 1 each Subcutaneous Q8H Gigi Velarde MD   senna 2 tablet Oral Daily PRN Vangie Sher PA-C   sevelamer 800 mg Oral TID With Meals Paul Gale MD        Lab, Imaging and other studies:   POC Glucose (mg/dl)   Date Value   09/10/2019 59 (L)   09/10/2019 111   09/09/2019 244 (H)   09/09/2019 303 (H)   09/09/2019 157 (H)   09/09/2019 363 (H)   09/09/2019 270 (H)   09/09/2019 130   09/09/2019 101   09/09/2019 186 (H)       D/W :   Dispo:

## 2019-09-10 NOTE — ASSESSMENT & PLAN NOTE
Appears that the patient insulin dose was changed recently  It appears that the patient is not compliant with blood sugar monitoring    Insulin pump setting adjusted by Endocrinology

## 2019-09-13 LAB
BACTERIA BLD CULT: NORMAL
BACTERIA BLD CULT: NORMAL

## 2019-11-14 ENCOUNTER — HOSPITAL ENCOUNTER (INPATIENT)
Facility: HOSPITAL | Age: 54
LOS: 8 days | Discharge: HOME | DRG: 871 | End: 2019-11-22
Attending: EMERGENCY MEDICINE | Admitting: INTERNAL MEDICINE
Payer: COMMERCIAL

## 2019-11-14 ENCOUNTER — APPOINTMENT (EMERGENCY)
Dept: RADIOLOGY | Facility: HOSPITAL | Age: 54
DRG: 871 | End: 2019-11-14
Payer: COMMERCIAL

## 2019-11-14 DIAGNOSIS — E10.641 UNCONTROLLED TYPE 1 DIABETES MELLITUS WITH HYPOGLYCEMIA AND COMA (HCC): ICD-10-CM

## 2019-11-14 DIAGNOSIS — N18.6 ESRD (END STAGE RENAL DISEASE) ON DIALYSIS (HCC): ICD-10-CM

## 2019-11-14 DIAGNOSIS — IMO0002 UNCONTROLLED TYPE 1 DIABETES MELLITUS: ICD-10-CM

## 2019-11-14 DIAGNOSIS — E10.9 TYPE 1 DIABETES (HCC): ICD-10-CM

## 2019-11-14 DIAGNOSIS — I48.91 A-FIB (HCC): ICD-10-CM

## 2019-11-14 DIAGNOSIS — D64.9 ANEMIA, UNSPECIFIED TYPE: ICD-10-CM

## 2019-11-14 DIAGNOSIS — G93.40 ENCEPHALOPATHY: ICD-10-CM

## 2019-11-14 DIAGNOSIS — R55 SYNCOPE: Primary | ICD-10-CM

## 2019-11-14 DIAGNOSIS — R00.1 BRADYCARDIA: ICD-10-CM

## 2019-11-14 DIAGNOSIS — E03.9 HYPOTHYROID: ICD-10-CM

## 2019-11-14 DIAGNOSIS — I49.5 TACHY-BRADY SYNDROME (HCC): ICD-10-CM

## 2019-11-14 DIAGNOSIS — Z99.2 ESRD (END STAGE RENAL DISEASE) ON DIALYSIS (HCC): ICD-10-CM

## 2019-11-14 DIAGNOSIS — J96.90 RESPIRATORY FAILURE (HCC): ICD-10-CM

## 2019-11-14 DIAGNOSIS — R11.10 VOMITING: ICD-10-CM

## 2019-11-14 DIAGNOSIS — A41.9 SEPSIS, DUE TO UNSPECIFIED ORGANISM, UNSPECIFIED WHETHER ACUTE ORGAN DYSFUNCTION PRESENT (HCC): ICD-10-CM

## 2019-11-14 DIAGNOSIS — A41.9 SEPSIS (HCC): ICD-10-CM

## 2019-11-14 DIAGNOSIS — S41.102A WOUND OF LEFT UPPER EXTREMITY, INITIAL ENCOUNTER: ICD-10-CM

## 2019-11-14 DIAGNOSIS — I48.0 PAF (PAROXYSMAL ATRIAL FIBRILLATION) (HCC): ICD-10-CM

## 2019-11-14 LAB
ALBUMIN SERPL BCP-MCNC: 2.4 G/DL (ref 3.5–5)
ALP SERPL-CCNC: 202 U/L (ref 46–116)
ALT SERPL W P-5'-P-CCNC: 16 U/L (ref 12–78)
ANION GAP BLD CALC-SCNC: 19 MMOL/L (ref 4–13)
ANION GAP SERPL CALCULATED.3IONS-SCNC: 16 MMOL/L (ref 4–13)
AST SERPL W P-5'-P-CCNC: 10 U/L (ref 5–45)
BACTERIA UR QL AUTO: ABNORMAL /HPF
BASE EX.OXY STD BLDV CALC-SCNC: 71.3 % (ref 60–80)
BASE EXCESS BLDA CALC-SCNC: 0 MMOL/L (ref -2–3)
BASE EXCESS BLDV CALC-SCNC: -3.3 MMOL/L
BASOPHILS # BLD AUTO: 0.04 THOUSANDS/ΜL (ref 0–0.1)
BASOPHILS NFR BLD AUTO: 0 % (ref 0–1)
BETA-HYDROXYBUTYRATE: 0.7 MMOL/L
BILIRUB SERPL-MCNC: 0.42 MG/DL (ref 0.2–1)
BILIRUB UR QL STRIP: ABNORMAL
BUN BLD-MCNC: 102 MG/DL (ref 5–25)
BUN SERPL-MCNC: 86 MG/DL (ref 5–25)
CA-I BLD-SCNC: 1.05 MMOL/L (ref 1.12–1.32)
CA-I BLD-SCNC: 1.06 MMOL/L (ref 1.12–1.32)
CALCIUM SERPL-MCNC: 9.4 MG/DL (ref 8.3–10.1)
CHLORIDE BLD-SCNC: 91 MMOL/L (ref 100–108)
CHLORIDE SERPL-SCNC: 90 MMOL/L (ref 100–108)
CLARITY UR: CLEAR
CO2 SERPL-SCNC: 23 MMOL/L (ref 21–32)
COLOR UR: YELLOW
CREAT BLD-MCNC: 7.1 MG/DL (ref 0.6–1.3)
CREAT SERPL-MCNC: 6.58 MG/DL (ref 0.6–1.3)
EOSINOPHIL # BLD AUTO: 0.37 THOUSAND/ΜL (ref 0–0.61)
EOSINOPHIL NFR BLD AUTO: 3 % (ref 0–6)
ERYTHROCYTE [DISTWIDTH] IN BLOOD BY AUTOMATED COUNT: 18.4 % (ref 11.6–15.1)
GFR SERPL CREATININE-BSD FRML MDRD: 6 ML/MIN/1.73SQ M
GFR SERPL CREATININE-BSD FRML MDRD: 7 ML/MIN/1.73SQ M
GLUCOSE SERPL-MCNC: 500 MG/DL (ref 65–140)
GLUCOSE SERPL-MCNC: 503 MG/DL (ref 65–140)
GLUCOSE SERPL-MCNC: 511 MG/DL (ref 65–140)
GLUCOSE SERPL-MCNC: >500 MG/DL (ref 65–140)
GLUCOSE SERPL-MCNC: >500 MG/DL (ref 65–140)
GLUCOSE UR STRIP-MCNC: ABNORMAL MG/DL
HCO3 BLDA-SCNC: 25 MMOL/L (ref 24–30)
HCO3 BLDV-SCNC: 22.1 MMOL/L (ref 24–30)
HCT VFR BLD AUTO: 24.9 % (ref 34.8–46.1)
HCT VFR BLD CALC: 24 % (ref 34.8–46.1)
HCT VFR BLD CALC: 24 % (ref 34.8–46.1)
HGB BLD-MCNC: 7.5 G/DL (ref 11.5–15.4)
HGB BLDA-MCNC: 8.2 G/DL (ref 11.5–15.4)
HGB BLDA-MCNC: 8.2 G/DL (ref 11.5–15.4)
HGB UR QL STRIP.AUTO: ABNORMAL
IMM GRANULOCYTES # BLD AUTO: 0.08 THOUSAND/UL (ref 0–0.2)
IMM GRANULOCYTES NFR BLD AUTO: 1 % (ref 0–2)
KETONES UR STRIP-MCNC: ABNORMAL MG/DL
LEUKOCYTE ESTERASE UR QL STRIP: ABNORMAL
LYMPHOCYTES # BLD AUTO: 3.26 THOUSANDS/ΜL (ref 0.6–4.47)
LYMPHOCYTES NFR BLD AUTO: 25 % (ref 14–44)
MCH RBC QN AUTO: 33.5 PG (ref 26.8–34.3)
MCHC RBC AUTO-ENTMCNC: 30.1 G/DL (ref 31.4–37.4)
MCV RBC AUTO: 111 FL (ref 82–98)
MONOCYTES # BLD AUTO: 1.13 THOUSAND/ΜL (ref 0.17–1.22)
MONOCYTES NFR BLD AUTO: 9 % (ref 4–12)
NEUTROPHILS # BLD AUTO: 7.94 THOUSANDS/ΜL (ref 1.85–7.62)
NEUTS SEG NFR BLD AUTO: 62 % (ref 43–75)
NITRITE UR QL STRIP: NEGATIVE
NON-SQ EPI CELLS URNS QL MICRO: ABNORMAL /HPF
NRBC BLD AUTO-RTO: 0 /100 WBCS
O2 CT BLDV-SCNC: 8.2 ML/DL
PCO2 BLD: 25 MMOL/L (ref 21–32)
PCO2 BLD: 26 MMOL/L (ref 21–32)
PCO2 BLD: 38.9 MM HG (ref 42–50)
PCO2 BLDV: 41.2 MM HG (ref 42–50)
PH BLD: 7.42 [PH] (ref 7.3–7.4)
PH BLDV: 7.35 [PH] (ref 7.3–7.4)
PH UR STRIP.AUTO: 5.5 [PH] (ref 4.5–8)
PLATELET # BLD AUTO: 215 THOUSANDS/UL (ref 149–390)
PLATELET # BLD AUTO: 228 THOUSANDS/UL (ref 149–390)
PMV BLD AUTO: 10.9 FL (ref 8.9–12.7)
PMV BLD AUTO: 11.5 FL (ref 8.9–12.7)
PO2 BLD: 37 MM HG (ref 35–45)
PO2 BLDV: 43.6 MM HG (ref 35–45)
POTASSIUM BLD-SCNC: 5.6 MMOL/L (ref 3.5–5.3)
POTASSIUM BLD-SCNC: 5.6 MMOL/L (ref 3.5–5.3)
POTASSIUM SERPL-SCNC: 5.2 MMOL/L (ref 3.5–5.3)
PROT SERPL-MCNC: 8.1 G/DL (ref 6.4–8.2)
PROT UR STRIP-MCNC: >=300 MG/DL
RBC # BLD AUTO: 2.24 MILLION/UL (ref 3.81–5.12)
RBC #/AREA URNS AUTO: ABNORMAL /HPF
SAO2 % BLD FROM PO2: 71 % (ref 60–85)
SODIUM BLD-SCNC: 128 MMOL/L (ref 136–145)
SODIUM BLD-SCNC: 129 MMOL/L (ref 136–145)
SODIUM SERPL-SCNC: 129 MMOL/L (ref 136–145)
SP GR UR STRIP.AUTO: 1.02 (ref 1–1.03)
SPECIMEN SOURCE: ABNORMAL
SPECIMEN SOURCE: ABNORMAL
T4 FREE SERPL-MCNC: 1.17 NG/DL (ref 0.76–1.46)
TROPONIN I SERPL-MCNC: <0.02 NG/ML
TROPONIN I SERPL-MCNC: <0.02 NG/ML
TSH SERPL DL<=0.05 MIU/L-ACNC: 6.4 UIU/ML (ref 0.36–3.74)
UROBILINOGEN UR QL STRIP.AUTO: 0.2 E.U./DL
WBC # BLD AUTO: 12.82 THOUSAND/UL (ref 4.31–10.16)
WBC #/AREA URNS AUTO: ABNORMAL /HPF

## 2019-11-14 PROCEDURE — 82803 BLOOD GASES ANY COMBINATION: CPT

## 2019-11-14 PROCEDURE — 96360 HYDRATION IV INFUSION INIT: CPT

## 2019-11-14 PROCEDURE — 82010 KETONE BODYS QUAN: CPT | Performed by: EMERGENCY MEDICINE

## 2019-11-14 PROCEDURE — 84295 ASSAY OF SERUM SODIUM: CPT

## 2019-11-14 PROCEDURE — 85025 COMPLETE CBC W/AUTO DIFF WBC: CPT | Performed by: EMERGENCY MEDICINE

## 2019-11-14 PROCEDURE — 82948 REAGENT STRIP/BLOOD GLUCOSE: CPT

## 2019-11-14 PROCEDURE — 99291 CRITICAL CARE FIRST HOUR: CPT | Performed by: EMERGENCY MEDICINE

## 2019-11-14 PROCEDURE — 84484 ASSAY OF TROPONIN QUANT: CPT | Performed by: EMERGENCY MEDICINE

## 2019-11-14 PROCEDURE — 0BH18EZ INSERTION OF ENDOTRACHEAL AIRWAY INTO TRACHEA, VIA NATURAL OR ARTIFICIAL OPENING ENDOSCOPIC: ICD-10-PCS | Performed by: EMERGENCY MEDICINE

## 2019-11-14 PROCEDURE — 70450 CT HEAD/BRAIN W/O DYE: CPT

## 2019-11-14 PROCEDURE — 85049 AUTOMATED PLATELET COUNT: CPT | Performed by: EMERGENCY MEDICINE

## 2019-11-14 PROCEDURE — 82330 ASSAY OF CALCIUM: CPT

## 2019-11-14 PROCEDURE — 84443 ASSAY THYROID STIM HORMONE: CPT | Performed by: EMERGENCY MEDICINE

## 2019-11-14 PROCEDURE — 82805 BLOOD GASES W/O2 SATURATION: CPT | Performed by: EMERGENCY MEDICINE

## 2019-11-14 PROCEDURE — 96374 THER/PROPH/DIAG INJ IV PUSH: CPT

## 2019-11-14 PROCEDURE — 94760 N-INVAS EAR/PLS OXIMETRY 1: CPT

## 2019-11-14 PROCEDURE — 71045 X-RAY EXAM CHEST 1 VIEW: CPT

## 2019-11-14 PROCEDURE — 94002 VENT MGMT INPAT INIT DAY: CPT

## 2019-11-14 PROCEDURE — 36415 COLL VENOUS BLD VENIPUNCTURE: CPT | Performed by: EMERGENCY MEDICINE

## 2019-11-14 PROCEDURE — 81001 URINALYSIS AUTO W/SCOPE: CPT

## 2019-11-14 PROCEDURE — 99291 CRITICAL CARE FIRST HOUR: CPT

## 2019-11-14 PROCEDURE — 87040 BLOOD CULTURE FOR BACTERIA: CPT | Performed by: EMERGENCY MEDICINE

## 2019-11-14 PROCEDURE — 80047 BASIC METABLC PNL IONIZED CA: CPT

## 2019-11-14 PROCEDURE — 31500 INSERT EMERGENCY AIRWAY: CPT | Performed by: EMERGENCY MEDICINE

## 2019-11-14 PROCEDURE — 85014 HEMATOCRIT: CPT

## 2019-11-14 PROCEDURE — 96372 THER/PROPH/DIAG INJ SC/IM: CPT

## 2019-11-14 PROCEDURE — 80053 COMPREHEN METABOLIC PANEL: CPT | Performed by: EMERGENCY MEDICINE

## 2019-11-14 PROCEDURE — 84439 ASSAY OF FREE THYROXINE: CPT | Performed by: EMERGENCY MEDICINE

## 2019-11-14 PROCEDURE — 82947 ASSAY GLUCOSE BLOOD QUANT: CPT

## 2019-11-14 PROCEDURE — 84132 ASSAY OF SERUM POTASSIUM: CPT

## 2019-11-14 PROCEDURE — 93005 ELECTROCARDIOGRAM TRACING: CPT

## 2019-11-14 PROCEDURE — 5A1945Z RESPIRATORY VENTILATION, 24-96 CONSECUTIVE HOURS: ICD-10-PCS | Performed by: EMERGENCY MEDICINE

## 2019-11-14 RX ORDER — ONDANSETRON 2 MG/ML
INJECTION INTRAMUSCULAR; INTRAVENOUS
Status: COMPLETED
Start: 2019-11-14 | End: 2019-11-14

## 2019-11-14 RX ORDER — SODIUM CHLORIDE, SODIUM GLUCONATE, SODIUM ACETATE, POTASSIUM CHLORIDE, MAGNESIUM CHLORIDE, SODIUM PHOSPHATE, DIBASIC, AND POTASSIUM PHOSPHATE .53; .5; .37; .037; .03; .012; .00082 G/100ML; G/100ML; G/100ML; G/100ML; G/100ML; G/100ML; G/100ML
75 INJECTION, SOLUTION INTRAVENOUS CONTINUOUS
Status: DISCONTINUED | OUTPATIENT
Start: 2019-11-14 | End: 2019-11-15

## 2019-11-14 RX ORDER — ONDANSETRON 2 MG/ML
4 INJECTION INTRAMUSCULAR; INTRAVENOUS ONCE
Status: COMPLETED | OUTPATIENT
Start: 2019-11-14 | End: 2019-11-14

## 2019-11-14 RX ORDER — DOPAMINE HYDROCHLORIDE 160 MG/100ML
INJECTION, SOLUTION INTRAVENOUS
Status: COMPLETED
Start: 2019-11-14 | End: 2019-11-15

## 2019-11-14 RX ORDER — SUCCINYLCHOLINE/SOD CL,ISO/PF 100 MG/5ML
100 SYRINGE (ML) INTRAVENOUS ONCE
Status: DISCONTINUED | OUTPATIENT
Start: 2019-11-14 | End: 2019-11-14

## 2019-11-14 RX ORDER — CHLORHEXIDINE GLUCONATE 0.12 MG/ML
15 RINSE ORAL EVERY 12 HOURS SCHEDULED
Status: DISCONTINUED | OUTPATIENT
Start: 2019-11-14 | End: 2019-11-17

## 2019-11-14 RX ORDER — SUCCINYLCHOLINE/SOD CL,ISO/PF 100 MG/5ML
150 SYRINGE (ML) INTRAVENOUS ONCE
Status: COMPLETED | OUTPATIENT
Start: 2019-11-14 | End: 2019-11-14

## 2019-11-14 RX ORDER — PROPOFOL 10 MG/ML
5-50 INJECTION, EMULSION INTRAVENOUS
Status: DISCONTINUED | OUTPATIENT
Start: 2019-11-14 | End: 2019-11-16

## 2019-11-14 RX ORDER — HEPARIN SODIUM 5000 [USP'U]/ML
5000 INJECTION, SOLUTION INTRAVENOUS; SUBCUTANEOUS EVERY 8 HOURS SCHEDULED
Status: DISCONTINUED | OUTPATIENT
Start: 2019-11-14 | End: 2019-11-15 | Stop reason: ALTCHOICE

## 2019-11-14 RX ORDER — ETOMIDATE 2 MG/ML
30 INJECTION INTRAVENOUS ONCE
Status: COMPLETED | OUTPATIENT
Start: 2019-11-14 | End: 2019-11-14

## 2019-11-14 RX ORDER — DOPAMINE HYDROCHLORIDE 160 MG/100ML
1-20 INJECTION, SOLUTION INTRAVENOUS
Status: DISCONTINUED | OUTPATIENT
Start: 2019-11-14 | End: 2019-11-16

## 2019-11-14 RX ORDER — ATROPINE SULFATE 1 MG/ML
0.5 INJECTION, SOLUTION INTRAMUSCULAR; INTRAVENOUS; SUBCUTANEOUS ONCE
Status: COMPLETED | OUTPATIENT
Start: 2019-11-14 | End: 2019-11-14

## 2019-11-14 RX ADMIN — SODIUM CHLORIDE 1000 ML: 0.9 INJECTION, SOLUTION INTRAVENOUS at 19:21

## 2019-11-14 RX ADMIN — INSULIN HUMAN 10 UNITS: 100 INJECTION, SOLUTION PARENTERAL at 19:51

## 2019-11-14 RX ADMIN — PROPOFOL 20 MCG/KG/MIN: 10 INJECTION, EMULSION INTRAVENOUS at 20:02

## 2019-11-14 RX ADMIN — ONDANSETRON 4 MG: 2 INJECTION INTRAMUSCULAR; INTRAVENOUS at 19:54

## 2019-11-14 RX ADMIN — ONDANSETRON 4 MG: 2 INJECTION INTRAMUSCULAR; INTRAVENOUS at 19:15

## 2019-11-14 RX ADMIN — SODIUM CHLORIDE, SODIUM GLUCONATE, SODIUM ACETATE, POTASSIUM CHLORIDE AND MAGNESIUM CHLORIDE 75 ML/HR: 526; 502; 368; 37; 30 INJECTION, SOLUTION INTRAVENOUS at 21:48

## 2019-11-14 RX ADMIN — SODIUM CHLORIDE, SODIUM GLUCONATE, SODIUM ACETATE, POTASSIUM CHLORIDE, MAGNESIUM CHLORIDE, SODIUM PHOSPHATE, DIBASIC, AND POTASSIUM PHOSPHATE 500 ML: .53; .5; .37; .037; .03; .012; .00082 INJECTION, SOLUTION INTRAVENOUS at 23:00

## 2019-11-14 RX ADMIN — ATROPINE SULFATE 0.5 MG: 1 INJECTION, SOLUTION INTRAMUSCULAR; INTRAVENOUS; SUBCUTANEOUS at 20:16

## 2019-11-14 RX ADMIN — ETOMIDATE 30 MG: 2 INJECTION, SOLUTION INTRAVENOUS at 19:58

## 2019-11-14 RX ADMIN — HEPARIN SODIUM 5000 UNITS: 5000 INJECTION, SOLUTION INTRAVENOUS; SUBCUTANEOUS at 21:48

## 2019-11-14 RX ADMIN — Medication 150 MG: at 20:01

## 2019-11-15 ENCOUNTER — APPOINTMENT (INPATIENT)
Dept: NON INVASIVE DIAGNOSTICS | Facility: HOSPITAL | Age: 54
DRG: 871 | End: 2019-11-15
Payer: COMMERCIAL

## 2019-11-15 ENCOUNTER — APPOINTMENT (INPATIENT)
Dept: RADIOLOGY | Facility: HOSPITAL | Age: 54
DRG: 871 | End: 2019-11-15
Payer: COMMERCIAL

## 2019-11-15 PROBLEM — G93.40 ENCEPHALOPATHY: Status: ACTIVE | Noted: 2019-11-15

## 2019-11-15 PROBLEM — E83.39 HYPERPHOSPHATEMIA: Status: ACTIVE | Noted: 2019-11-15

## 2019-11-15 PROBLEM — J96.00 ACUTE RESPIRATORY FAILURE (HCC): Status: ACTIVE | Noted: 2019-11-15

## 2019-11-15 PROBLEM — R00.1 BRADYCARDIA: Status: ACTIVE | Noted: 2019-11-15

## 2019-11-15 LAB
ABO GROUP BLD: NORMAL
ALBUMIN SERPL BCP-MCNC: 2.3 G/DL (ref 3.5–5)
ALP SERPL-CCNC: 203 U/L (ref 46–116)
ALT SERPL W P-5'-P-CCNC: 73 U/L (ref 12–78)
ANION GAP SERPL CALCULATED.3IONS-SCNC: 16 MMOL/L (ref 4–13)
ANION GAP SERPL CALCULATED.3IONS-SCNC: 16 MMOL/L (ref 4–13)
AST SERPL W P-5'-P-CCNC: 138 U/L (ref 5–45)
ATRIAL RATE: 187 BPM
ATRIAL RATE: 36 BPM
ATRIAL RATE: 55 BPM
ATRIAL RATE: 62 BPM
ATRIAL RATE: 72 BPM
ATRIAL RATE: 81 BPM
ATRIAL RATE: 98 BPM
ATRIAL RATE: 98 BPM
BASE EXCESS BLDA CALC-SCNC: -2.7 MMOL/L
BASOPHILS # BLD AUTO: 0.04 THOUSANDS/ΜL (ref 0–0.1)
BASOPHILS NFR BLD AUTO: 0 % (ref 0–1)
BILIRUB SERPL-MCNC: 0.79 MG/DL (ref 0.2–1)
BLD GP AB SCN SERPL QL: NEGATIVE
BODY TEMPERATURE: 97 DEGREES FEHRENHEIT
BUN SERPL-MCNC: 105 MG/DL (ref 5–25)
BUN SERPL-MCNC: 94 MG/DL (ref 5–25)
CA-I BLD-SCNC: 1.08 MMOL/L (ref 1.12–1.32)
CALCIUM SERPL-MCNC: 8.8 MG/DL (ref 8.3–10.1)
CALCIUM SERPL-MCNC: 9.2 MG/DL (ref 8.3–10.1)
CHLORIDE SERPL-SCNC: 93 MMOL/L (ref 100–108)
CHLORIDE SERPL-SCNC: 93 MMOL/L (ref 100–108)
CO2 SERPL-SCNC: 23 MMOL/L (ref 21–32)
CO2 SERPL-SCNC: 25 MMOL/L (ref 21–32)
CREAT SERPL-MCNC: 6.56 MG/DL (ref 0.6–1.3)
CREAT SERPL-MCNC: 7.41 MG/DL (ref 0.6–1.3)
EOSINOPHIL # BLD AUTO: 0.02 THOUSAND/ΜL (ref 0–0.61)
EOSINOPHIL NFR BLD AUTO: 0 % (ref 0–6)
ERYTHROCYTE [DISTWIDTH] IN BLOOD BY AUTOMATED COUNT: 18.1 % (ref 11.6–15.1)
GFR SERPL CREATININE-BSD FRML MDRD: 6 ML/MIN/1.73SQ M
GFR SERPL CREATININE-BSD FRML MDRD: 7 ML/MIN/1.73SQ M
GLUCOSE SERPL-MCNC: 104 MG/DL (ref 65–140)
GLUCOSE SERPL-MCNC: 110 MG/DL (ref 65–140)
GLUCOSE SERPL-MCNC: 126 MG/DL (ref 65–140)
GLUCOSE SERPL-MCNC: 139 MG/DL (ref 65–140)
GLUCOSE SERPL-MCNC: 148 MG/DL (ref 65–140)
GLUCOSE SERPL-MCNC: 171 MG/DL (ref 65–140)
GLUCOSE SERPL-MCNC: 224 MG/DL (ref 65–140)
GLUCOSE SERPL-MCNC: 237 MG/DL (ref 65–140)
GLUCOSE SERPL-MCNC: 402 MG/DL (ref 65–140)
GLUCOSE SERPL-MCNC: 475 MG/DL (ref 65–140)
GLUCOSE SERPL-MCNC: 487 MG/DL (ref 65–140)
GLUCOSE SERPL-MCNC: 92 MG/DL (ref 65–140)
GLUCOSE SERPL-MCNC: >500 MG/DL (ref 65–140)
HCO3 BLDA-SCNC: 22.7 MMOL/L (ref 22–28)
HCT VFR BLD AUTO: 23.5 % (ref 34.8–46.1)
HGB BLD-MCNC: 7.4 G/DL (ref 11.5–15.4)
HOROWITZ INDEX BLDA+IHG-RTO: 60 MM[HG]
IMM GRANULOCYTES # BLD AUTO: 0.24 THOUSAND/UL (ref 0–0.2)
IMM GRANULOCYTES NFR BLD AUTO: 2 % (ref 0–2)
LACTATE SERPL-SCNC: 2.3 MMOL/L (ref 0.5–2)
LIPASE SERPL-CCNC: 258 U/L (ref 73–393)
LYMPHOCYTES # BLD AUTO: 1.36 THOUSANDS/ΜL (ref 0.6–4.47)
LYMPHOCYTES NFR BLD AUTO: 9 % (ref 14–44)
MAGNESIUM SERPL-MCNC: 2.3 MG/DL (ref 1.6–2.6)
MAGNESIUM SERPL-MCNC: 2.3 MG/DL (ref 1.6–2.6)
MCH RBC QN AUTO: 33.3 PG (ref 26.8–34.3)
MCHC RBC AUTO-ENTMCNC: 31.5 G/DL (ref 31.4–37.4)
MCV RBC AUTO: 106 FL (ref 82–98)
MONOCYTES # BLD AUTO: 0.79 THOUSAND/ΜL (ref 0.17–1.22)
MONOCYTES NFR BLD AUTO: 5 % (ref 4–12)
NEUTROPHILS # BLD AUTO: 12.85 THOUSANDS/ΜL (ref 1.85–7.62)
NEUTS SEG NFR BLD AUTO: 84 % (ref 43–75)
NRBC BLD AUTO-RTO: 1 /100 WBCS
O2 CT BLDA-SCNC: 11.3 ML/DL (ref 16–23)
OXYHGB MFR BLDA: 96 % (ref 94–97)
P AXIS: 15 DEGREES
P AXIS: 43 DEGREES
P AXIS: 56 DEGREES
P AXIS: 71 DEGREES
PCO2 BLDA: 41.8 MM HG (ref 36–44)
PCO2 TEMP ADJ BLDA: 40.2 MM HG (ref 36–44)
PEEP RESPIRATORY: 8 CM[H2O]
PH BLD: 7.37 [PH] (ref 7.35–7.45)
PH BLDA: 7.35 [PH] (ref 7.35–7.45)
PHOSPHATE SERPL-MCNC: 5.3 MG/DL (ref 2.7–4.5)
PHOSPHATE SERPL-MCNC: 6.5 MG/DL (ref 2.7–4.5)
PLATELET # BLD AUTO: 168 THOUSANDS/UL (ref 149–390)
PMV BLD AUTO: 10.5 FL (ref 8.9–12.7)
PO2 BLD: 94.3 MM HG (ref 75–129)
PO2 BLDA: 99.5 MM HG (ref 75–129)
POTASSIUM SERPL-SCNC: 4.6 MMOL/L (ref 3.5–5.3)
POTASSIUM SERPL-SCNC: 5 MMOL/L (ref 3.5–5.3)
PR INTERVAL: 163 MS
PR INTERVAL: 166 MS
PR INTERVAL: 175 MS
PR INTERVAL: 404 MS
PROCALCITONIN SERPL-MCNC: 1.13 NG/ML
PROT SERPL-MCNC: 7.5 G/DL (ref 6.4–8.2)
QRS AXIS: 29 DEGREES
QRS AXIS: 39 DEGREES
QRS AXIS: 39 DEGREES
QRS AXIS: 41 DEGREES
QRS AXIS: 42 DEGREES
QRS AXIS: 43 DEGREES
QRS AXIS: 49 DEGREES
QRS AXIS: 57 DEGREES
QRSD INTERVAL: 60 MS
QRSD INTERVAL: 64 MS
QRSD INTERVAL: 66 MS
QRSD INTERVAL: 67 MS
QRSD INTERVAL: 71 MS
QRSD INTERVAL: 75 MS
QT INTERVAL: 396 MS
QT INTERVAL: 408 MS
QT INTERVAL: 417 MS
QT INTERVAL: 454 MS
QT INTERVAL: 458 MS
QT INTERVAL: 488 MS
QT INTERVAL: 532 MS
QT INTERVAL: 542 MS
QTC INTERVAL: 392 MS
QTC INTERVAL: 415 MS
QTC INTERVAL: 417 MS
QTC INTERVAL: 420 MS
QTC INTERVAL: 422 MS
QTC INTERVAL: 434 MS
QTC INTERVAL: 439 MS
QTC INTERVAL: 485 MS
RBC # BLD AUTO: 2.22 MILLION/UL (ref 3.81–5.12)
RH BLD: POSITIVE
SODIUM SERPL-SCNC: 132 MMOL/L (ref 136–145)
SODIUM SERPL-SCNC: 134 MMOL/L (ref 136–145)
SPECIMEN EXPIRATION DATE: NORMAL
SPECIMEN SOURCE: ABNORMAL
T WAVE AXIS: 44 DEGREES
T WAVE AXIS: 48 DEGREES
T WAVE AXIS: 49 DEGREES
T WAVE AXIS: 51 DEGREES
T WAVE AXIS: 55 DEGREES
T WAVE AXIS: 59 DEGREES
T WAVE AXIS: 59 DEGREES
T WAVE AXIS: 79 DEGREES
TROPONIN I SERPL-MCNC: <0.02 NG/ML
VENT AC: 16
VENT- AC: AC
VENTRICULAR RATE: 36 BPM
VENTRICULAR RATE: 38 BPM
VENTRICULAR RATE: 44 BPM
VENTRICULAR RATE: 45 BPM
VENTRICULAR RATE: 55 BPM
VENTRICULAR RATE: 62 BPM
VENTRICULAR RATE: 72 BPM
VENTRICULAR RATE: 81 BPM
VT SETTING VENT: 400 ML
WBC # BLD AUTO: 15.3 THOUSAND/UL (ref 4.31–10.16)

## 2019-11-15 PROCEDURE — 99222 1ST HOSP IP/OBS MODERATE 55: CPT | Performed by: INTERNAL MEDICINE

## 2019-11-15 PROCEDURE — 71045 X-RAY EXAM CHEST 1 VIEW: CPT

## 2019-11-15 PROCEDURE — 84145 PROCALCITONIN (PCT): CPT | Performed by: NURSE PRACTITIONER

## 2019-11-15 PROCEDURE — 83735 ASSAY OF MAGNESIUM: CPT | Performed by: EMERGENCY MEDICINE

## 2019-11-15 PROCEDURE — 36556 INSERT NON-TUNNEL CV CATH: CPT | Performed by: EMERGENCY MEDICINE

## 2019-11-15 PROCEDURE — 83605 ASSAY OF LACTIC ACID: CPT | Performed by: NURSE PRACTITIONER

## 2019-11-15 PROCEDURE — 94760 N-INVAS EAR/PLS OXIMETRY 1: CPT

## 2019-11-15 PROCEDURE — 83690 ASSAY OF LIPASE: CPT | Performed by: NURSE PRACTITIONER

## 2019-11-15 PROCEDURE — C8929 TTE W OR WO FOL WCON,DOPPLER: HCPCS

## 2019-11-15 PROCEDURE — 86900 BLOOD TYPING SEROLOGIC ABO: CPT | Performed by: NURSE PRACTITIONER

## 2019-11-15 PROCEDURE — 80048 BASIC METABOLIC PNL TOTAL CA: CPT | Performed by: EMERGENCY MEDICINE

## 2019-11-15 PROCEDURE — 99291 CRITICAL CARE FIRST HOUR: CPT | Performed by: INTERNAL MEDICINE

## 2019-11-15 PROCEDURE — 85025 COMPLETE CBC W/AUTO DIFF WBC: CPT | Performed by: EMERGENCY MEDICINE

## 2019-11-15 PROCEDURE — 4A133B1 MONITORING OF ARTERIAL PRESSURE, PERIPHERAL, PERCUTANEOUS APPROACH: ICD-10-PCS | Performed by: INTERNAL MEDICINE

## 2019-11-15 PROCEDURE — 84100 ASSAY OF PHOSPHORUS: CPT | Performed by: EMERGENCY MEDICINE

## 2019-11-15 PROCEDURE — 99223 1ST HOSP IP/OBS HIGH 75: CPT | Performed by: INTERNAL MEDICINE

## 2019-11-15 PROCEDURE — 93005 ELECTROCARDIOGRAM TRACING: CPT

## 2019-11-15 PROCEDURE — 04HY32Z INSERTION OF MONITORING DEVICE INTO LOWER ARTERY, PERCUTANEOUS APPROACH: ICD-10-PCS | Performed by: INTERNAL MEDICINE

## 2019-11-15 PROCEDURE — 82805 BLOOD GASES W/O2 SATURATION: CPT | Performed by: EMERGENCY MEDICINE

## 2019-11-15 PROCEDURE — 4A133J1 MONITORING OF ARTERIAL PULSE, PERIPHERAL, PERCUTANEOUS APPROACH: ICD-10-PCS | Performed by: INTERNAL MEDICINE

## 2019-11-15 PROCEDURE — 93306 TTE W/DOPPLER COMPLETE: CPT | Performed by: INTERNAL MEDICINE

## 2019-11-15 PROCEDURE — NC001 PR NO CHARGE: Performed by: EMERGENCY MEDICINE

## 2019-11-15 PROCEDURE — 86850 RBC ANTIBODY SCREEN: CPT | Performed by: NURSE PRACTITIONER

## 2019-11-15 PROCEDURE — 94002 VENT MGMT INPAT INIT DAY: CPT

## 2019-11-15 PROCEDURE — 93010 ELECTROCARDIOGRAM REPORT: CPT | Performed by: INTERNAL MEDICINE

## 2019-11-15 PROCEDURE — 02HV33Z INSERTION OF INFUSION DEVICE INTO SUPERIOR VENA CAVA, PERCUTANEOUS APPROACH: ICD-10-PCS | Performed by: RADIOLOGY

## 2019-11-15 PROCEDURE — 80053 COMPREHEN METABOLIC PANEL: CPT | Performed by: EMERGENCY MEDICINE

## 2019-11-15 PROCEDURE — 36620 INSERTION CATHETER ARTERY: CPT | Performed by: EMERGENCY MEDICINE

## 2019-11-15 PROCEDURE — 82330 ASSAY OF CALCIUM: CPT | Performed by: EMERGENCY MEDICINE

## 2019-11-15 PROCEDURE — 86901 BLOOD TYPING SEROLOGIC RH(D): CPT | Performed by: NURSE PRACTITIONER

## 2019-11-15 PROCEDURE — 82948 REAGENT STRIP/BLOOD GLUCOSE: CPT

## 2019-11-15 RX ORDER — FAMOTIDINE 20 MG/1
20 TABLET, FILM COATED ORAL DAILY
Status: DISCONTINUED | OUTPATIENT
Start: 2019-11-15 | End: 2019-11-22 | Stop reason: HOSPADM

## 2019-11-15 RX ORDER — FENTANYL CITRATE-0.9 % NACL/PF 10 MCG/ML
50 PLASTIC BAG, INJECTION (ML) INTRAVENOUS CONTINUOUS
Status: DISCONTINUED | OUTPATIENT
Start: 2019-11-15 | End: 2019-11-16

## 2019-11-15 RX ORDER — SODIUM CHLORIDE, SODIUM GLUCONATE, SODIUM ACETATE, POTASSIUM CHLORIDE, MAGNESIUM CHLORIDE, SODIUM PHOSPHATE, DIBASIC, AND POTASSIUM PHOSPHATE .53; .5; .37; .037; .03; .012; .00082 G/100ML; G/100ML; G/100ML; G/100ML; G/100ML; G/100ML; G/100ML
1000 INJECTION, SOLUTION INTRAVENOUS ONCE
Status: COMPLETED | OUTPATIENT
Start: 2019-11-15 | End: 2019-11-15

## 2019-11-15 RX ORDER — CLOPIDOGREL BISULFATE 75 MG/1
75 TABLET ORAL DAILY
Status: DISCONTINUED | OUTPATIENT
Start: 2019-11-15 | End: 2019-11-22 | Stop reason: HOSPADM

## 2019-11-15 RX ORDER — NOREPINEPHRINE BITARTRATE 1 MG/ML
INJECTION, SOLUTION INTRAVENOUS
Status: COMPLETED
Start: 2019-11-15 | End: 2019-11-15

## 2019-11-15 RX ORDER — ATORVASTATIN CALCIUM 80 MG/1
80 TABLET, FILM COATED ORAL
Status: DISCONTINUED | OUTPATIENT
Start: 2019-11-15 | End: 2019-11-22 | Stop reason: HOSPADM

## 2019-11-15 RX ORDER — SODIUM CHLORIDE, SODIUM GLUCONATE, SODIUM ACETATE, POTASSIUM CHLORIDE, MAGNESIUM CHLORIDE, SODIUM PHOSPHATE, DIBASIC, AND POTASSIUM PHOSPHATE .53; .5; .37; .037; .03; .012; .00082 G/100ML; G/100ML; G/100ML; G/100ML; G/100ML; G/100ML; G/100ML
500 INJECTION, SOLUTION INTRAVENOUS ONCE
Status: COMPLETED | OUTPATIENT
Start: 2019-11-15 | End: 2019-11-15

## 2019-11-15 RX ORDER — LEVOTHYROXINE SODIUM 0.07 MG/1
75 TABLET ORAL
Status: DISCONTINUED | OUTPATIENT
Start: 2019-11-15 | End: 2019-11-22 | Stop reason: HOSPADM

## 2019-11-15 RX ORDER — FLUOXETINE HYDROCHLORIDE 20 MG/1
40 CAPSULE ORAL DAILY
Status: DISCONTINUED | OUTPATIENT
Start: 2019-11-15 | End: 2019-11-22 | Stop reason: HOSPADM

## 2019-11-15 RX ORDER — CALCITRIOL 0.25 UG/1
0.5 CAPSULE, LIQUID FILLED ORAL DAILY
Status: DISCONTINUED | OUTPATIENT
Start: 2019-11-15 | End: 2019-11-18

## 2019-11-15 RX ORDER — ASPIRIN 81 MG/1
81 TABLET, CHEWABLE ORAL DAILY
Status: DISCONTINUED | OUTPATIENT
Start: 2019-11-15 | End: 2019-11-18

## 2019-11-15 RX ORDER — GABAPENTIN 300 MG/1
300 CAPSULE ORAL
Status: DISCONTINUED | OUTPATIENT
Start: 2019-11-15 | End: 2019-11-22 | Stop reason: HOSPADM

## 2019-11-15 RX ADMIN — SODIUM CHLORIDE 8 UNITS/HR: 9 INJECTION, SOLUTION INTRAVENOUS at 21:28

## 2019-11-15 RX ADMIN — CEFEPIME HYDROCHLORIDE 2000 MG: 2 INJECTION, POWDER, FOR SOLUTION INTRAVENOUS at 10:54

## 2019-11-15 RX ADMIN — DOPAMINE HYDROCHLORIDE IN DEXTROSE 5 MCG/KG/MIN: 1.6 INJECTION, SOLUTION INTRAVENOUS at 09:21

## 2019-11-15 RX ADMIN — FAMOTIDINE 20 MG: 20 TABLET ORAL at 08:43

## 2019-11-15 RX ADMIN — SODIUM CHLORIDE 12 UNITS/HR: 9 INJECTION, SOLUTION INTRAVENOUS at 00:45

## 2019-11-15 RX ADMIN — CHLORHEXIDINE GLUCONATE 0.12% ORAL RINSE 15 ML: 1.2 LIQUID ORAL at 21:30

## 2019-11-15 RX ADMIN — ASPIRIN 81 MG 81 MG: 81 TABLET ORAL at 08:43

## 2019-11-15 RX ADMIN — CLOPIDOGREL BISULFATE 75 MG: 75 TABLET ORAL at 08:43

## 2019-11-15 RX ADMIN — ATORVASTATIN CALCIUM 80 MG: 80 TABLET, FILM COATED ORAL at 15:53

## 2019-11-15 RX ADMIN — APIXABAN 5 MG: 5 TABLET, FILM COATED ORAL at 08:43

## 2019-11-15 RX ADMIN — LEVOTHYROXINE SODIUM 75 MCG: 75 TABLET ORAL at 05:40

## 2019-11-15 RX ADMIN — PROPOFOL 40 MCG/KG/MIN: 10 INJECTION, EMULSION INTRAVENOUS at 03:47

## 2019-11-15 RX ADMIN — PERFLUTREN 0.8 ML/MIN: 6.52 INJECTION, SUSPENSION INTRAVENOUS at 15:48

## 2019-11-15 RX ADMIN — VANCOMYCIN HYDROCHLORIDE 2000 MG: 10 INJECTION, POWDER, LYOPHILIZED, FOR SOLUTION INTRAVENOUS at 11:40

## 2019-11-15 RX ADMIN — NOREPINEPHRINE BITARTRATE 5 MCG/MIN: 1 INJECTION INTRAVENOUS at 02:22

## 2019-11-15 RX ADMIN — PROPOFOL 40 MCG/KG/MIN: 10 INJECTION, EMULSION INTRAVENOUS at 01:20

## 2019-11-15 RX ADMIN — CALCITRIOL CAPSULES 0.25 MCG 0.5 MCG: 0.25 CAPSULE ORAL at 08:44

## 2019-11-15 RX ADMIN — SODIUM CHLORIDE 21 UNITS/HR: 9 INJECTION, SOLUTION INTRAVENOUS at 05:55

## 2019-11-15 RX ADMIN — PROPOFOL 15 MCG/KG/MIN: 10 INJECTION, EMULSION INTRAVENOUS at 09:04

## 2019-11-15 RX ADMIN — PROPOFOL 25 MCG/KG/MIN: 10 INJECTION, EMULSION INTRAVENOUS at 15:53

## 2019-11-15 RX ADMIN — APIXABAN 5 MG: 5 TABLET, FILM COATED ORAL at 17:25

## 2019-11-15 RX ADMIN — DOPAMINE HYDROCHLORIDE IN DEXTROSE 8 MCG/KG/MIN: 1.6 INJECTION, SOLUTION INTRAVENOUS at 17:25

## 2019-11-15 RX ADMIN — GABAPENTIN 300 MG: 300 CAPSULE ORAL at 21:30

## 2019-11-15 RX ADMIN — DOPAMINE HYDROCHLORIDE IN DEXTROSE 5 MCG/KG/MIN: 1.6 INJECTION, SOLUTION INTRAVENOUS at 00:30

## 2019-11-15 RX ADMIN — SODIUM CHLORIDE, SODIUM GLUCONATE, SODIUM ACETATE, POTASSIUM CHLORIDE, MAGNESIUM CHLORIDE, SODIUM PHOSPHATE, DIBASIC, AND POTASSIUM PHOSPHATE 1000 ML: .53; .5; .37; .037; .03; .012; .00082 INJECTION, SOLUTION INTRAVENOUS at 01:47

## 2019-11-15 RX ADMIN — FLUOXETINE 40 MG: 20 CAPSULE ORAL at 08:44

## 2019-11-15 RX ADMIN — CHLORHEXIDINE GLUCONATE 0.12% ORAL RINSE 15 ML: 1.2 LIQUID ORAL at 08:43

## 2019-11-15 RX ADMIN — Medication 50 MCG/HR: at 17:28

## 2019-11-15 RX ADMIN — NOREPINEPHRINE BITARTRATE 2 MCG/MIN: 1 INJECTION INTRAVENOUS at 08:30

## 2019-11-15 RX ADMIN — Medication 50 MCG/HR: at 03:27

## 2019-11-15 RX ADMIN — DOPAMINE HYDROCHLORIDE IN DEXTROSE 15 MCG/KG/MIN: 1.6 INJECTION, SOLUTION INTRAVENOUS at 03:19

## 2019-11-15 RX ADMIN — PROPOFOL 25 MCG/KG/MIN: 10 INJECTION, EMULSION INTRAVENOUS at 22:19

## 2019-11-15 NOTE — ED ATTENDING ATTESTATION
11/14/2019  IMarilyn MD, saw and evaluated the patient  I have discussed the patient with the resident/non-physician practitioner and agree with the resident's/non-physician practitioner's findings, Plan of Care, and MDM as documented in the resident's/non-physician practitioner's note, except where noted  All available labs and Radiology studies were reviewed  I was present for key portions of any procedure(s) performed by the resident/non-physician practitioner and I was immediately available to provide assistance  At this point I agree with the current assessment done in the Emergency Department  I have conducted an independent evaluation of this patient a history and physical is as follows:    ED Course         Critical Care Time  CriticalCare Time  Performed by: Marilyn Tierney MD  Authorized by: Marilyn Tierney MD     Critical care provider statement:     Critical care time (minutes):  42    Critical care was necessary to treat or prevent imminent or life-threatening deterioration of the following conditions:  Respiratory failure and cardiac failure    Critical care was time spent personally by me on the following activities:  Ventilator management, review of old charts, re-evaluation of patient's condition, ordering and review of radiographic studies, ordering and review of laboratory studies, ordering and performing treatments and interventions, examination of patient, evaluation of patient's response to treatment and discussions with consultants        47 yo female with hx of iddm, esrd on hd, afib on cardizem, plavix, asa presents with syncopal episodes x 4 per ems en route and vomiting  Pt with no abdominal pain, no cp, no sob, no numbness, tingling, weakness  Pt at baseline mental status and states she was feeling ok until one hour ago  Pt had full dialysis yesterday, no hx of these symptoms  Vss, afebrile, lungs cta, rrr, abdomen soft nontender, bradycardia, fistula in arm  bs 522  Cardiac workup, ivf, dka workup, pt bp improved, vasovagal syncope  Pt with repeated episodes of vomiting and syncope with alteration in ms  Decision made to intubate for airway protection  Discussed with cardiology and due to pt with stable bp, no intervention at this time, however pt with junctional rhythm on ekg currently  No response to atropine

## 2019-11-15 NOTE — RESPIRATORY THERAPY NOTE
RT Ventilator Management Note  Crista Kincaid 48 y o  female MRN: [id number]  Unit/Bed#: MICU 07 Encounter: [id number]      Daily Screen     No data found in the last 10 encounters  Physical Exam:   Assessment Type: (P) Assess only  General Appearance: (P) Eyes do not open to any stimulus  Respiratory Pattern: (P) Assisted  Chest Assessment: (P) Chest expansion symmetrical  Bilateral Breath Sounds: (P) Clear  Cough: (P) Non-productive  Suction: (P) ET Tube  O2 Device: (P) Ventilator      Resp Comments: (P) Pt  remains stable on AC mode  No problems throughout the night  Will continue to monitor per protocol

## 2019-11-15 NOTE — CONSULTS
Consultation - Infectious Disease   Jazmin Valderrama 48 y o  female MRN: [id number]  Unit/Bed#: MICU 07 Encounter: [id number]      IMPRESSION & RECOMMENDATIONS:   Impression/Recommendations:  1  Shock  May be cardiogenic  Also consider septic shock  WBC count is 15 3  Procalcitonin level is 1 1, although may not be as reliable and ESRD patient  Consideration for development of pneumonia based on chest x-ray findings, recent vomiting and possible aspiration  Consideration for infection at right upper extremity AV fistula site as there is dehiscence noted  Also consider bacteremia  Agree with empiric antibiotics for now pending further culture results     -continue IV vancomycin  Dosing recommendations per pharmacy  -continue IV cefepime with HD dosing  -follow up pending blood cultures  -monitor temperatures and hemodynamics  -recheck CBC in a m   -recheck procalcitonin level in a m   -supportive care per Critical Care service    2  Acute respiratory failure  Patient was intubated due to concern for airway protection  Also consideration for aspiration based on recent emesis, abnormal chest x-ray  No significant secretions per nursing     -antibiotic plan as above  -monitor vent requirement  Wean off vent as able  -monitor secretions  -recheck procalcitonin level in a m     3  Wound dehiscence  Noted at right upper extremity AV fistula site  Consideration for bacteremia     -antibiotic plan as above  -serial exams  -daily local wound care and dressing changes    4  ESRD on HD  Currently receiving HD via left upper extremity AV graft  Will dose adjust antibiotics with HD     5  Diabetes mellitus type 1, uncontrolled  Risk factor for development of severe infection  6  Bradycardia  Currently remains on dopamine drip  Cardiology has been consulted  Follow-up recommendations    Follow-up echocardiogram       Antibiotics:  Vancomycin/cefepime 1    I discussed above plan with Jo Sotelo from [address] Service  Thank you for this consultation  We will follow along with you  HISTORY OF PRESENT ILLNESS:  Reason for Consult:  Probable septic shock    HPI: Elke Earl is a 48 y o  female with ESRD on HD, diabetes mellitus type 1, CAD presented overnight after multiple episodes of syncope and emesis  While in the ED, there was concern for airway protection and patient was intubated  She was bradycardic in the ED requiring initiation of dopamine  Then developed hypotension and was started on Levophed which has been weaned off  She still remains on dopamine  No fevers per persistent hypotension with lactic acid of 2 3, leukocytosis up to 15  Chest x-ray also showed bilateral opacities  Blood cultures were drawn and patient was started empirically on vancomycin and cefepime this morning  She was also noted to have dehiscence and mild drainage at the site of recent right upper extremity fistula  Patient remains sedated and intubated so no history is obtainable  REVIEW OF SYSTEMS:  A complete system-based review of systems is otherwise negative      PAST MEDICAL HISTORY:  Past Medical History:   Diagnosis Date    Asthma     Cardiac disease     CHF    [facility]estive [facility]     CPAP (continuous positive airway pressure) dependence     Diabetes mellitus (HCC)     type 1    [facility] )     Disease of thyroid gland     hypothyroidism    GERD (gastroesophageal reflux disease)     Hyperlipidemia     Hypertension     Migraine     Psychiatric disorder     PTSD, Depression, panic attacks    Renal disorder     stage 4 kidney disease     Past Surgical History:   Procedure Laterality Date    CATARACT EXTRACTION, BILATERAL       SECTION      CHOLECYSTECTOMY      HERNIA REPAIR      RETINOPATHY SURGERY      TONSILECTOMY AND ADNOIDECTOMY      TONSILLECTOMY      TYMPANOSTOMY TUBE PLACEMENT         FAMILY HISTORY:  Non-contributory    SOCIAL HISTORY:  Social History Substance and Sexual Activity   Alcohol Use Not Currently    Binge frequency: Never     Social History     Substance and Sexual Activity   Drug Use No     Social History     Tobacco Use   Smoking Status Never Smoker   Smokeless Tobacco Never Used       ALLERGIES:  Allergies   Allergen Reactions    Codeine Edema    Shellfish Allergy Edema    Hydrocodone Itching and Vomiting    Morphine Itching and Vomiting    Oxycodone Itching and Vomiting       MEDICATIONS:  All current active medications have been reviewed  PHYSICAL EXAM:  Vitals:  Temp:  [96 8 °F (36 °C)-98 3 °F (36 8 °C)] 97 2 °F (36 2 °C)  HR:  [36-84] 78  Resp:  [8-25] 15  BP: ()/(27-73) 56/30  SpO2:  [92 %-100 %] 96 %  Temp (24hrs), Av 9 °F (36 1 °C), Min:96 8 °F (36 °C), Max:98 3 °F (36 8 °C)  Current: Temperature: (!) 97 2 °F (36 2 °C)     Physical Exam:  General:  Intubated, sedated  Eyes:  Conjunctive clear with no hemorrhages or effusions  Oropharynx:  ET tube in place  Neck:  Supple, no lymphadenopathy  Lungs:  Ventilated breath sounds  Cardiac:  Regular rate and rhythm, no murmurs  Abdomen:  Soft, non-tender, non-distended  Extremities:  No peripheral cyanosis, clubbing, or edema  Right upper extremity fistula with wound dehiscence, mild surrounding erythema, mild purulent drainage  Skin:  No rashes, no ulcers  Neurological:  Sedated      LABS, IMAGING, & OTHER STUDIES:  Lab Results:  I have personally reviewed pertinent labs    Results from last 7 days   Lab Units 11/15/19  0506 19  191   POTASSIUM mmol/L 4 6 5 2  --   --    CHLORIDE mmol/L 93* 90*  --   --    CO2 mmol/L 23 23  --   --    CO2, I-STAT mmol/L  --   --  26 25   BUN mg/dL 94* 86*  --   --    CREATININE mg/dL 6 56* 6 58*  --   --    EGFR ml/min/1 73sq m 7 7  --  6   GLUCOSE, ISTAT mg/dl  --   --  503* 500*   CALCIUM mg/dL 8 8 9 4  --   --    AST U/L 138* 10  --   --    ALT U/L 73 16  --   --    ALK PHOS U/L 203* 202*  --   -- Results from last 7 days   Lab Units 11/15/19  0506 11/14/19  2116 11/14/19  1921 11/14/19  1918   WBC Thousand/uL 15 30*  --  12 82*  --    HEMOGLOBIN g/dL 7 4*  --  7 5*  --    I STAT HEMOGLOBIN g/dl  --   --   --  8 2*   PLATELETS Thousands/uL 168 215 228  --      Results from last 7 days   Lab Units 11/15/19  0901 11/14/19  2347   BLOOD CULTURE  Received in Microbiology Lab  Culture in Progress  Received in Microbiology Lab  Culture in Progress  Imaging Studies:   I have personally reviewed pertinent imaging study reports and images in PACS  Chest x-ray shows multifocal bilateral consolidations  EKG, Pathology, and Other Studies:   I have personally reviewed pertinent reports

## 2019-11-15 NOTE — PROGRESS NOTES
Tube Feeding Recommendations    With current propofol, goal of Nepro @ 32 ml/hr  2 packs prosource daily  Water flush: 110 ml every 6 hrs  If propofol discontinued, increase Nepro goal to 42 ml/hr  Monitor need to change water flush  We will continue to follow  Goal w/ propofol, 2 pks prosource and flush will provide: 1826 kcal, 92 gm protein, 1001 ml free water  With no propofol, will provide 1814 kcal, 112 gm protein, 1176 ml free water

## 2019-11-15 NOTE — ED PROVIDER NOTES
History  Chief Complaint   Patient presents with    Syncope     Pt in and out of consciousness per son and EMS  Pt hypotensive and bradycardic  16-year-old woman history of COPD end-stage renal disease on dialysis (she got it yesterday) diabetes type 1 with an insulin pump history of myocardial infarctions, history of primary biliary cirrhosis, son called EMS today in the setting of multiple episodes of syncope and emesis  EMS reports that on their arrival she was sitting in a wheelchair passed out in front of them vomited down the front had some labored breathing they were thinking intubation until she snapped out of it during the episode she is bradycardic down to the 40s she had 2 more episodes like this and arrived to us with a glucose approximately 550 bradycardic to the 40s hypertensive alert and oriented telling jokes while vomiting    Patient has bilateral AV fistula as the upper extremities EMS was able to put a 20 in her right ankle and we put a 2nd IV in her left breast   She is placed on the pads I stats are sent fingerstick glucose on our machine also greater than 500 she has 1 episode of emesis with transient fainting          Prior to Admission Medications   Prescriptions Last Dose Informant Patient Reported? Taking?    Ascorbic Acid (VITAMIN C) 1000 MG tablet   Yes No   Sig: Take 1,000 mg by mouth daily   FLUoxetine (PROzac) 40 MG capsule   Yes No   Sig: Take 40 mg by mouth daily   Ferric Citrate (AURYXIA) 1  MG(Fe) TABS   Yes No   Sig: Take by mouth   PATIENT MAINTAINED INSULIN PUMP   No No   Sig: Inject 1 each under the skin every 8 (eight) hours   acetaminophen (TYLENOL) 325 mg tablet   Yes No   Sig: Take 650 mg by mouth every 6 (six) hours as needed for mild pain   albuterol (PROVENTIL HFA,VENTOLIN HFA) 90 mcg/act inhaler   Yes No   Sig: Inhale 2 puffs every 6 (six) hours as needed for wheezing   albuterol (PROVENTIL HFA,VENTOLIN HFA) 90 mcg/act inhaler   No No   Sig: Inhale 2 puffs every 4 (four) hours as needed for wheezing   allopurinol (ZYLOPRIM) 100 mg tablet   Yes No   Sig: Take 100 mg by mouth daily   apixaban (ELIQUIS) 2 5 mg   No No   Sig: Take 1 tablet (2 5 mg total) by mouth 2 (two) times a day   Patient taking differently: Take 5 mg by mouth 2 (two) times a day    aspirin 81 mg chewable tablet   No No   Sig: Chew 1 tablet (81 mg total) daily   atorvastatin (LIPITOR) 40 mg tablet   No No   Sig: Take 1 tablet (40 mg total) by mouth daily with dinner   Patient taking differently: Take 80 mg by mouth daily with dinner    b complex vitamins capsule   Yes No   Sig: Take 1 capsule by mouth daily   bisacodyl (DULCOLAX) 5 mg EC tablet   Yes No   Sig: Take 5 mg by mouth daily as needed for constipation   calcitriol (ROCALTROL) 0 5 MCG capsule   Yes No   Sig: Take 0 5 mcg by mouth daily   cholecalciferol (VITAMIN D3) 1,000 units tablet   Yes No   Sig: Take 1,000 Units by mouth daily   clopidogrel (PLAVIX) 75 mg tablet   Yes No   Sig: Take 75 mg by mouth daily   diltiazem (CARDIZEM CD) 180 mg 24 hr capsule   No No   Sig: Take 1 capsule (180 mg total) by mouth daily   diphenhydrAMINE (BENADRYL) 25 mg tablet   Yes No   Sig: Take 25 mg by mouth every 6 (six) hours as needed for itching   famotidine (PEPCID) 20 mg tablet   Yes No   Sig: Take 20 mg by mouth daily   fluticasone (FLONASE) 50 mcg/act nasal spray   Yes No   Si spray into each nostril 2 (two) times a day   fluticasone-salmeterol (ADVAIR DISKUS, WIXELA INHUB) 100-50 mcg/dose inhaler   Yes No   Sig: Inhale 1 puff 2 (two) times a day Rinse mouth after use    gabapentin (NEURONTIN) 300 mg capsule   No No   Sig: Take 1 capsule (300 mg total) by mouth daily at bedtime   insulin lispro (HumaLOG) 100 units/mL   No No   Si Units by Subcutaneous Insulin Pump route as needed (when pump is empty)   Patient taking differently: 300 Units by Subcutaneous Insulin Pump route as needed (when pump is empty)    levothyroxine 75 mcg tablet   Yes No Sig: Take 75 mcg by mouth daily   loratadine (CLARITIN) 10 mg tablet   Yes No   Sig: Take 10 mg by mouth daily   metoprolol tartrate (LOPRESSOR) 25 mg tablet   No No   Sig: Take 1 tablet (25 mg total) by mouth every 12 (twelve) hours   omeprazole (PriLOSEC) 20 mg delayed release capsule   Yes No   Sig: Take 40 mg by mouth daily    ondansetron (ZOFRAN) 4 mg tablet   Yes No   Sig: Take 4 mg by mouth every 6 (six) hours as needed for nausea or vomiting   sevelamer carbonate (RENVELA) 800 mg tablet   Yes No   Sig: Take 800 mg by mouth 2 (two) times a day      Facility-Administered Medications: None       Past Medical History:   Diagnosis Date    Asthma     Cardiac disease     CHF    [facility]     CPAP (continuous positive airway pressure) dependence     Diabetes mellitus ([facility] [address] )     type 1    [facility] patient ([facility] )     Disease of thyroid gland     hypothyroidism    GERD (gastroesophageal reflux disease)     Hyperlipidemia     Hypertension     Migraine     Psychiatric disorder     PTSD, Depression, panic attacks    Renal disorder     stage 4 kidney disease       Past Surgical History:   Procedure Laterality Date    CATARACT EXTRACTION, BILATERAL       SECTION      CHOLECYSTECTOMY      HERNIA REPAIR      RETINOPATHY SURGERY      TONSILECTOMY AND ADNOIDECTOMY      TONSILLECTOMY      TYMPANOSTOMY TUBE PLACEMENT         Family History   Problem Relation Age of Onset    Pancreatic cancer Mother     Stroke Father      I have reviewed and agree with the history as documented      Social History     Tobacco Use    Smoking status: Never Smoker    Smokeless tobacco: Never Used   Substance Use Topics    Alcohol use: Not Currently     Binge frequency: Never    Drug use: No        Review of Systems   Unable to perform ROS: Intubated       Physical Exam  ED Triage Vitals   Temperature Pulse Respirations Blood Pressure SpO2   19 1913 11/14/19 1913   98 3 °F (36 8 °C) (!) 46 16 (!) 186/73 100 %      Temp src Heart Rate Source Patient Position - Orthostatic VS BP Location FiO2 (%)   -- -- -- -- --             Pain Score       --                    Orthostatic Vital Signs  Vitals:    11/14/19 1945 11/14/19 2004 11/14/19 2015 11/14/19 2025   BP: 145/57 (!) 133/48 (!) 121/49 128/50   Pulse: (!) 43 (!) 41 (!) 38 (!) 37       Physical Exam   Constitutional: She is oriented to person, place, and time  She appears well-developed and well-nourished  She appears distressed  HENT:   Head: Normocephalic and atraumatic  Right Ear: External ear normal    Left Ear: External ear normal    Nose: Nose normal    Eyes: Pupils are equal, round, and reactive to light  Conjunctivae and EOM are normal  Scleral icterus is present  Neck: Normal range of motion  Neck supple  Cardiovascular: Regular rhythm, normal heart sounds and intact distal pulses  No murmur heard  Nils 40   Pulmonary/Chest: Effort normal  No stridor  No respiratory distress  She has no wheezes  She has rales  Abdominal: Soft  Bowel sounds are normal  She exhibits no distension and no mass  There is no tenderness  There is no rebound and no guarding  Actively vomiting   Musculoskeletal: Normal range of motion  She exhibits no edema, tenderness or deformity  Lymphadenopathy:     She has no cervical adenopathy  Neurological: She is alert and oriented to person, place, and time  No cranial nerve deficit or sensory deficit  Skin: Skin is warm and dry  Capillary refill takes less than 2 seconds  No rash noted  She is not diaphoretic    jaundiced   Psychiatric: She has a normal mood and affect  Her behavior is normal  Judgment and thought content normal    Nursing note and vitals reviewed        ED Medications  Medications   propofol (DIPRIVAN) 1000 mg in 100 mL infusion (premix) (40 mcg/kg/min × 112 kg Intravenous Rate/Dose Change 11/14/19 2025)   chlorhexidine (PERIDEX) 0 12 % oral rinse 15 mL (has no administration in time range)   heparin (porcine) subcutaneous injection 5,000 Units (has no administration in time range)   multi-electrolyte (PLASMALYTE-A/ISOLYTE-S PH 7 4) IV solution (has no administration in time range)   ondansetron (ZOFRAN) injection 4 mg (4 mg Intravenous Given 11/14/19 1915)   sodium chloride 0 9 % bolus 1,000 mL (0 mL Intravenous Stopped 11/14/19 2001)   insulin regular (HumuLIN R,NovoLIN R) injection 10 Units (10 Units Subcutaneous Given 11/14/19 1951)   ondansetron (ZOFRAN) injection 4 mg (4 mg Intravenous Given 11/14/19 1954)   etomidate (AMIDATE) 2 mg/mL injection 30 mg (30 mg Intravenous Given 11/14/19 1958)   Succinylcholine Chloride 100 mg/5 mL syringe 160 mg (150 mg Intravenous Given 11/14/19 2001)   atropine injection 0 5 mg (0 5 mg Intravenous Given 11/14/19 2016)       Diagnostic Studies  Results Reviewed     Procedure Component Value Units Date/Time    T4, free [[id number]] Collected:  11/14/19 2116    Lab Status:  No result Specimen:  Blood from Arm, Right     Platelet count [[id number]] Collected:  11/14/19 2116    Lab Status:  No result Specimen:  Blood from Arm, Right     Troponin I [[id number]] Collected:  11/14/19 2116    Lab Status:  No result Specimen:  Blood from Arm, Right     Fingerstick Glucose (POCT) [[id number]]  (Abnormal) Collected:  11/14/19 1910    Lab Status:  Final result Updated:  11/14/19 2115     POC Glucose >500 mg/dl     Troponin I [[id number]]     Lab Status:  No result Specimen:  Blood     Urine Microscopic [[id number]] Collected:  11/14/19 2031    Lab Status:   In process Specimen:  Urine, Other Updated:  11/14/19 2037    Urine Macroscopic, POC [[id number]]  (Abnormal) Collected:  11/14/19 2031    Lab Status:  Final result Specimen:  Urine Updated:  11/14/19 2032     Color, UA Yellow     Clarity, UA Clear     pH, UA 5 5     Leukocytes, UA Trace     Nitrite, UA Negative     Protein, UA >=300 mg/dl      Glucose,  (1/2%) mg/dl Ketones, UA Trace mg/dl      Urobilinogen, UA 0 2 E U /dl      Bilirubin, UA Interference- unable to analyze     Blood, UA Trace     Specific Loda, UA 1 025    Narrative:       CLINITEK RESULT    Comprehensive metabolic panel [[id number]]  (Abnormal) Collected:  11/14/19 1921    Lab Status:  Final result Specimen:  Blood from Arm, Left Updated:  11/14/19 2012     Sodium 129 mmol/L      Potassium 5 2 mmol/L      Chloride 90 mmol/L      CO2 23 mmol/L      ANION GAP 16 mmol/L      BUN 86 mg/dL      Creatinine 6 58 mg/dL      Glucose 511 mg/dL      Calcium 9 4 mg/dL      AST 10 U/L      ALT 16 U/L      Alkaline Phosphatase 202 U/L      Total Protein 8 1 g/dL      Albumin 2 4 g/dL      Total Bilirubin 0 42 mg/dL      eGFR 7 ml/min/1 73sq m     Narrative:       [facility] guidelines for Chronic Kidney Disease (CKD):     Stage 1 with normal or high GFR (GFR > 90 mL/min/1 73 square meters)    Stage 2 Mild CKD (GFR = 60-89 mL/min/1 73 square meters)    Stage 3A Moderate CKD (GFR = 45-59 mL/min/1 73 square meters)    Stage 3B Moderate CKD (GFR = 30-44 mL/min/1 73 square meters)    Stage 4 Severe CKD (GFR = 15-29 mL/min/1 73 square meters)    Stage 5 End Stage CKD (GFR <15 mL/min/1 73 square meters)  Note: GFR calculation is accurate only with a steady state creatinine    Troponin I [[id number]]  (Normal) Collected:  11/14/19 1921    Lab Status:  Final result Specimen:  Blood from Arm, Left Updated:  11/14/19 2009     Troponin I <0 02 ng/mL     TSH [[id number]]  (Abnormal) Collected:  11/14/19 1921    Lab Status:  Final result Specimen:  Blood from Arm, Left Updated:  11/14/19 2009     TSH 3RD GENERATON 6 400 uIU/mL     Narrative:       Patients undergoing fluorescein dye angiography may retain small amounts of fluorescein in the body for 48-72 hours post procedure  Samples containing fluorescein can produce falsely depressed TSH values   If the patient had this procedure,a specimen should be resubmitted post fluorescein clearance        Beta Hydroxybutyrate [[id number]]  (Abnormal) Collected:  11/14/19 1921    Lab Status:  Final result Specimen:  Blood from Arm, Left Updated:  11/14/19 2003     BETA-HYDROXYBUTYRATE 0 7 mmol/L     Blood gas, venous [[id number]]  (Abnormal) Collected:  11/14/19 1921    Lab Status:  Final result Specimen:  Blood from Arm, Left Updated:  11/14/19 1933     pH, Mario 7 348     pCO2, Mario 41 2 mm Hg      pO2, Mario 43 6 mm Hg      HCO3, Mario 22 1 mmol/L      Base Excess, Mario -3 3 mmol/L      O2 Content, Mario 8 2 ml/dL      O2 HGB, VENOUS 71 3 %     CBC and differential [[id number]]  (Abnormal) Collected:  11/14/19 1921    Lab Status:  Final result Specimen:  Blood from Arm, Left Updated:  11/14/19 1932     WBC 12 82 Thousand/uL      RBC 2 24 Million/uL      Hemoglobin 7 5 g/dL      Hematocrit 24 9 %       fL      MCH 33 5 pg      MCHC 30 1 g/dL      RDW 18 4 %      MPV 10 9 fL      Platelets 852 Thousands/uL      nRBC 0 /100 WBCs      Neutrophils Relative 62 %      Immat GRANS % 1 %      Lymphocytes Relative 25 %      Monocytes Relative 9 %      Eosinophils Relative 3 %      Basophils Relative 0 %      Neutrophils Absolute 7 94 Thousands/µL      Immature Grans Absolute 0 08 Thousand/uL      Lymphocytes Absolute 3 26 Thousands/µL      Monocytes Absolute 1 13 Thousand/µL      Eosinophils Absolute 0 37 Thousand/µL      Basophils Absolute 0 04 Thousands/µL     POCT Blood Gas (CG8+) [[id number]]  (Abnormal) Collected:  11/14/19 1918    Lab Status:  Final result Specimen:  Venous Updated:  11/14/19 1923     ph, Mario ISTAT 7 416     pCO2, Mario i-STAT 38 9 mm HG      pO2, Mario i-STAT 37 0 mm HG      BE, i-STAT 0 mmol/L      HCO3, Mario i-STAT 25 0 mmol/L      CO2, i-STAT 26 mmol/L      O2 Sat, i-STAT 71 %      SODIUM, I-STAT 128 mmol/l      Potassium, i-STAT 5 6 mmol/L      Calcium, Ionized i-STAT 1 06 mmol/L      Hct, i-STAT 24 %      Hgb, i-STAT 8 2 g/dl      Glucose, i-STAT 503 mg/dl Specimen Type VENOUS    POCT Chem 8+ [[id number]]  (Abnormal) Collected:  11/14/19 1917    Lab Status:  Final result Specimen:  Venous Updated:  11/14/19 1921     SODIUM, I-STAT 129 mmol/l      Potassium, i-STAT 5 6 mmol/L      Chloride, istat 91 mmol/L      CO2, i-STAT 25 mmol/L      Anion Gap, i-STAT 19 mmol/L      Calcium, Ionized i-STAT 1 05 mmol/L      BUN, I-STAT 102 mg/dl      Creatinine, i-STAT 7 1 mg/dl      eGFR 6 ml/min/1 73sq m      Glucose, i-STAT 500 mg/dl      Hct, i-STAT 24 %      Hgb, i-STAT 8 2 g/dl      Specimen Type VENOUS    Narrative:       [employer] guidelines for Chronic Kidney Disease (CKD):     Stage 1 with normal or high GFR (GFR > 90 mL/min/1 73 square meters)    Stage 2 Mild CKD (GFR = 60-89 mL/min/1 73 square meters)    Stage 3A Moderate CKD (GFR = 45-59 mL/min/1 73 square meters)    Stage 3B Moderate CKD (GFR = 30-44 mL/min/1 73 square meters)    Stage 4 Severe CKD (GFR = 15-29 mL/min/1 73 square meters)    Stage 5 End Stage CKD (GFR <15 mL/min/1 73 square meters)  Note: GFR calculation is accurate only with a steady state creatinine                 CT head without contrast   Final Result by Stefanie Beltran MD (11/14 2050)      No acute intracranial abnormality  Microangiopathic changes and cortical atrophy appears somewhat precocious                    Workstation performed: [id number]         XR chest 1 view portable    (Results Pending)         Procedures  Intubation  Date/Time: 11/14/2019 9:10 PM  Performed by: Bonilla Stratton MD  Authorized by: Bonilla Stratton MD     Patient location:  ED  Consent:     Consent obtained:  Emergent situation    Consent given by:  Patient  Universal protocol:     Patient identity confirmed:  Verbally with patient, hospital-assigned identification number and arm band  Pre-procedure details:     Patient status:  Altered mental status    Mallampati score:  3    Pretreatment medications:  Etomidate    Paralytics: Succinylcholine  Indications:     Indications for intubation: respiratory distress and airway protection    Procedure details:     Preoxygenation:  Nasal cannula    CPR in progress: no      Intubation method:  Oral    Oral intubation technique:  Direct and glidescope    Laryngoscope blade: Mac 3    Tube size (mm):  8 0    Tube type:  Cuffed    Number of attempts:  2    Ventilation between attempts: yes      Cricoid pressure: yes      Tube visualized through cords: yes    Placement assessment:     ETT to lip:  24    Tube secured with: Adhesive tape and ETT morales    Breath sounds:  Equal    Placement verification: chest rise, condensation, CXR verification, direct visualization, equal breath sounds, esophageal detector, ETCO2 detector, tube exhalation and capnography      CXR findings:  ETT in proper place  Post-procedure details:     Patient tolerance of procedure:   Tolerated well, no immediate complications            ED Course                               MDM  Number of Diagnoses or Management Options  Diagnosis management comments: After approximately 15 minutes patient had another episode of syncope multiple episodes of vomiting poorly responsive in between another episode of syncope with vomitus in the mouth concern for aspiration patient was sedated paralyzed and intubated    Patient has a elevated TSH will follow T4    Patient continued to be bradycardic with changes in EKG from sinus bradycardia to junctional rhythm to strange couplet verses Afib  Trop negative    Patient given 10 units of insulin for elevated glucose no evidence of acidosis no ketonemia    Cardiology consulted they recommend using dopamine if pressures do not hold however patient maintains a systolic greater than 281  They recommend against pacing at this time  Patient's bradycardia refractory to atropine      Disposition  Final diagnoses:   Syncope   Respiratory failure ([address] )   Vomiting   Hypothyroid   Bradycardia     Time reflects when diagnosis was documented in both MDM as applicable and the Disposition within this note     Time User Action Codes Description Comment    11/14/2019  9:16 PM Teresa Corpus Add [R55] Syncope     11/14/2019  9:17 PM Teresa Corpus Add [J96 90] Respiratory failure ([address] )     11/14/2019  9:17 PM Teresa Corpus Add [R11 10] Vomiting     11/14/2019  9:17 PM Teresa Corpus Add [E03 9] Hypothyroid     11/14/2019  9:17 PM Teresa Corpus Add [R00 1] Bradycardia       ED Disposition     ED Disposition Condition Date/Time Comment    Admit Stable Thu Nov 14, 2019  9:16 PM Case was discussed with ICU med and the patient's admission status was agreed to be Admission Status: inpatient status to the service of Dr Kristi Brooks   Follow-up Information    None         Patient's Medications   Discharge Prescriptions    No medications on file     No discharge procedures on file  ED Provider  Attending physically available and evaluated Crista Kincaid I managed the patient along with the ED Attending      Electronically Signed by         Ezequiel Bonilla MD  11/14/19 3544

## 2019-11-15 NOTE — PROGRESS NOTES
Daily Progress Note - Critical Care   Shilpa Naqvi 48 y o  female MRN: [id number]  Unit/Bed#: MICU 07 Encounter: [id number]        ----------------------------------------------------------------------------------------  HPI/24hr events:   · Pt weaned off NE this AM  · Remains on Dopamine at 10 mcg/kg/min  ---------------------------------------------------------------------------------------  SUBJECTIVE  Pt intubated and sedated, unable to obtain  Overnight events discussed with RN at bedside    Review of Systems   Unable to perform ROS: Intubated     ---------------------------------------------------------------------------------------  Assessment and Plan:  Plan:    Neuro:  Diagnosis: Syncope, encephalopathy, hx of PTSD  Plan:  · Management of PAD  · Analgesia  · Sedation plan: Fentanyl 50 mcg/min and Propofol 25 mcg/kg/min  · RASS goal: -5 Unarousable, -1 Drowsy and 0 Alert and Calm  · Delirium monitoring/management  · Regulate Sleep-wake cycle/CAM-ICU daily  · Daily SAT  · Serial Neuro Exams  · [facility] 11/14: NO acute pathology  · Home medications Continue Prozac and gabapentin   Cardiac  Diagnosis: Shock, Symptomatic Bradycardia, Hx of CAD s/p Stent proximal and distal LAD 9/16/2019 at [facility], hx of PAF HFpEF  Plan:  · Consult cardiology  · Troponin trend negative x 3  · ECHO  · Cardiac infusions: Dopamine 10 mcg/kg/min, weaned Dopamine as able  · MAP goal > 65  · Rhythm: NSR  · Follow rhythm on telemetry  · Continue ASA, continue eliquis, continue atorvastatin, continue plavix  Pulmonary  Diagnosis: Acute respiratory failure, intubated for airway protection, concern for possible aspiration   Plan:  · Wean FiO2 and PEEP for SaO2 >92%  · CXR stat this AM  · Assess daily readiness to extubate: SBT in coordination with SAT  · SBT plan: assess daily  · Chlorhexidine ordered: yes  · Pulmonary toileting  Gastrointestinal  Diagnosis: hx of recent GIB at [facility] 9/2019, morbid obesity, nausea and vomiting  Plan:  · Bowel regimen: None at this time  · Consider KUB  · Check Lipase  FEN  Diagnosis: Hyperphosphatemia  Plan:  · Fluid/Diuretic plan: d/c IVF  · Goal 24 hour fluid balance: Euvolemia, Renal consult for HD  · Nutrition/diet plan: Consider starting trickle TF  · Replete electrolytes with goals: K >4 0, Mag >2 0, and Phos >3 0  Genitourinary  Diagnosis: ESRD on HD  Plan:  · Renal Consult  · Indwelling Coley present: no   · Trend UOP and BUN/creat  · Strict I and O  Infectious Diease  Diagnosis: Possible aspiration event, repeat CXR this AM  Plan:  · Abx ordered: None at this time  · Check PCT  · Consider starting abx broad spectrum  · Cultures pending  · Trend temps and WBC count    Heme:   Diagnosis: Macrocytic Anemia setting of recent GIB and on eliquis  Plan:  · Trend hgb and plts  · Transfuse as needed for goal hgb >7  Endo:   Diagnosis: DM1, uncontrolled  Plan:  · Glycemic control plan: Blood glucose controlled on current regimen, Insulin infusion 76 units/24 hours  · Goal -180mg/dL  MSK/Skin:  · Early Mobility/Exercise  · PT consult: not applicable  · OT consult: not applicable  · Turn and position patient Q2 hours  · Off load pressure points  · Allevyn preventative per protocol      Disposition: Continue Critical Care   Code Status: Level 1 - Full Code  ---------------------------------------------------------------------------------------  ICU CORE MEASURES    Prophylaxis   VTE Pharmacologic Prophylaxis: Apixaban (Eliquis)  VTE Mechanical Prophylaxis: sequential compression device  Stress Ulcer Prophylaxis: Famotidine PO    ABCDE Protocol (if indicated)  Plan to perform spontaneous awakening trial today? Yes  Plan to perform spontaneous breathing trial today? Yes  Obvious barriers to extubation?  No   CAM-ICU: unable to preform due to level of sedation at this time, will reassess later    Invasive Devices Review  Invasive Devices     Central Venous Catheter Line            CVC Central Lines 11/15/19 Triple 16cm less than 1 day          Peripheral Intravenous Line            Peripheral IV 11/14/19 Left  less than 1 day    Peripheral IV 11/14/19 Right Foot less than 1 day          Arterial Line            Arterial Line 11/15/19 Femoral less than 1 day          Line            Hemodialysis AV Fistula Left Upper arm -- days    Hemodialysis AV Fistula Right Upper arm -- days          Drain            NG/OG/Enteral Tube Orogastric 16 Fr less than 1 day          Airway            ETT  Cuffed 8 mm less than 1 day              Can any invasive devices be discontinued today? No (provide explanation): central line and shaw stay for management of vasoactive agent and poor peripheral access  ---------------------------------------------------------------------------------------  OBJECTIVE    Physical Exam   Constitutional: She appears well-developed and well-nourished  She appears lethargic  No distress  She is sedated, intubated and restrained  HENT:   Head: Normocephalic and atraumatic  Mouth/Throat: Mucous membranes are dry  Eyes: Pupils are equal, round, and reactive to light  Right eye exhibits no exudate  Left eye exhibits no exudate  Cardiovascular: Normal rate, regular rhythm and normal heart sounds  Pulses:       Dorsalis pedis pulses are 1+ on the right side, and 1+ on the left side  Posterior tibial pulses are 1+ on the right side, and 1+ on the left side  Pulmonary/Chest: Effort normal  She is intubated  No respiratory distress  She has no wheezes  She has no rhonchi  She has no rales  Abdominal: Soft  Normal appearance and bowel sounds are normal  There is no tenderness  Musculoskeletal: She exhibits no edema  Neurological: She appears lethargic  GCS eye subscore is 3  GCS verbal subscore is 1  GCS motor subscore is 6  Skin: She is not diaphoretic              Vitals   Vitals:    11/15/19 0543 11/15/19 0550 11/15/19 0600 11/15/19 0700   BP:       BP Location:       Pulse: 70 66 66 72   Resp: 20 20 17 (!) 24   Temp:       TempSrc:       SpO2: 100% 100% 100% 100%   Weight:       Height:         Temp (24hrs), Av 9 °F (36 1 °C), Min:96 8 °F (36 °C), Max:98 3 °F (36 8 °C)  Current: Temperature: (!) 96 8 °F (36 °C)  HR: 72  BP: 112//42  RR: 20  SpO2: 100%    Invasive/non-invasive ventilation settings   Respiratory    Lab Data (Last 4 hours)      11/15 0343            pH, Arterial       7 353           pCO2, Arterial       41 8           pO2, Arterial       99 5           HCO3, Arterial       22 7           Base Excess, Arterial       -2 7                O2/Vent Data       11/15 0323   Most Recent         Vent Mode AC/VC  AC/VC      Resp Rate (BPM) (BPM) 16  16      Vt (mL) (mL) 400  400      FIO2 (%) (%) 60  60      PEEP (cmH2O) (cmH2O) 8  8      MV 9  9                  Height and Weights   Height: 5' 5" (165 1 cm)  IBW: 57 kg  Body mass index is 42 48 kg/m²  Weight (last 2 days)     Date/Time   Weight    19   116 (255 29)    19   112 (246 92)    19   113 (249 12)                Intake and Output  I/O        0701 -  0700  0701 - 11/15 0700 11/15 0701 -  0700    I V  (mL/kg)  1806 1 (15 6)     IV Piggyback  1000     Total Intake(mL/kg)  2806 1 (24 2)     Urine (mL/kg/hr)  0     Emesis/NG output  175     Total Output  175     Net  +2631 1                UOP: anuric    Nutrition       Diet Orders   (From admission, onward)             Start     Ordered    19  Diet NPO  Diet effective now     Question Answer Comment   Diet Type NPO    RD to adjust diet per protocol?  No        19                Laboratory and Diagnostics:  Results from last 7 days   Lab Units 11/15/19  0506 196 19   WBC Thousand/uL 15 30*  --  12 82*  --   --    HEMOGLOBIN g/dL 7 4*  --  7 5*  --   --    I STAT HEMOGLOBIN g/dl  --   --   --  8 2* 8 2*   HEMATOCRIT % 23 5*  --  24 9*  --   --    HEMATOCRIT, ISTAT % --   --   --  24* 24*   PLATELETS Thousands/uL 168 215 228  --   --    NEUTROS PCT % 84*  --  62  --   --    MONOS PCT % 5  --  9  --   --      Results from last 7 days   Lab Units 11/15/19  0506 11/14/19 1921 11/14/19 1918 11/14/19 1917   SODIUM mmol/L 132* 129*  --   --    POTASSIUM mmol/L 4 6 5 2  --   --    CHLORIDE mmol/L 93* 90*  --   --    CO2 mmol/L 23 23  --   --    CO2, I-STAT mmol/L  --   --  26 25   AGAP mmol/L  --   --   --  19*   ANION GAP mmol/L 16* 16*  --   --    BUN mg/dL 94* 86*  --   --    CREATININE mg/dL 6 56* 6 58*  --   --    CALCIUM mg/dL 8 8 9 4  --   --    GLUCOSE RANDOM mg/dL 402* 511*  --   --    ALT U/L 73 16  --   --    AST U/L 138* 10  --   --    ALK PHOS U/L 203* 202*  --   --    ALBUMIN g/dL 2 3* 2 4*  --   --    TOTAL BILIRUBIN mg/dL 0 79 0 42  --   --      Results from last 7 days   Lab Units 11/15/19  0506   MAGNESIUM mg/dL 2 3   PHOSPHORUS mg/dL 5 3*           Results from last 7 days   Lab Units 11/14/19 2348 11/14/19 2116 11/14/19 1921   TROPONIN I ng/mL <0 02 <0 02 <0 02         ABG:  Results from last 7 days   Lab Units 11/15/19  0343   PH ART  7 353   PCO2 ART mm Hg 41 8   PO2 ART mm Hg 99 5   HCO3 ART mmol/L 22 7   BASE EXC ART mmol/L -2 7   ABG SOURCE  Line, Arterial     VBG:  Results from last 7 days   Lab Units 11/15/19  0343 11/14/19  1921   PH CHERRY   --  7 348   PCO2 CHERRY mm Hg  --  41 2*   PO2 CHERRY mm Hg  --  43 6   HCO3 CHERRY mmol/L  --  22 1*   BASE EXC CHERRY mmol/L  --  -3 3   ABG SOURCE  Line, Arterial  --            Micro        EKG: pending from this AM  Imaging: CXR pending    Active Medications  Scheduled Meds:  Current Facility-Administered Medications:  apixaban 5 mg Oral BID Richard Motley MD    aspirin 81 mg Oral Daily Richard Motley MD    atorvastatin 80 mg Oral Daily With Mee Chapman MD    bisacodyl 5 mg Oral Daily PRN Richard Motley MD    calcitriol 0 5 mcg Oral Daily Richard Motley MD    chlorhexidine 15 mL [employer] Q12H [address] Malorie Sigala MD    clopidogrel 75 mg Oral Daily Vicky Hahn MD    DOPamine in dextrose 5% 1-20 mcg/kg/min Intravenous Titrated Leanna Caraballo MD Last Rate: 10 mcg/kg/min (11/15/19 0550)   famotidine 20 mg Oral Daily Vicky Hahn MD    fentaNYL 50 mcg/hr Intravenous Continuous Leanna Caarballo MD Last Rate: 50 mcg/hr (11/15/19 0327)   FLUoxetine 40 mg Oral Daily Vicky Hahn MD    gabapentin 300 mg Oral HS Vicky Hahn MD    insulin regular (HumuLIN R,NovoLIN R) infusion 0 3-21 Units/hr Intravenous Titrated Vicky Hahn MD Last Rate: 21 Units/hr (11/15/19 0555)   levothyroxine 75 mcg Oral Early Morning Vicky Hahn MD    multi-electrolyte 75 mL/hr Intravenous Continuous Malorie Sigala MD Last Rate: 75 mL/hr (11/14/19 2148)   norepinephrine 1-30 mcg/min Intravenous Titrated Leanna Caraballo MD Last Rate: Stopped (11/15/19 0544)   propofol 5-50 mcg/kg/min Intravenous Titrated Malorie Sigala MD Last Rate: 25 mcg/kg/min (11/15/19 9519)     Continuous Infusions:    DOPamine in dextrose 5% 1-20 mcg/kg/min Last Rate: 10 mcg/kg/min (11/15/19 0550)   fentaNYL 50 mcg/hr Last Rate: 50 mcg/hr (11/15/19 0327)   insulin regular (HumuLIN R,NovoLIN R) infusion 0 3-21 Units/hr Last Rate: 21 Units/hr (11/15/19 0555)   multi-electrolyte 75 mL/hr Last Rate: 75 mL/hr (11/14/19 2148)   norepinephrine 1-30 mcg/min Last Rate: Stopped (11/15/19 0544)   propofol 5-50 mcg/kg/min Last Rate: 25 mcg/kg/min (11/15/19 0619)     PRN Meds:     bisacodyl 5 mg Daily PRN       Allergies   Allergies   Allergen Reactions    Codeine Edema    Shellfish Allergy Edema    Hydrocodone Itching and Vomiting    Morphine Itching and Vomiting    Oxycodone Itching and Vomiting       Advance Directive and Living Will:      Power of :    POLST:        Counseling / Coordination of Care  34 mins of critical care time, adjustment of vasoactive agents and ventilatory adjustments      Mickey Gusman, CRNP        Portions of the record may have been created with voice recognition software  Occasional wrong word or "sound a like" substitutions may have occurred due to the inherent limitations of voice recognition software    Read the chart carefully and recognize, using context, where substitutions have occurred

## 2019-11-15 NOTE — RESPIRATORY THERAPY NOTE
RT Ventilator Management Note  Bulmaro Thomas 48 y o  female MRN: [id number]  Unit/Bed#: MICU 07 Encounter: [id number]      Daily Screen       11/15/2019  0800 11/15/2019  0816          Patient safety screen outcome[de-identified]  Passed  Passed      RSBI:    60              Physical Exam:   Assessment Type: Assess only  General Appearance: Eyes open/responds to voice, Sedated  Respiratory Pattern: Assisted  Chest Assessment: Chest expansion symmetrical  Bilateral Breath Sounds: Expiratory wheezes, Diminished  Cough: Unable to assess  Suction: ET Tube  O2 Device: ventilator      Resp Comments: Pt  remains on ac/vc will be trialin on spontaneous mode  will continue to monitor  Spontaneous breathing trial started 0816 will continue to monitor pt   Seems to be tolerating well at this time

## 2019-11-15 NOTE — PROCEDURES
Central Line Insertion  Date/Time: 11/15/2019 12:55 AM  Performed by: Kamron Paige MD  Authorized by: Kamron Paige MD     Patient location:  ICU  Other Assisting Provider: Yes (comment) Erlin Cancer)    Consent:     Consent obtained:  Verbal    Consent given by:  Spouse    Risks discussed:  Arterial puncture, bleeding, infection, incorrect placement, nerve damage and pneumothorax    Alternatives discussed:  No treatment and observation  Universal protocol:     Procedure explained and questions answered to patient or proxy's satisfaction: yes      Patient identity confirmed:  Arm band  Pre-procedure details:     Hand hygiene: Hand hygiene performed prior to insertion      Sterile barrier technique: All elements of maximal sterile technique followed      Skin preparation:  ChloraPrep    Skin preparation agent: Skin preparation agent completely dried prior to procedure    Indications:     Central line indications: medications requiring central line and no peripheral vascular access    Anesthesia (see MAR for exact dosages): Anesthesia method:  None  Procedure details:     Location:  Left femoral    Vessel type: artery      Laterality:  Left    Approach: percutaneous technique used      Patient position:  Flat    Catheter type:  Triple lumen 16cm    Catheter size:  7 Fr    Landmarks identified: yes      Ultrasound guidance: yes      Sterile ultrasound techniques: Sterile gel and sterile probe covers were used      Manometry confirmation: no      Number of attempts:  2    Successful placement: yes    Post-procedure details:     Post-procedure:  Dressing applied and line sutured    Assessment:  Blood return through all ports and free fluid flow    Patient tolerance of procedure:   Tolerated well, no immediate complications

## 2019-11-15 NOTE — SOCIAL WORK
Pt intubated  TC to pt , Elijah Alvarado, to obtain prior level of functioning  Pt lives with her  and son in a 2 1/2 SH with a ramp to enter  Pt has bedroom and bathroom on the first floor  Pt performs ADLs independently pta  Pt uses a w/c and recently out of her cam boot and working with PT/OT on transitioning to her RW  Pt has a chair lift  Pt also has a SPC  Pt current with [facility]  Pt has past hx of STR at [address] [place] [address], and one other facility  Pt is dx with depression  She is not currently followed by any provides  Pt states she has IP admissions "years ago" but nothing recent  Pt denies any hx of tx for D&A  Pt PCP is Dr Melly Han with [facility]  Pt preferred pharmacy is [facility] in [facility] [place]  Pt is a current dialysis pt at [facility] in [place]  Chair time is M,W, F at 7am  No POA or LW  As per Diony Gu is for pt to continue with [facility] at d/c  CM sent referral via [facility]  CM to follow  CM reviewed d/c planning process including the following: identifying help at home, patient preference for d/c planning needs, Discharge Lounge, [facility] Meds to Bed program, availability of treatment team to discuss questions or concerns patient and/or family may have regarding understanding medications and recognizing signs and symptoms once discharged  CM also encouraged patient to follow up with all recommended appointments after discharge  Patient advised of importance for patient and family to participate in managing patients medical well being

## 2019-11-15 NOTE — UTILIZATION REVIEW
Initial Clinical Review    Admission: Date/Time/Statement: Inpatient Admission Orders (From admission, onward)     Ordered        11/14/19 2104  Inpatient Admission  Once                   Orders Placed This Encounter   Procedures    Inpatient Admission     Standing Status:   Standing     Number of Occurrences:   1     Order Specific Question:   Admitting Physician     Answer:   Jordon Lanier     Order Specific Question:   Level of Care     Answer:   Critical Care [15]     Order Specific Question:   Estimated length of stay     Answer:   More than 2 Midnights     Order Specific Question:   Certification     Answer:   I certify that inpatient services are medically necessary for this patient for a duration of greater than two midnights  See H&P and MD Progress Notes for additional information about the patient's course of treatment  ED Arrival Information     Expected Arrival Acuity Means of Arrival Escorted By Service Admission Type    - 11/14/2019 19:03 [facility] Critical Care/ICU Emergency    Arrival Complaint    -        Chief Complaint   Patient presents with    Syncope     Pt in and out of consciousness per son and EMS  Pt hypotensive and bradycardic  Assessment/Plan:   52y Female to ED from home presents for multiple episodes of syncope and emesis  Syncope and emesis persisted with EMS to ED  In ED intubated for airway protection due to  increasing duration of syncopal episodes  Also found bradycardic in ED, per Cardiology no pacemaker recommend at this time  Hypotensive in ICU, A line and central line placed  PMH of Chronic Diastolic CHF  Admit Inpatient level of care Bradycardia, Hypotension, ESRD on HD, Uncontrolled Hyperglycemia with DM1 and PAF  Unclear etiology, possibly secondary to dual AV da agents and hx of renal failure  Start Dopamine Infusion - if no response to dopamine tonight, my need TVP  A line and central line placed   Bld cultures, Dialysis yesterday - 8yr old Fistula, Right fistula os still maturing per , required revision 1 month ago  Renal consult - will likely not tolerate HD unless BP improves  Insulin pump at home - start insulin gtt  Currently in junctional bradycardic rhyth  Hold all AV da blocking agents  Continue Eliquis  11/15 Progress notes; Intubated/ vent, weaned off NE this AM  Continue Dopamine gtt  Sedation plan: Fentanyl 50 mcg/min and Propofol 25 mcg/kg/min  Delirium monitoring/management  Serial neuro exams and daily SAT  Cardiology consult, Echo, Tele monitoring - NSR  Continue ASA, continue eliquis, continue atorvastatin, continue plavix  CXR stat his AM - Assess daily readiness to extubate  Replete electrolytes with goals: K >4 0, Mag >2 0, and Phos >3 0  Consider starting trickle TD  Possible aspiration event, repeat CXR this AM - cultures pending, consider Iv antibiotics, trend temps and WBC count,    Macrocytic Anemia setting of recent GIB and on eliquis - trend hgb and plts  11/15 Nephrology cons; ESRD on HD MWF  currently with hypotensive, off and on requiring Levophed, with relatively stable electrolytes, improving oxygen requirement on ventilator, will hold off on dialysis today  Hopefully she will be off pressors and may be able to tolerate intermittent HD well tomorrow  if worsening respiratory status, volume overload or worsening electrolytes/acid base status, will consider dialysis today  11/15 Infectious Disease cons; Shock  May be cardiogenic  Also consider septic shock  Continue IV antibiotics with HD dosing, f/u bld cultures and monitor temp  Recheck CBC and procalcitonin in AM  Wound dehiscence  Noted at right upper extremity AV fistula site  Consideration for bacteremia   Serial exams      ED Triage Vitals   Temperature Pulse Respirations Blood Pressure SpO2   11/14/19 1918 11/14/19 1913 11/14/19 1913 11/14/19 1913 11/14/19 1913   98 3 °F (36 8 °C) (!) 46 16 (!) 186/73 100 %      Temp Source Heart Rate Source Patient Position - Orthostatic VS BP Location FiO2 (%)   11/15/19 0000 11/15/19 0000 11/14/19 2244 11/14/19 2244 --   Bladder Monitor Lying Left leg       Pain Score       --               Wt Readings from Last 1 Encounters:   11/14/19 116 kg (255 lb 4 7 oz)     Additional Vital Signs:   11/14/19 2259  96 8 °F (36 °C)Abnormal   36Abnormal   18  56/30Abnormal   38      100 %       11/14/19 2244  96 8 °F (36 °C)Abnormal   36  Abnormal   17  74/27  Abnormal   45      100 %  Ventilator  Lying   11/14/19 2100  96 8 °F (36 °C)Abnormal   38  Abnormal   16  115/46  Abnormal                  15/19 0400  96 8 °F (36 °C)Abnormal   80  17      112/40  62 mmHg  99 %      11/15/19 0345  96 8 °F (36 °C)Abnormal   82  17      114/40  64 mmHg  100 %      11/15/19 0323                99 %      11/15/19 0320  96 8 °F (36 °C)Abnormal   84  18      114/40  62 mmHg  98 %      11/15/19 0247  96 8 °F (36 °C)Abnormal   82  23Abnormal       110/38  60 mmHg  98 %      11/15/19 0238  96 8 °F (36 °C)Abnormal   62  18                  Pertinent Labs/Diagnostic Test Results:   11/14 PCXR - Low endotracheal tube, at the jacquelyn  Multifocal bilateral consolidation; given the patient's history, this could be due to aspiration  11/14 CT Head - No acute intracranial abnormality  Microangiopathic changes and cortical atrophy appears somewhat precocious      11/14 EKG - Sinus bradycardia    11/15 PCXR - Unchanged airspace opacities    Results from last 7 days   Lab Units 11/15/19  0506 11/14/19  2116 11/14/19  1921 11/14/19  1918 11/14/19  1917   WBC Thousand/uL 15 30*  --  12 82*  --   --    HEMOGLOBIN g/dL 7 4*  --  7 5*  --   --    I STAT HEMOGLOBIN g/dl  --   --   --  8 2* 8 2*   HEMATOCRIT % 23 5*  --  24 9*  --   --    HEMATOCRIT, ISTAT %  --   --   --  24* 24*   PLATELETS Thousands/uL 168 215 228  --   --    NEUTROS ABS Thousands/µL 12 85*  --  7 94*  --   --          Results from last 7 days Lab Units 11/15/19  0506 11/14/19  1921 11/14/19 1918 11/14/19 1917   SODIUM mmol/L 132* 129*  --   --    POTASSIUM mmol/L 4 6 5 2  --   --    CHLORIDE mmol/L 93* 90*  --   --    CO2 mmol/L 23 23  --   --    CO2, I-STAT mmol/L  --   --  26 25   AGAP mmol/L  --   --   --  19*   ANION GAP mmol/L 16* 16*  --   --    BUN mg/dL 94* 86*  --   --    CREATININE mg/dL 6 56* 6 58*  --   --    EGFR ml/min/1 73sq m 7 7  --  6   CALCIUM mg/dL 8 8 9 4  --   --    CALCIUM, IONIZED, ISTAT mmol/L  --   --  1 06* 1 05*   MAGNESIUM mg/dL 2 3  --   --   --    PHOSPHORUS mg/dL 5 3*  --   --   --      Results from last 7 days   Lab Units 11/15/19  0506 11/14/19  1921   AST U/L 138* 10   ALT U/L 73 16   ALK PHOS U/L 203* 202*   TOTAL PROTEIN g/dL 7 5 8 1   ALBUMIN g/dL 2 3* 2 4*   TOTAL BILIRUBIN mg/dL 0 79 0 42     Results from last 7 days   Lab Units 11/15/19  1004 11/15/19  0759 11/15/19  0551 11/15/19  0343 11/15/19  0145 11/15/19  0043 11/14/19  2206 11/14/19 2116 11/14/19 1910   POC GLUCOSE mg/dl 126 237* 475* 487* >500* >500* >500* >500* >500*     Results from last 7 days   Lab Units 11/15/19  0506 11/14/19  1921   GLUCOSE RANDOM mg/dL 402* 511*             BETA-HYDROXYBUTYRATE   Date Value Ref Range Status   11/14/2019 0 7 (H) <0 6 mmol/L Final      Results from last 7 days   Lab Units 11/15/19  0343   PH ART  7 353   PCO2 ART mm Hg 41 8   PO2 ART mm Hg 99 5   HCO3 ART mmol/L 22 7   BASE EXC ART mmol/L -2 7   O2 CONTENT ART mL/dL 11 3*   O2 HGB, ARTERIAL % 96 0   ABG SOURCE  Line, Arterial     Results from last 7 days   Lab Units 11/14/19 1921   PH CHERRY  7 348   PCO2 CHERRY mm Hg 41 2*   PO2 CHERRY mm Hg 43 6   HCO3 CHERRY mmol/L 22 1*   BASE EXC CHERRY mmol/L -3 3   O2 CONTENT CHERRY ml/dL 8 2   O2 HGB, VENOUS % 71 3     Results from last 7 days   Lab Units 11/14/19 1918   PH, CHERRY I-STAT  7 416*   PCO2, CHERRY ISTAT mm HG 38 9*   PO2, CHERRY ISTAT mm HG 37 0   HCO3, CHERRY ISTAT mmol/L 25 0   I STAT BASE EXC mmol/L 0   I STAT O2 SAT % 71 Results from last 7 days   Lab Units 11/14/19  2348 11/14/19  2116 11/14/19  1921   TROPONIN I ng/mL <0 02 <0 02 <0 02     Results from last 7 days   Lab Units 11/14/19  1921   TSH 3RD GENERATON uIU/mL 6 400*     Results from last 7 days   Lab Units 11/15/19  0755   PROCALCITONIN ng/ml 1 13*     Results from last 7 days   Lab Units 11/15/19  0755   LACTIC ACID mmol/L 2 3*     Results from last 7 days   Lab Units 11/15/19  0506   LIPASE u/L 258     Results from last 7 days   Lab Units 11/14/19  2031   CLARITY UA  Clear   COLOR UA  Yellow   SPEC GRAV UA  1 025   PH UA  5 5   GLUCOSE UA mg/dl 500 (1/2%)*   KETONES UA mg/dl Trace*   BLOOD UA  Trace*   PROTEIN UA mg/dl >=300*   NITRITE UA  Negative   BILIRUBIN UA  Interference- unable to analyze*   UROBILINOGEN UA E U /dl 0 2   LEUKOCYTES UA  Trace*   WBC UA /hpf 10-20*   RBC UA /hpf None Seen   BACTERIA UA /hpf Occasional   EPITHELIAL CELLS WET PREP /hpf Moderate*     Results from last 7 days   Lab Units 11/15/19  0901 11/14/19  2347   BLOOD CULTURE  Received in Microbiology Lab  Culture in Progress  Received in Microbiology Lab  Culture in Progress       ED Treatment:   Medication Administration from 11/14/2019 1903 to 11/14/2019 2200       Date/Time Order Dose Route Action     11/14/2019 1915 ondansetron (ZOFRAN) injection 4 mg 4 mg Intravenous Given     11/14/2019 1921 sodium chloride 0 9 % bolus 1,000 mL 1,000 mL Intravenous New Bag     11/14/2019 1951 insulin regular (HumuLIN R,NovoLIN R) injection 10 Units 10 Units Subcutaneous Given     11/14/2019 1954 ondansetron (ZOFRAN) injection 4 mg 4 mg Intravenous Given     11/14/2019 1958 etomidate (AMIDATE) 2 mg/mL injection 30 mg 30 mg Intravenous Given     11/14/2019 2001 Succinylcholine Chloride 100 mg/5 mL syringe 160 mg 150 mg Intravenous Given     11/14/2019 2025 propofol (DIPRIVAN) 1000 mg in 100 mL infusion (premix) 40 mcg/kg/min Intravenous Rate/Dose Change     11/14/2019 2002 propofol (DIPRIVAN) 1000 mg in 100 mL infusion (premix) 20 mcg/kg/min Intravenous New Bag     11/14/2019 2016 atropine injection 0 5 mg 0 5 mg Intravenous Given     11/14/2019 2148 heparin (porcine) subcutaneous injection 5,000 Units 5,000 Units Subcutaneous Given     11/14/2019 2148 multi-electrolyte (PLASMALYTE-A/ISOLYTE-S PH 7 4) IV solution 75 mL/hr Intravenous New Bag        Past Medical History:   Diagnosis Date    Asthma     Cardiac disease     CHF    [facility]     CPAP (continuous positive airway pressure) dependence     Diabetes mellitus ([place] [address] )     type 1    Dialysis patient ([place] [address] )     Disease of thyroid gland     hypothyroidism    GERD (gastroesophageal reflux disease)     Hyperlipidemia     Hypertension     Migraine     Psychiatric disorder     PTSD, Depression, panic attacks    Renal disorder     stage 4 kidney disease     Present on Admission:   Morbid obesity ([place] [address] )   Uncontrolled type 1 diabetes mellitus with hypoglycemia and coma ([facility])   PAF (paroxysmal atrial fibrillation) ([place] [address] )   CAD with recent stent   Hypothyroid      Admitting Diagnosis: Respiratory failure ([place] [address] ) [J96 90]  Bradycardia [R00 1]  Syncope [R55]  Vomiting [R11 10]  Hypothyroid [E03 9]     Age/Sex: 48 y o  female     Admission Orders:  NPO  Intubated/ Vent  A Line monitoring  Echo  Bld culture x2  Hemodialysis Adult  Neurological checks q1h  IP CONSULT TO NEPHROLOGY  IP CONSULT TO WOUND CARE  IP CONSULT TO INFECTIOUS DISEASES  IP CONSULT TO ELECTROPHYSIOLOGY    Scheduled Medications:  Medications:  apixaban 5 mg Oral BID   aspirin 81 mg Oral Daily   atorvastatin 80 mg Oral Daily With Dinner   calcitriol 0 5 mcg Oral Daily   [START ON 11/16/2019] cefepime 1,000 mg Intravenous Q24H   chlorhexidine 15 mL Swish [employer] Spit Q12H [address]   clopidogrel 75 mg Oral Daily   famotidine 20 mg Oral Daily   FLUoxetine 40 mg Oral Daily   gabapentin 300 mg Oral HS   levothyroxine 75 mcg Oral Early Morning   vancomycin 25 mg/kg (Adjusted) Intravenous Once     Continuous IV Infusions:  DOPamine in dextrose 5% 1-20 mcg/kg/min Intravenous Titrated   fentaNYL 50 mcg/hr Intravenous Continuous   insulin regular (HumuLIN R,NovoLIN R) infusion 0 3-21 Units/hr Intravenous Titrated   norepinephrine 1-30 mcg/min Intravenous Titrated   propofol 5-50 mcg/kg/min Intravenous Titrated     PRN Meds:  bisacodyl 5 mg Oral Daily PRN     Network Utilization Review Department  [email or web address]  ATTENTION: Please call with any questions or concerns to [phone] and carefully listen to the prompts so that you are directed to the right person  All voicemails are confidential   Jenni Reese all requests for admission clinical reviews, approved or denied determinations and any other requests to dedicated fax number below belonging to the campus where the patient is receiving treatment    FACILITY NAME UR FAX NUMBER   ADMISSION DENIALS (Administrative/Medical Necessity) [address] [facility] (Maternity/NICU/Pediatrics) [phone]   [address] 300 [facility] [phone]   [address] [place] [address] 1525 [facility] [phone]   [address] [facility] [address] [phone]

## 2019-11-15 NOTE — CONSULTS
Consultation - Nephrology   Nora Akins 48 y o  female MRN: [id number]  Unit/Bed#: MICU 07 Encounter: [id number]    ASSESSMENT AND PLAN:  Patient is 82-year-old female ESRD on HD on MWF schedule, presented with bradycardia, syncope, uncontrolled hyperglycemia  We are consulted for dialysis management  ESRD on HD on MWF schedule at [facility]  -patient had last dialysis on Wednesday   -currently with hypotensive, off and on requiring Levophed, with relatively stable electrolytes, improving oxygen requirement on ventilator, will hold off on dialysis today  Hopefully she will be off pressors and may be able to tolerate intermittent HD well tomorrow  -in interim, if worsening respiratory status, volume overload or worsening electrolytes/acid base status, will consider dialysis today   -outpatient dry weight 111 5 kg    Access, left upper extremity AV graft which is being used for dialysis  Recently had right upper extremity AV fistula placed although has very faint bruit and also had wound dehiscence as per my conversation with outpatient HD nurse  She will need vascular surgery follow-up and also fistulogram on right AV fistula once more stable    Symptomatic bradycardia, episode of syncope, patient currently intubated and remains on dopamine  Close monitoring in ICU setting    VDRF  -currently FiO2 requirement improving to 40%  -management as per ICU    Shock, blood pressure remains lower, off and on requiring Levophed  Blood/urine culture results to follow  -empiric antibiotic as per ICU team  -echocardiogram results to follow    CKD BMD, mild hyperphosphatemia with serum phosphorus 5 3, continue to monitor  -patient is on Renagel three tablets p o  T i d  With meals as outpatient  Also on calcitriol 0 5 mcg 3 times a week as outpatient  Secondary hyperparathyroidism, last  at goal in November 2019    CKD anemia, hemoglobin below goal, transfuse p r n   For hemoglobin less than seven  -iron saturation 16% in early 2019  -she is not on Emperatriz Band as outpatient    Discussed above plan in detail with ICU team     HISTORY OF PRESENT ILLNESS:  Requesting Physician: Magaly Silva MD  Reason for Consult:  ESRD    [place] Level is a 48y o  year old female who was admitted to [place] [address] after presenting with syncopal episode, bradycardia, uncontrolled hyperglycemia  A renal consultation is requested today for assistance in the management of started  She initially presented with episodes of syncope, vomiting and was found to have uncontrolled hyperglycemia, bradycardia  Eventually was intubated and currently remains in ICU critically ill  All the history is obtained from reviewing medical records, talking to outpatient HD nurse and ICU staff  Patient was intubated and eventually blood pressure remained lower requiring Levophed  She was also started on dopamine due to bradycardia issues  She had her last dialysis on Wednesday  She has left upper arm AV graft which is being used for dialysis  Also recently had placed a right upper extremity AV fistula with wound dehiscence eventually as per HD nurse as outpatient      PAST MEDICAL HISTORY:  Past Medical History:   Diagnosis Date    Asthma     Cardiac disease     CHF    [facility]     CPAP (continuous positive airway pressure) dependence     Diabetes mellitus ([facility] )     type 1    [facility] patient ([facility] )     Disease of thyroid gland     hypothyroidism    GERD (gastroesophageal reflux disease)     Hyperlipidemia     Hypertension     Migraine     Psychiatric disorder     PTSD, Depression, panic attacks    Renal disorder     stage 4 kidney disease       PAST SURGICAL HISTORY:  Past Surgical History:   Procedure Laterality Date    CATARACT EXTRACTION, BILATERAL       SECTION      CHOLECYSTECTOMY      HERNIA REPAIR      RETINOPATHY SURGERY      TONSILECTOMY AND ADNOIDECTOMY      TONSILLECTOMY      TYMPANOSTOMY TUBE PLACEMENT         ALLERGIES:  Allergies   Allergen Reactions    Codeine Edema    Shellfish Allergy Edema    Hydrocodone Itching and Vomiting    Morphine Itching and Vomiting    Oxycodone Itching and Vomiting       SOCIAL HISTORY:  Social History     Substance and Sexual Activity   Alcohol Use Not Currently    Binge frequency: Never     Social History     Substance and Sexual Activity   Drug Use No     Social History     Tobacco Use   Smoking Status Never Smoker   Smokeless Tobacco Never Used       FAMILY HISTORY:  Family History   Problem Relation Age of Onset    Pancreatic cancer Mother     Stroke Father        MEDICATIONS:    Current Facility-Administered Medications:     apixaban (ELIQUIS) tablet 5 mg, 5 mg, Oral, BID, Claude Jeffries MD, 5 mg at 11/15/19 9980    aspirin chewable tablet 81 mg, 81 mg, Oral, Daily, Claude Jeffries MD, 81 mg at 11/15/19 0843    atorvastatin (LIPITOR) tablet 80 mg, 80 mg, Oral, Daily With Dinner, Claude Jeffries MD    bisacodyl (DULCOLAX) EC tablet 5 mg, 5 mg, Oral, Daily PRN, Claude Jeffries MD    calcitriol (ROCALTROL) capsule 0 5 mcg, 0 5 mcg, Oral, Daily, Claude Jeffries MD, 0 5 mcg at 11/15/19 0844    chlorhexidine (PERIDEX) 0 12 % oral rinse 15 mL, 15 mL, [employer], Q12H [facility], Ricardo Alejandro MD, 15 mL at 11/15/19 0843    clopidogrel (PLAVIX) tablet 75 mg, 75 mg, Oral, Daily, Claude Jeffries MD, 75 mg at 11/15/19 0843    DOPamine (INTROPIN) 400 mg in 250 mL infusion (premix), 1-20 mcg/kg/min, Intravenous, Titrated, Antonino Hernandez MD, Last Rate: 10 5 mL/hr at 11/15/19 0853, 2 5 mcg/kg/min at 11/15/19 0853    famotidine (PEPCID) tablet 20 mg, 20 mg, Oral, Daily, Claude Jeffries MD, 20 mg at 11/15/19 0843    fentaNYL 1000 mcg in sodium chloride 0 9% 100mL infusion, 50 mcg/hr, Intravenous, Continuous, Antonino Hernandez MD, Last Rate: 5 mL/hr at 11/15/19 0327, 50 mcg/hr at 11/15/19 0327    FLUoxetine (PROzac) capsule 40 mg, 40 mg, Oral, Daily, Susan Sifuentes MD, 40 mg at 11/15/19 0844    gabapentin (NEURONTIN) capsule 300 mg, 300 mg, Oral, HS, Susan Sifuentes MD    insulin regular (HumuLIN R,NovoLIN R) 1 Units/mL in sodium chloride 0 9 % 100 mL infusion, 0 3-21 Units/hr, Intravenous, Titrated, Susan Sifuentes MD, Last Rate: 16 mL/hr at 11/15/19 0800, 16 Units/hr at 11/15/19 0800    levothyroxine tablet 75 mcg, 75 mcg, Oral, Early Morning, Susan Sifuentes MD, 75 mcg at 11/15/19 0540    norepinephrine (LEVOPHED) 4 mg (STANDARD CONCENTRATION) IV in sodium chloride 0 9% 250 mL, 1-30 mcg/min, Intravenous, Titrated, Anthony Mejia MD, Last Rate: 26 3 mL/hr at 11/15/19 0853, 7 mcg/min at 11/15/19 0853    propofol (DIPRIVAN) 1000 mg in 100 mL infusion (premix), 5-50 mcg/kg/min, Intravenous, Titrated, Traci Lima MD, Last Rate: 16 8 mL/hr at 11/15/19 0800, 25 mcg/kg/min at 11/15/19 0800    REVIEW OF SYSTEMS:  Unable to review any systems as patient is intubated    PHYSICAL EXAM:  Current Weight: Weight - Scale: 116 kg (255 lb 4 7 oz)  First Weight: Weight - Scale: 113 kg (249 lb 1 9 oz)  Vitals:    11/15/19 0853   BP:    Pulse: 60   Resp: (!) 11   Temp:    SpO2: 98%       Intake/Output Summary (Last 24 hours) at 11/15/2019 0900  Last data filed at 11/15/2019 9590  Gross per 24 hour   Intake 3201 03 ml   Output 175 ml   Net 3026 03 ml     Wt Readings from Last 3 Encounters:   11/14/19 116 kg (255 lb 4 7 oz)   09/10/19 113 kg (248 lb 7 3 oz)   07/20/19 111 kg (244 lb 14 9 oz)     Temp Readings from Last 3 Encounters:   11/15/19 (!) 97 2 °F (36 2 °C) (Rectal)   09/10/19 98 8 °F (37 1 °C) (Oral)   07/23/19 (!) 97 °F (36 1 °C) (Tympanic)     BP Readings from Last 3 Encounters:   11/14/19 (!) 56/30   09/10/19 112/53   07/23/19 155/67     Pulse Readings from Last 3 Encounters:   11/15/19 60   09/10/19 71   07/23/19 97        Physical Examination:  General:  Lying in bed, intubated  Eyes:  Mild conjunctival pallor present, sclerae anicteric  ENT:  ET tube present, otherwise external examination of ears and nose unremarkable  Neck:  No obvious lymphadenopathy appreciated  Respiratory:  Bilateral decreased breath sound at base  CVS:  S1, S2 present  GI:  Soft, nondistended  CNS:  Intubated, opens eyes  Extremities:  Trace edema in legs  Psych:  Unable to assess as patient is intubated  Skin:  No new rash in legs, right upper extremity AV fistula wound dehiscence present    Invasive Devices:      Lab Results:   Results from last 7 days   Lab Units 11/15/19  0506 11/14/19 2116 11/14/19 1921 11/14/19 1918 11/14/19 1917   WBC Thousand/uL 15 30*  --  12 82*  --   --    HEMOGLOBIN g/dL 7 4*  --  7 5*  --   --    I STAT HEMOGLOBIN g/dl  --   --   --  8 2* 8 2*   HEMATOCRIT % 23 5*  --  24 9*  --   --    HEMATOCRIT, ISTAT %  --   --   --  24* 24*   PLATELETS Thousands/uL 168 215 228  --   --    POTASSIUM mmol/L 4 6  --  5 2  --   --    CHLORIDE mmol/L 93*  --  90*  --   --    CO2 mmol/L 23  --  23  --   --    CO2, I-STAT mmol/L  --   --   --  26 25   BUN mg/dL 94*  --  86*  --   --    CREATININE mg/dL 6 56*  --  6 58*  --   --    CALCIUM mg/dL 8 8  --  9 4  --   --    MAGNESIUM mg/dL 2 3  --   --   --   --    PHOSPHORUS mg/dL 5 3*  --   --   --   --    GLUCOSE, ISTAT mg/dl  --   --   --  503* 500*       Other Studies:   XR chest portable ICU   Final Result by Kj Mckay MD (11/15 5197)      1  ET tube tip at the jacquelyn, consider retracting at least 2 cm   2  Unchanged airspace opacities      The study was marked in EPIC for immediate notification  Workstation performed: [id number]         CT head without contrast   Final Result by Kip Mohamud MD (11/14 2050)      No acute intracranial abnormality  Microangiopathic changes and cortical atrophy appears somewhat precocious                    Workstation performed: [id number]         XR chest 1 view portable   Final Result by Guerda Cruz MD (60/61 0674)      Low endotracheal tube, at the jacquelyn  Multifocal bilateral consolidation; given the patient's history, this could be due to aspiration  Workstation performed: [id number]         XR chest portable ICU    (Results Pending)   Chest x-ray images personally reviewed which shows mild diffuse interstitial opacities  Portions of the record may have been created with voice recognition software  Occasional wrong word or "sound a like" substitutions may have occurred due to the inherent limitations of voice recognition software  Read the chart carefully and recognize, using context, where substitutions have occurred

## 2019-11-15 NOTE — RESPIRATORY THERAPY NOTE
resp care      11/15/19 1147   Vent Information   Vent ID [id number]   Vent type     Vent Mode AC/VC   $ Pulse Oximetry Spot Check Charge Completed   SpO2 94 %   AC/VC Settings   Resp Rate (BPM) 16 BPM   Vt (mL) 400 mL   FIO2 (%) 50 %   PEEP (cmH2O) 8 cmH2O   Flow Pattern (LPM) 50 L/min   Trigger Sensitivity Flow (lpm) 3 %   Humidification Heater   Heater Temperature (Set) 98 6 °F (37 °C)   AC/VC Actuals   Resp Rate (BPM) 16 BPM   VT (mL) 447   MV 6 83   MAP (cmH2O) 13 cmH2O   Peak Pressure (cmH2O) 27 cmH2O   I/E Ratio (Obs) 1:3 3   Heater Temperature (Obs) 98 6 °F (37 °C)   AC/VC Alarms   High Peak Pressure (cmH2O) 40   High Resp Rate (BPM) 35 BPM   High MV (L/min) 20 L/min   Low MV (L/min) 5 L/min   Vt High (mL) 1300 mL   Vt Low (mL) 300 mL   AC/VC Apnea Settings   Resp Rate (BPM) 16 BPM   VT (mL) 400 mL   FIO2 (%) 100 %   Apnea Time (s) 20 S   Apnea Flow (L/min) 50 L/min   Maintenance   Alarm (pink) cable attached Yes   Resuscitation bag with peep valve at bedside Yes   Water bag changed No   Circuit changed No   Daily Screen   Patient safety screen outcome: Passed   IHI Ventilator Associated Pneumonia Bundle   Daily Awakening Trials Performed Yes   Daily Assessment of Readiness to Extubate Yes   Head of Bed Elevated HOB 30   ETT  Cuffed 8 mm   Placement Date/Time: 11/14/19 c) 2094   Type: Cuffed  Tube Size: 8 mm  Location: Oral  Insertion attempts: 2  Placement Verification: Chest x-ray;End tidal CO2;Symmetrical chest wall movement  Secured at (cm): (c) 24  Comments: orders to withdrawl en    Secured at (cm) 23   Measured from [address] [place] by [employer] tube morales   Site Condition Dry   HI-LO Suction  Intermittent suction   HI-LO Secretions Scant;Yellow; Thin   HI-LO Intervention Patent

## 2019-11-15 NOTE — RESPIRATORY THERAPY NOTE
RT Ventilator Management Note  Delmy William 48 y o  female MRN: [id number]  Unit/Bed#: [facility] 07 Encounter: [id number]      Daily Screen       11/15/2019  0800             Patient safety screen outcome[de-identified]  Passed            Physical Exam:   Assessment Type: Assess only  General Appearance: Eyes open/responds to voice, Sedated  Respiratory Pattern: Assisted  Chest Assessment: Chest expansion symmetrical  Bilateral Breath Sounds: Expiratory wheezes, Diminished  Cough: Unable to assess  Suction: ET Tube  O2 Device: ventilator      Resp Comments: Pt  remains on ac/vc will be trialin on spontaneous mode   will continue to monitor

## 2019-11-15 NOTE — H&P
H&P Exam - [address] Deloris 48 y o  female MRN: [id number]  Unit/Bed#: [facility] 07 Encounter: [id number]      -------------------------------------------------------------------------------------------------------------  Chief Complaint: Syncope, vomiting    History of Present Illness   HX and PE limited by: Patient intubated    Tushar Hair is a 48 y o  female who presents for evaluation due to having multiple episodes of syncope and emesis at home  Patient continued to have episodes of syncope and emesis with EMS and the emergency department  While in the emergency department, there was some concern for her ability to protect her airway and patient was intubated because of the increasing duration of syncopal episodes  Patient did not report having any chest pain to ED staff  Patient was found to be bradycardic in the emergency department  Per ED physicians, Cardiology was contacted and given that she had a normal blood pressure at that time, no recommendations for a pacemaker were given  They recommended possibly trying dopamine if she became hypotensive  Patient did not have any other complaints  She was transferred to the ICU  After arrival in the ICU, patient was found to be hypotensive  Given that we were only able to use her leg to check her blood pressure, we decided that it would be best to place a arterial line  Given that our access was limited because of patient's fistula is, we elected to use the groin  Patient also had poor peripheral access primarily due to her fistula was and we decided that it would be best to place a central catheter as well   did not have any other information to add regarding patient's complaints because he states that he was at work when this event happened   manages all of her patient's medications and he does not believe that she may have accidentally taken more than prescribed  No additional complaints       History obtained from chart review, ED physicians,    -------------------------------------------------------------------------------------------------------------  Assessment and Plan:    1  Bradycardia  - Unclear etiology, possibly secondary to dual AV da agents and hx of renal failure  - Although BP normal in ED, patient became hypotensive in ICU, will start dopamine  - If remains bradycardic will need cardiology consult and possible consideration for PPM  - If no response to dopamine tonight, may need TVP    2  Hypotension  - Unclear etiology, likely secondary to #1  - Will place shaw/central line for better management and to allow for starting dopamine  - Will send blood cultures to evaluate for occult infection    3  ESRD on HD  - Last dialysis yesterday  - Per , patient has an 6year old fistula on left that has required multiple stents/revisions but is currently being used for access  - Recently had Jon which was removed  - Right fistula os still maturing per , required revision 1 month ago  - Poor healing/open wound of RUE has been evaluated by vascular per  and there is no reported concern  - Will consult renal, will likely not tolerate HD unless BP improves    4  Uncontrolled Hyperglycemia with DM1  - Insulin pump at home  - Will start insulin gtt  - Hx of noncompliance    5  CAD with recent Stent placement  - Continue ASA  - Trend troponin to r/o MI as cause for bradycardia  - Echo    6  PAF  - Currently in junctional bradycardic rhythm  - Hold all AV da blocking agents  - Continue Eliquis    7  Chronic Diastolic CHF  - Echo      Disposition: Continue Critical Care   Code Status: Level 1 - Full Code  --------------------------------------------------------------------------------------------------------------  Review of Systems   Unable to perform ROS: Intubated   Gastrointestinal: Positive for vomiting  Neurological: Positive for syncope         Review of systems was unable to be performed secondary to intubation, sedation    Physical Exam   Constitutional: She appears well-developed and well-nourished  No distress  HENT:   Head: Normocephalic and atraumatic  Eyes: Pupils are equal, round, and reactive to light  Conjunctivae and EOM are normal    Neck: Normal range of motion  No JVD present  No tracheal deviation present  Cardiovascular: Regular rhythm and normal heart sounds  bradycardic   Pulmonary/Chest: Effort normal and breath sounds normal  No respiratory distress  Abdominal: Soft  Bowel sounds are normal    obese   Musculoskeletal: Normal range of motion  She exhibits no edema or deformity  Fistula present in B/L upper extremities, LUE well healed, RUE with dehisced wound and granulation tissue, no erythema   Neurological:   Sedated, withdraws to pain, no focal motor/sensory deficits    Skin: Skin is warm and dry  No rash noted  Nursing note and vitals reviewed      --------------------------------------------------------------------------------------------------------------  Historical Information   Past Medical History:   Diagnosis Date    Asthma     Cardiac disease     CHF    [facility]     CPAP (continuous positive airway pressure) dependence     Diabetes mellitus ([place] [address] )     type 1    Dialysis patient ([place] [address] )     Disease of thyroid gland     hypothyroidism    GERD (gastroesophageal reflux disease)     Hyperlipidemia     Hypertension     Migraine     Psychiatric disorder     PTSD, Depression, panic attacks    Renal disorder     stage 4 kidney disease     Past Surgical History:   Procedure Laterality Date    CATARACT EXTRACTION, BILATERAL       SECTION      CHOLECYSTECTOMY      HERNIA REPAIR      RETINOPATHY SURGERY      TONSILECTOMY AND ADNOIDECTOMY      TONSILLECTOMY      TYMPANOSTOMY TUBE PLACEMENT       Social History   Social History     Substance and Sexual Activity   Alcohol Use Not Currently    Binge frequency: Never     Social History     Substance and Sexual Activity   Drug Use No     Social History     Tobacco Use   Smoking Status Never Smoker   Smokeless Tobacco Never Used     Exercise History: none  Family History:   Family History   Problem Relation Age of Onset    Pancreatic cancer Mother     Stroke Father      I have reviewed this patient's family history and commented on sigificant items within the HPI    Vitals:   Vitals:    11/15/19 0100 11/15/19 0114 11/15/19 0117 11/15/19 0142   BP:       BP Location:       Pulse: 56 60 62 56   Resp: 17 (!) 25 16 (!) 25   Temp: (!) 96 8 °F (36 °C) (!) 96 8 °F (36 °C) (!) 96 8 °F (36 °C) (!) 96 8 °F (36 °C)   TempSrc:       SpO2: 92% 97% 92% 96%   Weight:       Height:         Temp  Min: 96 8 °F (36 °C)  Max: 98 3 °F (36 8 °C)  IBW: 57 kg  Height: 5' 5" (165 1 cm)  Body mass index is 42 48 kg/m²  N/A    Medications:  Current Facility-Administered Medications   Medication Dose Route Frequency    chlorhexidine (PERIDEX) 0 12 % oral rinse 15 mL  15 mL Swish [employer] Spit Q12H [address]    DOPamine (INTROPIN) 400 mg in 250 mL infusion (premix)  1-20 mcg/kg/min Intravenous Titrated    heparin (porcine) subcutaneous injection 5,000 Units  5,000 Units Subcutaneous Q8H [address]    insulin regular (HumuLIN R,NovoLIN R) 1 Units/mL in sodium chloride 0 9 % 100 mL infusion  0 3-21 Units/hr Intravenous Titrated    multi-electrolyte (ISOLYTE-S PH 7 4) bolus 1,000 mL  1,000 mL Intravenous Once    multi-electrolyte (PLASMALYTE-A/ISOLYTE-S PH 7 4) IV solution  75 mL/hr Intravenous Continuous    propofol (DIPRIVAN) 1000 mg in 100 mL infusion (premix)  5-50 mcg/kg/min Intravenous Titrated     Home medications:  Prior to Admission Medications   Prescriptions Last Dose Informant Patient Reported? Taking?    Ascorbic Acid (VITAMIN C) 1000 MG tablet   Yes No   Sig: Take 1,000 mg by mouth daily   FLUoxetine (PROzac) 40 MG capsule   Yes No   Sig: Take 40 mg by mouth daily   Ferric Citrate (AURYXIA) 1  MG(Fe) TABS   Yes No Sig: Take by mouth   PATIENT MAINTAINED INSULIN PUMP   No No   Sig: Inject 1 each under the skin every 8 (eight) hours   acetaminophen (TYLENOL) 325 mg tablet   Yes No   Sig: Take 650 mg by mouth every 6 (six) hours as needed for mild pain   albuterol (PROVENTIL HFA,VENTOLIN HFA) 90 mcg/act inhaler   Yes No   Sig: Inhale 2 puffs every 6 (six) hours as needed for wheezing   albuterol (PROVENTIL HFA,VENTOLIN HFA) 90 mcg/act inhaler   No No   Sig: Inhale 2 puffs every 4 (four) hours as needed for wheezing   allopurinol (ZYLOPRIM) 100 mg tablet   Yes No   Sig: Take 100 mg by mouth daily   apixaban (ELIQUIS) 2 5 mg   No No   Sig: Take 1 tablet (2 5 mg total) by mouth 2 (two) times a day   Patient taking differently: Take 5 mg by mouth 2 (two) times a day    aspirin 81 mg chewable tablet   No No   Sig: Chew 1 tablet (81 mg total) daily   atorvastatin (LIPITOR) 40 mg tablet   No No   Sig: Take 1 tablet (40 mg total) by mouth daily with dinner   Patient taking differently: Take 80 mg by mouth daily with dinner    b complex vitamins capsule   Yes No   Sig: Take 1 capsule by mouth daily   bisacodyl (DULCOLAX) 5 mg EC tablet   Yes No   Sig: Take 5 mg by mouth daily as needed for constipation   calcitriol (ROCALTROL) 0 5 MCG capsule   Yes No   Sig: Take 0 5 mcg by mouth daily   cholecalciferol (VITAMIN D3) 1,000 units tablet   Yes No   Sig: Take 1,000 Units by mouth daily   clopidogrel (PLAVIX) 75 mg tablet   Yes No   Sig: Take 75 mg by mouth daily   diltiazem (CARDIZEM CD) 180 mg 24 hr capsule   No No   Sig: Take 1 capsule (180 mg total) by mouth daily   diphenhydrAMINE (BENADRYL) 25 mg tablet   Yes No   Sig: Take 25 mg by mouth every 6 (six) hours as needed for itching   famotidine (PEPCID) 20 mg tablet   Yes No   Sig: Take 20 mg by mouth daily   fluticasone (FLONASE) 50 mcg/act nasal spray   Yes No   Si spray into each nostril 2 (two) times a day   fluticasone-salmeterol (ADVAIR DISKUS, WIXELA INHUB) 100-50 mcg/dose inhaler   Yes No   Sig: Inhale 1 puff 2 (two) times a day Rinse mouth after use    gabapentin (NEURONTIN) 300 mg capsule   No No   Sig: Take 1 capsule (300 mg total) by mouth daily at bedtime   insulin lispro (HumaLOG) 100 units/mL   No No   Si Units by Subcutaneous Insulin Pump route as needed (when pump is empty)   Patient taking differently: 300 Units by Subcutaneous Insulin Pump route as needed (when pump is empty)    levothyroxine 75 mcg tablet   Yes No   Sig: Take 75 mcg by mouth daily   loratadine (CLARITIN) 10 mg tablet   Yes No   Sig: Take 10 mg by mouth daily   metoprolol tartrate (LOPRESSOR) 25 mg tablet   No No   Sig: Take 1 tablet (25 mg total) by mouth every 12 (twelve) hours   omeprazole (PriLOSEC) 20 mg delayed release capsule   Yes No   Sig: Take 40 mg by mouth daily    ondansetron (ZOFRAN) 4 mg tablet   Yes No   Sig: Take 4 mg by mouth every 6 (six) hours as needed for nausea or vomiting   sevelamer carbonate (RENVELA) 800 mg tablet   Yes No   Sig: Take 800 mg by mouth 2 (two) times a day      Facility-Administered Medications: None     Allergies:   Allergies   Allergen Reactions    Codeine Edema    Shellfish Allergy Edema    Hydrocodone Itching and Vomiting    Morphine Itching and Vomiting    Oxycodone Itching and Vomiting         Laboratory and Diagnostics:  Results from last 7 days   Lab Units 19   WBC Thousand/uL  --  12 82*  --   --    HEMOGLOBIN g/dL  --  7 5*  --   --    I STAT HEMOGLOBIN g/dl  --   --  8 2* 8 2*   HEMATOCRIT %  --  24 9*  --   --    HEMATOCRIT, ISTAT %  --   --  24* 24*   PLATELETS Thousands/uL 215 228  --   --    NEUTROS PCT %  --  62  --   --    MONOS PCT %  --  9  --   --      Results from last 7 days   Lab Units 19   SODIUM mmol/L 129*  --   --    POTASSIUM mmol/L 5 2  --   --    CHLORIDE mmol/L 90*  --   --    CO2 mmol/L 23  --   --    CO2, I-STAT mmol/L  --  26 25   AGAP mmol/L  --   --  19*   ANION GAP mmol/L 16*  --   --    BUN mg/dL 86*  --   --    CREATININE mg/dL 6 58*  --   --    CALCIUM mg/dL 9 4  --   --    GLUCOSE RANDOM mg/dL 511*  --   --    ALT U/L 16  --   --    AST U/L 10  --   --    ALK PHOS U/L 202*  --   --    ALBUMIN g/dL 2 4*  --   --    TOTAL BILIRUBIN mg/dL 0 42  --   --                Results from last 7 days   Lab Units 11/14/19 2348 11/14/19 2116 11/14/19 1921   TROPONIN I ng/mL <0 02 <0 02 <0 02         ABG:    VBG:  Results from last 7 days   Lab Units 11/14/19 1921   PH CHERRY  7 348   PCO2 CHERRY mm Hg 41 2*   PO2 CHERRY mm Hg 43 6   HCO3 CHERRY mmol/L 22 1*   BASE EXC CHERRY mmol/L -3 3           Micro:          EKG: Multiple EKGs reviewed from ED, first appeared to be a sinus bradycardia with a possible block, the second appeared to be a junctional bradycardia, the third appeared to be a junctional bradycardia with couplets  Imaging: I have personally reviewed pertinent films in PACS    ------------------------------------------------------------------------------------------------------------  Advance Directive and Living Will:      Power of :    POLST:    ------------------------------------------------------------------------------------------------------------  Anticipated Length of Stay is > 2 midnights    Counseling / Coordination of Care  Total Critical Care time spent 70 minutes excluding procedures, teaching and family updates  This includes time spent assess hypotension and deciding on treatment  Magaly Silva MD        Portions of the record may have been created with voice recognition software  Occasional wrong word or "sound a like" substitutions may have occurred due to the inherent limitations of voice recognition software    Read the chart carefully and recognize, using context, where substitutions have occurred

## 2019-11-15 NOTE — PROGRESS NOTES
Vancomycin IV Pharmacy-to-Dose Consultation    Jose Segura is a 48 y o  female who is currently receiving vancomycin IV with management by the Pharmacy Consult service  Relevant clinical data and objective / subjective history reviewed  Vancomycin Assessment:  Indication:   Status: critically ill, worsening hypoxia  Micro:   7/20: MRSA screen negative   11/14: urine culture, blood culture x1 in process  11/15: blood culture x1 in process   Procalcitonin: 11/15 1 13  Renal Function: 11/15 Scr 6 56 (6 58 yesterday); elevated from suspected baseline; no UOP, possible HD tomorrow per nephrology   Potential Nephrotoxicity Factors:  Medications: vasopressors  Patient-Factors: hypotension, renal dysfunction  Days of Therapy: 1  Current Dose: 2000 mg x1 at 1130 on 11/15   Goal Trough: 15-20  Goal AUC(24h): 400-600  Last Level: N/A      Vancomycin Plan:  New Dosing: patient may go for HD tomorrow; received 25 mg/kg load 11/15, will check random level 11/16 with AM labs to ensure < 20; 1250 mg daily PRN when level < 20 (next dose:TBD)  Next Level: 11/16 with AM labs  Renal Function Monitoring: will monitor BMP,  UOP and follow nephrology note for dialysis plan      Pharmacy will continue to follow closely for s/sx of nephrotoxicity, infusion reactions and appropriateness of therapy  BMP and CBC will be ordered per protocol  We will continue to follow the patient's culture results and clinical progress daily         Thank you,  Farida Singer, PharmD  PGY-1 Pharmacy Resident

## 2019-11-15 NOTE — RESPIRATORY THERAPY NOTE
resp care      11/15/19 1620   Respiratory Assessment   Resp Comments pt appears comfortable on current settings, will continue to monitor   O2 Device vent   ETT  Cuffed 8 mm   Placement Date/Time: 11/14/19 (c) 8260   Type: Cuffed  Tube Size: 8 mm  Location: Oral  Insertion attempts: 2  Placement Verification: Chest x-ray;End tidal CO2;Symmetrical chest wall movement  Secured at (cm): (c) 24  Comments: orders to withdrawl en       Secured at (cm) 23   Measured from [address] [facility] by [employer] tube morales   Site Condition Dry   HI-LO Suction  Intermittent suction   Additional Assessments   SpO2 95 %

## 2019-11-15 NOTE — CONSULTS
Consult Note - Wound   Alexus Goodman 48 y o  female MRN: [id number]  Unit/Bed#: [facility] 07 Encounter: [id number]      History and Present Illness:  Patient is a 48year old female admitted for acute respiratory failure  Patient examined in bed with primary nurse present  Patient is intubated and sedated  Patient is on a low air loss mattress  She is incontinent of stool  She has a history of ESRD on dialysis  She has an AV fistula on her right upper arm that has a history of wound dehiscence  Assessment Findings:   1  POA evolving DTI L buttock  2  POA surgical wound dehiscence right upper arm with yellow drainage    See flowsheets for details      L buttock        R upper arm            Skin care plans:  1-Hydraguard to bilateral sacrum, buttock and heels BID and PRN  2-Elevate heels to offload pressure  3-Ehob cushion in chair when out of bed  4-Moisturize skin daily with skin nourishing cream   5-Turn/reposition q2h or when medically stable for pressure re-distribution on skin  6-Rinse R upper arm wound with saline, pat dry  Apply silvasorb gel, cover with nonadherant gauze, and an ABD  Wrap with Cathydwayne Conklin  Change daily  Call or Tiger text with any questions  Wound Care will continue to follow    Wound 11/15/19 Pressure Injury Buttocks Left (Active)   Wound Image   11/15/2019 10:44 AM   Wound Description Dark edges;Light purple;Fragile 11/15/2019 10:44 AM   Staging Deep Tissue Injury 11/15/2019 10:44 AM   Magi-wound Assessment Clean;Dry; Intact 11/15/2019 10:44 AM   Wound Length (cm) 1 5 cm 11/15/2019 10:44 AM   Wound Width (cm) 1 cm 11/15/2019 10:44 AM   Calculated Wound Area (cm^2) 1 5 cm^2 11/15/2019 10:44 AM   Drainage Amount None 11/15/2019  8:00 AM   Treatments Cleansed 11/15/2019 10:44 AM   Dressing Open to air 11/15/2019  8:00 AM       Wound 11/14/19 Fistula Fistula Arm Anterior; Upper;Right (Active)   Wound Image   11/15/2019 10:39 AM   Wound Description Drainage;Yellow 11/15/2019 10:39 AM Magi-wound Assessment Erythema 11/15/2019 10:39 AM   Wound Length (cm) 12 cm 11/15/2019 10:39 AM   Wound Width (cm) 1 cm 11/15/2019 10:39 AM   Wound Depth (cm) 0 2 11/15/2019 10:39 AM   Calculated Wound Area (cm^2) 12 cm^2 11/15/2019 10:39 AM   Calculated Wound Volume (cm^3) 2 4 cm^3 11/15/2019 10:39 AM   Drainage Amount Scant 11/15/2019 10:39 AM   Drainage Description Yellow 11/15/2019 10:39 AM   Dressing Vaseline gauze;Silvasorb gel;ABD 11/15/2019 10:39 AM   Wound packed? No 11/15/2019  8:00 AM   Dressing Changed New 11/15/2019  8:00 AM   Patient Tolerance Tolerated well 11/15/2019  8:00 AM   Dressing Status Clean; Intact;Dry 11/15/2019  8:00 AM

## 2019-11-15 NOTE — RESPIRATORY THERAPY NOTE
resp car      11/15/19 1102   Respiratory Assessment   Resp Comments endotube withdrawn 1cm, secured at the 23cm huy, bilat breath sounds heard

## 2019-11-15 NOTE — PLAN OF CARE
Problem: Prexisting or High Potential for Compromised Skin Integrity  Goal: Skin integrity is maintained or improved  Description  INTERVENTIONS:  - Identify patients at risk for skin breakdown  - Assess and monitor skin integrity  - Assess and monitor nutrition and hydration status  - Monitor labs   - Assess for incontinence   - Turn and reposition patient  - Assist with mobility/ambulation  - Relieve pressure over bony prominences  - Avoid friction and shearing  - Provide appropriate hygiene as needed including keeping skin clean and dry  - Evaluate need for skin moisturizer/barrier cream  - Collaborate with interdisciplinary team   - Patient/family teaching  - Consider wound care consult   Outcome: Progressing     Problem: Potential for Falls  Goal: Patient will remain free of falls  Description  INTERVENTIONS:  - Assess patient frequently for physical needs  -  Identify cognitive and physical deficits and behaviors that affect risk of falls    -  [place] fall precautions as indicated by assessment   - Educate patient/family on patient safety including physical limitations  - Instruct patient to call for assistance with activity based on assessment  - Modify environment to reduce risk of injury  - Consider OT/PT consult to assist with strengthening/mobility  Outcome: Progressing     Problem: PAIN - ADULT  Goal: Verbalizes/displays adequate comfort level or baseline comfort level  Description  Interventions:  - Encourage patient to monitor pain and request assistance  - Assess pain using appropriate pain scale  - Administer analgesics based on type and severity of pain and evaluate response  - Implement non-pharmacological measures as appropriate and evaluate response  - Consider cultural and social influences on pain and pain management  - Notify physician/advanced practitioner if interventions unsuccessful or patient reports new pain  Outcome: Progressing     Problem: INFECTION - ADULT  Goal: Absence or prevention of progression during hospitalization  Description  INTERVENTIONS:  - Assess and monitor for signs and symptoms of infection  - Monitor lab/diagnostic results  - Monitor all insertion sites, i e  indwelling lines, tubes, and drains  - Monitor endotracheal if appropriate and nasal secretions for changes in amount and color  - [place] appropriate cooling/warming therapies per order  - Administer medications as ordered  - Instruct and encourage patient and family to use good hand hygiene technique  - Identify and instruct in appropriate isolation precautions for identified infection/condition  Outcome: Progressing     Problem: DISCHARGE PLANNING  Goal: Discharge to home or other facility with appropriate resources  Description  INTERVENTIONS:  - Identify barriers to discharge w/patient and caregiver  - Arrange for needed discharge resources and transportation as appropriate  - Identify discharge learning needs (meds, wound care, etc )  - Arrange for interpretive services to assist at discharge as needed  - Refer to Case Management Department for coordinating discharge planning if the patient needs post-hospital services based on physician/advanced practitioner order or complex needs related to functional status, cognitive ability, or social support system  Outcome: Progressing     Problem: Knowledge Deficit  Goal: Patient/family/caregiver demonstrates understanding of disease process, treatment plan, medications, and discharge instructions  Description  Complete learning assessment and assess knowledge base    Interventions:  - Provide teaching at level of understanding  - Provide teaching via preferred learning methods  Outcome: Progressing     Problem: SAFETY,RESTRAINT: NV/NON-SELF DESTRUCTIVE BEHAVIOR  Goal: Remains free of harm/injury (restraint for non violent/non self-detsructive behavior)  Description  INTERVENTIONS:  - Instruct patient/family regarding restraint use   - Assess and monitor physiologic and psychological status   - Provide interventions and comfort measures to meet assessed patient needs   - Identify and implement measures to help patient regain control  - Assess readiness for release of restraint   Outcome: Progressing  Goal: Returns to optimal restraint-free functioning  Description  INTERVENTIONS:  - Assess the patient's behavior and symptoms that indicate continued need for restraint  - Identify and implement measures to help patient regain control  - Assess readiness for release of restraint   Outcome: Progressing     Problem: COPING  Goal: Pt/Family able to verbalize concerns and demonstrate effective coping strategies  Description  INTERVENTIONS:  - Assist patient/family to identify coping skills, available support systems and cultural and spiritual values  - Provide emotional support, including active listening and acknowledgement of concerns of patient and caregivers  - Reduce environmental stimuli, as able  - Provide patient education  - Assess for spiritual pain/suffering and initiate spiritual care, including notification of Pastoral Care or jimmie based community as needed  - Assess effectiveness of coping strategies  Outcome: Progressing  Goal: Will report anxiety at manageable levels  Description  INTERVENTIONS:  - Administer medication as ordered  - Teach and encourage coping skills  - Provide emotional support  - Assess patient/family for anxiety and ability to cope  Outcome: Progressing     Problem: SELF HARM  Goal: Effect of psychiatric condition will be minimized and patient will be protected from self harm  Description  INTERVENTIONS:  - Assess impact of patient's symptoms on level of functioning, self-care needs and offer support as indicated  - Assess patient/family knowledge of depression, impact on illness and need for teaching  - Provide emotional support, presence and reassurance  - Assess for possible suicidal thoughts, ideation or self-harm   If patient expresses suicidal thoughts or statements do not leave alone, notify physician/AP immediately, initiate suicide precautions, and determine need for continual observation    - initiate consults and referrals as appropriate (a mental health professional, Spiritual Care  Outcome: Progressing     Problem: CARDIOVASCULAR - ADULT  Goal: Maintains optimal cardiac output and hemodynamic stability  Description  INTERVENTIONS:  - Monitor I/O, vital signs and rhythm  - Monitor for S/S and trends of decreased cardiac output  - Administer and titrate ordered vasoactive medications to optimize hemodynamic stability  - Assess quality of pulses, skin color and temperature  - Assess for signs of decreased coronary artery perfusion  - Instruct patient to report change in severity of symptoms  Outcome: Progressing  Goal: Absence of cardiac dysrhythmias or at baseline rhythm  Description  INTERVENTIONS:  - Continuous cardiac monitoring, vital signs, obtain 12 lead EKG if ordered  - Administer antiarrhythmic and heart rate control medications as ordered  - Monitor electrolytes and administer replacement therapy as ordered  Outcome: Progressing     Problem: RESPIRATORY - ADULT  Goal: Achieves optimal ventilation and oxygenation  Description  INTERVENTIONS:  - Assess for changes in respiratory status  - Assess for changes in mentation and behavior  - Position to facilitate oxygenation and minimize respiratory effort  - Oxygen administered by appropriate delivery if ordered  - Initiate smoking cessation education as indicated  - Encourage broncho-pulmonary hygiene including cough, deep breathe, Incentive Spirometry  - Assess the need for suctioning and aspirate as needed  - Assess and instruct to report SOB or any respiratory difficulty  - Respiratory Therapy support as indicated  Outcome: Progressing

## 2019-11-15 NOTE — RESPIRATORY THERAPY NOTE
RT Protocol Note  Shilpa Naqvi 48 y o  female MRN: [id number]  Unit/Bed#: MICU 07 Encounter: [id number]    Assessment    Active Problems:    * No active hospital problems   *      Home Pulmonary Medications:  na       Past Medical History:   Diagnosis Date    Asthma     Cardiac disease     CHF    [facility]     CPAP (continuous positive airway pressure) dependence     Diabetes mellitus ([facility] )     type 1    Dialysis patient ([place] [id number] )     Disease of thyroid gland     hypothyroidism    GERD (gastroesophageal reflux disease)     Hyperlipidemia     Hypertension     Migraine     Psychiatric disorder     PTSD, Depression, panic attacks    Renal disorder     stage 4 kidney disease     Social History     Socioeconomic History    Marital status: /Civil Union     Spouse name: None    Number of children: None    Years of education: None    Highest education level: None   Occupational History    None   Social Needs    Financial resource strain: None    Food insecurity:     Worry: None     Inability: None    Transportation needs:     Medical: None     Non-medical: None   Tobacco Use    Smoking status: Never Smoker    Smokeless tobacco: Never Used   Substance and Sexual Activity    Alcohol use: Not Currently     Binge frequency: Never    Drug use: No    Sexual activity: None   Lifestyle    Physical activity:     Days per week: None     Minutes per session: None    Stress: None   Relationships    Social connections:     Talks on phone: None     Gets together: None     Attends Spiritism service: None     Active member of club or organization: None     Attends meetings of clubs or organizations: None     Relationship status: None    Intimate partner violence:     Fear of current or ex partner: None     Emotionally abused: None     Physically abused: None     Forced sexual activity: None   Other Topics Concern    None   Social History Narrative    None       Subjective Objective    Physical Exam:   Assessment Type: Assess only  General Appearance: Sedated  Respiratory Pattern: Normal  Chest Assessment: Chest expansion symmetrical  Bilateral Breath Sounds: Clear  Cough: Non-productive  O2 Device: Ventilator    Vitals:  Blood pressure (!) 115/46, pulse (!) 38, temperature (!) 96 8 °F (36 °C), resp  rate 16, weight 112 kg (246 lb 14 6 oz), SpO2 100 %  Imaging and other studies: I have personally reviewed pertinent reports  and I have personally reviewed pertinent films in PACS    O2 Device: Ventilator     Plan    Respiratory Plan: Vent/NIV/HFNC        Resp Comments: Pt  transported from ED to MICU 7 via 100% bag mask without incident  Pt  intubated for airway protection following multiple sycope events and subsequent vomiting  Pt  has no prior pulmonary Hx  according to chart and breath sounds are clear bilaterally at this time  Pt  placed on previous AC mode setting provided Kevin  No Pulmonary medications indicated at this time  Will continue to monitor and assess pt  per protocol

## 2019-11-15 NOTE — PROCEDURES
Arterial Line Insertion  Date/Time: 11/15/2019 12:58 AM  Performed by: Megha Hilliard MD  Authorized by: Megha Hilliard MD     Patient location:  Bedside  Other Assisting Provider: Yes (comment) Melonie Deleon)    Consent:     Consent obtained:  Verbal    Consent given by:  Spouse    Risks discussed:  Bleeding, infection and repeat procedure  Universal protocol:     Procedure explained and questions answered to patient or proxy's satisfaction: yes      Immediately prior to procedure a time out was called: yes      Patient identity confirmed:  Arm band  Indications:     Indications: hemodynamic monitoring, multiple ABGs, continuous blood pressure monitoring and frequent labs / infusion    Pre-procedure details:     Skin preparation:  Other (comment) (chloraprep)    Preparation: Patient was prepped and draped in sterile fashion    Anesthesia (see MAR for exact dosages): Anesthesia method:  None  Procedure details:     Location / Tip of Catheter:  Femoral    Laterality:  Left    Rancho's test performed: no      Needle gauge:  20 G    Placement technique:  Seldinger and ultrasound guided    Number of attempts:  2    Successful placement: yes      Transducer: waveform confirmed    Post-procedure details:     Post-procedure:  Sterile dressing applied and sutured    CMS:  Unable to assess    Patient tolerance of procedure:   Tolerated well, no immediate complications

## 2019-11-15 NOTE — RESPIRATORY THERAPY NOTE
resp care      11/15/19 0900   Respiratory Assessment   Resp Comments pt tolerated cpap/ps wean, pt placed back on previous settings will continue to monitor    O2 Device vent   ETT  Cuffed 8 mm   Placement Date/Time: 11/14/19 (c) 2110   Type: Cuffed  Tube Size: 8 mm  Location: Oral  Insertion attempts: 2  Placement Verification: Chest x-ray;End tidal CO2;Symmetrical chest wall movement  Secured at (cm): 24   Secured at (cm) 24   Measured from [facility] by [employer] tube morales   Site Condition Dry   HI-LO Suction  Intermittent suction

## 2019-11-15 NOTE — CONSULTS
Consultation - Electrophysiology  Angely Benitez 48 y o  female MRN: [id number]  Unit/Bed#: MICU 07 Encounter: [id number]      Inpatient consult to Electrophysiology  Consult performed by: Jason Thornton PA-C  Consult ordered by: CARLOS Mora          History of Present Illness   Physician Requesting Consult: Peg Pearce MD  Reason for Consult / Principal Problem: Bradycardia    Assessment/Plan   Assessment:  1  Bradycardia associated with hypotension  - narrow junctional escape rhythm per EKGs below   2  Multiple syncopal episodes  3  History of CAD  - status post stent to LAD 9/6/2019  - maintained on only Plavix and not aspirin given GI bleed, and statin  4  Paroxysmal atrial fibrillation  - had been anticoagulated with Eliquis in the past / lowered due to GIB (had appointment later this month for Watchman consultation)  - current echo pending / echo from   - rate controlled on diltiazem 180mg daily and metoprolol tartrate 25mg twice daily   5  Essential hypertension  6  Hyperlipidemia  - maintained on atorvastatin 80mg daily   7  Chronic diastolic congestive heart failure  8  Type I diabetes mellitus  - currently on insulin pump  9  End stage renal disease, on hemodialysis  10  Hypothyroidism   11  History of DVT  12  LISSA  - maintained on CPAP machine  13  Obesity with BMI of 42    Plan:  1  Bradycardia - Patient is currently being maintained on on dopamine drip with good pressures and heart rate in the 80s  Per ICU staff, when dopamine was stopped heart rate and pressures dropped dramatically  Therefore, would continue with this treatment for now as patient is not the best candidate at this time for device  It would have to be confirmed with Nephrology which fistula is being utilized so the device would be placed on the opposite side  Even a temporary pacemaker would be the difficult access from the jugular    There is also some concern for infection given dehiscence of AV fistula on the right side and possible aspiration from emesis when she first came in  Discussed with ICU attending and dopamine would be continued  They are to call cardiology over the weekend if anything should change in the patient's status and temporary from jugular is needed  Will continue to monitor rhythm but also formally see by electrophysiology again on Monday to help determine if patient requires permanent pacemaker implantation at that time  ECHO will also be done at this time to further guide therapy  In terms of reasoning for bradycardia, patient is on both metoprolol and Cardizem, which she has in his AV da blocker agents together could result in profound bradycardia  Patient is currently intubated and cannot the question as to whether she may have taken more these heart medications than usual  It's unclear per records how long she has had atrial fibrillation which could cause enough conduction disease to lead to junctional rhythm  HPI: Jazmin Valderrama is a 48y o  year old female with a history of paroxysmal atrial fibrillation with low-dose Eliquis due to GIB, hypertension, hyperlipidemia, CAD with prior stent, chronic diastolic congestive heart failure, type 1 diabetes mellitus, end-stage renal disease on hemodialysis, obesity, LISSA, history of DVT, and hypothyroidism  Patient presented to the emergency room on 11/14 with syncope and emesis   reports that he came home from work to find her in that state  There were concerns to the emergency room that she was unable to protect her airway and she was therefore intubated at that time  Patient has had both central and arterial lines placed in the fact that patient has bilateral fistulas for her hemodialysis (she had had hemodialysis on 11/13)  Also throughout the time that she was being transported to the hospital, she was bradycardic in the 40s    Cardiology was contacted in given the fact that patient continue to have good blood pressures, they advised to start dopamine drip  However, even after  starting dopamine drip her pressures began to bottom out  Therefore, she is being maintained on Levophed as well  It was attempted to wean down her dopamine drip which resulted in further bradycardia and hypotension was therefore re-initiated  EKG: on admission, prior to being given dopamine       Rhythm while on dopamine drip      Review of Systems  ROS as noted above, otherwise 12 point review of systems was performed and is negative       Historical Information   Past Medical History:   Diagnosis Date    Asthma     Cardiac disease     CHF    [facility]     CPAP (continuous positive airway pressure) dependence     Diabetes mellitus ([facility] )     type 1    Dialysis patient ([facility] )     Disease of thyroid gland     hypothyroidism    GERD (gastroesophageal reflux disease)     Hyperlipidemia     Hypertension     Migraine     Psychiatric disorder     PTSD, Depression, panic attacks    Renal disorder     stage 4 kidney disease     Past Surgical History:   Procedure Laterality Date    CATARACT EXTRACTION, BILATERAL       SECTION      CHOLECYSTECTOMY      HERNIA REPAIR      RETINOPATHY SURGERY      TONSILECTOMY AND ADNOIDECTOMY      TONSILLECTOMY      TYMPANOSTOMY TUBE PLACEMENT       Social History     Substance and Sexual Activity   Alcohol Use Not Currently    Binge frequency: Never     Social History     Substance and Sexual Activity   Drug Use No     Social History     Tobacco Use   Smoking Status Never Smoker   Smokeless Tobacco Never Used     Family History:   Family History   Problem Relation Age of Onset    Pancreatic cancer Mother     Stroke Father        Meds/Allergies   Hospital Medications:   Current Facility-Administered Medications   Medication Dose Route Frequency    apixaban (ELIQUIS) tablet 5 mg  5 mg Oral BID    aspirin chewable tablet 81 mg  81 mg Oral Daily    atorvastatin (LIPITOR) tablet 80 mg  80 mg Oral Daily With Dinner    bisacodyl (DULCOLAX) EC tablet 5 mg  5 mg Oral Daily PRN    calcitriol (ROCALTROL) capsule 0 5 mcg  0 5 mcg Oral Daily    cefepime (MAXIPIME) 2,000 mg in dextrose 5 % 50 mL IVPB  2,000 mg Intravenous Once    chlorhexidine (PERIDEX) 0 12 % oral rinse 15 mL  15 mL [employer] Spit Q12H [address]    clopidogrel (PLAVIX) tablet 75 mg  75 mg Oral Daily    DOPamine (INTROPIN) 400 mg in 250 mL infusion (premix)  1-20 mcg/kg/min Intravenous Titrated    famotidine (PEPCID) tablet 20 mg  20 mg Oral Daily    fentaNYL 1000 mcg in sodium chloride 0 9% 100mL infusion  50 mcg/hr Intravenous Continuous    FLUoxetine (PROzac) capsule 40 mg  40 mg Oral Daily    gabapentin (NEURONTIN) capsule 300 mg  300 mg Oral HS    insulin regular (HumuLIN R,NovoLIN R) 1 Units/mL in sodium chloride 0 9 % 100 mL infusion  0 3-21 Units/hr Intravenous Titrated    levothyroxine tablet 75 mcg  75 mcg Oral Early Morning    norepinephrine (LEVOPHED) 4 mg (STANDARD CONCENTRATION) IV in sodium chloride 0 9% 250 mL  1-30 mcg/min Intravenous Titrated    propofol (DIPRIVAN) 1000 mg in 100 mL infusion (premix)  5-50 mcg/kg/min Intravenous Titrated    vancomycin (VANCOCIN) 2,000 mg in sodium chloride 0 9 % 500 mL IVPB  25 mg/kg (Adjusted) Intravenous Once     Home Medications:   Medications Prior to Admission   Medication    acetaminophen (TYLENOL) 325 mg tablet    albuterol (PROVENTIL HFA,VENTOLIN HFA) 90 mcg/act inhaler    albuterol (PROVENTIL HFA,VENTOLIN HFA) 90 mcg/act inhaler    allopurinol (ZYLOPRIM) 100 mg tablet    apixaban (ELIQUIS) 2 5 mg    Ascorbic Acid (VITAMIN C) 1000 MG tablet    aspirin 81 mg chewable tablet    atorvastatin (LIPITOR) 40 mg tablet    b complex vitamins capsule    bisacodyl (DULCOLAX) 5 mg EC tablet    calcitriol (ROCALTROL) 0 5 MCG capsule    cholecalciferol (VITAMIN D3) 1,000 units tablet    clopidogrel (PLAVIX) 75 mg tablet    diltiazem (CARDIZEM CD) 180 mg 24 hr capsule    diphenhydrAMINE (BENADRYL) 25 mg tablet    famotidine (PEPCID) 20 mg tablet    Ferric Citrate (AURYXIA) 1  MG(Fe) TABS    FLUoxetine (PROzac) 40 MG capsule    fluticasone (FLONASE) 50 mcg/act nasal spray    fluticasone-salmeterol (ADVAIR DISKUS, WIXELA INHUB) 100-50 mcg/dose inhaler    gabapentin (NEURONTIN) 300 mg capsule    insulin lispro (HumaLOG) 100 units/mL    levothyroxine 75 mcg tablet    loratadine (CLARITIN) 10 mg tablet    metoprolol tartrate (LOPRESSOR) 25 mg tablet    omeprazole (PriLOSEC) 20 mg delayed release capsule    ondansetron (ZOFRAN) 4 mg tablet    PATIENT MAINTAINED INSULIN PUMP    sevelamer carbonate (RENVELA) 800 mg tablet       Allergies   Allergen Reactions    Codeine Edema    Shellfish Allergy Edema    Hydrocodone Itching and Vomiting    Morphine Itching and Vomiting    Oxycodone Itching and Vomiting       Objective   Vitals: Blood pressure (!) 56/30, pulse 80, temperature (!) 97 2 °F (36 2 °C), temperature source Rectal, resp  rate 22, height 5' 5" (1 651 m), weight 116 kg (255 lb 4 7 oz), SpO2 97 %    Orthostatic Blood Pressures      Most Recent Value   Blood Pressure  (!) 56/30 filed at 11/14/2019 2259   Patient Position - Orthostatic VS  Lying filed at 11/15/2019 0430            Intake/Output Summary (Last 24 hours) at 11/15/2019 1057  Last data filed at 11/15/2019 0838  Gross per 24 hour   Intake 3201 03 ml   Output 175 ml   Net 3026 03 ml       Invasive Devices     Central Venous Catheter Line            CVC Central Lines 11/15/19 Triple 16cm less than 1 day          Peripheral Intravenous Line            Peripheral IV 11/14/19 Left  less than 1 day    Peripheral IV 11/14/19 Right Foot less than 1 day          Arterial Line            Arterial Line 11/15/19 Femoral less than 1 day          Line            Hemodialysis AV Fistula Left Upper arm -- days    Hemodialysis AV Fistula Right Upper arm -- days          Drain            NG/OG/Enteral Tube Orogastric 16 Fr less than 1 day          Airway            ETT  Cuffed 8 mm less than 1 day                Physical Exam   GEN: NAD, alert and oriented, well appearing  SKIN: AV fistula in brachiocephalic area bilaterally   NECK: No JVD / short thick stature  CARDIOVASCULAR: RRR  LUNGS: currently intubated  ABDOMEN: Soft, nontender, nondistended  EXTREMITIES/VASCULAR: perfused without clubbing, cyanosis, or edema b/l    Lab Results: I have personally reviewed pertinent lab results  Results from last 7 days   Lab Units 11/15/19  0506 11/14/19 2116 11/14/19 1921 11/14/19 1918   WBC Thousand/uL 15 30*  --  12 82*  --    HEMOGLOBIN g/dL 7 4*  --  7 5*  --    I STAT HEMOGLOBIN g/dl  --   --   --  8 2*   HEMATOCRIT % 23 5*  --  24 9*  --    HEMATOCRIT, ISTAT %  --   --   --  24*   PLATELETS Thousands/uL 168 215 228  --      Results from last 7 days   Lab Units 11/15/19  0506 11/14/19 1921 11/14/19 1918   POTASSIUM mmol/L 4 6 5 2  --    CHLORIDE mmol/L 93* 90*  --    CO2 mmol/L 23 23  --    CO2, I-STAT mmol/L  --   --  26   BUN mg/dL 94* 86*  --    CREATININE mg/dL 6 56* 6 58*  --    GLUCOSE, ISTAT mg/dl  --   --  503*   CALCIUM mg/dL 8 8 9 4  --          Results from last 7 days   Lab Units 11/15/19  0506   MAGNESIUM mg/dL 2 3       Imaging: I have personally reviewed pertinent reports  ECHO: No results found for this or any previous visit  Cardiac testing:   ECHO:   No results found for this or any previous visit  No results found for this or any previous visit  CATH:  No results found for this or any previous visit  STRESS TEST:  No results found for this or any previous visit      VTE Prophylaxis: Sequential compression device (Venodyne)

## 2019-11-15 NOTE — ED NOTES
Pt continuing to pass out and vomit while passing out  Pt suctioned several times         Jean-Claude Atkins RN  11/14/19 1950

## 2019-11-16 ENCOUNTER — APPOINTMENT (INPATIENT)
Dept: RADIOLOGY | Facility: HOSPITAL | Age: 54
DRG: 871 | End: 2019-11-16
Payer: COMMERCIAL

## 2019-11-16 ENCOUNTER — APPOINTMENT (INPATIENT)
Dept: DIALYSIS | Facility: HOSPITAL | Age: 54
DRG: 871 | End: 2019-11-16
Attending: INTERNAL MEDICINE
Payer: COMMERCIAL

## 2019-11-16 PROBLEM — G93.40 ENCEPHALOPATHY: Status: RESOLVED | Noted: 2019-11-15 | Resolved: 2019-11-16

## 2019-11-16 LAB
ALBUMIN SERPL BCP-MCNC: 2.3 G/DL (ref 3.5–5)
ALP SERPL-CCNC: 178 U/L (ref 46–116)
ALT SERPL W P-5'-P-CCNC: 88 U/L (ref 12–78)
ANION GAP SERPL CALCULATED.3IONS-SCNC: 18 MMOL/L (ref 4–13)
AST SERPL W P-5'-P-CCNC: 115 U/L (ref 5–45)
ATRIAL RATE: 117 BPM
BILIRUB SERPL-MCNC: 0.67 MG/DL (ref 0.2–1)
BUN SERPL-MCNC: 106 MG/DL (ref 5–25)
CA-I BLD-SCNC: 1.08 MMOL/L (ref 1.12–1.32)
CALCIUM SERPL-MCNC: 9.2 MG/DL (ref 8.3–10.1)
CHLORIDE SERPL-SCNC: 92 MMOL/L (ref 100–108)
CO2 SERPL-SCNC: 23 MMOL/L (ref 21–32)
CREAT SERPL-MCNC: 7.79 MG/DL (ref 0.6–1.3)
ERYTHROCYTE [DISTWIDTH] IN BLOOD BY AUTOMATED COUNT: 18.4 % (ref 11.6–15.1)
GFR SERPL CREATININE-BSD FRML MDRD: 5 ML/MIN/1.73SQ M
GLUCOSE SERPL-MCNC: 104 MG/DL (ref 65–140)
GLUCOSE SERPL-MCNC: 109 MG/DL (ref 65–140)
GLUCOSE SERPL-MCNC: 115 MG/DL (ref 65–140)
GLUCOSE SERPL-MCNC: 120 MG/DL (ref 65–140)
GLUCOSE SERPL-MCNC: 123 MG/DL (ref 65–140)
GLUCOSE SERPL-MCNC: 129 MG/DL (ref 65–140)
GLUCOSE SERPL-MCNC: 146 MG/DL (ref 65–140)
GLUCOSE SERPL-MCNC: 167 MG/DL (ref 65–140)
GLUCOSE SERPL-MCNC: 224 MG/DL (ref 65–140)
GLUCOSE SERPL-MCNC: 238 MG/DL (ref 65–140)
GLUCOSE SERPL-MCNC: 266 MG/DL (ref 65–140)
GLUCOSE SERPL-MCNC: 285 MG/DL (ref 65–140)
GLUCOSE SERPL-MCNC: 89 MG/DL (ref 65–140)
HCT VFR BLD AUTO: 22.4 % (ref 34.8–46.1)
HGB BLD-MCNC: 7.2 G/DL (ref 11.5–15.4)
MAGNESIUM SERPL-MCNC: 2.3 MG/DL (ref 1.6–2.6)
MCH RBC QN AUTO: 33.8 PG (ref 26.8–34.3)
MCHC RBC AUTO-ENTMCNC: 32.1 G/DL (ref 31.4–37.4)
MCV RBC AUTO: 105 FL (ref 82–98)
P AXIS: -36 DEGREES
PHOSPHATE SERPL-MCNC: 7.7 MG/DL (ref 2.7–4.5)
PLATELET # BLD AUTO: 174 THOUSANDS/UL (ref 149–390)
PMV BLD AUTO: 11.1 FL (ref 8.9–12.7)
POTASSIUM SERPL-SCNC: 5.2 MMOL/L (ref 3.5–5.3)
PR INTERVAL: 125 MS
PROCALCITONIN SERPL-MCNC: 3.9 NG/ML
PROT SERPL-MCNC: 7.2 G/DL (ref 6.4–8.2)
QRS AXIS: 41 DEGREES
QRSD INTERVAL: 63 MS
QT INTERVAL: 308 MS
QTC INTERVAL: 430 MS
RBC # BLD AUTO: 2.13 MILLION/UL (ref 3.81–5.12)
SODIUM SERPL-SCNC: 133 MMOL/L (ref 136–145)
T WAVE AXIS: 30 DEGREES
VANCOMYCIN SERPL-MCNC: 31.5 UG/ML
VENTRICULAR RATE: 117 BPM
WBC # BLD AUTO: 14.02 THOUSAND/UL (ref 4.31–10.16)

## 2019-11-16 PROCEDURE — 85027 COMPLETE CBC AUTOMATED: CPT | Performed by: NURSE PRACTITIONER

## 2019-11-16 PROCEDURE — 94003 VENT MGMT INPAT SUBQ DAY: CPT

## 2019-11-16 PROCEDURE — 99291 CRITICAL CARE FIRST HOUR: CPT | Performed by: INTERNAL MEDICINE

## 2019-11-16 PROCEDURE — 87070 CULTURE OTHR SPECIMN AEROBIC: CPT | Performed by: NURSE PRACTITIONER

## 2019-11-16 PROCEDURE — 99233 SBSQ HOSP IP/OBS HIGH 50: CPT | Performed by: INTERNAL MEDICINE

## 2019-11-16 PROCEDURE — 93005 ELECTROCARDIOGRAM TRACING: CPT

## 2019-11-16 PROCEDURE — 5A1D70Z PERFORMANCE OF URINARY FILTRATION, INTERMITTENT, LESS THAN 6 HOURS PER DAY: ICD-10-PCS | Performed by: INTERNAL MEDICINE

## 2019-11-16 PROCEDURE — 94640 AIRWAY INHALATION TREATMENT: CPT

## 2019-11-16 PROCEDURE — 82330 ASSAY OF CALCIUM: CPT | Performed by: NURSE PRACTITIONER

## 2019-11-16 PROCEDURE — 87205 SMEAR GRAM STAIN: CPT | Performed by: NURSE PRACTITIONER

## 2019-11-16 PROCEDURE — 83735 ASSAY OF MAGNESIUM: CPT | Performed by: NURSE PRACTITIONER

## 2019-11-16 PROCEDURE — 84145 PROCALCITONIN (PCT): CPT | Performed by: INTERNAL MEDICINE

## 2019-11-16 PROCEDURE — 94760 N-INVAS EAR/PLS OXIMETRY 1: CPT

## 2019-11-16 PROCEDURE — 80202 ASSAY OF VANCOMYCIN: CPT | Performed by: NURSE PRACTITIONER

## 2019-11-16 PROCEDURE — 82948 REAGENT STRIP/BLOOD GLUCOSE: CPT

## 2019-11-16 PROCEDURE — 80053 COMPREHEN METABOLIC PANEL: CPT | Performed by: NURSE PRACTITIONER

## 2019-11-16 PROCEDURE — 93010 ELECTROCARDIOGRAM REPORT: CPT | Performed by: INTERNAL MEDICINE

## 2019-11-16 PROCEDURE — 84100 ASSAY OF PHOSPHORUS: CPT | Performed by: NURSE PRACTITIONER

## 2019-11-16 PROCEDURE — 87106 FUNGI IDENTIFICATION YEAST: CPT | Performed by: NURSE PRACTITIONER

## 2019-11-16 PROCEDURE — 99222 1ST HOSP IP/OBS MODERATE 55: CPT | Performed by: INTERNAL MEDICINE

## 2019-11-16 PROCEDURE — 71045 X-RAY EXAM CHEST 1 VIEW: CPT

## 2019-11-16 RX ORDER — FLUTICASONE FUROATE AND VILANTEROL 100; 25 UG/1; UG/1
1 POWDER RESPIRATORY (INHALATION)
Status: DISCONTINUED | OUTPATIENT
Start: 2019-11-16 | End: 2019-11-22 | Stop reason: HOSPADM

## 2019-11-16 RX ORDER — ONDANSETRON 2 MG/ML
4 INJECTION INTRAMUSCULAR; INTRAVENOUS ONCE
Status: COMPLETED | OUTPATIENT
Start: 2019-11-16 | End: 2019-11-16

## 2019-11-16 RX ORDER — ONDANSETRON 2 MG/ML
INJECTION INTRAMUSCULAR; INTRAVENOUS
Status: COMPLETED
Start: 2019-11-16 | End: 2019-11-16

## 2019-11-16 RX ORDER — MIDODRINE HYDROCHLORIDE 5 MG/1
5 TABLET ORAL
Status: DISCONTINUED | OUTPATIENT
Start: 2019-11-16 | End: 2019-11-22 | Stop reason: HOSPADM

## 2019-11-16 RX ORDER — AMOXICILLIN 250 MG
1 CAPSULE ORAL
Status: DISCONTINUED | OUTPATIENT
Start: 2019-11-16 | End: 2019-11-17

## 2019-11-16 RX ORDER — DIGOXIN 0.25 MG/ML
250 INJECTION INTRAMUSCULAR; INTRAVENOUS ONCE
Status: COMPLETED | OUTPATIENT
Start: 2019-11-16 | End: 2019-11-16

## 2019-11-16 RX ORDER — SODIUM CHLORIDE FOR INHALATION 0.9 %
VIAL, NEBULIZER (ML) INHALATION
Status: COMPLETED
Start: 2019-11-16 | End: 2019-11-16

## 2019-11-16 RX ORDER — ACETAMINOPHEN 325 MG/1
650 TABLET ORAL EVERY 6 HOURS PRN
Status: DISCONTINUED | OUTPATIENT
Start: 2019-11-16 | End: 2019-11-22 | Stop reason: HOSPADM

## 2019-11-16 RX ADMIN — APIXABAN 5 MG: 5 TABLET, FILM COATED ORAL at 11:46

## 2019-11-16 RX ADMIN — CALCITRIOL CAPSULES 0.25 MCG 0.5 MCG: 0.25 CAPSULE ORAL at 11:48

## 2019-11-16 RX ADMIN — DOPAMINE HYDROCHLORIDE IN DEXTROSE 7.5 MCG/KG/MIN: 1.6 INJECTION, SOLUTION INTRAVENOUS at 00:10

## 2019-11-16 RX ADMIN — DIGOXIN 250 MCG: 0.25 INJECTION INTRAMUSCULAR; INTRAVENOUS at 19:36

## 2019-11-16 RX ADMIN — SENNOSIDES AND DOCUSATE SODIUM 1 TABLET: 8.6; 5 TABLET ORAL at 22:12

## 2019-11-16 RX ADMIN — ONDANSETRON 4 MG: 2 INJECTION INTRAMUSCULAR; INTRAVENOUS at 14:27

## 2019-11-16 RX ADMIN — GABAPENTIN 300 MG: 300 CAPSULE ORAL at 22:10

## 2019-11-16 RX ADMIN — ASPIRIN 81 MG 81 MG: 81 TABLET ORAL at 11:47

## 2019-11-16 RX ADMIN — FLUTICASONE FUROATE AND VILANTEROL TRIFENATATE 1 PUFF: 100; 25 POWDER RESPIRATORY (INHALATION) at 19:51

## 2019-11-16 RX ADMIN — CEFEPIME HYDROCHLORIDE 1000 MG: 1 INJECTION, POWDER, FOR SOLUTION INTRAMUSCULAR; INTRAVENOUS at 14:15

## 2019-11-16 RX ADMIN — ATORVASTATIN CALCIUM 80 MG: 80 TABLET, FILM COATED ORAL at 19:50

## 2019-11-16 RX ADMIN — ISODIUM CHLORIDE 3 ML: 0.03 SOLUTION RESPIRATORY (INHALATION) at 23:08

## 2019-11-16 RX ADMIN — APIXABAN 5 MG: 5 TABLET, FILM COATED ORAL at 19:50

## 2019-11-16 RX ADMIN — CHLORHEXIDINE GLUCONATE 0.12% ORAL RINSE 15 ML: 1.2 LIQUID ORAL at 11:47

## 2019-11-16 RX ADMIN — RACEPINEPHRINE HYDROCHLORIDE 0.5 ML: 11.25 SOLUTION RESPIRATORY (INHALATION) at 23:08

## 2019-11-16 RX ADMIN — PROPOFOL 25 MCG/KG/MIN: 10 INJECTION, EMULSION INTRAVENOUS at 03:03

## 2019-11-16 RX ADMIN — LEVOTHYROXINE SODIUM 75 MCG: 75 TABLET ORAL at 05:32

## 2019-11-16 RX ADMIN — CLOPIDOGREL BISULFATE 75 MG: 75 TABLET ORAL at 11:46

## 2019-11-16 RX ADMIN — FAMOTIDINE 20 MG: 20 TABLET ORAL at 11:46

## 2019-11-16 RX ADMIN — FLUOXETINE 40 MG: 20 CAPSULE ORAL at 11:48

## 2019-11-16 RX ADMIN — NOREPINEPHRINE BITARTRATE 6 MCG/MIN: 1 INJECTION INTRAVENOUS at 15:08

## 2019-11-16 RX ADMIN — CHLORHEXIDINE GLUCONATE 0.12% ORAL RINSE 15 ML: 1.2 LIQUID ORAL at 22:10

## 2019-11-16 RX ADMIN — MIDODRINE HYDROCHLORIDE 5 MG: 5 TABLET ORAL at 19:50

## 2019-11-16 NOTE — QUICK NOTE
Pt's  at bedside and update provided  Questions answered regarding culture results, antibiotic management, extubation, HR management and plan of care; questions answered      Santos Monzon

## 2019-11-16 NOTE — PROGRESS NOTES
Daily Progress Note - Critical Care   Nora Akins 48 y o  female MRN: [id number]  Unit/Bed#: MICU 07 Encounter: [id number]        ----------------------------------------------------------------------------------------  HPI/24hr events:   Afib overnight vs Atach  Dopamine weaned off  NE started, currently at 4mcg  ECHO EF 65%, no RWMA, RV function nl  ---------------------------------------------------------------------------------------  SUBJECTIVE  Patient intubated and sedated, unable to provide, overnight events discussed with nursing staff at the bedside    Review of Systems   Unable to perform ROS: Intubated     ---------------------------------------------------------------------------------------  Assessment and Plan:  Plan:    Neuro:  Diagnosis:  Encephalopathy, history of PTSD  Plan:  · Management of PAD  · Analgesia  · Sedation plan: Fentanyl 50 mcg/min and Propofol 25 mcg/kg/min  · RASS goal:-1 Drowsy and 0 Alert and Calm  · Delirium monitoring/management  · Regulate Sleep-wake cycle/CAM-ICU daily  · Daily SAT  · Serial Neuro Exams  · Continue prozac, gabapentin  Cardiac  Diagnosis:  Syncope with  symptomatic sinus bradycardia and junctional rhythm on presentation, patient has a history of paroxysmal AFib, shock (cardiogenic versus septic), recent drug-eluting stent to LAD, HFpEF, pt developed afib overnight  Plan:  Cardiology to re-evaluate today for rate control recommendations  · Cardiac infusions: Levophed, 4 mcg/min  · Wean NE as able  · MAP goal > 65  · Rhythm: Atrial Fibrillation  · Follow rhythm on telemetry  · Continue Eliquis, ASA, atorvastatin, Plavix  Pulmonary  Diagnosis:  Acute hypoxemic respiratory failure, possible aspiration  Plan:  · Assess daily readiness to extubate: SBT in coordination with SAT  · SBT plan:  Past, plan for extubation later today after volume removed with hemodialysis  · Chlorhexidine ordered: yes  · Pulmonary toileting  Gastrointestinal  Diagnosis:  History of recent GI bleed at [facility] 9/2019, morbid obesity  Plan:  · Bowel regimen: sennakot     FEN  Diagnosis: Hyperphosphatemia  Plan:  · Fluid/Diuretic plan:  Plan for fluid removal with dialysis today  · Goal 24 hour fluid balance:  Goal 2 L removal with dialysis today  · Nutrition/diet plan:  NPO in anticipation of exubation  · Replete electrolytes with goals: K >4 0, Mag >2 0, and Phos >3 0  Genitourinary  Diagnosis:  End-stage renal disease on hemodialysis  Plan:  · Nephrology following   · plan for attempt of intermittent hemodialysis today with 2 L fluid removal  · Indwelling Coley present: no   · Trend UOP and BUN/creat  · Strict I and O  Infectious Diease  Diagnosis:  Shock possible septic etiology patient risk for aspiration and also at risk for right upper extremity wound infection  Plan:  Infectious Disease following  · Abx ordered: Cefepime and Vancomycin  · Follow-up cultures  · Trend temps and WBC count  Procalcitonin:  Results from last 7 days   Lab Units 11/16/19  0450 11/15/19  0755   PROCALCITONIN ng/ml 3 90* 1 13*   ·   Heme:   Diagnosis:  Anemia related to CKD  Plan:  · Trend hgb and plts  · Transfuse as needed for goal hgb >7  Endo:   Diagnosis:  Diabetes type 1  Plan:  · Glycemic control plan: Blood glucose controlled on current regimen, 145 units per 24 hours  · Goal -180mg/dL  MSK/Skin:  Right upper extremity wound dehiscence, present on admission  · Wound Care following appreciate recommendations  · Local wound care  · Outpatient follow-up with vascular surgery at [facility]  · Early Mobility/Exercise  · PT consult: not applicable  · OT consult: not applicable  · Turn and position patient Q2 hours  · Off load pressure points  · Allevyn preventative per protocol    Disposition: Continue Critical Care   Code Status: Level 1 - Full Code  ---------------------------------------------------------------------------------------  ICU CORE MEASURES    Prophylaxis VTE Pharmacologic Prophylaxis: Apixaban (Eliquis)  VTE Mechanical Prophylaxis: sequential compression device  Stress Ulcer Prophylaxis: Famotidine PO    ABCDE Protocol (if indicated)  Plan to perform spontaneous awakening trial today? Yes  Plan to perform spontaneous breathing trial today? Yes  Obvious barriers to extubation? No (provide explanation): will assess for extubation after HD today  CAM-ICU: attempted, patient did not follow     Invasive Devices Review  Invasive Devices     Central Venous Catheter Line            CVC Central Lines 11/15/19 Triple 16cm 1 day          Peripheral Intravenous Line            Peripheral IV 11/14/19 Left  1 day          Arterial Line            Arterial Line 11/15/19 Femoral 1 day          Line            Hemodialysis AV Fistula Left Upper arm -- days    Hemodialysis AV Fistula Right Upper arm -- days          Drain            NG/OG/Enteral Tube Orogastric 16 Fr 1 day          Airway            ETT  Cuffed 8 mm 1 day              Can any invasive devices be discontinued today? Yes ETT    ---------------------------------------------------------------------------------------  OBJECTIVE    Physical Exam   Constitutional: She appears lethargic  No distress  She is sedated, intubated and restrained  HENT:   Head: Normocephalic and atraumatic  Eyes: Pupils are equal, round, and reactive to light  Cardiovascular: Normal heart sounds  An irregular rhythm present  Tachycardia present  Pulses:       Radial pulses are 2+ on the right side, and 2+ on the left side  Dorsalis pedis pulses are 1+ on the right side, and 1+ on the left side  Pulmonary/Chest: She is intubated  She has rhonchi  Abdominal: Soft  Normal appearance  There is no tenderness  Neurological: She appears lethargic  GCS eye subscore is 4  GCS verbal subscore is 1  GCS motor subscore is 6  Skin: Skin is warm and dry  Capillary refill takes less than 2 seconds  No cyanosis  Nails show no clubbing  1+ pitting edema UE       Vitals   Vitals:    19 0619 19 0734 19 0736 19 0800   BP:       BP Location:       Pulse: (!) 116 (!) 122  (!) 122   Resp: (!) 11 12  (!) 11   Temp:       TempSrc:       SpO2: 99% 96% 96%    Weight:       Height:         Temp (24hrs), Av 5 °F (36 9 °C), Min:97 2 °F (36 2 °C), Max:99 4 °F (37 4 °C)  Current: Temperature: 98 9 °F (37 2 °C)  /50  SaO2 97    Invasive/non-invasive ventilation settings   Respiratory    Lab Data (Last 4 hours)    None         O2/Vent Data (Last 4 hours)       07           Vent Mode CPAP/PS Spont       Patient safety screen outcome: Passed       FIO2 (%) (%) 40       PEEP (cmH2O) (cmH2O) 5       Pressure Support (cmH2O) (cmH20) 5       MV (Obs) 6 3       RSBI 22                   Height and Weights   Height: 5' 5" (165 1 cm)  IBW: 57 kg  Body mass index is 43 25 kg/m²  Weight (last 2 days)     Date/Time   Weight    19 0533   118 (259 92)    19   116 (255 29)    19 195   112 (246 92)    19 1913   113 (249 12)                Intake and Output  I/O        07 - 11/15 0700 11/15 0701 -  0700  07 -  0700    I V  (mL/kg) 1806 1 (15 6) 1695 8 (14 4) 200 (1 7)    NG/GT  60     IV Piggyback 1000 550     Total Intake(mL/kg) 2806 1 (24 2) 2305 8 (19 5) 200 (1 7)    Urine (mL/kg/hr) 0      Emesis/NG output 175 1225     Total Output 175 1225     Net +2631 1 +1080 8 +200                   Nutrition       Diet Orders   (From admission, onward)             Start     Ordered    19  Diet NPO  Diet effective now     Question Answer Comment   Diet Type NPO    RD to adjust diet per protocol?  No        19                  Laboratory and Diagnostics:  Results from last 7 days   Lab Units 19  0449 11/15/19  0506 19  2116 19  191   WBC Thousand/uL 14 02* 15 30*  --  12 82*  --   --    HEMOGLOBIN g/dL 7 2* 7 4*  -- 7 5*  --   --    I STAT HEMOGLOBIN g/dl  --   --   --   --  8 2* 8 2*   HEMATOCRIT % 22 4* 23 5*  --  24 9*  --   --    HEMATOCRIT, ISTAT %  --   --   --   --  24* 24*   PLATELETS Thousands/uL 174 168 215 228  --   --    NEUTROS PCT %  --  84*  --  62  --   --    MONOS PCT %  --  5  --  9  --   --      Results from last 7 days   Lab Units 11/16/19  0450 11/15/19  2253 11/15/19  0506 11/14/19 1921 11/14/19 1918 11/14/19 1917   SODIUM mmol/L 133* 134* 132* 129*  --   --    POTASSIUM mmol/L 5 2 5 0 4 6 5 2  --   --    CHLORIDE mmol/L 92* 93* 93* 90*  --   --    CO2 mmol/L 23 25 23 23  --   --    CO2, I-STAT mmol/L  --   --   --   --  26 25   AGAP mmol/L  --   --   --   --   --  19*   ANION GAP mmol/L 18* 16* 16* 16*  --   --    BUN mg/dL 106* 105* 94* 86*  --   --    CREATININE mg/dL 7 79* 7 41* 6 56* 6 58*  --   --    CALCIUM mg/dL 9 2 9 2 8 8 9 4  --   --    GLUCOSE RANDOM mg/dL 129 104 402* 511*  --   --    ALT U/L 88*  --  73 16  --   --    AST U/L 115*  --  138* 10  --   --    ALK PHOS U/L 178*  --  203* 202*  --   --    ALBUMIN g/dL 2 3*  --  2 3* 2 4*  --   --    TOTAL BILIRUBIN mg/dL 0 67  --  0 79 0 42  --   --      Results from last 7 days   Lab Units 11/16/19  0450 11/15/19  2253 11/15/19  0506   MAGNESIUM mg/dL 2 3 2 3 2 3   PHOSPHORUS mg/dL 7 7* 6 5* 5 3*           Results from last 7 days   Lab Units 11/14/19 2348 11/14/19 2116 11/14/19 1921   TROPONIN I ng/mL <0 02 <0 02 <0 02     Results from last 7 days   Lab Units 11/15/19  0755   LACTIC ACID mmol/L 2 3*     ABG:  Results from last 7 days   Lab Units 11/15/19  0343   PH ART  7 353   PCO2 ART mm Hg 41 8   PO2 ART mm Hg 99 5   HCO3 ART mmol/L 22 7   BASE EXC ART mmol/L -2 7   ABG SOURCE  Line, Arterial     VBG:  Results from last 7 days   Lab Units 11/15/19  0343 11/14/19  1921   PH CHERRY   --  7 348   PCO2 CHERRY mm Hg  --  41 2*   PO2 CHERRY mm Hg  --  43 6   HCO3 CHERRY mmol/L  --  22 1*   BASE EXC CHERRY mmol/L  --  -3 3   ABG SOURCE  Line, Arterial  -- Results from last 7 days   Lab Units 11/16/19  0450 11/15/19  0755   PROCALCITONIN ng/ml 3 90* 1 13*       Micro  Procedure Component Value - Date/Time   Blood culture [[id number]] Resulted: 11/16/19 0801   Lab Status: Preliminary result Specimen: Blood from Central Venous Line Updated: 11/16/19 0801    Blood Culture No Growth at 24 hrs  Blood culture [[id number]] Collected: 11/14/19 2347   Lab Status: Preliminary result Specimen: Blood from Line, Arterial Updated: 11/16/19 0801    Blood Culture No Growth at 24 hrs  Urine culture [[id number]] Collected: 11/14/19 2031   Lab Status:  In process Specimen: Urine, Other Updated: 11/14/19 2126       EKG:  Atrial tachycardia/aflutter this AM  Imaging:  Slightly underpenetrated film, ET tube in appropriate position, no pneumothorax on chest x-ray, questionable opacification and near right heart per order developing compared to chest x-ray yesterday    Active Medications  Scheduled Meds:  Current Facility-Administered Medications:  apixaban 5 mg Oral BID Kin Torres MD    aspirin 81 mg Oral Daily Kin Torres MD    atorvastatin 80 mg Oral Daily With Celena Done, MD    bisacodyl 5 mg Oral Daily PRN Kin Torres MD    calcitriol 0 5 mcg Oral Daily Kin Torres MD    cefepime 1,000 mg Intravenous Q24H Mini He DO    chlorhexidine 15 mL Swish & Spit Q12H [address] Guerda Burgess MD    clopidogrel 75 mg Oral Daily Kin Torres MD    DOPamine in dextrose 5% 1-20 mcg/kg/min Intravenous Titrated [employer]ustin [employer], CRNP Last Rate: Stopped (11/16/19 0330)   famotidine 20 mg Oral Daily Kin Torres MD    fentaNYL 50 mcg/hr Intravenous Continuous Jani Mcgraw MD Last Rate: 50 mcg/hr (11/16/19 0732)   FLUoxetine 40 mg Oral Daily Kin Torres MD    gabapentin 300 mg Oral HS Kin Torres MD    insulin regular (HumuLIN R,NovoLIN R) infusion 0 3-21 Units/hr Intravenous Titrated Kin Torres MD Last Rate: 3 Units/hr (11/16/19 6490)   levothyroxine 75 mcg Oral Early Morning Vicky Hahn MD    norepinephrine 1-30 mcg/min Intravenous Titrated CARLOS Carlos Last Rate: 4 mcg/min (11/16/19 0732)   propofol 5-50 mcg/kg/min Intravenous Titrated Malorie Sigala MD Last Rate: Stopped (11/16/19 6003)   vancomycin 15 mg/kg (Adjusted) Intravenous Daily PRN CARLOS Carlos      Continuous Infusions:    DOPamine in dextrose 5% 1-20 mcg/kg/min Last Rate: Stopped (11/16/19 0330)   fentaNYL 50 mcg/hr Last Rate: 50 mcg/hr (11/16/19 0732)   insulin regular (HumuLIN R,NovoLIN R) infusion 0 3-21 Units/hr Last Rate: 3 Units/hr (11/16/19 0732)   norepinephrine 1-30 mcg/min Last Rate: 4 mcg/min (11/16/19 0732)   propofol 5-50 mcg/kg/min Last Rate: Stopped (11/16/19 0702)     PRN Meds:     bisacodyl 5 mg Daily PRN   vancomycin 15 mg/kg (Adjusted) Daily PRN       Allergies   Allergies   Allergen Reactions    Codeine Edema    Shellfish Allergy Edema    Hydrocodone Itching and Vomiting    Morphine Itching and Vomiting    Oxycodone Itching and Vomiting       Advance Directive and Living Will:      Power of :    POLST:        Counseling / Coordination of Care  0 mins of critical care time      CARLOS Carlos        Portions of the record may have been created with voice recognition software  Occasional wrong word or "sound a like" substitutions may have occurred due to the inherent limitations of voice recognition software    Read the chart carefully and recognize, using context, where substitutions have occurred

## 2019-11-16 NOTE — PROGRESS NOTES
Vancomycin IV Pharmacy-to-Dose Consultation    Jazmin Valderrama is a 48 y o  female who is currently receiving Vancomycin IV with management by the Pharmacy Consult service  Relevant clinical data and objective history reviewed:  Temp Readings from Last 3 Encounters:   11/16/19 98 9 °F (37 2 °C) (Axillary)   09/10/19 98 8 °F (37 1 °C) (Oral)   07/23/19 (!) 97 °F (36 1 °C) (Tympanic)     /55   Pulse (!) 126   Temp 98 9 °F (37 2 °C) (Axillary)   Resp 12   Ht 5' 5" (1 651 m)   Wt 118 kg (259 lb 14 8 oz)   SpO2 96%   BMI 43 25 kg/m²     I/O last 3 completed shifts:   In: 5111 9 [I V :3501 9; NG/GT:60; IV Piggyback:1550]  Out: 1400 [Emesis/NG output:1400]    Lab Results   Component Value Date/Time     (H) 11/16/2019 04:50 AM    WBC 14 02 (H) 11/16/2019 04:49 AM    HGB 7 2 (L) 11/16/2019 04:49 AM    HCT 22 4 (L) 11/16/2019 04:49 AM     (H) 11/16/2019 04:49 AM     11/16/2019 04:49 AM     Creatinine   Date Value Ref Range Status   11/16/2019 7 79 (H) 0 60 - 1 30 mg/dL Final     Comment:     Standardized to IDMS reference method   11/15/2019 7 41 (H) 0 60 - 1 30 mg/dL Final     Comment:     Standardized to IDMS reference method     Vancomycin Rm   Date Value Ref Range Status   11/16/2019 31 5 ug/mL Final     Vancomycin Assessment:  Indication: Shock possible septic etiology - risk for aspiration and right upper extremity wound infection  Micro: 11/14 urine cx - pending                   11/14 BC x1 - no growth to date                    11/15 BC x1 - no growth to date  Procalcitonin: 3 9 (increased from 1 13 yesterday)  Renal Function: SCr= 7 79 - patient received HD today  Potential Nephrotoxicity Factors:  Medications: norepinephrine @ 5 mcg/min  Patient-Factors: hypotension, renal dysfunction  Days of Therapy: 2  Current Dose: 1250 mg PRN when random level < 20 (last dose: 11/15 @ 1140)  Goal Trough: 15-20 (appropriate for most indications)   Goal AUC(24h): 400-600  Last Level: 11/16 @ 0450 = 31 5     Vancomycin Plan:  New Dosing: continue 1250 mg PRN when random level < 20 - no vanco administered today due to level of 31 5  Next Level: Level entered for 11/17 with AM labs because currently unknown if patient will require another session of HD tomorrow  Per discussion with nephrology will only get a session tomorrow if emergent  If no HD tomorrow, can check a level Monday 11/18 with AM labs because the plan would be to restart her M/W/F regimen  Pharmacy will continue to follow closely for s/sx of nephrotoxicity, infusion reactions and appropriateness of therapy  BMP and CBC will be ordered per protocol  We will continue to follow the patient's culture results and clinical progress daily       Preeti Hudson, PharmD  Clinical Pharmacist, Emergency Medicine

## 2019-11-16 NOTE — PROGRESS NOTES
NEPHROLOGY PROGRESS NOTE   Lourdes Cheng 48 y o  female MRN: [id number]  Unit/Bed#: MICU 07 Encounter: [id number]  Reason for Consult: ESRD    ASSESSMENT AND PLAN:  Patient is 53-year-old female ESRD on HD on MWF schedule, presented with bradycardia, syncope, uncontrolled hyperglycemia  We are consulted for dialysis management      ESRD on HD on MWF schedule at [facility]  -patient had last dialysis on Wednesday   -dialysis was held yesterday due to relatively stable electrolytes, improving oxygen requirement on ventilator and off and on requiring Levophed   -patient still remains on Levophed, plan is to do intermittent HD today with 2K bath  If patient is unable to tolerate fluid removal on IHD, will have to place temporary HD catheter and CRRT  -outpatient dry weight 111 5 kg     Access, left upper extremity AV graft which is being used for dialysis  Recently had right upper extremity AV fistula placed although has very faint bruit and also had wound dehiscence as per my conversation with outpatient HD nurse  She will need vascular surgery follow-up and also fistulogram on right AV fistula once more stable     Initial Symptomatic bradycardia, episode of syncope, patient currently intubated  Bradycardia is resolved  Now remains off dopamine      VDRF  -currently FiO2 requirement improving to 40%  -management as per ICU     Shock, blood pressure remains lower, requiring Levophed  Blood/urine culture results to follow  -empiric antibiotic as per ICU team  -echocardiogram shows EF 65%, no pericardial effusion, IVC normal      CKD BMD, mild hyperphosphatemia with serum phosphorus 7 7  continue to monitor with dialysis  If remains persistently elevated, consider starting binders  tube feeds  -patient is on Renagel three tablets p o  T i d  With meals as outpatient  Also on calcitriol 0 5 mcg 3 times a week as outpatient    Secondary hyperparathyroidism, last  at goal in November 2019     CKD anemia, hemoglobin below goal, transfuse p r n  For hemoglobin less than seven  -iron saturation 16% in early November 2019  -she is not on Micera as outpatient     Discussed above plan in detail with ICU team    SUBJECTIVE:  Patient seen and examined at bedside  She remains intubated, currently requiring Levophed      OBJECTIVE:  Current Weight: Weight - Scale: 118 kg (259 lb 14 8 oz)  Vitals:    11/16/19 0619   BP:    Pulse: (!) 116   Resp: (!) 11   Temp:    SpO2: 99%       Intake/Output Summary (Last 24 hours) at 11/16/2019 0708  Last data filed at 11/16/2019 1086  Gross per 24 hour   Intake 2305 77 ml   Output 1225 ml   Net 1080 77 ml     Wt Readings from Last 3 Encounters:   11/16/19 118 kg (259 lb 14 8 oz)   09/10/19 113 kg (248 lb 7 3 oz)   07/20/19 111 kg (244 lb 14 9 oz)     Temp Readings from Last 3 Encounters:   11/16/19 98 9 °F (37 2 °C) (Axillary)   09/10/19 98 8 °F (37 1 °C) (Oral)   07/23/19 (!) 97 °F (36 1 °C) (Tympanic)     BP Readings from Last 3 Encounters:   11/14/19 (!) 56/30   09/10/19 112/53   07/23/19 155/67     Pulse Readings from Last 3 Encounters:   11/16/19 (!) 116   09/10/19 71   07/23/19 97        Physical Examination:  General:  Lying in bed, intubated   Eyes:  Mild conjunctival pallor present, sclerae anicteric  ENT:  External examination of ears and nose unremarkable  Neck:  No obvious lymphadenopathy appreciated  Respiratory:  Bilateral air entry present  CVS:  S1, S2 present  GI:  Soft, non tender  CNS:  Intubated, sedated  Extremities:  Trace edema in legs  Skin:  No new rash in legs    Medications:    Current Facility-Administered Medications:     apixaban (ELIQUIS) tablet 5 mg, 5 mg, Oral, BID, John Gaitan MD, 5 mg at 11/15/19 1725    aspirin chewable tablet 81 mg, 81 mg, Oral, Daily, John Gaitan MD, 81 mg at 11/15/19 0843    atorvastatin (LIPITOR) tablet 80 mg, 80 mg, Oral, Daily With Dinner, John Gaitan MD, 80 mg at 11/15/19 1553    bisacodyl (DULCOLAX) EC tablet 5 mg, 5 mg, Oral, Daily PRN, Colletta Hake, MD    calcitriol (ROCALTROL) capsule 0 5 mcg, 0 5 mcg, Oral, Daily, Colletta Hake, MD, 0 5 mcg at 11/15/19 0844    cefepime (MAXIPIME) 1,000 mg in dextrose 5 % 50 mL IVPB, 1,000 mg, Intravenous, Q24H, Mini Aldea, DO    chlorhexidine (PERIDEX) 0 12 % oral rinse 15 mL, 15 mL, Swish & [place], Q12H [address], Beverley Levine MD, 15 mL at 11/15/19 2130    clopidogrel (PLAVIX) tablet 75 mg, 75 mg, Oral, Daily, Colletta Hake, MD, 75 mg at 11/15/19 0843    DOPamine (INTROPIN) 400 mg in 250 mL infusion (premix), 1-20 mcg/kg/min, Intravenous, Titrated, KamilahCARLOS Martínez, Stopped at 11/16/19 0330    famotidine (PEPCID) tablet 20 mg, 20 mg, Oral, Daily, Colletta Hake, MD, 20 mg at 11/15/19 0843    fentaNYL 1000 mcg in sodium chloride 0 9% 100mL infusion, 50 mcg/hr, Intravenous, Continuous, Ministerio Cortes MD, Last Rate: 5 mL/hr at 11/15/19 1728, 50 mcg/hr at 11/15/19 1728    FLUoxetine (PROzac) capsule 40 mg, 40 mg, Oral, Daily, Colletta Hake, MD, 40 mg at 11/15/19 0844    gabapentin (NEURONTIN) capsule 300 mg, 300 mg, Oral, HS, Colletta Hake, MD, 300 mg at 11/15/19 2130    insulin regular (HumuLIN R,NovoLIN R) 1 Units/mL in sodium chloride 0 9 % 100 mL infusion, 0 3-21 Units/hr, Intravenous, Titrated, Colletta Hake, MD, Last Rate: 1 5 mL/hr at 11/16/19 0619, 1 5 Units/hr at 11/16/19 2006    levothyroxine tablet 75 mcg, 75 mcg, Oral, Early Morning, Colletta Hake, MD, 75 mcg at 11/16/19 0532    norepinephrine (LEVOPHED) 4 mg (STANDARD CONCENTRATION) IV in sodium chloride 0 9% 250 mL, 1-30 mcg/min, Intravenous, Titrated, CARLOS Quiles, Last Rate: 15 mL/hr at 11/16/19 0619, 4 mcg/min at 11/16/19 0619    propofol (DIPRIVAN) 1000 mg in 100 mL infusion (premix), 5-50 mcg/kg/min, Intravenous, Titrated, Beverley Levine MD, Last Rate: 16 8 mL/hr at 11/16/19 0303, 25 mcg/kg/min at 11/16/19 0303    vancomycin (VANCOCIN) 1,250 mg in sodium chloride 0 9 % 250 mL IVPB, 15 mg/kg (Adjusted), Intravenous, Daily PRN, CARLOS Arellano    Laboratory Results:  Results from last 7 days   Lab Units 11/16/19  0450 11/16/19  0449 11/15/19  2253 11/15/19  0506 11/14/19  2116 11/14/19 1921 11/14/19 1918 11/14/19 1917   WBC Thousand/uL  --  14 02*  --  15 30*  --  12 82*  --   --    HEMOGLOBIN g/dL  --  7 2*  --  7 4*  --  7 5*  --   --    I STAT HEMOGLOBIN g/dl  --   --   --   --   --   --  8 2* 8 2*   HEMATOCRIT %  --  22 4*  --  23 5*  --  24 9*  --   --    HEMATOCRIT, ISTAT %  --   --   --   --   --   --  24* 24*   PLATELETS Thousands/uL  --  174  --  168 215 228  --   --    SODIUM mmol/L 133*  --  134* 132*  --  129*  --   --    POTASSIUM mmol/L 5 2  --  5 0 4 6  --  5 2  --   --    CHLORIDE mmol/L 92*  --  93* 93*  --  90*  --   --    CO2 mmol/L 23  --  25 23  --  23  --   --    CO2, I-STAT mmol/L  --   --   --   --   --   --  26 25   BUN mg/dL 106*  --  105* 94*  --  86*  --   --    CREATININE mg/dL 7 79*  --  7 41* 6 56*  --  6 58*  --   --    CALCIUM mg/dL 9 2  --  9 2 8 8  --  9 4  --   --    MAGNESIUM mg/dL 2 3  --  2 3 2 3  --   --   --   --    PHOSPHORUS mg/dL 7 7*  --  6 5* 5 3*  --   --   --   --    GLUCOSE, ISTAT mg/dl  --   --   --   --   --   --  503* 500*       XR chest portable ICU   Final Result by Jolene Dowell MD (11/15 0859)      1  ET tube tip at the jacquelyn, consider retracting at least 2 cm   2  Unchanged airspace opacities      The study was marked in EPIC for immediate notification  Workstation performed: [id number]         CT head without contrast   Final Result by Sukumar Paez MD (11/14 2050)      No acute intracranial abnormality  Microangiopathic changes and cortical atrophy appears somewhat precocious  Workstation performed: [id number]         XR chest 1 view portable   Final Result by Romel Pace MD (11/15 7893)      Low endotracheal tube, at the jacquelyn        Multifocal bilateral consolidation; given the patient's history, this could be due to aspiration  Workstation performed: [id number]         XR chest portable ICU    (Results Pending)       Portions of the record may have been created with voice recognition software  Occasional wrong word or "sound a like" substitutions may have occurred due to the inherent limitations of voice recognition software  Read the chart carefully and recognize, using context, where substitutions have occurred

## 2019-11-16 NOTE — NURSING NOTE
Hemodialysis done today  Tolerated well  2 5L removed  Remains on Levophed drip  Fentanyl drip D/C  Extubated after HD  Tolerated well  NC 6lpm  Patient's  at bedside

## 2019-11-16 NOTE — PROGRESS NOTES
Progress Note - Infectious Disease   Shailesh Gamez 48 y o  female MRN: [id number]  Unit/Bed#: MICU 07 Encounter: [id number]      IMPRESSION & RECOMMENDATIONS:   Impression/Recommendations:  1  Shock  May be cardiogenic  Also consider septic shock  WBC count was elevated at 15  Procalcitonin level also elevated, although less reliable in an ESRD patient  Consideration for development of pneumonia based on chest x-ray findings, recent vomiting and possible aspiration  Consideration for infection at right upper extremity AV fistula site as there is dehiscence noted  Also consider bacteremia  Blood cultures are negative thus far  Agree with empiric antibiotics for now pending further culture results      -continue IV vancomycin  Dosing recommendations per pharmacy  -continue IV cefepime with HD dosing for now  -follow up pending blood cultures  If blood cultures remain negative at 48 hours, plan to discontinue vancomycin   -follow-up sputum culture  -monitor temperatures and hemodynamics  -recheck CBC in a m   -recheck procalcitonin level in a m   -supportive care per Critical Care service     2  Acute respiratory failure  Patient was intubated due to concern for airway protection  Also consideration for aspiration based on recent emesis, abnormal chest x-ray       -antibiotic plan as above  -follow up sputum culture  -monitor vent requirements  Wean off vent as able  -monitor secretions     3  Wound dehiscence  Noted at right upper extremity AV fistula site  Consideration for bacteremia  Fortunately, blood cultures are negative thus far      -antibiotic plan as above  -serial exams  -daily local wound care and dressing changes     4  ESRD on HD  Currently receiving HD via left upper extremity AV graft  Will dose adjust antibiotics with HD      5  Diabetes mellitus type 1, uncontrolled  Risk factor for development of severe infection      6  Bradycardia  Off dopamine drip     Cardiology has been consulted  Follow-up recommendations  2D echocardiogram was poor study         Antibiotics:  Vancomycin/cefepime 2     I discussed above plan with Dale Carballo from [address] [place] [address]  Subjective:  Patient is awake on ventilator  Remains on Levophed drip  Receiving HD at bedside  No fevers  Objective:  Vitals:  Temp:  [98 °F (36 7 °C)-99 4 °F (37 4 °C)] 98 2 °F (36 8 °C)  HR:  [] 120  Resp:  [9-23] 17  BP: ()/(44-58) 106/48  SpO2:  [95 %-100 %] 100 %  Temp (24hrs), Av 7 °F (37 1 °C), Min:98 °F (36 7 °C), Max:99 4 °F (37 4 °C)  Current: Temperature: 98 2 °F (36 8 °C)    Physical Exam:   General:  Awake on ventilator  Eyes:  Conjunctive clear with no hemorrhages or effusions  Oropharynx:  ET tube in place  Neck:  Supple, no lymphadenopathy  Lungs:  Ventilated breath sounds  Cardiac:  Regular rate and rhythm, no murmurs  Abdomen:  Soft, non-tender, non-distented  Extremities:  No peripheral cyanosis, clubbing, or edema  Right upper extremity fistula with mild wound dehiscence  Mild surrounding erythema and drainage  Skin:  No rashes, no ulcers  Neurological:  Awake on ventilator, moves all extremities    Lab Results:  I have personally reviewed pertinent labs    Results from last 7 days   Lab Units 19  0450 11/15/19  2253 11/15/19  0506 19  1921  19  1918 19  1917   POTASSIUM mmol/L 5 2 5 0 4 6 5 2   < >  --   --    CHLORIDE mmol/L 92* 93* 93* 90*   < >  --   --    CO2 mmol/L 23 25 23 23   < >  --   --    CO2, I-STAT mmol/L  --   --   --   --   --  26 25   BUN mg/dL 106* 105* 94* 86*   < >  --   --    CREATININE mg/dL 7 79* 7 41* 6 56* 6 58*   < >  --   --    EGFR ml/min/1 73sq m 5 6 7 7   < >  --  6   GLUCOSE, ISTAT mg/dl  --   --   --   --   --  503* 500*   CALCIUM mg/dL 9 2 9 2 8 8 9 4   < >  --   --    AST U/L 115*  --  138* 10  --   --   --    ALT U/L 88*  --  73 16  --   --   --    ALK PHOS U/L 178*  --  203* 202*  --   --   --     < > = values in this interval not displayed  Results from last 7 days   Lab Units 11/16/19  0449 11/15/19  0506 11/14/19  2116 11/14/19  1921   WBC Thousand/uL 14 02* 15 30*  --  12 82*   HEMOGLOBIN g/dL 7 2* 7 4*  --  7 5*   PLATELETS Thousands/uL 174 168 215 228     Results from last 7 days   Lab Units 11/16/19  0801 11/14/19  2347   BLOOD CULTURE  No Growth at 24 hrs  No Growth at 24 hrs  Imaging Studies:   I have personally reviewed pertinent imaging study reports and images in PACS  Chest x-ray shows stable airspace opacities  EKG, Pathology, and Other Studies:   I have personally reviewed pertinent reports  2D echocardiogram was a poor study

## 2019-11-16 NOTE — PLAN OF CARE
Patient scheduled for 4 hour Hemodialysis treatment today with a goal of 2 to 2 5 liters as tolerated

## 2019-11-16 NOTE — RESPIRATORY THERAPY NOTE
resp care      11/16/19 1234   Respiratory Assessment   Resp Comments pt extubated to 6lnc, 02 sat 100%, no stridor noted, prior to extubation +cuff leak, pt awake, alert, following commands, pt appears comfortable, will continue to monitor   O2 Device nc

## 2019-11-16 NOTE — RESPIRATORY THERAPY NOTE
RT Ventilator Management Note  Chikis Carvalho 48 y o  female MRN: [id number]  Unit/Bed#: MICU 07 Encounter: [id number]      Daily Screen       11/15/2019  0900 11/15/2019  1147          Patient safety screen outcome[de-identified]  Passed  Passed      Spont breathing trial % for 30 min:  Yes        Spont breathing trial outcome[de-identified]  Passed                Physical Exam:   Assessment Type: Assess only  General Appearance: Sedated  Respiratory Pattern: Assisted  Chest Assessment: Chest expansion symmetrical  Bilateral Breath Sounds: Coarse  Suction: ET Tube  O2 Device: vent      Resp Comments: Pt is stable on current vent settings  No changes made to vent at this time  No respiratory distres noted at this time  Will continue to moniter pt

## 2019-11-16 NOTE — RESPIRATORY THERAPY NOTE
RT Ventilator Management Note  Judith Sumner 48 y o  female MRN: [id number]  [facility] [id number]      Daily Screen       11/15/2019  1147 11/16/2019  0736          Patient safety screen outcome[de-identified]  Passed  Passed              Physical Exam:   Assessment Type: (P) Assess only  General Appearance: (P) Alert, Awake  Respiratory Pattern: (P) Normal  Chest Assessment: (P) Chest expansion symmetrical  Bilateral Breath Sounds: (P) Coarse, Diminished  Suction: ET Tube  O2 Device: (P) vent      Resp Comments: (P) pt placed on cpap/ps wean, tolerating well, will continue to monitor

## 2019-11-17 PROBLEM — N18.9 ANEMIA DUE TO CHRONIC KIDNEY DISEASE: Status: ACTIVE | Noted: 2019-11-17

## 2019-11-17 PROBLEM — J96.00 ACUTE RESPIRATORY FAILURE (HCC): Status: RESOLVED | Noted: 2019-11-15 | Resolved: 2019-11-17

## 2019-11-17 PROBLEM — R57.9 SHOCK (HCC): Status: ACTIVE | Noted: 2019-11-17

## 2019-11-17 PROBLEM — E83.39 HYPERPHOSPHATEMIA: Status: RESOLVED | Noted: 2019-11-15 | Resolved: 2019-11-17

## 2019-11-17 PROBLEM — D63.1 ANEMIA DUE TO CHRONIC KIDNEY DISEASE: Status: ACTIVE | Noted: 2019-11-17

## 2019-11-17 LAB
ANION GAP SERPL CALCULATED.3IONS-SCNC: 13 MMOL/L (ref 4–13)
BUN SERPL-MCNC: 51 MG/DL (ref 5–25)
CALCIUM SERPL-MCNC: 8.9 MG/DL (ref 8.3–10.1)
CHLORIDE SERPL-SCNC: 96 MMOL/L (ref 100–108)
CO2 SERPL-SCNC: 26 MMOL/L (ref 21–32)
CREAT SERPL-MCNC: 4.87 MG/DL (ref 0.6–1.3)
DIGOXIN SERPL-MCNC: 0.9 NG/ML (ref 0.8–2)
ERYTHROCYTE [DISTWIDTH] IN BLOOD BY AUTOMATED COUNT: 18.8 % (ref 11.6–15.1)
GFR SERPL CREATININE-BSD FRML MDRD: 10 ML/MIN/1.73SQ M
GLUCOSE SERPL-MCNC: 103 MG/DL (ref 65–140)
GLUCOSE SERPL-MCNC: 105 MG/DL (ref 65–140)
GLUCOSE SERPL-MCNC: 106 MG/DL (ref 65–140)
GLUCOSE SERPL-MCNC: 116 MG/DL (ref 65–140)
GLUCOSE SERPL-MCNC: 123 MG/DL (ref 65–140)
GLUCOSE SERPL-MCNC: 124 MG/DL (ref 65–140)
GLUCOSE SERPL-MCNC: 125 MG/DL (ref 65–140)
GLUCOSE SERPL-MCNC: 137 MG/DL (ref 65–140)
GLUCOSE SERPL-MCNC: 137 MG/DL (ref 65–140)
GLUCOSE SERPL-MCNC: 185 MG/DL (ref 65–140)
GLUCOSE SERPL-MCNC: 209 MG/DL (ref 65–140)
GLUCOSE SERPL-MCNC: 213 MG/DL (ref 65–140)
GLUCOSE SERPL-MCNC: 228 MG/DL (ref 65–140)
GLUCOSE SERPL-MCNC: 240 MG/DL (ref 65–140)
HCT VFR BLD AUTO: 20.8 % (ref 34.8–46.1)
HGB BLD-MCNC: 6.4 G/DL (ref 11.5–15.4)
MCH RBC QN AUTO: 33.2 PG (ref 26.8–34.3)
MCHC RBC AUTO-ENTMCNC: 30.8 G/DL (ref 31.4–37.4)
MCV RBC AUTO: 108 FL (ref 82–98)
PHOSPHATE SERPL-MCNC: 6.5 MG/DL (ref 2.7–4.5)
PLATELET # BLD AUTO: 162 THOUSANDS/UL (ref 149–390)
PMV BLD AUTO: 10 FL (ref 8.9–12.7)
POTASSIUM SERPL-SCNC: 4.4 MMOL/L (ref 3.5–5.3)
RBC # BLD AUTO: 1.93 MILLION/UL (ref 3.81–5.12)
SODIUM SERPL-SCNC: 135 MMOL/L (ref 136–145)
VANCOMYCIN SERPL-MCNC: 20.8 UG/ML
WBC # BLD AUTO: 13.41 THOUSAND/UL (ref 4.31–10.16)

## 2019-11-17 PROCEDURE — 94660 CPAP INITIATION&MGMT: CPT

## 2019-11-17 PROCEDURE — 82948 REAGENT STRIP/BLOOD GLUCOSE: CPT

## 2019-11-17 PROCEDURE — 99233 SBSQ HOSP IP/OBS HIGH 50: CPT | Performed by: INTERNAL MEDICINE

## 2019-11-17 PROCEDURE — 80162 ASSAY OF DIGOXIN TOTAL: CPT | Performed by: NURSE PRACTITIONER

## 2019-11-17 PROCEDURE — 80202 ASSAY OF VANCOMYCIN: CPT | Performed by: EMERGENCY MEDICINE

## 2019-11-17 PROCEDURE — 84100 ASSAY OF PHOSPHORUS: CPT | Performed by: NURSE PRACTITIONER

## 2019-11-17 PROCEDURE — 99231 SBSQ HOSP IP/OBS SF/LOW 25: CPT | Performed by: INTERNAL MEDICINE

## 2019-11-17 PROCEDURE — 94760 N-INVAS EAR/PLS OXIMETRY 1: CPT

## 2019-11-17 PROCEDURE — 85027 COMPLETE CBC AUTOMATED: CPT | Performed by: NURSE PRACTITIONER

## 2019-11-17 PROCEDURE — NC001 PR NO CHARGE: Performed by: NURSE PRACTITIONER

## 2019-11-17 PROCEDURE — 94640 AIRWAY INHALATION TREATMENT: CPT

## 2019-11-17 PROCEDURE — 99232 SBSQ HOSP IP/OBS MODERATE 35: CPT | Performed by: INTERNAL MEDICINE

## 2019-11-17 PROCEDURE — 80048 BASIC METABOLIC PNL TOTAL CA: CPT | Performed by: NURSE PRACTITIONER

## 2019-11-17 RX ORDER — ALBUTEROL SULFATE 90 UG/1
2 AEROSOL, METERED RESPIRATORY (INHALATION) EVERY 4 HOURS PRN
Status: DISCONTINUED | OUTPATIENT
Start: 2019-11-17 | End: 2019-11-22 | Stop reason: HOSPADM

## 2019-11-17 RX ORDER — DIGOXIN 0.25 MG/ML
125 INJECTION INTRAMUSCULAR; INTRAVENOUS EVERY OTHER DAY
Status: DISCONTINUED | OUTPATIENT
Start: 2019-11-18 | End: 2019-11-17

## 2019-11-17 RX ORDER — POLYETHYLENE GLYCOL 3350 17 G/17G
17 POWDER, FOR SOLUTION ORAL DAILY
Status: DISCONTINUED | OUTPATIENT
Start: 2019-11-17 | End: 2019-11-22 | Stop reason: HOSPADM

## 2019-11-17 RX ORDER — PANTOPRAZOLE SODIUM 40 MG/1
40 TABLET, DELAYED RELEASE ORAL
Status: DISCONTINUED | OUTPATIENT
Start: 2019-11-17 | End: 2019-11-22 | Stop reason: HOSPADM

## 2019-11-17 RX ORDER — DIGOXIN 0.25 MG/ML
62.5 INJECTION INTRAMUSCULAR; INTRAVENOUS EVERY OTHER DAY
Status: DISCONTINUED | OUTPATIENT
Start: 2019-11-18 | End: 2019-11-18

## 2019-11-17 RX ORDER — ALLOPURINOL 100 MG/1
100 TABLET ORAL DAILY
Status: DISCONTINUED | OUTPATIENT
Start: 2019-11-17 | End: 2019-11-22 | Stop reason: HOSPADM

## 2019-11-17 RX ORDER — AMOXICILLIN 250 MG
2 CAPSULE ORAL
Status: DISCONTINUED | OUTPATIENT
Start: 2019-11-17 | End: 2019-11-22 | Stop reason: HOSPADM

## 2019-11-17 RX ADMIN — FAMOTIDINE 20 MG: 20 TABLET ORAL at 09:07

## 2019-11-17 RX ADMIN — CEFEPIME HYDROCHLORIDE 1000 MG: 1 INJECTION, POWDER, FOR SOLUTION INTRAMUSCULAR; INTRAVENOUS at 15:07

## 2019-11-17 RX ADMIN — LEVOTHYROXINE SODIUM 75 MCG: 75 TABLET ORAL at 06:07

## 2019-11-17 RX ADMIN — ASPIRIN 81 MG 81 MG: 81 TABLET ORAL at 09:07

## 2019-11-17 RX ADMIN — MIDODRINE HYDROCHLORIDE 5 MG: 5 TABLET ORAL at 12:17

## 2019-11-17 RX ADMIN — APIXABAN 5 MG: 5 TABLET, FILM COATED ORAL at 17:43

## 2019-11-17 RX ADMIN — SODIUM CHLORIDE 5 UNITS/HR: 9 INJECTION, SOLUTION INTRAVENOUS at 15:16

## 2019-11-17 RX ADMIN — APIXABAN 5 MG: 5 TABLET, FILM COATED ORAL at 09:07

## 2019-11-17 RX ADMIN — CHLORHEXIDINE GLUCONATE 0.12% ORAL RINSE 15 ML: 1.2 LIQUID ORAL at 09:07

## 2019-11-17 RX ADMIN — RACEPINEPHRINE HYDROCHLORIDE 0.5 ML: 11.25 SOLUTION RESPIRATORY (INHALATION) at 02:08

## 2019-11-17 RX ADMIN — MIDODRINE HYDROCHLORIDE 5 MG: 5 TABLET ORAL at 17:44

## 2019-11-17 RX ADMIN — POLYETHYLENE GLYCOL 3350 17 G: 17 POWDER, FOR SOLUTION ORAL at 12:18

## 2019-11-17 RX ADMIN — GABAPENTIN 300 MG: 300 CAPSULE ORAL at 21:08

## 2019-11-17 RX ADMIN — CALCITRIOL CAPSULES 0.25 MCG 0.5 MCG: 0.25 CAPSULE ORAL at 09:08

## 2019-11-17 RX ADMIN — MIDODRINE HYDROCHLORIDE 5 MG: 5 TABLET ORAL at 06:07

## 2019-11-17 RX ADMIN — FLUOXETINE 40 MG: 20 CAPSULE ORAL at 09:07

## 2019-11-17 RX ADMIN — PANTOPRAZOLE SODIUM 40 MG: 40 TABLET, DELAYED RELEASE ORAL at 15:07

## 2019-11-17 RX ADMIN — ATORVASTATIN CALCIUM 80 MG: 80 TABLET, FILM COATED ORAL at 17:43

## 2019-11-17 RX ADMIN — CLOPIDOGREL BISULFATE 75 MG: 75 TABLET ORAL at 09:07

## 2019-11-17 RX ADMIN — FLUTICASONE FUROATE AND VILANTEROL TRIFENATATE 1 PUFF: 100; 25 POWDER RESPIRATORY (INHALATION) at 15:04

## 2019-11-17 RX ADMIN — SENNOSIDES AND DOCUSATE SODIUM 2 TABLET: 8.6; 5 TABLET ORAL at 21:08

## 2019-11-17 RX ADMIN — ALLOPURINOL 100 MG: 100 TABLET ORAL at 15:04

## 2019-11-17 NOTE — PROGRESS NOTES
Cardiology Progress Note - Fransisco Nuñez 48 y o  female MRN: [id number]    Unit/Bed#: MICU 07 Encounter: [id number]      Assessment:  Principal Problem:    Acute respiratory failure ([facility] )  Active Problems:    Uncontrolled type 1 diabetes mellitus with hypoglycemia and coma ([place] [address] )    ESRD (end stage renal disease) on dialysis ([facility] )    PAF (paroxysmal atrial fibrillation) ([place] [address] )    Morbid obesity ([place] [address] )    CAD with recent stent    Hypothyroid    Bradycardia    Hyperphosphatemia      Plan:  Patient is extubated  She has no chest pain or significant dyspnea  She is in sinus rhythm on telemetry  She is on Eliquis anticoagulation in the setting of paroxysmal atrial fibrillation  She is on midodrine to help support her blood pressure  She is typically on diltiazem and metoprolol which are being held given her blood pressure  Hopefully we will be able to add these back  Labs today show potassium of 5 2 and a creatinine of 7 7  The nephrology note is appreciated  Patient's hemoglobin this morning is 6 4  A limited echo done yesterday demonstrated preserved LV/RV systolic function  Subjective:   Patient seen and examined  No significant events overnight   negative  Objective:     Vitals: Blood pressure (!) 106/48, pulse 84, temperature 98 1 °F (36 7 °C), temperature source Oral, resp  rate 12, height 5' 5" (1 651 m), weight 115 kg (254 lb 3 1 oz), SpO2 100 %  , Body mass index is 42 3 kg/m² ,   Orthostatic Blood Pressures      Most Recent Value   Blood Pressure  (!) 106/48 filed at 11/16/2019 1230   Patient Position - Orthostatic VS  Lying filed at 11/17/2019 0400      ,      Intake/Output Summary (Last 24 hours) at 11/17/2019 0957  Last data filed at 11/17/2019 0749  Gross per 24 hour   Intake 739 43 ml   Output 3000 ml   Net -2260 57 ml       No significant arrhythmias seen on telemetry review         Physical Exam:    GEN: Lina Deloris   NECK: supple, no carotid bruits, no JVD or HJR  HEART: normal rate, regular rhythm, normal S1 and S2, no murmurs, clicks, gallops or rubs   LUNGS: clear to auscultation bilaterally; no wheezes, rales, or rhonchi   ABDOMEN: normal bowel sounds, soft, no tenderness, no distention  EXTREMITIES:  edema  Labs & Results:    No results displayed because visit has over 200 results  Xr Chest 1 View Portable    Result Date: 11/15/2019  Narrative: CHEST INDICATION:   intubated  Multiple episodes of syncope and atelectasis  COMPARISON:  Chest CT from 9/9/2019  Chest radiograph from 9/8/2019  EXAM PERFORMED/VIEWS:  XR CHEST PORTABLE FINDINGS:  Low endotracheal tube, at the jacquelyn  NG tube below the diaphragm  Cardiomediastinal silhouette appears unremarkable  Patchy bilateral consolidation  Osseous structures appear within normal limits for patient age  Impression: Low endotracheal tube, at the jacquelyn  Multifocal bilateral consolidation; given the patient's history, this could be due to aspiration  Workstation performed: [id number]     Ct Head Without Contrast    Result Date: 11/14/2019  Narrative: CT BRAIN - WITHOUT CONTRAST INDICATION:   syncope  COMPARISON:  None  TECHNIQUE:  CT examination of the brain was performed  In addition to axial images, coronal 2D reformatted images were created and submitted for interpretation  Radiation dose length product (DLP) for this visit:  949 mGy-cm   This examination, like all CT scans performed in the [facility], was performed utilizing techniques to minimize radiation dose exposure, including the use of iterative reconstruction and automated exposure control  IMAGE QUALITY:  Diagnostic  FINDINGS: PARENCHYMA: Decreased attenuation is noted in periventricular and subcortical white matter demonstrating an appearance that is statistically most likely to represent mild microangiopathic change  No CT signs of acute infarction  No intracranial mass, mass effect or midline shift  No acute parenchymal hemorrhage   VENTRICLES AND EXTRA-AXIAL SPACES:  Ventricles and extra-axial CSF spaces are prominent commensurate with the degree of volume loss  No hydrocephalus  No acute extra-axial hemorrhage  VISUALIZED ORBITS AND PARANASAL SINUSES:  Unremarkable  CALVARIUM AND EXTRACRANIAL SOFT TISSUES:  Normal      Impression: No acute intracranial abnormality  Microangiopathic changes and cortical atrophy appears somewhat precocious  Workstation performed: [id number]     Xr Chest Portable Icu    Result Date: 11/16/2019  Narrative: CHEST INDICATION:   hypoxemia  COMPARISON:  11/15/2019 EXAM PERFORMED/VIEWS:  XR CHEST PORTABLE ICU FINDINGS:  ET tube projects appropriately  NG tube extends below the GE junction  Stable cardiomediastinal structures  No effusions  No pneumothorax  Mild central pulmonary vascular congestion and perihilar interstitial edema  Osseous structures appear within normal limits for patient age  Impression: Mild pulmonary edema  Workstation performed: [id number]     Xr Chest Portable Icu    Result Date: 11/15/2019  Narrative: CHEST INDICATION:   hypoxemia  COMPARISON:  X-ray from prior day and CT 9/9/19 EXAM PERFORMED/VIEWS:  XR CHEST PORTABLE ICU FINDINGS:  ET tube tip at the jacquelyn OG tube tip is not seen Cardiomediastinal silhouette appears unremarkable  Mild diffuse interstitial opacities, unchanged  No pneumothorax  No large pleural effusion  Osseous structures appear within normal limits for patient age  Impression: 1   ET tube tip at the jacquelyn, consider retracting at least 2 cm 2  Unchanged airspace opacities The study was marked in EPIC for immediate notification  Workstation performed: [id number]       EKG personally reviewed by Mindi Lanier MD      Counseling / Coordination of Care  Total floor / unit time spent today 30 minutes  Greater than 50% of total time was spent with the patient and / or family counseling and / or coordination of care

## 2019-11-17 NOTE — RESPIRATORY THERAPY NOTE
RT Protocol Note  Cheryle Deem 48 y o  female MRN: [id number]  Unit/Bed#: MICU 07 Encounter: [id number]    Assessment    Principal Problem:    Acute respiratory failure ([place] [address] )  Active Problems:    Uncontrolled type 1 diabetes mellitus with hypoglycemia and coma ([place] [address] )    ESRD (end stage renal disease) on dialysis ([place] [address] )    PAF (paroxysmal atrial fibrillation) ([place] [address] )    Morbid obesity ([place] [address] )    CAD with recent stent    Hypothyroid    Bradycardia    Hyperphosphatemia      Home Pulmonary Medications:  Albuterol mdi       Past Medical History:   Diagnosis Date    Asthma     Cardiac disease     CHF    CHF (congestive heart failure) ([facility])     CPAP (continuous positive airway pressure) dependence     Diabetes mellitus ([place] [address] )     type 1    Dialysis patient ([place] [address] )     Disease of thyroid gland     hypothyroidism    GERD (gastroesophageal reflux disease)     Hyperlipidemia     Hypertension     Migraine     Psychiatric disorder     PTSD, Depression, panic attacks    Renal disorder     stage 4 kidney disease     Social History     Socioeconomic History    Marital status: /Civil Union     Spouse name: None    Number of children: None    Years of education: None    Highest education level: None   Occupational History    None   Social Needs    Financial resource strain: None    Food insecurity:     Worry: None     Inability: None    Transportation needs:     Medical: None     Non-medical: None   Tobacco Use    Smoking status: Never Smoker    Smokeless tobacco: Never Used   Substance and Sexual Activity    Alcohol use: Not Currently     Binge frequency: Never    Drug use: No    Sexual activity: None   Lifestyle    Physical activity:     Days per week: None     Minutes per session: None    Stress: None   Relationships    Social connections:     Talks on phone: None     Gets together: None     Attends [employer] service: None     Active member of club or organization: None     Attends meetings of clubs or organizations: None     Relationship status: None    Intimate partner violence:     Fear of current or ex partner: None     Emotionally abused: None     Physically abused: None     Forced sexual activity: None   Other Topics Concern    None   Social History Narrative    None       Subjective         Objective    Physical Exam:   Assessment Type: Assess only  General Appearance: Alert, Awake  Respiratory Pattern: Normal  Chest Assessment: Chest expansion symmetrical  Bilateral Breath Sounds: Diminished  O2 Device: nc    Vitals:  Blood pressure (!) 106/48, pulse 84, temperature 98 1 °F (36 7 °C), temperature source Oral, resp  rate 12, height 5' 5" (1 651 m), weight 115 kg (254 lb 3 1 oz), SpO2 100 %  Results from last 7 days   Lab Units 11/15/19  0343   PH ART  7 353   PCO2 ART mm Hg 41 8   PO2 ART mm Hg 99 5   HCO3 ART mmol/L 22 7   BASE EXC ART mmol/L -2 7   O2 CONTENT ART mL/dL 11 3*   O2 HGB, ARTERIAL % 96 0   ABG SOURCE  Line, Arterial       Imaging and other studies: I have personally reviewed pertinent reports        O2 Device: nc     Plan    Respiratory Plan: Vent/NIV/HFNC        Resp Comments: (P) pt ordered on an albuterol mdi q 4 prn for sob/wheezing, pt takes mdi's at home, pmhx of asthma, breath sounds clear and diminished, no distress noted

## 2019-11-17 NOTE — PROGRESS NOTES
NEPHROLOGY PROGRESS NOTE   Cammie Lindsey 48 y o  female MRN: [id number]  Unit/Bed#: MICU 07 Encounter: [id number]  Reason for Consult: ESRD    ASSESSMENT AND PLAN:  Patient is 65-year-old female ESRD on HD on MWF schedule, presented with bradycardia, syncope, uncontrolled hyperglycemia   We are consulted for dialysis management      ESRD on HD on MWF schedule at [facility]  -status post HD yesterday with 2 5 L UF removal   Next HD tomorrow   -currently remains off Levophed at the time of my encounter   -outpatient dry weight 111 5 kg     Access, left upper extremity AV graft which is being used for dialysis   Recently had right upper extremity AV fistula placed although has very faint bruit and also had wound dehiscence  She will need vascular surgery follow-up and also fistulogram on right AV fistula once more stable     Initial Symptomatic bradycardia, episode of syncope, now remains extubated  Improved     VDRF, now extubated  -currently on CPAP at the time of my encounter which she uses each night  Patient was on 2 L oxygen via nasal cannula yesterday and comfortable as per nurse      Shock, blood pressure overall improving, now remains off Levophed at the time of my encounter  Blood culture negative so far, urine culture results to follow  -empiric antibiotic as per ICU team  -echocardiogram shows EF 65%, no pericardial effusion, IVC normal      CKD BMD, mild hyperphosphatemia with serum phosphorus improving to 6 5   continue to monitor with dialysis  -patient is on Renagel three tablets p o  T i d  With meals as outpatient  Prema Ramirez on calcitriol 0 5 mcg 3 times a week as outpatient  Secondary hyperparathyroidism, last  at goal in November 2019  -currently remains NPO, when tolerating diet, can restart Renagel      CKD anemia, hemoglobin dropped to 6 4, transfuse PRBC as per ICU team, would recommend only 1 unit if needed additional, may consider with dialysis tomorrow  transfuse p r n   For hemoglobin less than seven  -iron saturation 16% in early November 2019  -she is not on Micera as outpatient     Discussed above plan in detail with ICU team    SUBJECTIVE:  Patient seen and examined at bedside  She was extubated yesterday  Now remains off Levophed at the time of my encounter  She tolerated dialysis well yesterday  Currently on CPAP which she uses each night      OBJECTIVE:  Current Weight: Weight - Scale: 115 kg (254 lb 3 1 oz)  Vitals:    11/17/19 0600   BP:    Pulse: 86   Resp: 14   Temp:    SpO2: 98%       Intake/Output Summary (Last 24 hours) at 11/17/2019 0709  Last data filed at 11/17/2019 0600  Gross per 24 hour   Intake 1007 37 ml   Output 3000 ml   Net -1992 63 ml     Wt Readings from Last 3 Encounters:   11/17/19 115 kg (254 lb 3 1 oz)   09/10/19 113 kg (248 lb 7 3 oz)   07/20/19 111 kg (244 lb 14 9 oz)     Temp Readings from Last 3 Encounters:   11/17/19 98 3 °F (36 8 °C) (Oral)   09/10/19 98 8 °F (37 1 °C) (Oral)   07/23/19 (!) 97 °F (36 1 °C) (Tympanic)     BP Readings from Last 3 Encounters:   11/16/19 (!) 106/48   09/10/19 112/53   07/23/19 155/67     Pulse Readings from Last 3 Encounters:   11/17/19 86   09/10/19 71   07/23/19 97        Physical Examination:  General:  Lying in bed, no acute distress   Eyes:  Mild conjunctival pallor present, sclerae anicteric  ENT:  External examination of ears and nose unremarkable  Neck:  No obvious lymphadenopathy appreciated  Respiratory:  Bilateral air entry present  CVS:  S1, S2 present  GI:  Soft, nontender, nondistended  CNS:  Active alert  Extremities:  Trace edema in legs  Skin:  No new rash in legs    Medications:    Current Facility-Administered Medications:     acetaminophen (TYLENOL) tablet 650 mg, 650 mg, Oral, Q6H PRN, CARLOS Mantilla    apixaban (ELIQUIS) tablet 5 mg, 5 mg, Oral, BID, Kamaljit Mike MD, 5 mg at 11/16/19 1950    aspirin chewable tablet 81 mg, 81 mg, Oral, Daily, Kamaljit Mike MD, 81 mg at 11/16/19 1147    atorvastatin (LIPITOR) tablet 80 mg, 80 mg, Oral, Daily With Fabiola Hess MD, 80 mg at 11/16/19 1950    bisacodyl (DULCOLAX) EC tablet 5 mg, 5 mg, Oral, Daily PRN, Ade Dupree MD    calcitriol (ROCALTROL) capsule 0 5 mcg, 0 5 mcg, Oral, Daily, Ade Dupree MD, 0 5 mcg at 11/16/19 1148    cefepime (MAXIPIME) 1,000 mg in dextrose 5 % 50 mL IVPB, 1,000 mg, Intravenous, Q24H, Mini Tadeoea, DO, Stopped at 11/16/19 1445    chlorhexidine (PERIDEX) 0 12 % oral rinse 15 mL, 15 mL, [employer], Q12H [address], Jordon Logan MD, 15 mL at 11/16/19 2210    clopidogrel (PLAVIX) tablet 75 mg, 75 mg, Oral, Daily, Ade Dupree MD, 75 mg at 11/16/19 1146    famotidine (PEPCID) tablet 20 mg, 20 mg, Oral, Daily, Ade Dupree MD, 20 mg at 11/16/19 1146    FLUoxetine (PROzac) capsule 40 mg, 40 mg, Oral, Daily, Ade Dupree MD, 40 mg at 11/16/19 1148    fluticasone-vilanterol (BREO ELLIPTA) 100-25 mcg/inh inhaler 1 puff, 1 puff, Inhalation, Daily, CARLOS Han, 1 puff at 11/16/19 1951    gabapentin (NEURONTIN) capsule 300 mg, 300 mg, Oral, HS, Ade Dupree MD, 300 mg at 11/16/19 2210    insulin regular (HumuLIN R,NovoLIN R) 1 Units/mL in sodium chloride 0 9 % 100 mL infusion, 0 3-21 Units/hr, Intravenous, Titrated, Ade Dupree MD, Last Rate: 0 5 mL/hr at 11/17/19 0435, 0 5 Units/hr at 11/17/19 0435    levothyroxine tablet 75 mcg, 75 mcg, Oral, Early Morning, Ade Dupree MD, 75 mcg at 11/17/19 0607    midodrine (PROAMATINE) tablet 5 mg, 5 mg, Oral, TID AC, CARLOS Han, 5 mg at 11/17/19 0607    norepinephrine (LEVOPHED) 4 mg (STANDARD CONCENTRATION) IV in sodium chloride 0 9% 250 mL, 1-30 mcg/min, Intravenous, Titrated, CARLOS Han, Stopped at 11/17/19 0435    phenol (CHLORASEPTIC) 1 4 % mucosal liquid 1 spray, 1 spray, Mouth/Throat, Q4H PRN, CARLOS Han    racepinephrine 2 25 % inhalation solution 0 5 mL, 0 5 mL, Nebulization, Q2H PRN, Taylor Vasquez MD, 0 5 mL at 11/17/19 0208    senna-docusate sodium (SENOKOT S) 8 6-50 mg per tablet 1 tablet, 1 tablet, Per NG Tube, HS, CARLOS Julien, 1 tablet at 11/16/19 2212    vancomycin (VANCOCIN) 1,250 mg in sodium chloride 0 9 % 250 mL IVPB, 15 mg/kg (Adjusted), Intravenous, Daily PRN, CARLOS Julien    Laboratory Results:  Results from last 7 days   Lab Units 11/17/19  0431 11/16/19  0450 11/16/19  0449 11/15/19  2253 11/15/19  0506 11/14/19 2116 11/14/19 1921 11/14/19 1918 11/14/19 1917   WBC Thousand/uL 13 41*  --  14 02*  --  15 30*  --  12 82*  --   --    HEMOGLOBIN g/dL 6 4*  --  7 2*  --  7 4*  --  7 5*  --   --    I STAT HEMOGLOBIN g/dl  --   --   --   --   --   --   --  8 2* 8 2*   HEMATOCRIT % 20 8*  --  22 4*  --  23 5*  --  24 9*  --   --    HEMATOCRIT, ISTAT %  --   --   --   --   --   --   --  24* 24*   PLATELETS Thousands/uL 162  --  174  --  168 215 228  --   --    SODIUM mmol/L 135* 133*  --  134* 132*  --  129*  --   --    POTASSIUM mmol/L 4 4 5 2  --  5 0 4 6  --  5 2  --   --    CHLORIDE mmol/L 96* 92*  --  93* 93*  --  90*  --   --    CO2 mmol/L 26 23  --  25 23  --  23  --   --    CO2, I-STAT mmol/L  --   --   --   --   --   --   --  26 25   BUN mg/dL 51* 106*  --  105* 94*  --  86*  --   --    CREATININE mg/dL 4 87* 7 79*  --  7 41* 6 56*  --  6 58*  --   --    CALCIUM mg/dL 8 9 9 2  --  9 2 8 8  --  9 4  --   --    MAGNESIUM mg/dL  --  2 3  --  2 3 2 3  --   --   --   --    PHOSPHORUS mg/dL 6 5* 7 7*  --  6 5* 5 3*  --   --   --   --    GLUCOSE, ISTAT mg/dl  --   --   --   --   --   --   --  503* 500*       XR chest portable ICU   Final Result by Nickolas Hernandez DO (11/16 0601)      Mild pulmonary edema  Workstation performed: [id number]         XR chest portable ICU   Final Result by Judd Kan MD (50/30 9235)      1  ET tube tip at the jacquelyn, consider retracting at least 2 cm   2    Unchanged airspace opacities      The study was marked in EPIC for immediate notification  Workstation performed: [id number]         CT head without contrast   Final Result by Fernando Pelaez MD (11/14 2050)      No acute intracranial abnormality  Microangiopathic changes and cortical atrophy appears somewhat precocious  Workstation performed: [id number]         XR chest 1 view portable   Final Result by Mylene Donato MD (11/15 9236)      Low endotracheal tube, at the jacquelyn  Multifocal bilateral consolidation; given the patient's history, this could be due to aspiration  Workstation performed: [id number]             Portions of the record may have been created with voice recognition software  Occasional wrong word or "sound a like" substitutions may have occurred due to the inherent limitations of voice recognition software  Read the chart carefully and recognize, using context, where substitutions have occurred

## 2019-11-17 NOTE — PROGRESS NOTES
Vancomycin IV Pharmacy-to-Dose Consultation    Delmy William is a 48 y o  female who is currently receiving Vancomycin IV with management by the Pharmacy Consult service  Relevant clinical data and objective history reviewed:  Temp Readings from Last 3 Encounters:   11/17/19 97 6 °F (36 4 °C) (Oral)   09/10/19 98 8 °F (37 1 °C) (Oral)   07/23/19 (!) 97 °F (36 1 °C) (Tympanic)     BP (!) 114/46 (BP Location: Right leg)   Pulse 86   Temp 97 6 °F (36 4 °C) (Oral)   Resp 19   Ht 5' 5" (1 651 m)   Wt 115 kg (254 lb 3 1 oz)   SpO2 91%   BMI 42 30 kg/m²     I/O last 3 completed shifts: In: 1780 5 [P O :50; I V :1620 5; NG/GT:60; IV Piggyback:50]  Out: 4499 [Emesis/NG output:575;  Other:3000]    Lab Results   Component Value Date/Time    BUN 51 (H) 11/17/2019 04:31 AM    WBC 13 41 (H) 11/17/2019 04:31 AM    HGB 6 4 (LL) 11/17/2019 04:31 AM    HCT 20 8 (L) 11/17/2019 04:31 AM     (H) 11/17/2019 04:31 AM     11/17/2019 04:31 AM     Creatinine   Date Value Ref Range Status   11/17/2019 4 87 (H) 0 60 - 1 30 mg/dL Final     Comment:     Standardized to IDMS reference method   11/16/2019 7 79 (H) 0 60 - 1 30 mg/dL Final     Comment:     Standardized to IDMS reference method     Vancomycin Rm   Date Value Ref Range Status   11/17/2019 20 8 ug/mL Final   11/16/2019 31 5 ug/mL Final     Vancomycin Assessment:  Indication: Shock possible septic etiology - risk for aspiration and right upper extremity wound infection  Micro: 11/14 Urine culture - pending; BC - no growth to date        11/16 - sputum culture pending  Renal Function: SCr= 4 87; patient received HD yesterday and is planned to restart iHD M/W/F schedule tomorrow  Potential Nephrotoxicity Factors:  Patient-Factors: renal dysfunction  Days of Therapy: 3  Current Dose: 1250 mg PRN when random level < 20 (last dose: 2000 mg x1 11/15 @ 1140)  Goal Trough: 15-20 (appropriate for most indications) (goal of 20-25 pre-HD level equates to a 15-20 post-HD) Goal AUC(24h): 400-600  Last Level: 11/17 @ 0431= 20 8    Vancomycin Plan:  New Dosing: change to 750 mg after dialysis M/W/F (next dose: 11/18 after dialysis )  Next Level: 11/20 pre-HD --goal of 20-25 equates to a 15-20 post-HD     Pharmacy will continue to follow closely for s/sx of nephrotoxicity, infusion reactions and appropriateness of therapy  BMP and CBC will be ordered per protocol  We will continue to follow the patient's culture results and clinical progress daily      Agustin Augustin, PharmD  Clinical Pharmacist, Emergency Medicine

## 2019-11-17 NOTE — ASSESSMENT & PLAN NOTE
Lab Results   Component Value Date    HGBA1C 8 1 (H) 07/21/2019       Recent Labs     11/17/19  0434 11/17/19  0611 11/17/19  0747 11/17/19  1003   POCGLU 106 103 124 137   Continue insulin infusion pending 24 hours of oral intake    Blood Sugar Average: Last 72 hrs:  (P) 299 [id number]

## 2019-11-17 NOTE — ASSESSMENT & PLAN NOTE
Unclear causation  Will require re-evaluation by electrophysiology tomorrow  Heart rates been improving with resolution of undergo lying metabolic disorders

## 2019-11-17 NOTE — ASSESSMENT & PLAN NOTE
Likely secondary to anemia of chronic kidney disease, acute illness, poor oral intake  Will hold off on transfusing 1 unit packed red blood cells until dialysis tomorrow

## 2019-11-17 NOTE — PROGRESS NOTES
Progress Note - Infectious Disease   Nikki Vargas 48 y o  female MRN: [id number]  Unit/Bed#: MICU 07 Encounter: [id number]      IMPRESSION & RECOMMENDATIONS:   Impression/Recommendations:  1  Shock   May be cardiogenic   Also consider septic shock   WBC count was elevated at 15   Procalcitonin level also elevated, although less reliable in an ESRD patient   Consideration for development of pneumonia based on chest x-ray findings, recent vomiting and possible aspiration   Consideration for infection at right upper extremity AV fistula site as there is dehiscence noted   Also consider bacteremia  Blood cultures remain negative          -discontinue vancomycin as blood cultures remain negative  -continue IV cefepime with HD dosing for now pending sputum culture and repeat procalcitonin level  If inconsistent with pneumonia, will likely stop antibiotic in the next 24 hours  -follow-up sputum culture  -monitor temperatures and hemodynamics  -recheck CBC in a m   -recheck procalcitonin level in a m      2  Acute respiratory failure   Patient was intubated due to concern for airway protection   Also consideration for aspiration based on recent emesis, abnormal chest x-ray       -antibiotic plan as above  -follow up sputum culture  -repeat procalcitonin level in a m     3  Wound dehiscence   Noted at right upper extremity AV fistula site   Consideration for bacteremia  Blood cultures remain negative      -antibiotic plan as above  -serial exams  -daily local wound care and dressing changes     4  ESRD on HD   Currently receiving HD via left upper extremity AV graft   Will dose adjust antibiotics with HD      5  Diabetes mellitus type 1, uncontrolled   Risk factor for development of severe infection      6  Bradycardia   Off dopamine drip    Cardiology has been consulted   Follow-up recommendations    2D echocardiogram was poor study      Antibiotics:  Vancomycin/cefepime 3     I discussed above plan with critical care service          Subjective:  Patient was extubated and is doing well  Shortness of breath much improved  Minimal cough  Denies fevers, chills, sweats, nausea, vomiting or diarrhea  Objective:  Vitals:  Temp:  [97 6 °F (36 4 °C)-98 8 °F (37 1 °C)] 97 6 °F (36 4 °C)  HR:  [] 86  Resp:  [10-23] 19  BP: (114)/(46) 114/46  SpO2:  [86 %-100 %] 91 %  Temp (24hrs), Av 4 °F (36 9 °C), Min:97 6 °F (36 4 °C), Max:98 8 °F (37 1 °C)  Current: Temperature: 97 6 °F (36 4 °C)    Physical Exam:   General:  Awake resting comfortably in bed  Eyes:  Conjunctive clear with no hemorrhages or effusions  Oropharynx:  No ulcers, no lesions  Neck:  Supple, no lymphadenopathy  Lungs:  Clear to auscultation bilaterally, no accessory muscle use  Cardiac:  Regular rate and rhythm, no murmurs  Abdomen:  Soft, non-tender, non-distented, obese  Extremities:  Symmetric edema  Skin:  No rashes, right upper extremity fistula with mild dehiscence  Minimally erythema, drainage  Neurological:  Moves all four extremities spontaneously    Lab Results:  I have personally reviewed pertinent labs  Results from last 7 days   Lab Units 19  0431 19  0450 11/15/19  2253 11/15/19  0506 19  1921  19  1918 19  1917   POTASSIUM mmol/L 4 4 5 2 5 0 4 6 5 2   < >  --   --    CHLORIDE mmol/L 96* 92* 93* 93* 90*   < >  --   --    CO2 mmol/L 26 23 25 23 23   < >  --   --    CO2, I-STAT mmol/L  --   --   --   --   --   --  26 25   BUN mg/dL 51* 106* 105* 94* 86*   < >  --   --    CREATININE mg/dL 4 87* 7 79* 7 41* 6 56* 6 58*   < >  --   --    EGFR ml/min/1 73sq m 10 5 6 7 7   < >  --  6   GLUCOSE, ISTAT mg/dl  --   --   --   --   --   --  503* 500*   CALCIUM mg/dL 8 9 9 2 9 2 8 8 9 4   < >  --   --    AST U/L  --  115*  --  138* 10  --   --   --    ALT U/L  --  88*  --  73 16  --   --   --    ALK PHOS U/L  --  178*  --  203* 202*  --   --   --     < > = values in this interval not displayed       Results from last 7 days Lab Units 11/17/19  0431 11/16/19  0449 11/15/19  0506   WBC Thousand/uL 13 41* 14 02* 15 30*   HEMOGLOBIN g/dL 6 4* 7 2* 7 4*   PLATELETS Thousands/uL 162 174 168     Results from last 7 days   Lab Units 11/17/19  0801 11/16/19  1112 11/14/19  2347   BLOOD CULTURE  No Growth at 48 hrs  --  No Growth at 48 hrs  SPUTUM CULTURE   --  Culture too young- will reincubate  --    GRAM STAIN RESULT   --  1+ Polys  No bacteria seen  --        Imaging Studies:   I have personally reviewed pertinent imaging study reports and images in PACS  Repeat chest x-ray shows mild pulmonary edema  EKG, Pathology, and Other Studies:   I have personally reviewed pertinent reports

## 2019-11-17 NOTE — ASSESSMENT & PLAN NOTE
Patient presently on digoxin Q 48 hours  Resume av da blocking agents when appropriate with Cardiology/electrophysiology  Continue Eliquis

## 2019-11-17 NOTE — PROGRESS NOTES
Daily Progress Note - [address] 48 y o  female MRN: [id number]  Unit/Bed#: MICU 07 Encounter: [id number]        ----------------------------------------------------------------------------------------  HPI/24hr events: Patient found to be anemic this morning    ---------------------------------------------------------------------------------------  SUBJECTIVE  "I'm hungry and I'm ready to go home"    Review of Systems   Constitutional: Positive for appetite change (increased) and fatigue  HENT: Negative for trouble swallowing  Respiratory: Positive for shortness of breath (while laying flat)  Cardiovascular: Negative for chest pain  Gastrointestinal: Negative for nausea and vomiting  Musculoskeletal: Positive for arthralgias and back pain  Neurological: Positive for weakness  Negative for light-headedness and headaches  All other systems reviewed and are negative       ---------------------------------------------------------------------------------------  Assessment and Plan:    Neuro:    Diagnosis:  Anxiety/depression  o Plan: Conitnue Prozac and Neurontin      CV:    Diagnosis: Shock- resolving  o Plan: Conitnue midodrine for now, if pressures remain improved, could slowly wean, appreciate cardiology recommendtaions   Diagnosis: Atrial fibrillation  o Plan: Improvement with digoxin, would hold morning Eliquis given decline in hemoglobin   Diagnosis: Coronary artery disease  o Plan: Continue statin for now, would hold aspirin/Plavix   Diagnosis: Bradycardia  o Plan: Pending EP evaluation      Pulm:   Diagnosis: Acute hypoxic respiratory failure  o Plan: Slowly resolving, continue non-invasive positive pressure overnight, continue Jodee Ellipta    GI:   o Plan: Contine bowel regimen      :    Diagnosis: ESRD  o Plan: Plan for dialysis MWF, appreciate nephrology recommendations      F/E/N:    Plan: Assess readiness for oral diet, replete electrolytes as necessary      Heme/Onc:  Diagnosis: Anemia- multifactoria  o Plan: Would administer 1u PRBC, likely combination of acute illness and ESRD    Endo:    Diagnosis: Type 1 diabetes  o Plan: Continue insulin infusion until 24 hours of diet   Diagnosis: Hypothyroidism  o Plan: Continue home levothyroxine    ID:    Diagnosis: Sepsis with unclear source  o Plan: Continue antibiotics per ID service, no growth on cultures    MSK/Skin:    Diagnosis: Right upper extremity wound dehiscencse  o Plan: Appreciate would care recommendations    Disposition: Transfer to Stepdown Level 1   Code Status: Level 1 - Full Code  ---------------------------------------------------------------------------------------  ICU CORE MEASURES    Prophylaxis   VTE Pharmacologic Prophylaxis: Apixaban (Eliquis)  VTE Mechanical Prophylaxis: sequential compression device  Stress Ulcer Prophylaxis: Prophylaxis Not Indicated     ABCDE Protocol (if indicated)  Plan to perform spontaneous awakening trial today? Not applicable  Plan to perform spontaneous breathing trial today? Not applicable  Obvious barriers to extubation? Not applicable  CAM-ICU: Negative    Invasive Devices Review  Invasive Devices     Central Venous Catheter Line            CVC Central Lines 11/15/19 Triple 16cm 2 days          Peripheral Intravenous Line            Peripheral IV 11/14/19 Left  2 days          Arterial Line            Arterial Line 11/15/19 Femoral 2 days          Line            Hemodialysis AV Fistula Left Upper arm -- days    Hemodialysis AV Fistula Right Upper arm -- days              Can any invasive devices be discontinued today? Not applicable  ---------------------------------------------------------------------------------------  OBJECTIVE    Physical Exam   Constitutional: She is oriented to person, place, and time  She appears well-developed and well-nourished  No distress  HENT:   Head: Normocephalic and atraumatic  Eyes: Pupils are equal, round, and reactive to light   EOM are normal    Neck: No JVD present  No tracheal deviation present  Cardiovascular: Normal rate and regular rhythm  Pulmonary/Chest: She has rales  Abdominal: Soft  Bowel sounds are normal  She exhibits distension  There is no tenderness  Musculoskeletal: She exhibits edema  She exhibits no tenderness  Neurological: She is alert and oriented to person, place, and time  Skin: Skin is warm and dry  She is not diaphoretic  Vitals   Vitals:    19 0400 19 0500 19 0551 19 0600   BP:       BP Location:       Pulse: 76 86  86   Resp: 13 12  14   Temp: 98 3 °F (36 8 °C)      TempSrc: Oral      SpO2: 100% 100%  98%   Weight:   115 kg (254 lb 3 1 oz)    Height:         Temp (24hrs), Av 6 °F (37 °C), Min:98 2 °F (36 8 °C), Max:98 8 °F (37 1 °C)  Current: Temperature: 98 3 °F (36 8 °C)  HR: 86  BP: 134/52  RR: 14  SpO2: 98    Invasive/non-invasive ventilation settings   Respiratory    Lab Data (Last 4 hours)    None         O2/Vent Data (Last 4 hours)       0300          Non-Invasive Ventilation Mode CPAP                   Height and Weights   Height: 5' 5" (165 1 cm)  IBW: 57 kg  Body mass index is 42 3 kg/m²  Weight (last 2 days)     Date/Time   Weight    19 0551   115 (254 19)    19 0533   118 (259 92)                Intake and Output  I/O       11/15 0701 -  0700  0701 -  0700    P  O   50    I V  (mL/kg) 1695 8 (14 4) 956 1 (8 3)    NG/GT 60     IV Piggyback 550 50    Total Intake(mL/kg) 2305 8 (19 5) 1056 1 (9 2)    Urine (mL/kg/hr)  0 (0)    Emesis/NG output 1225     Other  3000    Total Output 1225 3000    Net +1080 8 -1943 9              UOP: 0 ml/hr     Nutrition       Diet Orders   (From admission, onward)             Start     Ordered    19  Diet NPO  Diet effective now     Question Answer Comment   Diet Type NPO    RD to adjust diet per protocol?  No        19              Laboratory and Diagnostics:  Results from last 7 days   Lab Units 11/17/19 0431 11/16/19 0449 11/15/19  0506 11/14/19 2116 11/14/19 1921 11/14/19 1918 11/14/19 1917   WBC Thousand/uL 13 41* 14 02* 15 30*  --  12 82*  --   --    HEMOGLOBIN g/dL 6 4* 7 2* 7 4*  --  7 5*  --   --    I STAT HEMOGLOBIN g/dl  --   --   --   --   --  8 2* 8 2*   HEMATOCRIT % 20 8* 22 4* 23 5*  --  24 9*  --   --    HEMATOCRIT, ISTAT %  --   --   --   --   --  24* 24*   PLATELETS Thousands/uL 162 174 168 215 228  --   --    NEUTROS PCT %  --   --  84*  --  62  --   --    MONOS PCT %  --   --  5  --  9  --   --      Results from last 7 days   Lab Units 11/17/19 0431 11/16/19 0450 11/15/19  2253 11/15/19  0506 11/14/19 1921 11/14/19 1918 11/14/19 1917   SODIUM mmol/L 135* 133* 134* 132* 129*  --   --    POTASSIUM mmol/L 4 4 5 2 5 0 4 6 5 2  --   --    CHLORIDE mmol/L 96* 92* 93* 93* 90*  --   --    CO2 mmol/L 26 23 25 23 23  --   --    CO2, I-STAT mmol/L  --   --   --   --   --  26 25   AGAP mmol/L  --   --   --   --   --   --  19*   ANION GAP mmol/L 13 18* 16* 16* 16*  --   --    BUN mg/dL 51* 106* 105* 94* 86*  --   --    CREATININE mg/dL 4 87* 7 79* 7 41* 6 56* 6 58*  --   --    CALCIUM mg/dL 8 9 9 2 9 2 8 8 9 4  --   --    GLUCOSE RANDOM mg/dL 105 129 104 402* 511*  --   --    ALT U/L  --  88*  --  73 16  --   --    AST U/L  --  115*  --  138* 10  --   --    ALK PHOS U/L  --  178*  --  203* 202*  --   --    ALBUMIN g/dL  --  2 3*  --  2 3* 2 4*  --   --    TOTAL BILIRUBIN mg/dL  --  0 67  --  0 79 0 42  --   --      Results from last 7 days   Lab Units 11/17/19  0431 11/16/19  0450 11/15/19  2253 11/15/19  0506   MAGNESIUM mg/dL  --  2 3 2 3 2 3   PHOSPHORUS mg/dL 6 5* 7 7* 6 5* 5 3*           Results from last 7 days   Lab Units 11/14/19  2348 11/14/19  2116 11/14/19  1921   TROPONIN I ng/mL <0 02 <0 02 <0 02     Results from last 7 days   Lab Units 11/15/19  0755   LACTIC ACID mmol/L 2 3*     ABG:  Results from last 7 days   Lab Units 11/15/19  0343   PH ART  7 353   PCO2 ART mm Hg 41 8   PO2 ART mm Hg 99 5   HCO3 ART mmol/L 22 7   BASE EXC ART mmol/L -2 7   ABG SOURCE  Line, Arterial     VBG:  Results from last 7 days   Lab Units 11/15/19  0343 11/14/19  1921   PH CHERRY   --  7 348   PCO2 CHERRY mm Hg  --  41 2*   PO2 CHERRY mm Hg  --  43 6   HCO3 CHERRY mmol/L  --  22 1*   BASE EXC CHERRY mmol/L  --  -3 3   ABG SOURCE  Line, Arterial  --      Results from last 7 days   Lab Units 11/16/19  0450 11/15/19  0755   PROCALCITONIN ng/ml 3 90* 1 13*       Micro  Results from last 7 days   Lab Units 11/16/19  1112 11/16/19  0801 11/14/19  2347   BLOOD CULTURE   --  No Growth at 24 hrs  No Growth at 24 hrs  GRAM STAIN RESULT  1+ Polys  No bacteria seen  --   --        EKG: Review of EKGs demonstrates atrial flutter  Imaging:  I have personally reviewed pertinent reports        Active Medications  Scheduled Meds:  Current Facility-Administered Medications:  acetaminophen 650 mg Oral Q6H PRN Blease CARLOS Arias    apixaban 5 mg Oral BID Nichole Santa MD    aspirin 81 mg Oral Daily Nichole Santa MD    atorvastatin 80 mg Oral Daily With Bernard Gupta MD    bisacodyl 5 mg Oral Daily PRN Nichole Santa MD    calcitriol 0 5 mcg Oral Daily Nichole Santa MD    cefepime 1,000 mg Intravenous Q24H Mini Aldpeg DO Last Rate: Stopped (11/16/19 1445)   chlorhexidine 15 mL Swish & Spit Q12H [address] Adaline MD Juan    clopidogrel 75 mg Oral Daily Nichole Santa MD    famotidine 20 mg Oral Daily Nichole Santa MD    FLUoxetine 40 mg Oral Daily Nichole Santa MD    fluticasone-vilanterol 1 puff Inhalation Daily CARLOS Bravo    gabapentin 300 mg Oral HS Nichole Santa MD    insulin regular (HumuLIN R,NovoLIN R) infusion 0 3-21 Units/hr Intravenous Titrated Nichole Santa MD Last Rate: 0 5 Units/hr (11/17/19 0435)   levothyroxine 75 mcg Oral Early Morning Nichole Santa MD    midodrine 5 mg Oral TID AC Blease Righter, CRNP    norepinephrine 1-30 mcg/min Intravenous Titrated CARLOS Cullen Last Rate: Stopped (11/17/19 0435)   phenol 1 spray Mouth/Throat Q4H PRN CARLOS Cullen    racepinephrine 0 5 mL Nebulization Q2H PRN Jyothi Luther MD    senna-docusate sodium 1 tablet Per NG Tube HS CARLOS Cullen    vancomycin 15 mg/kg (Adjusted) Intravenous Daily PRN CARLOS Cullen      Continuous Infusions:    insulin regular (HumuLIN R,NovoLIN R) infusion 0 3-21 Units/hr Last Rate: 0 5 Units/hr (11/17/19 0435)   norepinephrine 1-30 mcg/min Last Rate: Stopped (11/17/19 0435)     PRN Meds:     acetaminophen 650 mg Q6H PRN   bisacodyl 5 mg Daily PRN   phenol 1 spray Q4H PRN   racepinephrine 0 5 mL Q2H PRN   vancomycin 15 mg/kg (Adjusted) Daily PRN       Allergies   Allergies   Allergen Reactions    Codeine Edema    Shellfish Allergy Edema    Hydrocodone Itching and Vomiting    Morphine Itching and Vomiting    Oxycodone Itching and Vomiting       Advance Directive and Living Will:      Power of :    POLST:        Counseling / Coordination of Care  Total time spent today 36 minutes  Greater than 50% of total time was spent with the patient and / or family counseling and / or coordination of care  A description of the counseling / coordination of care: plan of care for the day      CARLOS Green        Portions of the record may have been created with voice recognition software  Occasional wrong word or "sound a like" substitutions may have occurred due to the inherent limitations of voice recognition software    Read the chart carefully and recognize, using context, where substitutions have occurred

## 2019-11-17 NOTE — RESPIRATORY THERAPY NOTE
resp care      11/17/19 0728   Respiratory Assessment   Assessment Type Assess only   General Appearance Alert; Awake   Respiratory Pattern Normal   Chest Assessment Chest expansion symmetrical   Bilateral Breath Sounds Diminished   Resp Comments pt taken off cpap, placed on 3lnc, pt appears comfortable, no stridor noted, will continue to monitor   O2 Device nc

## 2019-11-17 NOTE — PROGRESS NOTES
Progress Note Douglas Hernandez 1965, 48 y o  female MRN: [id number]    Unit/Bed#: MICU 07 Encounter: [id number]    Primary Care Provider: Magi Abdullahi DO   Date and time admitted to hospital: 11/14/2019  7:03 PM    Bradycardia  Assessment & Plan  Unclear causation  Will require re-evaluation by electrophysiology tomorrow  Heart rates been improving with resolution of undergo lying metabolic disorders    PAF (paroxysmal atrial fibrillation) ([facility] )  Assessment & Plan  Patient presently on digoxin Q 48 hours  Resume av da blocking agents when appropriate with Cardiology/electrophysiology  Continue Eliquis    CAD with recent stent  Assessment & Plan  Continue aspirin/statin/Plavix  Beta-blocker on hold secondary to symptomatic bradycardia    ESRD (end stage renal disease) on dialysis [facility])  Assessment & Plan  Cap continue dialysis Monday Wednesday Friday  Appreciate Nephrology recommendations    Uncontrolled type 1 diabetes mellitus with hypoglycemia and coma [facility])  Assessment & Plan  Lab Results   Component Value Date    HGBA1C 8 1 (H) 07/21/2019       Recent Labs     11/17/19  0434 11/17/19  0611 11/17/19  0747 11/17/19  1003   POCGLU 106 103 124 137   Continue insulin infusion pending 24 hours of oral intake    Blood Sugar Average: Last 72 hrs:  (P) 642 [id number]    Morbid obesity ([facility] )  Assessment & Plan  Encouraged therapeutic lifestyle changes    Hypothyroid  Assessment & Plan  Continue home levothyroxine    Anemia due to chronic kidney disease  Assessment & Plan  Likely secondary to anemia of chronic kidney disease, acute illness, poor oral intake  Will hold off on transfusing 1 unit packed red blood cells until dialysis tomorrow    Sepsis [facility])  Assessment & Plan  Unclear source  Presently on day 3 of antibiotics cefepime/vancomycin  Appreciate ID recommendations  Follow temperature/white count/culture results    * Shock ([facility] 75 )  Assessment & Plan  Likely mixed etiology-thought to be secondary to septic versus cardiac components  Continue midodrine for now    Code Status: Level 1 - Full Code  POA:    POLST:      Reason for ICU admission:   Shock/bradycardia    Active problems:   Principal Problem:    Shock ([facility] )  Active Problems:    Bradycardia    PAF (paroxysmal atrial fibrillation) ([facility]  )    CAD with recent stent    ESRD (end stage renal disease) on dialysis ([place] [id number] )    Uncontrolled type 1 diabetes mellitus with hypoglycemia and coma ([place] [address] )    Morbid obesity ([place] [address] )    Hypothyroid    Sepsis ([place] [address] )    Anemia due to chronic kidney disease  Resolved Problems:    Hyperphosphatemia    Acute respiratory failure ([place] [address] )    Encephalopathy      Consultants:   Cardiology/electrophysiology, infectious Disease, Nephrology    History of Present Illness:   Patient is a 16-year-old female presenting on 11/14 following several episodes of syncope and emesis at home  She has a past medical history coronary artery disease, type 1 diabetes, hypothyroidism, GERD, hyperlipidemia, hypertension, end-stage renal disease on dialysis, and depression  On arrival the patient was found to be bradycardic in the emergency room and Cardiology recommended attempting dopamine if patient developed hypotension  The patient was ultimately intubated secondary to multiple episodes of syncope with emesis in the emergency room  The patient was transitioned to the critical care unit  Summary of clinical course: Following admission to the critical care unit, the patient became hypotensive requiring placement of a central line and the patient was started on dopamine  She was seen by the nephrology team remain hopeful she will be transitioned off pressors and would be able to tolerate IHD  She was seen by the electrophysiology team who recommended continuing dopamine as she had significant response    Patient had a leukocytosis as well as an elevated procalcitonin, Infectious Disease was consulted who recommended continuing broad-spectrum antibiotics given the severity of her presentation  Patient developed atrial fibrillation with rapid ventricular response on 11/16 which was responsive to a 1 time dose of digoxin  Her home diltiazem/metoprolol were held secondary to bradycardia  She was successfully extubated on 11/16  Respiratory status improved significantly following extubation, her heart rate responded to the digoxin with conversion to sinus rhythm  This point she is felt to be stable for transition to a medical-surgical floor with telemetry  Recent or scheduled procedures:   11/16 Extubated  11/16 CXR- mild pulmonary edema  93/56 ECHO- systolic function normal with ejection fraction 65%  11/14 CT Head- no acute intracranial abnormality    Outstanding/pending diagnostics:   Electrophysiology recommendations, ID plans    Cultures:   11/14 Blood x2- no growth x48 hours  11/14 Urine- in process  11/16 Sputum- no bacteria seen         Mobilization Plan:   Out of bed with assistance    Nutrition Plan:   Oral diet    VTE Pharmacologic Prophylaxis: Apixaban (Eliquis)  VTE Mechanical Prophylaxis: sequential compression device    Discharge Plan:   Patient should be ready for discharge to home/rehab after electrophysiology recommendations, ID plans    Initial Physical Therapy Recommendations: Pending  Initial Occupational Therapy Recommendations: Pending  Initial /Plan: Following    Home medications that are not reordered and reason why:   Diltiazem/metoprolol- recent bradycardia      Spoke with Dr Sommer Porter regarding transfer  Please call [phone] with any questions or concerns  Portions of the record may have been created with voice recognition software  Occasional wrong word or "sound a like" substitutions may have occurred due to the inherent limitations of voice recognition software  Read the chart carefully and recognize, using context, where substitutions have occurred

## 2019-11-17 NOTE — ASSESSMENT & PLAN NOTE
Likely mixed etiology-thought to be secondary to septic versus cardiac components  Continue midodrine for now

## 2019-11-17 NOTE — ASSESSMENT & PLAN NOTE
Unclear source  Presently on day 3 of antibiotics cefepime/vancomycin  Appreciate ID recommendations  Follow temperature/white count/culture results

## 2019-11-18 ENCOUNTER — APPOINTMENT (INPATIENT)
Dept: DIALYSIS | Facility: HOSPITAL | Age: 54
DRG: 871 | End: 2019-11-18
Attending: INTERNAL MEDICINE
Payer: COMMERCIAL

## 2019-11-18 ENCOUNTER — APPOINTMENT (INPATIENT)
Dept: RADIOLOGY | Facility: HOSPITAL | Age: 54
DRG: 871 | End: 2019-11-18
Attending: INTERNAL MEDICINE
Payer: COMMERCIAL

## 2019-11-18 PROBLEM — I49.5 TACHY-BRADY SYNDROME (HCC): Status: ACTIVE | Noted: 2019-11-18

## 2019-11-18 PROBLEM — N25.81 SECONDARY HYPERPARATHYROIDISM OF RENAL ORIGIN (HCC): Status: ACTIVE | Noted: 2019-11-18

## 2019-11-18 PROBLEM — G47.33 OSA (OBSTRUCTIVE SLEEP APNEA): Chronic | Status: ACTIVE | Noted: 2019-11-18

## 2019-11-18 PROBLEM — D64.9 ANEMIA: Status: ACTIVE | Noted: 2019-11-17

## 2019-11-18 PROBLEM — E87.5 HYPERKALEMIA: Status: ACTIVE | Noted: 2019-11-18

## 2019-11-18 LAB
ABO GROUP BLD: NORMAL
ANION GAP SERPL CALCULATED.3IONS-SCNC: 17 MMOL/L (ref 4–13)
BACTERIA SPT RESP CULT: ABNORMAL
BACTERIA SPT RESP CULT: ABNORMAL
BLD GP AB SCN SERPL QL: NEGATIVE
BUN SERPL-MCNC: 73 MG/DL (ref 5–25)
CALCIUM SERPL-MCNC: 9 MG/DL (ref 8.3–10.1)
CHLORIDE SERPL-SCNC: 95 MMOL/L (ref 100–108)
CO2 SERPL-SCNC: 22 MMOL/L (ref 21–32)
CREAT SERPL-MCNC: 6.98 MG/DL (ref 0.6–1.3)
ERYTHROCYTE [DISTWIDTH] IN BLOOD BY AUTOMATED COUNT: 18.6 % (ref 11.6–15.1)
FERRITIN SERPL-MCNC: 2242 NG/ML (ref 8–388)
GFR SERPL CREATININE-BSD FRML MDRD: 6 ML/MIN/1.73SQ M
GLUCOSE SERPL-MCNC: 118 MG/DL (ref 65–140)
GLUCOSE SERPL-MCNC: 122 MG/DL (ref 65–140)
GLUCOSE SERPL-MCNC: 141 MG/DL (ref 65–140)
GLUCOSE SERPL-MCNC: 173 MG/DL (ref 65–140)
GLUCOSE SERPL-MCNC: 188 MG/DL (ref 65–140)
GLUCOSE SERPL-MCNC: 192 MG/DL (ref 65–140)
GLUCOSE SERPL-MCNC: 193 MG/DL (ref 65–140)
GLUCOSE SERPL-MCNC: 198 MG/DL (ref 65–140)
GLUCOSE SERPL-MCNC: 21 MG/DL (ref 65–140)
GLUCOSE SERPL-MCNC: 294 MG/DL (ref 65–140)
GLUCOSE SERPL-MCNC: 60 MG/DL (ref 65–140)
GLUCOSE SERPL-MCNC: 70 MG/DL (ref 65–140)
GLUCOSE SERPL-MCNC: 76 MG/DL (ref 65–140)
GLUCOSE SERPL-MCNC: 82 MG/DL (ref 65–140)
GLUCOSE SERPL-MCNC: 89 MG/DL (ref 65–140)
GRAM STN SPEC: ABNORMAL
GRAM STN SPEC: ABNORMAL
HCT VFR BLD AUTO: 20.3 % (ref 34.8–46.1)
HGB BLD-MCNC: 6.3 G/DL (ref 11.5–15.4)
HGB BLD-MCNC: 7.1 G/DL (ref 11.5–15.4)
IRON SATN MFR SERPL: 13 %
IRON SERPL-MCNC: 37 UG/DL (ref 50–170)
MAGNESIUM SERPL-MCNC: 2.4 MG/DL (ref 1.6–2.6)
MCH RBC QN AUTO: 33.7 PG (ref 26.8–34.3)
MCHC RBC AUTO-ENTMCNC: 31 G/DL (ref 31.4–37.4)
MCV RBC AUTO: 109 FL (ref 82–98)
PHOSPHATE SERPL-MCNC: 8.8 MG/DL (ref 2.7–4.5)
PLATELET # BLD AUTO: 192 THOUSANDS/UL (ref 149–390)
PMV BLD AUTO: 10.6 FL (ref 8.9–12.7)
POTASSIUM SERPL-SCNC: 5.4 MMOL/L (ref 3.5–5.3)
PROCALCITONIN SERPL-MCNC: 3.03 NG/ML
RBC # BLD AUTO: 1.87 MILLION/UL (ref 3.81–5.12)
RH BLD: POSITIVE
SODIUM SERPL-SCNC: 134 MMOL/L (ref 136–145)
SPECIMEN EXPIRATION DATE: NORMAL
TIBC SERPL-MCNC: 279 UG/DL (ref 250–450)
VIT B12 SERPL-MCNC: 3619 PG/ML (ref 100–900)
WBC # BLD AUTO: 13.19 THOUSAND/UL (ref 4.31–10.16)

## 2019-11-18 PROCEDURE — 30233N1 TRANSFUSION OF NONAUTOLOGOUS RED BLOOD CELLS INTO PERIPHERAL VEIN, PERCUTANEOUS APPROACH: ICD-10-PCS | Performed by: INTERNAL MEDICINE

## 2019-11-18 PROCEDURE — 86923 COMPATIBILITY TEST ELECTRIC: CPT

## 2019-11-18 PROCEDURE — 85027 COMPLETE CBC AUTOMATED: CPT | Performed by: NURSE PRACTITIONER

## 2019-11-18 PROCEDURE — 99233 SBSQ HOSP IP/OBS HIGH 50: CPT | Performed by: INTERNAL MEDICINE

## 2019-11-18 PROCEDURE — 99024 POSTOP FOLLOW-UP VISIT: CPT | Performed by: RADIOLOGY

## 2019-11-18 PROCEDURE — 80048 BASIC METABOLIC PNL TOTAL CA: CPT | Performed by: NURSE PRACTITIONER

## 2019-11-18 PROCEDURE — C1751 CATH, INF, PER/CENT/MIDLINE: HCPCS

## 2019-11-18 PROCEDURE — 86850 RBC ANTIBODY SCREEN: CPT | Performed by: INTERNAL MEDICINE

## 2019-11-18 PROCEDURE — 82948 REAGENT STRIP/BLOOD GLUCOSE: CPT

## 2019-11-18 PROCEDURE — 83550 IRON BINDING TEST: CPT | Performed by: INTERNAL MEDICINE

## 2019-11-18 PROCEDURE — 76937 US GUIDE VASCULAR ACCESS: CPT | Performed by: RADIOLOGY

## 2019-11-18 PROCEDURE — 76937 US GUIDE VASCULAR ACCESS: CPT

## 2019-11-18 PROCEDURE — 77001 FLUOROGUIDE FOR VEIN DEVICE: CPT

## 2019-11-18 PROCEDURE — 99232 SBSQ HOSP IP/OBS MODERATE 35: CPT | Performed by: INTERNAL MEDICINE

## 2019-11-18 PROCEDURE — 86900 BLOOD TYPING SEROLOGIC ABO: CPT | Performed by: INTERNAL MEDICINE

## 2019-11-18 PROCEDURE — 77001 FLUOROGUIDE FOR VEIN DEVICE: CPT | Performed by: RADIOLOGY

## 2019-11-18 PROCEDURE — 36556 INSERT NON-TUNNEL CV CATH: CPT

## 2019-11-18 PROCEDURE — 94660 CPAP INITIATION&MGMT: CPT

## 2019-11-18 PROCEDURE — 82728 ASSAY OF FERRITIN: CPT | Performed by: INTERNAL MEDICINE

## 2019-11-18 PROCEDURE — 36558 INSERT TUNNELED CV CATH: CPT | Performed by: RADIOLOGY

## 2019-11-18 PROCEDURE — 82607 VITAMIN B-12: CPT | Performed by: INTERNAL MEDICINE

## 2019-11-18 PROCEDURE — 83735 ASSAY OF MAGNESIUM: CPT | Performed by: NURSE PRACTITIONER

## 2019-11-18 PROCEDURE — 90935 HEMODIALYSIS ONE EVALUATION: CPT | Performed by: INTERNAL MEDICINE

## 2019-11-18 PROCEDURE — 94760 N-INVAS EAR/PLS OXIMETRY 1: CPT

## 2019-11-18 PROCEDURE — 5A1D70Z PERFORMANCE OF URINARY FILTRATION, INTERMITTENT, LESS THAN 6 HOURS PER DAY: ICD-10-PCS | Performed by: INTERNAL MEDICINE

## 2019-11-18 PROCEDURE — 84145 PROCALCITONIN (PCT): CPT | Performed by: INTERNAL MEDICINE

## 2019-11-18 PROCEDURE — 82947 ASSAY GLUCOSE BLOOD QUANT: CPT | Performed by: INTERNAL MEDICINE

## 2019-11-18 PROCEDURE — 99233 SBSQ HOSP IP/OBS HIGH 50: CPT | Performed by: PHYSICIAN ASSISTANT

## 2019-11-18 PROCEDURE — 85018 HEMOGLOBIN: CPT | Performed by: NURSE PRACTITIONER

## 2019-11-18 PROCEDURE — 86901 BLOOD TYPING SEROLOGIC RH(D): CPT | Performed by: INTERNAL MEDICINE

## 2019-11-18 PROCEDURE — 84100 ASSAY OF PHOSPHORUS: CPT | Performed by: NURSE PRACTITIONER

## 2019-11-18 PROCEDURE — 83540 ASSAY OF IRON: CPT | Performed by: INTERNAL MEDICINE

## 2019-11-18 PROCEDURE — P9016 RBC LEUKOCYTES REDUCED: HCPCS

## 2019-11-18 RX ORDER — DEXTROSE MONOHYDRATE 25 G/50ML
25 INJECTION, SOLUTION INTRAVENOUS ONCE
Status: COMPLETED | OUTPATIENT
Start: 2019-11-18 | End: 2019-11-18

## 2019-11-18 RX ORDER — INSULIN GLARGINE 100 [IU]/ML
38 INJECTION, SOLUTION SUBCUTANEOUS
Status: DISCONTINUED | OUTPATIENT
Start: 2019-11-18 | End: 2019-11-22 | Stop reason: HOSPADM

## 2019-11-18 RX ORDER — DEXTROSE MONOHYDRATE 25 G/50ML
INJECTION, SOLUTION INTRAVENOUS
Status: COMPLETED
Start: 2019-11-18 | End: 2019-11-18

## 2019-11-18 RX ORDER — CALCITRIOL 0.25 UG/1
0.5 CAPSULE, LIQUID FILLED ORAL 3 TIMES WEEKLY
Status: DISCONTINUED | OUTPATIENT
Start: 2019-11-18 | End: 2019-11-18

## 2019-11-18 RX ORDER — CALCITRIOL 0.25 UG/1
0.25 CAPSULE, LIQUID FILLED ORAL 3 TIMES WEEKLY
Status: DISCONTINUED | OUTPATIENT
Start: 2019-11-20 | End: 2019-11-22 | Stop reason: HOSPADM

## 2019-11-18 RX ORDER — AMIODARONE HYDROCHLORIDE 200 MG/1
200 TABLET ORAL
Status: DISCONTINUED | OUTPATIENT
Start: 2019-11-18 | End: 2019-11-22 | Stop reason: HOSPADM

## 2019-11-18 RX ADMIN — GABAPENTIN 300 MG: 300 CAPSULE ORAL at 21:04

## 2019-11-18 RX ADMIN — ATORVASTATIN CALCIUM 80 MG: 80 TABLET, FILM COATED ORAL at 18:53

## 2019-11-18 RX ADMIN — DEXTROSE MONOHYDRATE 25 ML: 25 INJECTION, SOLUTION INTRAVENOUS at 00:31

## 2019-11-18 RX ADMIN — FAMOTIDINE 20 MG: 20 TABLET ORAL at 18:54

## 2019-11-18 RX ADMIN — ASPIRIN 81 MG 81 MG: 81 TABLET ORAL at 18:53

## 2019-11-18 RX ADMIN — INSULIN GLARGINE 38 UNITS: 100 INJECTION, SOLUTION SUBCUTANEOUS at 23:04

## 2019-11-18 RX ADMIN — FLUTICASONE FUROATE AND VILANTEROL TRIFENATATE 1 PUFF: 100; 25 POWDER RESPIRATORY (INHALATION) at 18:55

## 2019-11-18 RX ADMIN — LEVOTHYROXINE SODIUM 75 MCG: 75 TABLET ORAL at 05:42

## 2019-11-18 RX ADMIN — ACETAMINOPHEN 650 MG: 325 TABLET ORAL at 05:17

## 2019-11-18 RX ADMIN — PANTOPRAZOLE SODIUM 40 MG: 40 TABLET, DELAYED RELEASE ORAL at 05:42

## 2019-11-18 RX ADMIN — AMIODARONE HYDROCHLORIDE 0.5 MG/MIN: 50 INJECTION, SOLUTION INTRAVENOUS at 10:26

## 2019-11-18 RX ADMIN — AMIODARONE HYDROCHLORIDE 200 MG: 200 TABLET ORAL at 18:54

## 2019-11-18 RX ADMIN — CLOPIDOGREL BISULFATE 75 MG: 75 TABLET ORAL at 18:54

## 2019-11-18 RX ADMIN — ACETAMINOPHEN 650 MG: 325 TABLET ORAL at 14:03

## 2019-11-18 RX ADMIN — DEXTROSE 50 % IN WATER (D50W) INTRAVENOUS SYRINGE 25 ML: at 00:31

## 2019-11-18 RX ADMIN — MIDODRINE HYDROCHLORIDE 5 MG: 5 TABLET ORAL at 06:04

## 2019-11-18 RX ADMIN — DIGOXIN 62.5 MCG: 0.25 INJECTION INTRAMUSCULAR; INTRAVENOUS at 08:45

## 2019-11-18 RX ADMIN — MIDODRINE HYDROCHLORIDE 5 MG: 5 TABLET ORAL at 14:22

## 2019-11-18 RX ADMIN — VANCOMYCIN HYDROCHLORIDE 750 MG: 750 INJECTION, SOLUTION INTRAVENOUS at 16:50

## 2019-11-18 RX ADMIN — SODIUM CHLORIDE 6 UNITS/HR: 9 INJECTION, SOLUTION INTRAVENOUS at 14:14

## 2019-11-18 NOTE — ASSESSMENT & PLAN NOTE
· Anemia of chronic kidney disease r/o GIB  · History of recurrent GI bleed - underwent APC treatment to 2 areas of increased vasculature in the duodenum during push enteroscopy on 11/5/19   If Hb dropped later had been advised double balloon enteroscopy  · Recent baseline Hb 7 9 to 8 2  · Hb gradually dropped from 7 5 to 6 3 during this admission  · S/p 1 PRBC  · Check hemoccult  · Monitor H/H  · Transfuse as needed  · Eliquis held as above  · May need GI eval

## 2019-11-18 NOTE — CONSULTS
Consultation - [facility]    Patient Information: Julio C Vickers 48 y o  female MRN: [id number]  Unit/Bed#: [facility] 402-01 Encounter: [id number]  Admitting Physician: Stanley Mchugh MD  PCP: Magi Abdullahi DO  Date of Admission:  11/18/19    ASSESSMENT:      Hospital Problem List:     Principal Problem:    Shock ([facility] 75 )  Active Problems:    Uncontrolled type 1 diabetes mellitus with hypoglycemia and coma ([place] [id number] )    ESRD (end stage renal disease) on dialysis ([place] [id number] )    PAF (paroxysmal atrial fibrillation) ([place] [address] )    Morbid obesity ([place] [address] )    CAD with recent stent    Hypothyroid    Sepsis ([place] [address] )    Bradycardia    Anemia due to chronic kidney disease      PLAN:    · Uncontrolled type 1 diabetes  Her A1c in July was 8 1%  Goal is 7%  She is presently on IV insulin drip  I will continue this as patient has some cognitive impairment this morning  Over the next 24 hours, once the patient improves and is able to manage her pump herself, we will transition her to her original pump settings  Follow-up as outpatient with Dr Gonzalez  Her pump settings are as follows:  Basal: 12a-3a   1u/hr  3a-8a  1 5u/hr  8a-9p  1 7u/hr  9p-21a  1 15u/hr  CIR:   12a-5a   6g/u  5a-8p   5g/u  8p-12a  6u/hr    ISF: 12a-8a   40  8a-10p  20  10p-12a  40  TGB: 8p-8a   120-140 8a-8p  120-130    · Hypothyroidism  She is presently on levothyroxine 75 mcg q day  Note use of amiodarone  She will need repeat thyroid function tests in 6 weeks and follow up by endocrinologist   Her TSH was low with normal free T4  Could consider euthyroid sick  Reason for consultation:   Diabetes management with insulin pump use history    History of Present Illness:    Julio C Vickers is a 48 y o  female who presents with syncope and change in mental status  She was admitted to the ICU and intubated  She was also found to have bradycardia and hypotension and required pressors    She has received IV antibiotic for possible pneumonia and is being worked up for change in mental status possible toxic metabolic encephalopathy  Endocrine consult placed for hyperglycemia and diabetes management in context of a pump  Patient has known type 1 diabetes since age 14 associated with nephropathy, neuropathy with Charcot arthropathy and retinopathy  She has end-stage renal disease on dialysis and has known coronary artery disease status post stents  Her pump settings include basal 12a to 3a 1 2 units/hour, 3a-9p 1 7 units/hour,9p-12a 1 35u/hr  Carb to insulin ratio of 3 grams/unit, ISF 20, target blood glucose 110-130  She uses Humalog with a Medtronic pump along with the dexcom CGM  She follows with Dr Janak Luciano from [facility]  Other medical history is significant for morbid obesity, obstructive sleep apnea, end-stage renal disease, coronary artery disease status post stents, GERD, hypertension, hyperlipidemia, hypothyroidism, heart failure  Review of Systems:    Review of Systems   Constitutional: Positive for activity change, appetite change and fatigue  HENT: Negative  Eyes: Negative  Respiratory: Positive for shortness of breath  Cardiovascular: Negative  Endocrine: Negative  Genitourinary: Negative  Musculoskeletal: Negative  Allergic/Immunologic: Negative  Neurological: Positive for weakness  Hematological: Negative  Psychiatric/Behavioral: Positive for confusion  All other systems reviewed and are negative        Past Medical and Surgical History:     Past Medical History:   Diagnosis Date    Asthma     Cardiac disease     CHF    [facility]     CPAP (continuous positive airway pressure) dependence     [facility] )     type 1    Dialysis patient ([facility] )     Disease of thyroid gland     hypothyroidism    GERD (gastroesophageal reflux disease)     Hyperlipidemia     Hypertension     Migraine     Psychiatric disorder     PTSD, Depression, panic attacks    Renal disorder     stage 4 kidney disease       Past Surgical History:   Procedure Laterality Date    CATARACT EXTRACTION, BILATERAL       SECTION      CHOLECYSTECTOMY      HERNIA REPAIR      RETINOPATHY SURGERY      TONSILECTOMY AND ADNOIDECTOMY      TONSILLECTOMY      TYMPANOSTOMY TUBE PLACEMENT         Meds/Allergies:    PTA meds:   Prior to Admission Medications   Prescriptions Last Dose Informant Patient Reported? Taking?    Ascorbic Acid (VITAMIN C) 1000 MG tablet   Yes No   Sig: Take 1,000 mg by mouth daily   FLUoxetine (PROzac) 40 MG capsule   Yes No   Sig: Take 40 mg by mouth daily   Ferric Citrate (AURYXIA) 1  MG(Fe) TABS   Yes No   Sig: Take by mouth   PATIENT MAINTAINED INSULIN PUMP   No No   Sig: Inject 1 each under the skin every 8 (eight) hours   acetaminophen (TYLENOL) 325 mg tablet   Yes No   Sig: Take 650 mg by mouth every 6 (six) hours as needed for mild pain   albuterol (PROVENTIL HFA,VENTOLIN HFA) 90 mcg/act inhaler   Yes No   Sig: Inhale 2 puffs every 6 (six) hours as needed for wheezing   albuterol (PROVENTIL HFA,VENTOLIN HFA) 90 mcg/act inhaler   No No   Sig: Inhale 2 puffs every 4 (four) hours as needed for wheezing   allopurinol (ZYLOPRIM) 100 mg tablet   Yes No   Sig: Take 100 mg by mouth daily   apixaban (ELIQUIS) 2 5 mg   No No   Sig: Take 1 tablet (2 5 mg total) by mouth 2 (two) times a day   Patient taking differently: Take 5 mg by mouth 2 (two) times a day    aspirin 81 mg chewable tablet   No No   Sig: Chew 1 tablet (81 mg total) daily   atorvastatin (LIPITOR) 40 mg tablet   No No   Sig: Take 1 tablet (40 mg total) by mouth daily with dinner   Patient taking differently: Take 80 mg by mouth daily with dinner    b complex vitamins capsule   Yes No   Sig: Take 1 capsule by mouth daily   bisacodyl (DULCOLAX) 5 mg EC tablet   Yes No   Sig: Take 5 mg by mouth daily as needed for constipation   calcitriol (ROCALTROL) 0 5 MCG capsule   Yes No   Sig: Take 0 5 mcg by mouth daily   cholecalciferol (VITAMIN D3) 1,000 units tablet   Yes No   Sig: Take 1,000 Units by mouth daily   clopidogrel (PLAVIX) 75 mg tablet   Yes No   Sig: Take 75 mg by mouth daily   diltiazem (CARDIZEM CD) 180 mg 24 hr capsule   No No   Sig: Take 1 capsule (180 mg total) by mouth daily   diphenhydrAMINE (BENADRYL) 25 mg tablet   Yes No   Sig: Take 25 mg by mouth every 6 (six) hours as needed for itching   famotidine (PEPCID) 20 mg tablet   Yes No   Sig: Take 20 mg by mouth daily   fluticasone (FLONASE) 50 mcg/act nasal spray   Yes No   Si spray into each nostril 2 (two) times a day   fluticasone-salmeterol (ADVAIR DISKUS, WIXELA INHUB) 100-50 mcg/dose inhaler   Yes No   Sig: Inhale 1 puff 2 (two) times a day Rinse mouth after use    gabapentin (NEURONTIN) 300 mg capsule   No No   Sig: Take 1 capsule (300 mg total) by mouth daily at bedtime   insulin lispro (HumaLOG) 100 units/mL   No No   Si Units by Subcutaneous Insulin Pump route as needed (when pump is empty)   Patient taking differently: 300 Units by Subcutaneous Insulin Pump route as needed (when pump is empty)    levothyroxine 75 mcg tablet   Yes No   Sig: Take 75 mcg by mouth daily   loratadine (CLARITIN) 10 mg tablet   Yes No   Sig: Take 10 mg by mouth daily   metoprolol tartrate (LOPRESSOR) 25 mg tablet   No No   Sig: Take 1 tablet (25 mg total) by mouth every 12 (twelve) hours   omeprazole (PriLOSEC) 20 mg delayed release capsule   Yes No   Sig: Take 40 mg by mouth daily    ondansetron (ZOFRAN) 4 mg tablet   Yes No   Sig: Take 4 mg by mouth every 6 (six) hours as needed for nausea or vomiting   sevelamer carbonate (RENVELA) 800 mg tablet   Yes No   Sig: Take 800 mg by mouth 2 (two) times a day      Facility-Administered Medications: None       Allergies:    Allergies   Allergen Reactions    Codeine Edema    Shellfish Allergy Edema    Hydrocodone Itching and Vomiting    Morphine Itching and Vomiting    Oxycodone Itching and Vomiting History:     Marital Status: /Civil Union   Occupation:   Substance Use History:   Social History     Substance and Sexual Activity   Alcohol Use Not Currently    Binge frequency: Never     Social History     Tobacco Use   Smoking Status Never Smoker   Smokeless Tobacco Never Used     Social History     Substance and Sexual Activity   Drug Use No       Family History:    Family History   Problem Relation Age of Onset    Pancreatic cancer Mother     Stroke Father        Physical Exam:     Vitals:   Blood Pressure: 115/53 (11/18/19 0919)  Pulse: (!) 145 (11/18/19 0708)  Temperature: (!) 97 3 °F (36 3 °C) (11/18/19 0708)  Temp Source: Oral (11/18/19 0708)  Respirations: 18 (11/18/19 0312)  Height: 5' 5" (165 1 cm) (11/14/19 2259)  Weight - Scale: 115 kg (254 lb 3 1 oz) (11/18/19 0600)  SpO2: 92 % (11/18/19 0919)    Physical Exam   Constitutional: She is oriented to person, place, and time  HENT:   Head: Normocephalic  Mouth/Throat: Oropharynx is clear and moist    Eyes: Pupils are equal, round, and reactive to light  Cardiovascular: Normal rate and normal heart sounds  Pulmonary/Chest: Effort normal and breath sounds normal    Abdominal: Soft  Bowel sounds are normal    Neurological: She is alert and oriented to person, place, and time  Lethargic   Skin: Capillary refill takes 2 to 3 seconds  There is pallor  Lab and Imaging Results: I have personally reviewed pertinent films in PACS    Results from last 7 days   Lab Units 11/17/19  0431  11/15/19  0506   WBC Thousand/uL 13 41*   < > 15 30*   HEMOGLOBIN g/dL 6 4*   < > 7 4*   HEMATOCRIT % 20 8*   < > 23 5*   PLATELETS Thousands/uL 162   < > 168   NEUTROS PCT %  --   --  84*   LYMPHS PCT %  --   --  9*   MONOS PCT %  --   --  5   EOS PCT %  --   --  0    < > = values in this interval not displayed       Results from last 7 days   Lab Units 11/17/19  0431 11/16/19  0450  11/14/19  1918   POTASSIUM mmol/L 4 4 5 2   < >  --    CHLORIDE mmol/L 96* 92*   < >  --    CO2 mmol/L 26 23   < >  --    CO2, I-STAT mmol/L  --   --   --  26   BUN mg/dL 51* 106*   < >  --    CREATININE mg/dL 4 87* 7 79*   < >  --    CALCIUM mg/dL 8 9 9 2   < >  --    ALK PHOS U/L  --  178*   < >  --    ALT U/L  --  88*   < >  --    AST U/L  --  115*   < >  --    GLUCOSE, ISTAT mg/dl  --   --   --  503*    < > = values in this interval not displayed  POC Glucose (mg/dl)   Date Value   11/18/2019 141 (H)   11/18/2019 188 (H)   11/18/2019 294 (H)   11/18/2019 193 (H)   11/18/2019 89   11/18/2019 76   11/18/2019 82   11/18/2019 21 (LL)   11/18/2019 60 (L)   11/17/2019 213 (H)       Xr Chest 1 View Portable    Result Date: 11/15/2019  Narrative: CHEST INDICATION:   intubated  Multiple episodes of syncope and atelectasis  COMPARISON:  Chest CT from 9/9/2019  Chest radiograph from 9/8/2019  EXAM PERFORMED/VIEWS:  XR CHEST PORTABLE FINDINGS:  Low endotracheal tube, at the jacquelyn  NG tube below the diaphragm  Cardiomediastinal silhouette appears unremarkable  Patchy bilateral consolidation  Osseous structures appear within normal limits for patient age  Impression: Low endotracheal tube, at the jacquelyn  Multifocal bilateral consolidation; given the patient's history, this could be due to aspiration  Workstation performed: [id number]     Ct Head Without Contrast    Result Date: 11/14/2019  Narrative: CT BRAIN - WITHOUT CONTRAST INDICATION:   syncope  COMPARISON:  None  TECHNIQUE:  CT examination of the brain was performed  In addition to axial images, coronal 2D reformatted images were created and submitted for interpretation  Radiation dose length product (DLP) for this visit:  949 mGy-cm   This examination, like all CT scans performed in the [facility], was performed utilizing techniques to minimize radiation dose exposure, including the use of iterative reconstruction and automated exposure control  IMAGE QUALITY:  Diagnostic   FINDINGS: PARENCHYMA: Decreased attenuation is noted in periventricular and subcortical white matter demonstrating an appearance that is statistically most likely to represent mild microangiopathic change  No CT signs of acute infarction  No intracranial mass, mass effect or midline shift  No acute parenchymal hemorrhage  VENTRICLES AND EXTRA-AXIAL SPACES:  Ventricles and extra-axial CSF spaces are prominent commensurate with the degree of volume loss  No hydrocephalus  No acute extra-axial hemorrhage  VISUALIZED ORBITS AND PARANASAL SINUSES:  Unremarkable  CALVARIUM AND EXTRACRANIAL SOFT TISSUES:  Normal      Impression: No acute intracranial abnormality  Microangiopathic changes and cortical atrophy appears somewhat precocious  Workstation performed: [id number]     Xr Chest Portable Icu    Result Date: 11/16/2019  Narrative: CHEST INDICATION:   hypoxemia  COMPARISON:  11/15/2019 EXAM PERFORMED/VIEWS:  XR CHEST PORTABLE ICU FINDINGS:  ET tube projects appropriately  NG tube extends below the GE junction  Stable cardiomediastinal structures  No effusions  No pneumothorax  Mild central pulmonary vascular congestion and perihilar interstitial edema  Osseous structures appear within normal limits for patient age  Impression: Mild pulmonary edema  Workstation performed: [id number]     Xr Chest Portable Icu    Result Date: 11/15/2019  Narrative: CHEST INDICATION:   hypoxemia  COMPARISON:  X-ray from prior day and CT 9/9/19 EXAM PERFORMED/VIEWS:  XR CHEST PORTABLE ICU FINDINGS:  ET tube tip at the jacquelyn OG tube tip is not seen Cardiomediastinal silhouette appears unremarkable  Mild diffuse interstitial opacities, unchanged  No pneumothorax  No large pleural effusion  Osseous structures appear within normal limits for patient age  Impression: 1   ET tube tip at the jacquelyn, consider retracting at least 2 cm 2  Unchanged airspace opacities The study was marked in EPIC for immediate notification   Workstation performed: [id number]         ** Please Note: Dragon 360 Dictation voice to text software may have been used in the creation of this document   **

## 2019-11-18 NOTE — PROGRESS NOTES
Progress Note - Electrophysiology-Cardiology (EP)   Crista Kincaid 48 y o  female MRN: [id number]  Unit/Bed#: [facility] 402-01 Encounter: [id number]      Assessment/Plan:   1  Shock    * patient admitted to [facility] on 11-14-19 after being found down at home with admitting ECG's showing junctional bradycvardia with overall presentation of shock requiring pressor support and MICU admission    * since she has been able to step down to med surg floors    * awake, alert    * no longer on pressor support    * is on midodrine for hypotension     2  Junctional bradycardia    * since her clinical status has improved and she has been stopped on combination of BB and CCB when in sinus rhythm she has been in the mid 80's on tele    * continue to monitor on tele    * will update need for PPM as clinical course continues     3  Paroxysmal atrial fibrillation/flutter, symptomatic    * currently patient in AF with RVR with prior ECG's showing AF and atrial flutter    * will initiate amiodarone bolus, 0 5mg/min gtt and PO amiodarone to load and maintain NSR, if we can maintain NSR with continued tele monitoring likely can forgo PPM at this time, again will update as clinical course continues    * continue AC with eliquis    * EF 65% on inpatient echo    * LA size is normal!   * needs to work on outpatient risk factor modification to lower AF burden, can discuss in outpatient setting in more detail     4  Morbid obesity   5  ESRD on HD   6  CAD   7  LISSA   8  Leukocytosis    * ID following    * no signs of clear bacteremia as blood cultures have been negative    * PNA workup continuing on IV ancef     9  Acute anemia    * Hb around 6 on 11-18-19   * primary teams managing    * possible transfusion today    * may be driving her atrial fibrillation w/ RVR       EKG (11-18-19):        Subjective/Objective   Subjective:   LD is a 48year old female w/ paroxysmal atrial fibrillation, paroxysmal atrial flutter AC w/ eliquis, ESRD on HD, CAD, hypothyroidism, LISSA, DVT who presents to [facility] on 11-14-19 after being diagnosed with shock after an episode of syncope with admitting ECG's showing junctional bradycardia  EP was consulted on 11-15-19 for further evaluation of her bradycardia/    11-18-19: This AM patient feels "terrible" but unable to describe specific symptoms  Tele showing atrial fibrillation w/ RVR       Vitals: /53   Pulse (!) 145   Temp (!) 97 3 °F (36 3 °C) (Oral)   Resp 18   Ht 5' 5" (1 651 m)   Wt 115 kg (254 lb 3 1 oz)   SpO2 92%   BMI 42 30 kg/m²   Vitals:    11/17/19 0551 11/18/19 0600   Weight: 115 kg (254 lb 3 1 oz) 115 kg (254 lb 3 1 oz)     Orthostatic Blood Pressures      Most Recent Value   Blood Pressure  115/53 filed at 11/18/2019 9508   Patient Position - Orthostatic VS  Lying filed at 11/18/2019 0708            Intake/Output Summary (Last 24 hours) at 11/18/2019 1015  Last data filed at 11/18/2019 5827  Gross per 24 hour   Intake 305 93 ml   Output    Net 305 93 ml       Invasive Devices     Peripheral Intravenous Line            Peripheral IV 11/14/19 Left  3 days          Line            Hemodialysis AV Fistula Left Upper arm -- days    Hemodialysis AV Fistula Right Upper arm -- days                            Scheduled Meds:  Current Facility-Administered Medications:  acetaminophen 650 mg Oral Q6H PRN Zee Lieu, CRNP    albuterol 2 puff Inhalation Q4H PRN Zee Lieu, CRNP    allopurinol 100 mg Oral Daily Zee Lieu, CRNP    amiodarone 0 5 mg/min Intravenous Continuous Kenton Freeman PA-C    amiodarone (CORDARONE) IV bolus 150 mg Intravenous Once Taisha Carey MD    amiodarone 200 mg Oral TID With Meals Kenton Freeman PA-C    apixaban 5 mg Oral BID Zee Lieu, CRNP    aspirin 81 mg Oral Daily Zee Lieu, CRNP    atorvastatin 80 mg Oral Daily With MeaHayes Lazo, CARLOS    bisacodyl 5 mg Oral Daily PRN Zee Lieu, CRNP    calcitriol 0 5 mcg Oral Once per day on Mon Wed Fri Hina K Mabel Seip, DO    cefepime 1,000 mg Intravenous Q24H Natasha Catching, CRNP Last Rate: Stopped (11/17/19 1540)   clopidogrel 75 mg Oral Daily Natasha Catching, CRNP    digoxin 62 5 mcg Intravenous Every Other Day Natasha Catching, CRNP    famotidine 20 mg Oral Daily Natasha Catching, CRNP    FLUoxetine 40 mg Oral Daily Natasha Catching, CRNP    fluticasone-vilanterol 1 puff Inhalation Daily Natasha Catching, CRNP    gabapentin 300 mg Oral HS Natasha Catching, CRNP    insulin lispro 1-5 Units Subcutaneous TID [facility] Rick Merrill MD    insulin lispro 1-5 Units Subcutaneous HS Rick Merrill MD    insulin regular (HumuLIN R,NovoLIN R) infusion 0 3-21 Units/hr Intravenous Titrated Natasha Catching, CRNP Last Rate: 6 Units/hr (11/18/19 4946)   levothyroxine 75 mcg Oral Early Morning Natasha Catching, CRNP    midodrine 5 mg Oral TID AC Shailesh Lazo, CRNP    pantoprazole 40 mg Oral Early Morning Natasha Catching, CRNP    phenol 1 spray Mouth/Throat Q4H PRN Natasha Catching, CRNP    polyethylene glycol 17 g Oral Daily Natasha Catching, CRNP    senna-docusate sodium 2 tablet Per NG Tube HS Natasha Catching, CRNP    [facility] Hold] vancomycin 10 mg/kg (Adjusted) Intravenous After Dialysis Rick Merrill MD      Continuous Infusions:  amiodarone 0 5 mg/min    insulin regular (HumuLIN R,NovoLIN R) infusion 0 3-21 Units/hr Last Rate: 6 Units/hr (11/18/19 0806)     PRN Meds:   acetaminophen    albuterol    bisacodyl    phenol    [MAR Hold] vancomycin    Physical Exam:   GEN: NAD, alert and oriented, well appearing  SKIN: dry without significant lesions or rashes  HEENT: NCAT, PERRL, EOMs intact  NECK: No JVD or carotid bruits appreciated  CARDIOVASCULAR: irregularly irregular rhythm, tachycardic, normal S1, S2 without murmurs, rubs, or gallops appreciated  LUNGS: Clear to auscultation bilaterally without wheezes, rhonchi, or rales  ABDOMEN: Soft, nontender, nondistended  EXTREMITIES/VASCULAR: perfused without clubbing, cyanosis, or edema b/l  PSYCH: Normal mood and affect  NEURO: CN ll-Xll grossly intact                Lab Results: I have personally reviewed pertinent lab results  Results from last 7 days   Lab Units 199 11/15/19  0506   WBC Thousand/uL 13 41* 14 02* 15 30*   HEMOGLOBIN g/dL 6 4* 7 2* 7 4*   HEMATOCRIT % 20 8* 22 4* 23 5*   PLATELETS Thousands/uL 162 174 168     Results from last 7 days   Lab Units 19  0431 19  0450 11/15/19  2253  19  1918   POTASSIUM mmol/L 4 4 5 2 5 0   < >  --    CHLORIDE mmol/L 96* 92* 93*   < >  --    CO2 mmol/L 26 23 25   < >  --    CO2, I-STAT mmol/L  --   --   --   --  26   BUN mg/dL 51* 106* 105*   < >  --    CREATININE mg/dL 4 87* 7 79* 7 41*   < >  --    GLUCOSE, ISTAT mg/dl  --   --   --   --  503*   CALCIUM mg/dL 8 9 9 2 9 2   < >  --     < > = values in this interval not displayed  Results from last 7 days   Lab Units 190 11/15/19  2253 11/15/19  0506   MAGNESIUM mg/dL 2 3 2 3 2 3         Imaging: I have personally reviewed pertinent reports      Results for orders placed during the hospital encounter of 19   Echo complete with contrast if indicated    Narrative [facility] [address]  [facility] [address] [facility]  [phone]    Transthoracic Echocardiogram  2D, M-mode, Doppler, and Color Doppler    Study date:  15-Nov-2019    Patient: Walt Goldstein  MR number: [id number]  Account number: [de-identified]  : 1965  Age: 48 years  Gender: Female  Status: Inpatient  Location: Bedside  Height: 65 in  Weight: 254 5 lb  BP: 112/ 42 mmHg    Indications: Bradycardia    Diagnoses: R00 1 - Bradycardia, unspecified    Sonographer:  Alcira Sanchez RDCS  Interpreting Physician:  Janette Orellana MD  Primary Physician:  Marsha Mendoza DO  Referring Physician:  CARLOS Mobley  Group:  [facility]    SUMMARY    PROCEDURE INFORMATION:  This was a technically difficult study  LEFT VENTRICLE:  Systolic function was normal by visual assessment  Ejection fraction was estimated to be 65 %  There were no regional wall motion abnormalities  RIGHT VENTRICLE:  The size was normal   Systolic function was normal     HISTORY: PRIOR HISTORY: Syncope, Respiratory Failure, Hypertension, Cardiomyopathy, CHF, PAF, ESRD, Hypothyroid, Hyperlipidemia, Dialysis    PROCEDURE: The procedure was performed at the bedside  This was a routine study  The transthoracic approach was used  The study included complete 2D imaging, M-mode, complete spectral Doppler, and color Doppler  The heart rate was 80 bpm,  at the start of the study  Images were obtained from the parasternal, apical, subcostal, and suprasternal notch acoustic windows  Echocardiographic views were limited due to restricted patient mobility, decreased penetration, and lung  interference  This was a technically difficult study  LEFT VENTRICLE: Size was normal  Systolic function was normal by visual assessment  Ejection fraction was estimated to be 65 %  There were no regional wall motion abnormalities  Wall thickness was normal  DOPPLER: The study was not  technically sufficient to allow evaluation of LV diastolic function  RIGHT VENTRICLE: The size was normal  Systolic function was normal  Wall thickness was normal     LEFT ATRIUM: Size was normal     RIGHT ATRIUM: Size was normal     MITRAL VALVE: Valve structure was normal  There was normal leaflet separation  Not well visualized  AORTIC VALVE: The valve was trileaflet  Leaflets exhibited normal thickness and normal cuspal separation  DOPPLER: Transaortic velocity was within the normal range  There was no evidence for stenosis  There was no significant  regurgitation  TRICUSPID VALVE: Not well visualized  The valve structure was normal  There was normal leaflet separation  PULMONIC VALVE: Not well visualized      PERICARDIUM: There was no pericardial effusion  The pericardium was normal in appearance  AORTA: The root exhibited normal size  SYSTEMIC VEINS: IVC: The inferior vena cava was normal in size      SYSTEM MEASUREMENT TABLES    2D  Ao Diam: 1 79 cm  LA Area: 16 94 cm2  LA Diam: 3 28 cm  LVEDV MOD A4C: 90 67 ml  LVEF MOD A4C: 68 44 %  LVESV MOD A4C: 28 62 ml  LVLd A4C: 7 97 cm  LVLs A4C: 6 59 cm  RVIDd: 3 19 cm  SV MOD A4C: 62 06 ml    CW  TR Vmax: 2 92 m/s  TR maxP 2 mmHg    MM  TAPSE: 2 47 cm    PW  E': 0 08 m/s  E/E': 18 69  MV A [place]: 0 97 m/s  MV Dec [place]: 5 29 m/s2  MV DecT: 267 59 ms  MV E [place]: 1 42 m/s  MV E/A Ratio: 1 46  MV PHT: 77 6 ms  MVA By PHT: 2 84 cm2    [employer] Accredited Echocardiography Laboratory    Prepared and electronically signed by    Antonia Stafford MD  Signed [id number] 16:21:42

## 2019-11-18 NOTE — ASSESSMENT & PLAN NOTE
Lab Results   Component Value Date    HGBA1C 8 1 (H) 07/21/2019       Recent Labs     11/18/19  0610 11/18/19  0803 11/18/19  0913 11/18/19  1028   POCGLU 294* 188* 141* 122     Blood Sugar Average: Last 72 hrs:  (P) 166 [id number]     On insulin pump at home  On insulin drip at present  Discussed with endocrine - plan for transition to Lantus 38 hs and Humalog 6 TID with SSI  Insulin drip to be discontinued after receiving Lantus

## 2019-11-18 NOTE — ASSESSMENT & PLAN NOTE
· Pt with history of GIB 9/2019 at [facility]  Currently on Eliquis for PAF, and plavix for recent LAD stent at [facility]  · Hgb 6 4 today  · Pt had femoral TLC and shaw discontinued yesterday  · Transfuse 1 unit PRBC with HD today  · Repeat Hgb, if does not bump appropriately, consider CT to r/o [facility]  · FOB pending

## 2019-11-18 NOTE — PLAN OF CARE
Problem: Prexisting or High Potential for Compromised Skin Integrity  Goal: Skin integrity is maintained or improved  Description  INTERVENTIONS:  - Identify patients at risk for skin breakdown  - Assess and monitor skin integrity  - Assess and monitor nutrition and hydration status  - Monitor labs   - Assess for incontinence   - Turn and reposition patient  - Assist with mobility/ambulation  - Relieve pressure over bony prominences  - Avoid friction and shearing  - Provide appropriate hygiene as needed including keeping skin clean and dry  - Evaluate need for skin moisturizer/barrier cream  - Collaborate with interdisciplinary team   - Patient/family teaching  - Consider wound care consult   Outcome: Progressing     Problem: Potential for Falls  Goal: Patient will remain free of falls  Description  INTERVENTIONS:  - Assess patient frequently for physical needs  -  Identify cognitive and physical deficits and behaviors that affect risk of falls    -  [place] fall precautions as indicated by assessment   - Educate patient/family on patient safety including physical limitations  - Instruct patient to call for assistance with activity based on assessment  - Modify environment to reduce risk of injury  - Consider OT/PT consult to assist with strengthening/mobility  Outcome: Progressing     Problem: PAIN - ADULT  Goal: Verbalizes/displays adequate comfort level or baseline comfort level  Description  Interventions:  - Encourage patient to monitor pain and request assistance  - Assess pain using appropriate pain scale  - Administer analgesics based on type and severity of pain and evaluate response  - Implement non-pharmacological measures as appropriate and evaluate response  - Consider cultural and social influences on pain and pain management  - Notify physician/advanced practitioner if interventions unsuccessful or patient reports new pain  Outcome: Progressing     Problem: INFECTION - ADULT  Goal: Absence or prevention of progression during hospitalization  Description  INTERVENTIONS:  - Assess and monitor for signs and symptoms of infection  - Monitor lab/diagnostic results  - Monitor all insertion sites, i e  indwelling lines, tubes, and drains  - Monitor endotracheal if appropriate and nasal secretions for changes in amount and color  - [place] appropriate cooling/warming therapies per order  - Administer medications as ordered  - Instruct and encourage patient and family to use good hand hygiene technique  - Identify and instruct in appropriate isolation precautions for identified infection/condition  Outcome: Progressing     Problem: DISCHARGE PLANNING  Goal: Discharge to home or other facility with appropriate resources  Description  INTERVENTIONS:  - Identify barriers to discharge w/patient and caregiver  - Arrange for needed discharge resources and transportation as appropriate  - Identify discharge learning needs (meds, wound care, etc )  - Arrange for interpretive services to assist at discharge as needed  - Refer to Case Management Department for coordinating discharge planning if the patient needs post-hospital services based on physician/advanced practitioner order or complex needs related to functional status, cognitive ability, or social support system  Outcome: Progressing     Problem: Knowledge Deficit  Goal: Patient/family/caregiver demonstrates understanding of disease process, treatment plan, medications, and discharge instructions  Description  Complete learning assessment and assess knowledge base    Interventions:  - Provide teaching at level of understanding  - Provide teaching via preferred learning methods  Outcome: Progressing     Problem: SAFETY,RESTRAINT: NV/NON-SELF DESTRUCTIVE BEHAVIOR  Goal: Remains free of harm/injury (restraint for non violent/non self-detsructive behavior)  Description  INTERVENTIONS:  - Instruct patient/family regarding restraint use   - Assess and monitor physiologic and psychological status   - Provide interventions and comfort measures to meet assessed patient needs   - Identify and implement measures to help patient regain control  - Assess readiness for release of restraint   Outcome: Progressing  Goal: Returns to optimal restraint-free functioning  Description  INTERVENTIONS:  - Assess the patient's behavior and symptoms that indicate continued need for restraint  - Identify and implement measures to help patient regain control  - Assess readiness for release of restraint   Outcome: Progressing     Problem: COPING  Goal: Pt/Family able to verbalize concerns and demonstrate effective coping strategies  Description  INTERVENTIONS:  - Assist patient/family to identify coping skills, available support systems and cultural and spiritual values  - Provide emotional support, including active listening and acknowledgement of concerns of patient and caregivers  - Reduce environmental stimuli, as able  - Provide patient education  - Assess for spiritual pain/suffering and initiate spiritual care, including notification of Pastoral Care or jimmie based community as needed  - Assess effectiveness of coping strategies  Outcome: Progressing  Goal: Will report anxiety at manageable levels  Description  INTERVENTIONS:  - Administer medication as ordered  - Teach and encourage coping skills  - Provide emotional support  - Assess patient/family for anxiety and ability to cope  Outcome: Progressing     Problem: SELF HARM  Goal: Effect of psychiatric condition will be minimized and patient will be protected from self harm  Description  INTERVENTIONS:  - Assess impact of patient's symptoms on level of functioning, self-care needs and offer support as indicated  - Assess patient/family knowledge of depression, impact on illness and need for teaching  - Provide emotional support, presence and reassurance  - Assess for possible suicidal thoughts, ideation or self-harm   If patient expresses suicidal thoughts or statements do not leave alone, notify physician/AP immediately, initiate suicide precautions, and determine need for continual observation    - initiate consults and referrals as appropriate (a mental health professional, Spiritual Care  Outcome: Progressing     Problem: CARDIOVASCULAR - ADULT  Goal: Maintains optimal cardiac output and hemodynamic stability  Description  INTERVENTIONS:  - Monitor I/O, vital signs and rhythm  - Monitor for S/S and trends of decreased cardiac output  - Administer and titrate ordered vasoactive medications to optimize hemodynamic stability  - Assess quality of pulses, skin color and temperature  - Assess for signs of decreased coronary artery perfusion  - Instruct patient to report change in severity of symptoms  Outcome: Progressing  Goal: Absence of cardiac dysrhythmias or at baseline rhythm  Description  INTERVENTIONS:  - Continuous cardiac monitoring, vital signs, obtain 12 lead EKG if ordered  - Administer antiarrhythmic and heart rate control medications as ordered  - Monitor electrolytes and administer replacement therapy as ordered  Outcome: Progressing     Problem: RESPIRATORY - ADULT  Goal: Achieves optimal ventilation and oxygenation  Description  INTERVENTIONS:  - Assess for changes in respiratory status  - Assess for changes in mentation and behavior  - Position to facilitate oxygenation and minimize respiratory effort  - Oxygen administered by appropriate delivery if ordered  - Initiate smoking cessation education as indicated  - Encourage broncho-pulmonary hygiene including cough, deep breathe, Incentive Spirometry  - Assess the need for suctioning and aspirate as needed  - Assess and instruct to report SOB or any respiratory difficulty  - Respiratory Therapy support as indicated  Outcome: Progressing     Problem: Nutrition/Hydration-ADULT  Goal: Nutrient/Hydration intake appropriate for improving, restoring or maintaining nutritional needs  Description  Monitor and assess patient's nutrition/hydration status for malnutrition  Collaborate with interdisciplinary team and initiate plan and interventions as ordered  Monitor patient's weight and dietary intake as ordered or per policy  Utilize nutrition screening tool and intervene as necessary  Determine patient's food preferences and provide high-protein, high-caloric foods as appropriate       INTERVENTIONS:  - Monitor oral intake, urinary output, labs, and treatment plans  - Assess nutrition and hydration status and recommend course of action  - Evaluate amount of meals eaten  - Assist patient with eating if necessary   - Allow adequate time for meals  - Recommend/ encourage appropriate diets, oral nutritional supplements, and vitamin/mineral supplements  - Order, calculate, and assess calorie counts as needed  - Recommend, monitor, and adjust tube feedings and TPN/PPN based on assessed needs  - Assess need for intravenous fluids  - Provide specific nutrition/hydration education as appropriate  - Include patient/family/caregiver in decisions related to nutrition  Outcome: Progressing     Problem: METABOLIC, FLUID AND ELECTROLYTES - ADULT  Goal: Electrolytes maintained within normal limits  Description  INTERVENTIONS:  - Monitor labs and assess patient for signs and symptoms of electrolyte imbalances  - Administer electrolyte replacement as ordered  - Monitor response to electrolyte replacements, including repeat lab results as appropriate  - Instruct patient on fluid and nutrition as appropriate  Outcome: Progressing  Goal: Fluid balance maintained  Description  INTERVENTIONS:  - Monitor labs   - Monitor I/O and WT  - Instruct patient on fluid and nutrition as appropriate  - Assess for signs & symptoms of volume excess or deficit  Outcome: Progressing  Goal: Glucose maintained within target range  Description  INTERVENTIONS:  - Monitor Blood Glucose as ordered  - Assess for signs and symptoms of hyperglycemia and hypoglycemia  - Administer ordered medications to maintain glucose within target range  - Assess nutritional intake and initiate nutrition service referral as needed  Outcome: Progressing

## 2019-11-18 NOTE — ASSESSMENT & PLAN NOTE
· ID following, management of abx  · Source Pulmonary with aspiration vs R UE wound  · BP stable  · Follow cultures

## 2019-11-18 NOTE — PROGRESS NOTES
Pt 0000 BS >21, Pt lethargic, 25mL of 50% dextrose administered, Alonzo Valencia w/ DOLLY made aware, ordered to hold insulin gtt until BS rises to 110 before restarting Insulin gtt on algorithm 2      BS rechecked 82, follow up BS 72

## 2019-11-18 NOTE — PROGRESS NOTES
Patient has been off floor since 1100 in dialysis  Patient lost IV access and had to go to IR for PICC which was placed  Patient back to HD at 1400 and , Insulin on algorithm 3 at 6units/hr, and amiodarone infusion checked and infusing as well  Patient is currently in NSR with HR of 91 in dialysis   Patient's type and screen upon arrival

## 2019-11-18 NOTE — ASSESSMENT & PLAN NOTE
· Presented with junctional bradycardia   · Bradycardia resolved with holding home medications Metoprolol and Diltiazem  · Later developed rapid Afib and converted to NSR with IV Digoxin  · Was in rapid Afib this morning - now in NSR  · IV Amiodarone load per EP followed by oral Amiodarone  · Per discussion with  Rafa Oconnor on the phone her gastroenterologist at [address] [place] [address] [address] had advised her to be off anticoagulation indefinitely due to GI bleed  · Discussed with EP - Eliquis discontinued   · Restart anticoagulation when cleared by GI  · Ct ASA

## 2019-11-18 NOTE — PROGRESS NOTES
Procedure Note - Nephrology   Mandie Posey 48 y o  female MRN: [id number]  Unit/Bed#: [facility] 402-01 Encounter: [id number]    Procedure note-hemodialysis(93776)  Assessment / Plan:  1  End-stage renal disease on hemodialysis on Monday Wednesday Friday schedule at [facility]-patient seen examined by me on hemodialysis today  She is currently off pressors  Target weight 111 5 kg    2  access-left upper extremity AV graft-well-functioning  Recently had right upper extremity AV fistula placed but has faint bruit and wound dehiscence  Will need vascular surgery follow-up and fistulogram right AV fistula once more stable  3  Initial symptomatic bradycardia-with episode of syncope, remains extubated    4  Shock-resolved, off Levophed  Has EF of 65% with normal IVC per recent echocardiogram    5  Mild hyperphosphatemia-continue Renagel 3 tablets p  o  t i d  With meals  -also on calcitriol 0 5 mcg 3 times a week as an outpatient for secondary hyperparathyroidism of renal origin  Last phos 8 8  Will decrease calcitriol to 0 25mcg with HD sessions and if phos remains high, d/c calcitriol until phos controlled  Monitor PTH  6  Anemia of CKD-receiving leuko reduced blood during dialysis today as Hgb down to 6 3  Not on outpatient mircera  7  Hyperkalemia - K 5 4 today at start of dialysis, correct with low K bath on HD    8  Hyponatremia - likely dilutional and in part due to hyperglycemia, correct with UF on HD      Subjective:   Patient seen & examined by me on hemodialysis at approximately 3:15 p m  Mariano yamil Blood pressure 117/42, UF goal 1 L, blood flow 400 mL/min, dialysate flow 600 mL/min  Patient had an episode of rapid AFib this morning was placed on amiodarone drip as well as an insulin drip  Unfortunately she has no peripheral IV access to a tunneled PICC is to be placed    She denies any current chest pain or shortness of Breath      Objective:     Vitals: Blood pressure (!) 92/23, pulse 95, temperature 98 °F (36 7 °C), temperature source Oral, resp  rate 16, height 5' 5" (1 651 m), weight 115 kg (254 lb 3 1 oz), SpO2 100 %  ,Body mass index is 42 3 kg/m²  Temp (24hrs), Av 7 °F (36 5 °C), Min:97 3 °F (36 3 °C), Max:98 7 °F (37 1 °C)      Weight (last 2 days)     Date/Time   Weight    19 0600   115 (254 19)    19 0551   115 (254 19)    19 0533   118 (259 92)                Intake/Output Summary (Last 24 hours) at 2019 1546  Last data filed at 2019 1401  Gross per 24 hour   Intake 376 01 ml   Output    Net 376 01 ml     I/O last 24 hours: In: 591 7 [P O :150; I V :386 7; IV Piggyback:55]  Out: -         Physical Exam:   Physical Exam   Constitutional: She appears well-developed and well-nourished  No distress  obese   HENT:   Head: Normocephalic and atraumatic  Mouth/Throat: No oropharyngeal exudate  Eyes: Right eye exhibits no discharge  Left eye exhibits no discharge  No scleral icterus  Neck: Normal range of motion  Neck supple  No thyromegaly present  Cardiovascular: Normal rate and regular rhythm  No murmur heard  Pulmonary/Chest: Effort normal and breath sounds normal  No respiratory distress  She has no wheezes  Abdominal: Soft  Bowel sounds are normal  She exhibits no distension  Musculoskeletal: She exhibits edema (trace LE edema b/l)  Neurological: She is alert  She exhibits normal muscle tone  awake   Skin: Skin is warm and dry  No rash noted  She is not diaphoretic  Psychiatric: She has a normal mood and affect  Her behavior is normal    Nursing note and vitals reviewed        Invasive Devices     Central Venous Catheter Line            CVC Central Lines 19 Double less than 1 day    CVC Central Lines 19 Double Right Internal jugular less than 1 day          Peripheral Intravenous Line            Peripheral IV 19 Left  3 days          Line            Hemodialysis AV Fistula Left Upper arm -- days    Hemodialysis AV Fistula Right Upper arm -- days                Medications:    Scheduled Meds:  Current Facility-Administered Medications:  acetaminophen 650 mg Oral Q6H PRN CARLOS Monae    albuterol 2 puff Inhalation Q4H PRN CARLOS Monae    allopurinol 100 mg Oral Daily CARLOS Monae    amiodarone 0 5 mg/min Intravenous Continuous Kenton Freeman PA-C Last Rate: 0 5 mg/min (11/18/19 1401)   amiodarone (CORDARONE) IV bolus 150 mg Intravenous Once Annabelle Mistry MD    amiodarone 200 mg Oral TID With Meals Kenton Freeman PA-C    apixaban 5 mg Oral BID CARLOS Monae    aspirin 81 mg Oral Daily CARLOS Monae    atorvastatin 80 mg Oral Daily With [employer], CARLOS    bisacodyl 5 mg Oral Daily PRN CARLOS Monae    calcitriol 0 5 mcg Oral Once per day on Mon Wed Fri Vielka K Jalen, DO    clopidogrel 75 mg Oral Daily CARLOS Monae    digoxin 62 5 mcg Intravenous Every Other Day CARLOS Monae    famotidine 20 mg Oral Daily CARLOS Monae    FLUoxetine 40 mg Oral Daily CARLOS Monae    fluticasone-vilanterol 1 puff Inhalation Daily CARLOS Reddy    gabapentin 300 mg Oral HS CARLOS Reddy    insulin glargine 38 Units Subcutaneous HS Teresa Kathleen MD    insulin lispro 1-5 Units Subcutaneous HS Teresa Kathleen MD    insulin lispro 1-6 Units Subcutaneous TID AC Teresa Kathleen MD    [START ON 11/19/2019] insulin lispro 6 Units Subcutaneous TID With Meals Teresa Kathleen MD    insulin regular (HumuLIN R,NovoLIN R) infusion 0 3-21 Units/hr Intravenous Titrated Teresa Kathleen MD Last Rate: 6 Units/hr (11/18/19 1414)   levothyroxine 75 mcg Oral Early Morning CARLOS Monae    midodrine 5 mg Oral TID AC CARLOS Reddy    pantoprazole 40 mg Oral Early Morning CARLOS Monae    phenol 1 spray Mouth/Throat Q4H PRN CARLOS Monae    polyethylene glycol 17 g Oral Daily CARLOS Monae senna-docusate sodium 2 tablet Per NG Tube HS Nneka CARLOS Mcdonald    [facility] Hold] vancomycin 10 mg/kg (Adjusted) Intravenous After Dialysis Umer Avila MD        PRN Meds:   acetaminophen    albuterol    bisacodyl    phenol    [MAR Hold] vancomycin    Continuous Infusions:  amiodarone 0 5 mg/min Last Rate: 0 5 mg/min (11/18/19 1401)   insulin regular (HumuLIN R,NovoLIN R) infusion 0 3-21 Units/hr Last Rate: 6 Units/hr (11/18/19 1414)           LAB RESULTS:      Results from last 7 days   Lab Units 11/18/19  1345 11/17/19  0431 11/16/19  0450 11/16/19  0449 11/15/19  2253 11/15/19  0506 11/14/19  2116 11/14/19  1921 11/14/19  1918 11/14/19  1917   WBC Thousand/uL 13 19* 13 41*  --  14 02*  --  15 30*  --  12 82*  --   --    HEMOGLOBIN g/dL 6 3* 6 4*  --  7 2*  --  7 4*  --  7 5*  --   --    I STAT HEMOGLOBIN g/dl  --   --   --   --   --   --   --   --  8 2* 8 2*   HEMATOCRIT % 20 3* 20 8*  --  22 4*  --  23 5*  --  24 9*  --   --    HEMATOCRIT, ISTAT %  --   --   --   --   --   --   --   --  24* 24*   PLATELETS Thousands/uL 192 162  --  174  --  168 215 228  --   --    NEUTROS PCT %  --   --   --   --   --  84*  --  62  --   --    LYMPHS PCT %  --   --   --   --   --  9*  --  25  --   --    MONOS PCT %  --   --   --   --   --  5  --  9  --   --    EOS PCT %  --   --   --   --   --  0  --  3  --   --    POTASSIUM mmol/L 5 4* 4 4 5 2  --  5 0 4 6  --  5 2  --   --    CHLORIDE mmol/L 95* 96* 92*  --  93* 93*  --  90*  --   --    CO2 mmol/L 22 26 23  --  25 23  --  23  --   --    CO2, I-STAT mmol/L  --   --   --   --   --   --   --   --  26 25   BUN mg/dL 73* 51* 106*  --  105* 94*  --  86*  --   --    CREATININE mg/dL 6 98* 4 87* 7 79*  --  7 41* 6 56*  --  6 58*  --   --    CALCIUM mg/dL 9 0 8 9 9 2  --  9 2 8 8  --  9 4  --   --    ALK PHOS U/L  --   --  178*  --   --  203*  --  202*  --   --    ALT U/L  --   --  88*  --   --  73  --  16  --   --    AST U/L  --   --  115*  --   --  138*  --  10  --   -- GLUCOSE, ISTAT mg/dl  --   --   --   --   --   --   --   --  503* 500*   MAGNESIUM mg/dL 2 4  --  2 3  --  2 3 2 3  --   --   --   --    PHOSPHORUS mg/dL 8 8* 6 5* 7 7*  --  6 5* 5 3*  --   --   --   --        CUTURES:  Lab Results   Component Value Date    BLOODCX No Growth at 72 hrs  11/18/2019    BLOODCX No Growth at 72 hrs  11/14/2019    BLOODCX No Growth After 5 Days  09/08/2019    BLOODCX No Growth After 5 Days  09/08/2019                 Portions of the record may have been created with voice recognition software  Occasional wrong word or "sound a like" substitutions may have occurred due to the inherent limitations of voice recognition software  Read the chart carefully and recognize, using context, where substitutions have occurred  If you have any questions, please contact the dictating provider

## 2019-11-18 NOTE — PHYSICAL THERAPY NOTE
Physical Therapy Cancellation Note    PT order received; chart reviewed and spoke to RN; pt is reportedly w/ ongoing BP (decreased) and HR (elevated) issues; also noted pt's Hgb was 6 4 yesterday but today's lab values are not available yet (will be drawn during HD as per nsg); will hold PT assessment at this time based on overall status; will follow as clinically appropriate      Edgardo Brooks, PT

## 2019-11-18 NOTE — OCCUPATIONAL THERAPY NOTE
Occupational Therapy Cancellation        Patient Name: Kirsten Chakraborty  VKUUN'M Date: 11/18/2019    OT orders received  Chart reviewed  Spoke w/ RN Miya, pt w/ low blood pressure and low hemoglobin  OT to hold at this time and continue to see as medically appropriate      Candace Sheets MS, OTR/L

## 2019-11-18 NOTE — PROGRESS NOTES
Vancomycin IV Pharmacy-to-Dose Consultation    Gm Goldman is a 48 y o  female who is currently receiving Vancomycin IV with management by the Pharmacy Consult service  Assessment/Plan:  The patient was reviewed  Renal function is unchanged and no signs or symptoms of infusion reactions were documented in the chart  Based on todays assessment, continue current vancomycin (day # 3) dosing of 750mg iv prn when random level<20 (dose to be given today), with a plan for trough to be drawn with am labs on 11/20  We will continue to follow the patients culture results and clinical progress daily      Kenyon Cortez, Pharmacist

## 2019-11-18 NOTE — PROGRESS NOTES
Progress Note - Cheryle Deem 1965, 48 y o  female MRN: [id number]    Unit/Bed#: [facility] 402-01 Encounter: [id number]    Primary Care Provider: Nikhil Arevalo DO   Date and time admitted to hospital: 11/14/2019  7:03 PM        PAF (paroxysmal atrial fibrillation) ([facility] [address] )  Assessment & Plan  · Pt with junctional bradycardia/afib with RVR and NSR during hospital   · Cardiology and EP following  · Pt being dosed with Digoxin and started on Amio per EP  · Home Metoprolol and Cardizem on hold    Sepsis [facility])  Assessment & Plan  · ID following, management of abx  · Source Pulmonary with aspiration vs R UE wound  · BP stable  · Follow cultures    Anemia  Assessment & Plan  · Pt with history of GIB 9/2019 at [facility]  Currently on Eliquis for PAF, and plavix for recent LAD stent at [facility]  · Hgb 6 4 today  · Pt had femoral TLC and shaw discontinued yesterday  · Transfuse 1 unit PRBC with HD today  · Repeat Hgb 7 1, consider CT to r/o RPH if hgb drops again  · FOB pending    ESRD  -HD per Nephrology    DM  -Insulin per Endocrinology    -------------------------------------------------------------------------------------------------------------  Chief Complaint: no c/o pain or SOB    History of Present Illness     Cheryle Deem is a 48 y o  female who presented with syncope and found to have a junctional bradycardia  She was in the MICU on Dopamine for a time, but then developed Afib with RVR and required Levo while in ICU  She also aspirated and required intubation  She was subsequently extubated and transferred to [facility]  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 and is managing medically as to avoid PPM with BUE AV fistulae  On 11/18, she developed hypotension and hgb dropped from 7 2 to 6 3  1u PRBC was given in HD and patient taken to IR for Rt IJ CVC  CCM asked to see patient again for SD 1 care  I saw patient after above  She was resting comfortably and had no c/o pain, SOB, nausea, vomiting      History obtained from chart review and the patient   -------------------------------------------------------------------------------------------------------------  Dispo: Continue Stepdown Level 1 level of care     Code Status: Level 1 - Full Code  --------------------------------------------------------------------------------------------------------------  Review of Systems    A 12-point, complete review of systems was reviewed and negative except as stated above     Physical Exam   Constitutional: She is oriented to person, place, and time  She appears well-developed and well-nourished  No distress  Cardiovascular: Normal rate and regular rhythm  Distant heart tones  Left femoral line site without hematoma   Pulmonary/Chest: Effort normal and breath sounds normal    Abdominal: Soft  Bowel sounds are normal  There is no tenderness  There is no guarding  Morbid obesity  Left groin line site without hematoma   Musculoskeletal:   BLE with 1+ edema   Neurological: She is oriented to person, place, and time  No cranial nerve deficit  Skin: Skin is warm and dry     Psychiatric: She has a normal mood and affect      --------------------------------------------------------------------------------------------------------------  Historical Information   Past Medical History:   Diagnosis Date    Asthma     Cardiac disease     CHF    CH[facility]congestive heart failure) ([facility]     CPAP (continuous positive airway pressure) dependence     Diabetes mellitus ([facility] )     type 1    Dialysis patient ([facility] )     Disease of thyroid gland     hypothyroidism    GERD (gastroesophageal reflux disease)     Hyperlipidemia     Hypertension     Migraine     Psychiatric disorder     PTSD, Depression, panic attacks    Renal disorder     stage 4 kidney disease     Past Surgical History:   Procedure Laterality Date    CATARACT EXTRACTION, BILATERAL       SECTION      CHOLECYSTECTOMY      HERNIA REPAIR      IR CENTRAL LINE PLACEMENT  2019  RETINOPATHY SURGERY      TONSILECTOMY AND ADNOIDECTOMY      TONSILLECTOMY      TYMPANOSTOMY TUBE PLACEMENT       Social History   Social History     Substance and Sexual Activity   Alcohol Use Not Currently    Binge frequency: Never     Social History     Substance and Sexual Activity   Drug Use No     Social History     Tobacco Use   Smoking Status Never Smoker   Smokeless Tobacco Never Used     Family History:   Family History   Problem Relation Age of Onset    Pancreatic cancer Mother     Stroke Father      Family history unknown and I have reviewed this patient's family history and commented on sigificant items within the HPI    Vitals:   Vitals:    11/18/19 1648 11/18/19 1700 11/18/19 1730 11/18/19 1745   BP: 170/64 165/73 (!) 175/66 165/70   BP Location:  Left leg Left leg Left leg   Pulse: 93 93 90 93   Resp: 18 18 18 18   Temp: 98 4 °F (36 9 °C)   98 3 °F (36 8 °C)   TempSrc: Oral   Oral   SpO2:       Weight:       Height:         Temp  Min: 96 8 °F (36 °C)  Max: 99 4 °F (37 4 °C)  IBW: 57 kg  Height: 5' 5" (165 1 cm)  Body mass index is 42 3 kg/m²        Medications:  Current Facility-Administered Medications   Medication Dose Route Frequency    acetaminophen (TYLENOL) tablet 650 mg  650 mg Oral Q6H PRN    albuterol (PROVENTIL HFA,VENTOLIN HFA) inhaler 2 puff  2 puff Inhalation Q4H PRN    allopurinol (ZYLOPRIM) tablet 100 mg  100 mg Oral Daily    amiodarone (CORDARONE) 900 mg in dextrose 5 % 500 mL infusion  0 5 mg/min Intravenous Continuous    amiodarone 150 mg in dextrose 5 % 100 mL IV bolus  150 mg Intravenous Once    amiodarone tablet 200 mg  200 mg Oral TID With Meals    aspirin chewable tablet 81 mg  81 mg Oral Daily    atorvastatin (LIPITOR) tablet 80 mg  80 mg Oral Daily With Dinner    bisacodyl (DULCOLAX) EC tablet 5 mg  5 mg Oral Daily PRN    [START ON 11/20/2019] calcitriol (ROCALTROL) capsule 0 25 mcg  0 25 mcg Oral Once per day on Mon Wed Fri    clopidogrel (PLAVIX) tablet 75 mg  75 mg Oral Daily    famotidine (PEPCID) tablet 20 mg  20 mg Oral Daily    FLUoxetine (PROzac) capsule 40 mg  40 mg Oral Daily    fluticasone-vilanterol (BREO ELLIPTA) 100-25 mcg/inh inhaler 1 puff  1 puff Inhalation Daily    gabapentin (NEURONTIN) capsule 300 mg  300 mg Oral HS    insulin glargine (LANTUS) subcutaneous injection 38 Units 0 38 mL  38 Units Subcutaneous HS    insulin lispro (HumaLOG) 100 units/mL subcutaneous injection 1-5 Units  1-5 Units Subcutaneous HS    insulin lispro (HumaLOG) 100 units/mL subcutaneous injection 1-6 Units  1-6 Units Subcutaneous TID AC    [START ON 11/19/2019] insulin lispro (HumaLOG) 100 units/mL subcutaneous injection 6 Units  6 Units Subcutaneous TID With Meals    insulin regular (HumuLIN R,NovoLIN R) 1 Units/mL in sodium chloride 0 9 % 100 mL infusion  0 3-21 Units/hr Intravenous Titrated    levothyroxine tablet 75 mcg  75 mcg Oral Early Morning    midodrine (PROAMATINE) tablet 5 mg  5 mg Oral TID AC    pantoprazole (PROTONIX) EC tablet 40 mg  40 mg Oral Early Morning    phenol (CHLORASEPTIC) 1 4 % mucosal liquid 1 spray  1 spray Mouth/Throat Q4H PRN    polyethylene glycol (MIRALAX) packet 17 g  17 g Oral Daily    senna-docusate sodium (SENOKOT S) 8 6-50 mg per tablet 2 tablet  2 tablet Per NG Tube HS    vancomycin (VANCOCIN) IVPB (premix) 750 mg  10 mg/kg (Adjusted) Intravenous After Dialysis     Home medications:  Prior to Admission Medications   Prescriptions Last Dose Informant Patient Reported? Taking?    Ascorbic Acid (VITAMIN C) 1000 MG tablet   Yes No   Sig: Take 1,000 mg by mouth daily   FLUoxetine (PROzac) 40 MG capsule   Yes No   Sig: Take 40 mg by mouth daily   Ferric Citrate (AURYXIA) 1  MG(Fe) TABS   Yes No   Sig: Take by mouth   PATIENT MAINTAINED INSULIN PUMP   No No   Sig: Inject 1 each under the skin every 8 (eight) hours   acetaminophen (TYLENOL) 325 mg tablet   Yes No   Sig: Take 650 mg by mouth every 6 (six) hours as needed for mild pain   albuterol (PROVENTIL HFA,VENTOLIN HFA) 90 mcg/act inhaler   Yes No   Sig: Inhale 2 puffs every 6 (six) hours as needed for wheezing   albuterol (PROVENTIL HFA,VENTOLIN HFA) 90 mcg/act inhaler   No No   Sig: Inhale 2 puffs every 4 (four) hours as needed for wheezing   allopurinol (ZYLOPRIM) 100 mg tablet   Yes No   Sig: Take 100 mg by mouth daily   apixaban (ELIQUIS) 2 5 mg   No No   Sig: Take 1 tablet (2 5 mg total) by mouth 2 (two) times a day   Patient taking differently: Take 5 mg by mouth 2 (two) times a day    aspirin 81 mg chewable tablet   No No   Sig: Chew 1 tablet (81 mg total) daily   atorvastatin (LIPITOR) 40 mg tablet   No No   Sig: Take 1 tablet (40 mg total) by mouth daily with dinner   Patient taking differently: Take 80 mg by mouth daily with dinner    b complex vitamins capsule   Yes No   Sig: Take 1 capsule by mouth daily   bisacodyl (DULCOLAX) 5 mg EC tablet   Yes No   Sig: Take 5 mg by mouth daily as needed for constipation   calcitriol (ROCALTROL) 0 5 MCG capsule   Yes No   Sig: Take 0 5 mcg by mouth daily   cholecalciferol (VITAMIN D3) 1,000 units tablet   Yes No   Sig: Take 1,000 Units by mouth daily   clopidogrel (PLAVIX) 75 mg tablet   Yes No   Sig: Take 75 mg by mouth daily   diltiazem (CARDIZEM CD) 180 mg 24 hr capsule   No No   Sig: Take 1 capsule (180 mg total) by mouth daily   diphenhydrAMINE (BENADRYL) 25 mg tablet   Yes No   Sig: Take 25 mg by mouth every 6 (six) hours as needed for itching   famotidine (PEPCID) 20 mg tablet   Yes No   Sig: Take 20 mg by mouth daily   fluticasone (FLONASE) 50 mcg/act nasal spray   Yes No   Si spray into each nostril 2 (two) times a day   fluticasone-salmeterol (ADVAIR DISKUS, WIXELA INHUB) 100-50 mcg/dose inhaler   Yes No   Sig: Inhale 1 puff 2 (two) times a day Rinse mouth after use    gabapentin (NEURONTIN) 300 mg capsule   No No   Sig: Take 1 capsule (300 mg total) by mouth daily at bedtime   insulin lispro (HumaLOG) 100 units/mL   No No   Si Units by Subcutaneous Insulin Pump route as needed (when pump is empty)   Patient taking differently: 300 Units by Subcutaneous Insulin Pump route as needed (when pump is empty)    levothyroxine 75 mcg tablet   Yes No   Sig: Take 75 mcg by mouth daily   loratadine (CLARITIN) 10 mg tablet   Yes No   Sig: Take 10 mg by mouth daily   metoprolol tartrate (LOPRESSOR) 25 mg tablet   No No   Sig: Take 1 tablet (25 mg total) by mouth every 12 (twelve) hours   omeprazole (PriLOSEC) 20 mg delayed release capsule   Yes No   Sig: Take 40 mg by mouth daily    ondansetron (ZOFRAN) 4 mg tablet   Yes No   Sig: Take 4 mg by mouth every 6 (six) hours as needed for nausea or vomiting   sevelamer carbonate (RENVELA) 800 mg tablet   Yes No   Sig: Take 800 mg by mouth 2 (two) times a day      Facility-Administered Medications: None     Allergies:   Allergies   Allergen Reactions    Codeine Edema    Shellfish Allergy Edema    Hydrocodone Itching and Vomiting    Morphine Itching and Vomiting    Oxycodone Itching and Vomiting         Laboratory and Diagnostics:  Results from last 7 days   Lab Units 19  1345 19  0431 19  0449 11/15/19  0506 196 19   WBC Thousand/uL 13 19* 13 41* 14 02* 15 30*  --  12 82*  --   --    HEMOGLOBIN g/dL 6 3* 6 4* 7 2* 7 4*  --  7 5*  --   --    I STAT HEMOGLOBIN g/dl  --   --   --   --   --   --  8 2* 8 2*   HEMATOCRIT % 20 3* 20 8* 22 4* 23 5*  --  24 9*  --   --    HEMATOCRIT, ISTAT %  --   --   --   --   --   --  24* 24*   PLATELETS Thousands/uL 192 162 174 168 215 228  --   --    NEUTROS PCT %  --   --   --  84*  --  62  --   --    MONOS PCT %  --   --   --  5  --  9  --   --      Results from last 7 days   Lab Units 19  1345 19  0102 19  0431 19  0450 11/15/19  2253 11/15/19  0506 191 19   SODIUM mmol/L 134*  --  135* 133* 134* 132* 129*  --   -- POTASSIUM mmol/L 5 4*  --  4 4 5 2 5 0 4 6 5 2  --   --    CHLORIDE mmol/L 95*  --  96* 92* 93* 93* 90*  --   --    CO2 mmol/L 22  --  26 23 25 23 23  --   --    CO2, I-STAT mmol/L  --   --   --   --   --   --   --  26 25   AGAP mmol/L  --   --   --   --   --   --   --   --  19*   ANION GAP mmol/L 17*  --  13 18* 16* 16* 16*  --   --    BUN mg/dL 73*  --  51* 106* 105* 94* 86*  --   --    CREATININE mg/dL 6 98*  --  4 87* 7 79* 7 41* 6 56* 6 58*  --   --    CALCIUM mg/dL 9 0  --  8 9 9 2 9 2 8 8 9 4  --   --    GLUCOSE RANDOM mg/dL 192* 70 105 129 104 402* 511*  --   --    ALT U/L  --   --   --  88*  --  73 16  --   --    AST U/L  --   --   --  115*  --  138* 10  --   --    ALK PHOS U/L  --   --   --  178*  --  203* 202*  --   --    ALBUMIN g/dL  --   --   --  2 3*  --  2 3* 2 4*  --   --    TOTAL BILIRUBIN mg/dL  --   --   --  0 67  --  0 79 0 42  --   --      Results from last 7 days   Lab Units 11/18/19  1345 11/17/19  0431 11/16/19  0450 11/15/19  2253 11/15/19  0506   MAGNESIUM mg/dL 2 4  --  2 3 2 3 2 3   PHOSPHORUS mg/dL 8 8* 6 5* 7 7* 6 5* 5 3*           Results from last 7 days   Lab Units 11/14/19  2348 11/14/19  2116 11/14/19  1921   TROPONIN I ng/mL <0 02 <0 02 <0 02     Results from last 7 days   Lab Units 11/15/19  0755   LACTIC ACID mmol/L 2 3*     ABG:  Results from last 7 days   Lab Units 11/15/19  0343   PH ART  7 353   PCO2 ART mm Hg 41 8   PO2 ART mm Hg 99 5   HCO3 ART mmol/L 22 7   BASE EXC ART mmol/L -2 7   ABG SOURCE  Line, Arterial     VBG:  Results from last 7 days   Lab Units 11/15/19  0343 11/14/19  1921   PH CHERRY   --  7 348   PCO2 CHERRY mm Hg  --  41 2*   PO2 CHERRY mm Hg  --  43 6   HCO3 CHERRY mmol/L  --  22 1*   BASE EXC CHERRY mmol/L  --  -3 3   ABG SOURCE  Line, Arterial  --      Results from last 7 days   Lab Units 11/18/19  1345 11/16/19  0450 11/15/19  0755   PROCALCITONIN ng/ml 3 03* 3 90* 1 13*         Micro:  Results from last 7 days   Lab Units 11/18/19  0801 11/16/19  1112 11/14/19  2347   BLOOD CULTURE  No Growth at 72 hrs   --  No Growth at 72 hrs  SPUTUM CULTURE   --  2+ Growth of Candida albicans*  2+ Growth of   --    GRAM STAIN RESULT   --  1+ Polys  No bacteria seen  --          EKG: NSR 80s  Imaging: I have personally reviewed pertinent reports  ------------------------------------------------------------------------------------------------------------  Advance Directive and Living Will:      Power of :    POLST:    ------------------------------------------------------------------------------------------------------------  Counseling / Coordination of Care  Total time spent today 30 minutes  Greater than 50% of total time was spent with the patient and / or family counseling and / or coordination of care  A description of the counseling / coordination of care: critical care time      CARLOS Bravo        Portions of the record may have been created with voice recognition software  Occasional wrong word or "sound a like" substitutions may have occurred due to the inherent limitations of voice recognition software    Read the chart carefully and recognize, using context, where substitutions have occurred

## 2019-11-18 NOTE — PROGRESS NOTES
Progress Note - Cardiology   Juanito Oliva 48 y o  female MRN: [id number]  Unit/Bed#: [facility] 402-01 Encounter: [id number]        Principal Problem:    Shock ([place] [address] )  Active Problems:    Uncontrolled type 1 diabetes mellitus with hypoglycemia and coma ([place] [address] )    ESRD (end stage renal disease) on dialysis ([place] [address] )    PAF (paroxysmal atrial fibrillation) ([place] [address] )    Morbid obesity ([place] [address] )    CAD with recent stent    Hypothyroid    Sepsis ([place] [address] )    Bradycardia    Anemia due to chronic kidney disease      Assessment/Plan    1  Syncope-presented with   Limited echo preserved LV and RV function, TDS    2  Junctional bradycardia  Associated with hypotension- requiring pressors- levophed followed by dopamine  ( d/c'ed d/t tachcyardia)   No longer on pressors    3  Acute hypoxic respiratory failure  Requiring intubation, now extubated  She was transferred out of ICU last evening  4  Hypotension- requiring pressors , now on midodrine 5 TID  Systolic blood pressure initially 90 now 115  Patient was lightheaded dizzy this morning felt like she was going to pass out  5  PAF- was sinus rhythm yesterday  Went to back into AFib with RVR at 7 o'clock this morning  Was previously on diltiazem 180 and metoprolol tartrate 25 b i d   H O GIB and was recently evaluated at [facility] for [facility]  On eliquis 5 BID  Digoxin 62 5 every other day started today  She was given  11/16  0945 EP now at bedside evaluating patient  IV amiodarone orders to follow  6  CAD LAD stent 9/6/2019  Plavix 75  BB on hold    7  ESRD/HD  Patient has both right arm and left arm HD fistula    8  Chronic diastolic heart failure-managed by HD    9  Acute on chronic anemia  Hgb 6 4  She is to receive unit packed red cells with hemodialysis today  10  Sepsis- unclear source   Elevated procalcitonin  Possible pneumonia, aspiration  Blood cultures remain negative    11  Acute mental status change-this a m   I walked into her room and she seems to have had an acute mental status change  Blood pressure was low this morning with systolic 90 but currently stable  She is tachycardic in the 130s  Pulse ox 92%, placed on 2 L  She is nodding  She has twitching of her arms legs and head  Blood sugar 88  Subjective/Objective   Chief Complaint/Subjective  Walked in to pts room  She is sitting  at side of bed nodding off  She said she felt like she needed to sit up but was sitting up  She was confused  Lightheaded dizzy  Felt like she was going to pass out  Spoke with attending Dr Andrea Clemons has reached out to EP          Vitals: BP 91/57 (BP Location: Right leg) Comment: Map 69  Pulse (!) 145   Temp (!) 97 3 °F (36 3 °C) (Oral)   Resp 18   Ht 5' 5" (1 651 m)   Wt 115 kg (254 lb 3 1 oz)   SpO2 94%   BMI 42 30 kg/m²     Vitals:    11/17/19 0551 11/18/19 0600   Weight: 115 kg (254 lb 3 1 oz) 115 kg (254 lb 3 1 oz)     Orthostatic Blood Pressures      Most Recent Value   Blood Pressure  91/57 [Map 69] filed at 11/18/2019 0708   Patient Position - Orthostatic VS  Lying filed at 11/18/2019 0708            Intake/Output Summary (Last 24 hours) at 11/18/2019 0843  Last data filed at 11/17/2019 1833  Gross per 24 hour   Intake 243 58 ml   Output    Net 243 58 ml       Invasive Devices     Peripheral Intravenous Line            Peripheral IV 11/14/19 Left  3 days          Line            Hemodialysis AV Fistula Left Upper arm -- days    Hemodialysis AV Fistula Right Upper arm -- days                Current Facility-Administered Medications   Medication Dose Route Frequency    acetaminophen (TYLENOL) tablet 650 mg  650 mg Oral Q6H PRN    albuterol (PROVENTIL HFA,VENTOLIN HFA) inhaler 2 puff  2 puff Inhalation Q4H PRN    allopurinol (ZYLOPRIM) tablet 100 mg  100 mg Oral Daily    apixaban (ELIQUIS) tablet 5 mg  5 mg Oral BID    aspirin chewable tablet 81 mg  81 mg Oral Daily    atorvastatin (LIPITOR) tablet 80 mg  80 mg Oral Daily With Dinner    bisacodyl (DULCOLAX) EC tablet 5 mg  5 mg Oral Daily PRN    calcitriol (ROCALTROL) capsule 0 5 mcg  0 5 mcg Oral Once per day on Mon Wed Fri    cefepime (MAXIPIME) 1,000 mg in dextrose 5 % 50 mL IVPB  1,000 mg Intravenous Q24H    clopidogrel (PLAVIX) tablet 75 mg  75 mg Oral Daily    digoxin (LANOXIN) injection 62 5 mcg  62 5 mcg Intravenous Every Other Day    famotidine (PEPCID) tablet 20 mg  20 mg Oral Daily    FLUoxetine (PROzac) capsule 40 mg  40 mg Oral Daily    fluticasone-vilanterol (BREO ELLIPTA) 100-25 mcg/inh inhaler 1 puff  1 puff Inhalation Daily    gabapentin (NEURONTIN) capsule 300 mg  300 mg Oral HS    insulin regular (HumuLIN R,NovoLIN R) 1 Units/mL in sodium chloride 0 9 % 100 mL infusion  0 3-21 Units/hr Intravenous Titrated    levothyroxine tablet 75 mcg  75 mcg Oral Early Morning    midodrine (PROAMATINE) tablet 5 mg  5 mg Oral TID AC    pantoprazole (PROTONIX) EC tablet 40 mg  40 mg Oral Early Morning    phenol (CHLORASEPTIC) 1 4 % mucosal liquid 1 spray  1 spray Mouth/Throat Q4H PRN    polyethylene glycol (MIRALAX) packet 17 g  17 g Oral Daily    senna-docusate sodium (SENOKOT S) 8 6-50 mg per tablet 2 tablet  2 tablet Per NG Tube HS    [MAR Hold] vancomycin (VANCOCIN) IVPB (premix) 750 mg  10 mg/kg (Adjusted) Intravenous After Dialysis         Physical Exam: BP 91/57 (BP Location: Right leg) Comment: Map 69  Pulse (!) 145   Temp (!) 97 3 °F (36 3 °C) (Oral)   Resp 18   Ht 5' 5" (1 651 m)   Wt 115 kg (254 lb 3 1 oz)   SpO2 94%   BMI 42 30 kg/m²     General Appearance:    Arousable   Appears distressed   Head:    Normocephalic, no scleral icterus   Eyes:    PERRL   Nose:   Nares normal, septum midline, no drainage    Throat:   Lips, mucosa, and tongue normal   Neck:   Supple, symmetrical, trachea midline,              Lungs:     Clear to auscultation bilaterally, respirations unlabored   Chest Wall:    No tenderness or deformity    Heart:    Iregular rate and rhythm, S1 and S2 normal, no murmur, rub   or gallop       Extremities:   Extremities normal, atraumatic, no cyanosis or edema       Skin:   Skin warm   Neurologic:   Twitching had arms and legs  Patient nodding off as I talk to her  Feels confused                    Lab Results:   Recent Results (from the past 72 hour(s))   ECG 12 lead    Collection Time: 11/15/19  9:07 AM   Result Value Ref Range    Ventricular Rate 55 BPM    Atrial Rate 55 BPM    AK Interval 163 ms    QRSD Interval 75 ms    QT Interval 458 ms    QTC Interval 439 ms    P Axis 71 degrees    QRS Axis 57 degrees    T Wave Axis 79 degrees   Fingerstick Glucose (POCT)    Collection Time: 11/15/19 10:04 AM   Result Value Ref Range    POC Glucose 126 65 - 140 mg/dl   ECG 12 lead    Collection Time: 11/15/19 10:55 AM   Result Value Ref Range    Ventricular Rate 72 BPM    Atrial Rate 72 BPM    AK Interval 166 ms    QRSD Interval 75 ms    QT Interval 396 ms    QTC Interval 434 ms    P Axis  degrees    QRS Axis 29 degrees    T Wave Axis 51 degrees   Fingerstick Glucose (POCT)    Collection Time: 11/15/19 11:54 AM   Result Value Ref Range    POC Glucose 110 65 - 140 mg/dl   Fingerstick Glucose (POCT)    Collection Time: 11/15/19  2:13 PM   Result Value Ref Range    POC Glucose 92 65 - 140 mg/dl   Fingerstick Glucose (POCT)    Collection Time: 11/15/19  4:18 PM   Result Value Ref Range    POC Glucose 148 (H) 65 - 140 mg/dl   Fingerstick Glucose (POCT)    Collection Time: 11/15/19  6:09 PM   Result Value Ref Range    POC Glucose 171 (H) 65 - 140 mg/dl   Fingerstick Glucose (POCT)    Collection Time: 11/15/19  8:08 PM   Result Value Ref Range    POC Glucose 224 (H) 65 - 140 mg/dl   Fingerstick Glucose (POCT)    Collection Time: 11/15/19 10:23 PM   Result Value Ref Range    POC Glucose 139 65 - 140 mg/dl   Basic metabolic panel    Collection Time: 11/15/19 10:53 PM   Result Value Ref Range    Sodium 134 (L) 136 - 145 mmol/L    Potassium 5 0 3 5 - 5 3 mmol/L    Chloride 93 (L) 100 - 108 mmol/L    CO2 25 21 - 32 mmol/L    ANION GAP 16 (H) 4 - 13 mmol/L     (H) 5 - 25 mg/dL    Creatinine 7 41 (H) 0 60 - 1 30 mg/dL    Glucose 104 65 - 140 mg/dL    Calcium 9 2 8 3 - 10 1 mg/dL    eGFR 6 ml/min/1 73sq m   Magnesium    Collection Time: 11/15/19 10:53 PM   Result Value Ref Range    Magnesium 2 3 1 6 - 2 6 mg/dL   Calcium, ionized    Collection Time: 11/15/19 10:53 PM   Result Value Ref Range    Calcium, Ionized 1 08 (L) 1 12 - 1 32 mmol/L   Phosphorus    Collection Time: 11/15/19 10:53 PM   Result Value Ref Range    Phosphorus 6 5 (H) 2 7 - 4 5 mg/dL   Fingerstick Glucose (POCT)    Collection Time: 11/16/19 12:15 AM   Result Value Ref Range    POC Glucose 109 65 - 140 mg/dl   Fingerstick Glucose (POCT)    Collection Time: 11/16/19  2:13 AM   Result Value Ref Range    POC Glucose 146 (H) 65 - 140 mg/dl   Fingerstick Glucose (POCT)    Collection Time: 11/16/19  4:16 AM   Result Value Ref Range    POC Glucose 167 (H) 65 - 140 mg/dl   CBC    Collection Time: 11/16/19  4:49 AM   Result Value Ref Range    WBC 14 02 (H) 4 31 - 10 16 Thousand/uL    RBC 2 13 (L) 3 81 - 5 12 Million/uL    Hemoglobin 7 2 (L) 11 5 - 15 4 g/dL    Hematocrit 22 4 (L) 34 8 - 46 1 %     (H) 82 - 98 fL    MCH 33 8 26 8 - 34 3 pg    MCHC 32 1 31 4 - 37 4 g/dL    RDW 18 4 (H) 11 6 - 15 1 %    Platelets 148 318 - 519 Thousands/uL    MPV 11 1 8 9 - 12 7 fL   Calcium, ionized    Collection Time: 11/16/19  4:49 AM   Result Value Ref Range    Calcium, Ionized 1 08 (L) 1 12 - 1 32 mmol/L   Comprehensive metabolic panel    Collection Time: 11/16/19  4:50 AM   Result Value Ref Range    Sodium 133 (L) 136 - 145 mmol/L    Potassium 5 2 3 5 - 5 3 mmol/L    Chloride 92 (L) 100 - 108 mmol/L    CO2 23 21 - 32 mmol/L    ANION GAP 18 (H) 4 - 13 mmol/L     (H) 5 - 25 mg/dL    Creatinine 7 79 (H) 0 60 - 1 30 mg/dL    Glucose 129 65 - 140 mg/dL    Calcium 9 2 8 3 - 10 1 mg/dL     (H) 5 - 45 U/L    ALT 88 (H) 12 - 78 U/L    Alkaline Phosphatase 178 (H) 46 - 116 U/L    Total Protein 7 2 6 4 - 8 2 g/dL    Albumin 2 3 (L) 3 5 - 5 0 g/dL    Total Bilirubin 0 67 0 20 - 1 00 mg/dL    eGFR 5 ml/min/1 73sq m   Phosphorus    Collection Time: 11/16/19  4:50 AM   Result Value Ref Range    Phosphorus 7 7 (H) 2 7 - 4 5 mg/dL   Magnesium    Collection Time: 11/16/19  4:50 AM   Result Value Ref Range    Magnesium 2 3 1 6 - 2 6 mg/dL   Procalcitonin    Collection Time: 11/16/19  4:50 AM   Result Value Ref Range    Procalcitonin 3 90 (H) <=0 25 ng/ml   Vancomycin, random    Collection Time: 11/16/19  4:50 AM   Result Value Ref Range    Vancomycin Rm 31 5 ug/mL   ECG 12 lead    Collection Time: 11/16/19  6:22 AM   Result Value Ref Range    Ventricular Rate 117 BPM    Atrial Rate 117 BPM    WA Interval 125 ms    QRSD Interval 63 ms    QT Interval 308 ms    QTC Interval 430 ms    P Axis -36 degrees    QRS Axis 41 degrees    T Wave Axis 30 degrees   Fingerstick Glucose (POCT)    Collection Time: 11/16/19  6:35 AM   Result Value Ref Range    POC Glucose 123 65 - 140 mg/dl   Fingerstick Glucose (POCT)    Collection Time: 11/16/19  8:03 AM   Result Value Ref Range    POC Glucose 120 65 - 140 mg/dl   Fingerstick Glucose (POCT)    Collection Time: 11/16/19 11:11 AM   Result Value Ref Range    POC Glucose 89 65 - 140 mg/dl   Sputum culture and Gram stain    Collection Time: 11/16/19 11:12 AM   Result Value Ref Range    Sputum Culture Culture too young- will reincubate     Gram Stain Result 1+ Polys     Gram Stain Result No bacteria seen    Fingerstick Glucose (POCT)    Collection Time: 11/16/19 12:11 PM   Result Value Ref Range    POC Glucose 104 65 - 140 mg/dl   Fingerstick Glucose (POCT)    Collection Time: 11/16/19  2:10 PM   Result Value Ref Range    POC Glucose 238 (H) 65 - 140 mg/dl   Fingerstick Glucose (POCT)    Collection Time: 11/16/19  4:02 PM   Result Value Ref Range    POC Glucose 285 (H) 65 - 140 mg/dl   Fingerstick Glucose (POCT)    Collection Time: 11/16/19  5:55 PM   Result Value Ref Range    POC Glucose 266 (H) 65 - 140 mg/dl   Fingerstick Glucose (POCT)    Collection Time: 11/16/19  7:46 PM   Result Value Ref Range    POC Glucose 224 (H) 65 - 140 mg/dl   Fingerstick Glucose (POCT)    Collection Time: 11/16/19 10:08 PM   Result Value Ref Range    POC Glucose 115 65 - 140 mg/dl   Fingerstick Glucose (POCT)    Collection Time: 11/17/19 12:29 AM   Result Value Ref Range    POC Glucose 116 65 - 140 mg/dl   Fingerstick Glucose (POCT)    Collection Time: 11/17/19 12:57 AM   Result Value Ref Range    POC Glucose 123 65 - 140 mg/dl   Fingerstick Glucose (POCT)    Collection Time: 11/17/19  2:12 AM   Result Value Ref Range    POC Glucose 125 65 - 140 mg/dl   Basic metabolic panel    Collection Time: 11/17/19  4:31 AM   Result Value Ref Range    Sodium 135 (L) 136 - 145 mmol/L    Potassium 4 4 3 5 - 5 3 mmol/L    Chloride 96 (L) 100 - 108 mmol/L    CO2 26 21 - 32 mmol/L    ANION GAP 13 4 - 13 mmol/L    BUN 51 (H) 5 - 25 mg/dL    Creatinine 4 87 (H) 0 60 - 1 30 mg/dL    Glucose 105 65 - 140 mg/dL    Calcium 8 9 8 3 - 10 1 mg/dL    eGFR 10 ml/min/1 73sq m   CBC    Collection Time: 11/17/19  4:31 AM   Result Value Ref Range    WBC 13 41 (H) 4 31 - 10 16 Thousand/uL    RBC 1 93 (L) 3 81 - 5 12 Million/uL    Hemoglobin 6 4 (LL) 11 5 - 15 4 g/dL    Hematocrit 20 8 (L) 34 8 - 46 1 %     (H) 82 - 98 fL    MCH 33 2 26 8 - 34 3 pg    MCHC 30 8 (L) 31 4 - 37 4 g/dL    RDW 18 8 (H) 11 6 - 15 1 %    Platelets 620 061 - 862 Thousands/uL    MPV 10 0 8 9 - 12 7 fL   Phosphorus    Collection Time: 11/17/19  4:31 AM   Result Value Ref Range    Phosphorus 6 5 (H) 2 7 - 4 5 mg/dL   Digoxin level    Collection Time: 11/17/19  4:31 AM   Result Value Ref Range    Digoxin Lvl 0 9 0 8 - 2 0 ng/mL   Vancomycin, random    Collection Time: 11/17/19  4:31 AM   Result Value Ref Range    Vancomycin Rm 20 8 ug/mL   Fingerstick Glucose (POCT)    Collection Time: 11/17/19  4:34 AM   Result Value Ref Range    POC Glucose 106 65 - 140 mg/dl   Fingerstick Glucose (POCT)    Collection Time: 11/17/19  6:11 AM   Result Value Ref Range    POC Glucose 103 65 - 140 mg/dl   Fingerstick Glucose (POCT)    Collection Time: 11/17/19  7:47 AM   Result Value Ref Range    POC Glucose 124 65 - 140 mg/dl   Fingerstick Glucose (POCT)    Collection Time: 11/17/19 10:03 AM   Result Value Ref Range    POC Glucose 137 65 - 140 mg/dl   Fingerstick Glucose (POCT)    Collection Time: 11/17/19 11:59 AM   Result Value Ref Range    POC Glucose 137 65 - 140 mg/dl   Fingerstick Glucose (POCT)    Collection Time: 11/17/19  2:19 PM   Result Value Ref Range    POC Glucose 228 (H) 65 - 140 mg/dl   Fingerstick Glucose (POCT)    Collection Time: 11/17/19  4:34 PM   Result Value Ref Range    POC Glucose 240 (H) 65 - 140 mg/dl   Fingerstick Glucose (POCT)    Collection Time: 11/17/19  6:26 PM   Result Value Ref Range    POC Glucose 185 (H) 65 - 140 mg/dl   Fingerstick Glucose (POCT)    Collection Time: 11/17/19  8:09 PM   Result Value Ref Range    POC Glucose 209 (H) 65 - 140 mg/dl   Fingerstick Glucose (POCT)    Collection Time: 11/17/19  9:54 PM   Result Value Ref Range    POC Glucose 213 (H) 65 - 140 mg/dl   Fingerstick Glucose (POCT)    Collection Time: 11/18/19 12:14 AM   Result Value Ref Range    POC Glucose 60 (L) 65 - 140 mg/dl   Fingerstick Glucose (POCT)    Collection Time: 11/18/19 12:22 AM   Result Value Ref Range    POC Glucose 21 (LL) 65 - 140 mg/dl   Fingerstick Glucose (POCT)    Collection Time: 11/18/19 12:42 AM   Result Value Ref Range    POC Glucose 82 65 - 140 mg/dl   Glucose, random    Collection Time: 11/18/19  1:02 AM   Result Value Ref Range    Glucose 70 65 - 140 mg/dL   Fingerstick Glucose (POCT)    Collection Time: 11/18/19  1:11 AM   Result Value Ref Range    POC Glucose 76 65 - 140 mg/dl   Fingerstick Glucose (POCT)    Collection Time: 11/18/19  1:54 AM   Result Value Ref Range    POC Glucose 89 65 - 140 mg/dl   Fingerstick Glucose (POCT) Collection Time: 19  4:23 AM   Result Value Ref Range    POC Glucose 193 (H) 65 - 140 mg/dl   Fingerstick Glucose (POCT)    Collection Time: 19  6:10 AM   Result Value Ref Range    POC Glucose 294 (H) 65 - 140 mg/dl   Blood culture    Collection Time: 19  8:01 AM   Result Value Ref Range    Blood Culture No Growth at 72 hrs  Fingerstick Glucose (POCT)    Collection Time: 19  8:03 AM   Result Value Ref Range    POC Glucose 188 (H) 65 - 140 mg/dl     [facility] [address]  [facility] [place] [facility]  [phone]     Transthoracic Echocardiogram  2D, M-mode, Doppler, and Color Doppler     Study date:  15-Nov-2019     Patient: Kenan Barbour  MR number: [id number]  Account number: [de-identified]  : 1965  Age: 48 years  Gender: Female  Status: Inpatient  Location: Bedside  Height: 65 in  Weight: 254 5 lb  BP: 112/ 42 mmHg     Indications: Bradycardia     Diagnoses: R00 1 - Bradycardia, unspecified     Sonographer:  Aniceto Linares RDCS  Interpreting Physician:  Ryne Campa MD  Primary Physician:  Triny Orourke DO  Referring Physician:  CARLOS Sexton  Group:  [facility]     SUMMARY     PROCEDURE INFORMATION:  This was a technically difficult study      LEFT VENTRICLE:  Systolic function was normal by visual assessment  Ejection fraction was estimated to be 65 %  There were no regional wall motion abnormalities      RIGHT VENTRICLE:  The size was normal   Systolic function was normal      HISTORY: PRIOR HISTORY: Syncope, Respiratory Failure, Hypertension, Cardiomyopathy, CHF, PAF, ESRD, Hypothyroid, Hyperlipidemia, Dialysis     PROCEDURE: The procedure was performed at the bedside  This was a routine study  The transthoracic approach was used  The study included complete 2D imaging, M-mode, complete spectral Doppler, and color Doppler  The heart rate was 80 bpm,  at the start of the study   Images were obtained from the parasternal, apical, subcostal, and suprasternal notch acoustic windows  Echocardiographic views were limited due to restricted patient mobility, decreased penetration, and lung  interference  This was a technically difficult study      LEFT VENTRICLE: Size was normal  Systolic function was normal by visual assessment  Ejection fraction was estimated to be 65 %  There were no regional wall motion abnormalities  Wall thickness was normal  DOPPLER: The study was not  technically sufficient to allow evaluation of LV diastolic function      RIGHT VENTRICLE: The size was normal  Systolic function was normal  Wall thickness was normal      LEFT ATRIUM: Size was normal      RIGHT ATRIUM: Size was normal      MITRAL VALVE: Valve structure was normal  There was normal leaflet separation  Not well visualized      AORTIC VALVE: The valve was trileaflet  Leaflets exhibited normal thickness and normal cuspal separation  DOPPLER: Transaortic velocity was within the normal range  There was no evidence for stenosis  There was no significant  regurgitation      TRICUSPID VALVE: Not well visualized  The valve structure was normal  There was normal leaflet separation      PULMONIC VALVE: Not well visualized      PERICARDIUM: There was no pericardial effusion   The pericardium was normal in appearance      AORTA: The root exhibited normal size      SYSTEMIC VEINS: IVC: The inferior vena cava was normal in size      SYSTEM MEASUREMENT TABLES     2D  Ao Diam: 1 79 cm  LA Area: 16 94 cm2  LA Diam: 3 28 cm  LVEDV MOD A4C: 90 67 ml  LVEF MOD A4C: 68 44 %  LVESV MOD A4C: 28 62 ml  LVLd A4C: 7 97 cm  LVLs A4C: 6 59 cm  RVIDd: 3 19 cm  SV MOD A4C: 62 06 ml     CW  TR Vmax: 2 92 m/s  TR maxP 2 mmHg     MM  TAPSE: 2 47 cm     PW  E': 0 08 m/s  E/E': 18 69  MV A Hunter: 0 97 m/s  MV Dec Craig: 5 29 m/s2  MV DecT: 267 59 ms  MV E Hunter: 1 42 m/s  MV E/A Ratio: 1 46  MV PHT: 77 6 ms  MVA By PHT: 2 84 cm2     [facility] Accredited Echocardi[facility]     Prepared and electronically signed by  Sydnee Abrams MD  Signed [id number] 16:21:42       Imaging: I have personally reviewed pertinent reports  Tele- afib RVR  VTE Pharmacologic Prophylaxis: eliquis      Counseling / Coordination of Care  Total time spent today 45 minutes  Greater than 50% of total time was spent with the patient and / or family counseling and / or coordination of care

## 2019-11-18 NOTE — PROCEDURES
Central Line Insertion  Date/Time: 11/18/2019 1:44 PM  Performed by: Marylou Mayfield MD  Authorized by: Marylou Mayfield MD     Patient location:  [facility] protocol:     Procedure explained and questions answered to patient or proxy's satisfaction: yes      Relevant documents present and verified: yes      Radiology Images displayed and confirmed  If images not available, report reviewed: yes      Immediately prior to procedure, a time out was called: yes      Patient identity confirmed:  Verbally with patient, arm band and provided demographic data  Pre-procedure details:     Hand hygiene: Hand hygiene performed prior to insertion      Sterile barrier technique: All elements of maximal sterile technique followed      Skin preparation:  2% chlorhexidine    Skin preparation agent: Skin preparation agent completely dried prior to procedure    Indications:     Central line indications: medications requiring central line    Anesthesia (see MAR for exact dosages): Anesthesia method:  Local infiltration    Local anesthetic:  Lidocaine 1% w/o epi  Procedure details:     Location:  Right internal jugular    Vessel type: vein      Approach: percutaneous technique used      Catheter type:  Double lumen    Catheter size:  5 Fr (non cuffed tunneled)    Ultrasound guidance: yes      Sterile ultrasound techniques: Sterile gel and sterile probe covers were used      Successful placement: yes      Vessel of catheter tip end:  SVC  Post-procedure details:     Post-procedure:  Dressing applied (StatLock)    Assessment:  Blood return through all ports and placement verified by x-ray    Post-procedure complications: none      Patient tolerance of procedure:   Tolerated well, no immediate complications

## 2019-11-18 NOTE — ASSESSMENT & PLAN NOTE
· Pt with junctional bradycardia/afib with RVR and NSR during hospital   · Cardiology and EP following  · Pt being dosed with Digoxin and started on Amio per EP  · Home Metoprolol and Cardizem on hold

## 2019-11-18 NOTE — PROGRESS NOTES
Pt tx to unit, groin site bleeding, pressure dressing removed, manual pressure applied for ~5mins, bleeding stopped, pressure dressing reapplied  SLIM made aware, also made aware of pt Hgb of 6 4, no new orders at this time

## 2019-11-18 NOTE — PROGRESS NOTES
Progress Note - Infectious Disease   Marily Score 48 y o  female MRN: [id number]  Unit/Bed#: [facility] 402-01 Encounter: [id number]         IMPRESSION & RECOMMENDATIONS:   Impression/Recommendations:  1  Shock  More likely cardiogenic   Also consider septic shock although no evidence of acute infection identified   Procalcitonin level is elevated, although less reliable in an ESRD patient   Consideration for development of pneumonia based on chest x-ray findings, recent vomiting and possible aspiration  Sputum culture only showed Candida suggestive of colonization   Consideration for infection at right upper extremity AV fistula site as there is dehiscence noted but no overt evidence of infection  Blood cultures remain negative      -discontinue cefepime and monitor closely off anymore antibiotics  -monitor temperatures and hemodynamics  -recheck CBC in a m      2  Acute respiratory failure   Patient was intubated due to concern for airway protection   Also consideration for aspiration based on recent emesis, abnormal chest x-ray  Sputum culture showed Candida albicans consistent with colonization  Low clinical suspicion for pneumonia  Patient has received 4 days of empiric IV antibiotics at this point      -observe off anymore antibiotics  -monitor respiratory symptoms     3  Wound dehiscence   Noted at right upper extremity AV fistula site  No overt evidence of infection   Consideration for bacteremia   Blood cultures remain negative     -serial exams  -daily local wound care and dressing changes     4  ESRD on HD   Currently receiving HD via left upper extremity AV graft   Will dose adjust antibiotics with HD      5  Diabetes mellitus type 1, uncontrolled   Risk factor for development of severe infection      6  Bradycardia   Off dopamine drip    Cardiology/EP follow-up ongoing        Antibiotics:  cefepime 4     I discussed above plan with Dr Elyssa Mitchell from primary service          Subjective:  Feeling better today  Denies fevers, chills, sweats, cough, diarrhea  Objective:  Vitals:  Temp:  [97 3 °F (36 3 °C)-98 7 °F (37 1 °C)] 98 °F (36 7 °C)  HR:  [] 92  Resp:  [11-20] 18  BP: ()/(35-57) 119/41  SpO2:  [92 %-100 %] 100 %  Temp (24hrs), Av 7 °F (36 5 °C), Min:97 3 °F (36 3 °C), Max:98 7 °F (37 1 °C)  Current: Temperature: 98 °F (36 7 °C)    Physical Exam:   General:  Awake, resting comfortably  Eyes:  Conjunctive clear with no hemorrhages or effusions  Oropharynx:  No ulcers, no lesions  Neck:  Supple, no lymphadenopathy  Lungs:  Clear to auscultation bilaterally, no accessory muscle use  Cardiac:  Regular rate and rhythm, no murmurs  Abdomen:  Soft, non-tender, non-distented  Extremities:  Symmetric edema  Skin:  No rashes, mild dehiscence have right upper extremity fistula  Neurological:  Moves all four extremities spontaneously      Lab Results:  I have personally reviewed pertinent labs  Results from last 7 days   Lab Units 19  1345 19  0431 19  0450  11/15/19  0506 19  1921  19  1918 19  191   POTASSIUM mmol/L 5 4* 4 4 5 2   < > 4 6 5 2   < >  --   --    CHLORIDE mmol/L 95* 96* 92*   < > 93* 90*   < >  --   --    CO2 mmol/L 22 26 23   < > 23 23   < >  --   --    CO2, I-STAT mmol/L  --   --   --   --   --   --   --  26 25   BUN mg/dL 73* 51* 106*   < > 94* 86*   < >  --   --    CREATININE mg/dL 6 98* 4 87* 7 79*   < > 6 56* 6 58*   < >  --   --    EGFR ml/min/1 73sq m 6 10 5   < > 7 7   < >  --  6   GLUCOSE, ISTAT mg/dl  --   --   --   --   --   --   --  503* 500*   CALCIUM mg/dL 9 0 8 9 9 2   < > 8 8 9 4   < >  --   --    AST U/L  --   --  115*  --  138* 10  --   --   --    ALT U/L  --   --  88*  --  73 16  --   --   --    ALK PHOS U/L  --   --  178*  --  203* 202*  --   --   --     < > = values in this interval not displayed       Results from last 7 days   Lab Units 19  1345 19  0431 19  0449   WBC Thousand/uL 13 19* 13 41* 14 02*   HEMOGLOBIN g/dL 6 3* 6 4* 7 2*   PLATELETS Thousands/uL 192 162 174     Results from last 7 days   Lab Units 11/18/19  0801 11/16/19  1112 11/14/19  2347   BLOOD CULTURE  No Growth at 72 hrs   --  No Growth at 72 hrs  SPUTUM CULTURE   --  2+ Growth of Candida albicans*  2+ Growth of   --    GRAM STAIN RESULT   --  1+ Polys  No bacteria seen  --        Imaging Studies:   I have personally reviewed pertinent imaging study reports and images in PACS  EKG, Pathology, and Other Studies:   I have personally reviewed pertinent reports

## 2019-11-18 NOTE — ASSESSMENT & PLAN NOTE
· S/p stent to LAD on 9/6/19  · Ct Plavix  · ASA had been held as outpatient due to GIB - restarted while in ICU - monitor Hb - if drops post transfusion hold ASA  · Ct statin  · No BB due to symptomatic bradycardia  · Treatment per cardiology

## 2019-11-18 NOTE — PROGRESS NOTES
Pastoral Care Progress Note    2019  Patient: Bulmaro Thomas : 1965  Admission Date & Time: 2019 1903  MRN: [id number] [facility]: [id number]                     Chaplaincy Interventions Utilized:   Empowerment: Provided chaplaincy education    Exploration:     Collaboration:     Relationship Building: Cultivated a relationship of care and support    Ritual:     Chaplaincy Outcomes Achieved:  Declined  support    Spiritual Coping Strategies Utilized:        19 1000   Plan of Care   Assessment Completed by: Unit visit

## 2019-11-18 NOTE — ASSESSMENT & PLAN NOTE
· Presented with multiple episodes of syncope and emesis  · Noted to have junctional bradycardia and hypotension  · Intubated in the ED on presentation due to concern for airway protection due to increasing duration of syncopal episodes   Extubated on 11/16  · Off Dopamine and Levophed  · Was on Metoprolol 25 mg BID and Diltiazem 180 mg which have been held  · On Midodrine 5 mg TID  · Likely cardiogenic  · There was initial concern for sepsis - received Cefepime for 3 days - discontinued by ID today  · BP acceptable at present - monitor  · NSR at present - heart rate 90  · Ct telemetry

## 2019-11-18 NOTE — PROGRESS NOTES
Progress Note Kat Jones 1965, 48 y o  female MRN: [id number]    Unit/Bed#: [facility] 402-01 Encounter: [id number]    Primary Care Provider: Francois Hugo DO   Date and time admitted to hospital: 11/14/2019  7:03 PM        * Shock [facility])  Assessment & Plan  · Presented with multiple episodes of syncope and emesis  · Noted to have junctional bradycardia and hypotension  · Intubated in the ED on presentation due to concern for airway protection due to increasing duration of syncopal episodes  Extubated on 11/16  · Off Dopamine and Levophed  · Was on Metoprolol 25 mg BID and Diltiazem 180 mg which have been held  · On Midodrine 5 mg TID  · Likely cardiogenic  · There was initial concern for sepsis - received Cefepime for 3 days - discontinued by ID today  · BP acceptable at present - monitor  · NSR at present - heart rate 90  · Ct telemetry      Tachy-bette syndrome (HCC)  Assessment & Plan  · Presented with junctional bradycardia   · Bradycardia resolved with holding home medications Metoprolol and Diltiazem  · Later developed rapid Afib and converted to NSR with IV Digoxin  · Was in rapid Afib this morning - now in NSR  · IV Amiodarone load per EP followed by oral Amiodarone  · Per discussion with  Faisal Ma on the phone her gastroenterologist at [facility] had advised her to be off anticoagulation indefinitely due to GI bleed  · Discussed with EP - Eliquis discontinued   · Restart anticoagulation when cleared by GI  · Ct ASA    Anemia  Assessment & Plan  · Anemia of chronic kidney disease r/o GIB  · History of recurrent GI bleed - underwent APC treatment to 2 areas of increased vasculature in the duodenum during push enteroscopy on 11/5/19   If Hb dropped later had been advised double balloon enteroscopy  · Recent baseline Hb 7 9 to 8 2  · Hb gradually dropped from 7 5 to 6 3 during this admission  · S/p 1 PRBC  · Check hemoccult  · Monitor H/H  · Transfuse as needed  · Eliquis held as above  · May need GI eval ESRD (end stage renal disease) on dialysis [facility])  Assessment & Plan  · HD on Mon/Wed/Fri  · Underwent HD today    CAD with recent stent  Assessment & Plan  · S/p stent to LAD on 9/6/19  · Ct Plavix  · ASA had been held as outpatient due to GIB - restarted while in ICU - monitor Hb - if drops post transfusion hold ASA  · Ct statin  · No BB due to symptomatic bradycardia  · Treatment per cardiology    Uncontrolled type 1 diabetes mellitus [facility])  Assessment & Plan  Lab Results   Component Value Date    HGBA1C 8 1 (H) 07/21/2019       Recent Labs     11/18/19  0610 11/18/19  0803 11/18/19  0913 11/18/19  1028   POCGLU 294* 188* 141* 122     Blood Sugar Average: Last 72 hrs:  (P) 166 [id number]     On insulin pump at home  On insulin drip at present  Discussed with endocrine - plan for transition to Lantus 38 hs and Humalog 6 TID with SSI  Insulin drip to be discontinued after receiving Lantus    LISSA (obstructive sleep apnea)  Assessment & Plan  · CPAP hs    Asthma  Assessment & Plan  · No exacerbation  · Advair changed to equivalent Breo as Advair non-formulary    Hypothyroid  Assessment & Plan  · Continue home levothyroxine 75 mcg daily  · TSH 6 4 on 11/14 with normal free T4        VTE Pharmacologic Prophylaxis:   Pharmacologic: Pharmacologic VTE Prophylaxis contraindicated due to severe anemia  Mechanical VTE Prophylaxis in Place: Yes    Patient Centered Rounds: I have performed bedside rounds with nursing staff today  Discussions with Specialists or Other Care Team Provider: Discussed with Dr Austin Ramires, Dr Tea Meyer, Dr Angela Mireles, Dr Sharlon Ganser    Education and Discussions with Family / Patient: Discussed with  on the phone twice    Time Spent for Care: 45 minutes  More than 50% of total time spent on counseling and coordination of care as described above      Current Length of Stay: 4 day(s)    Current Patient Status: Inpatient   Certification Statement: The patient will continue to require additional inpatient hospital stay due to tachy-bette syndrome    Code Status: Level 1 - Full Code    Subjective:   Was in rapid atrial fibrillation this morning  Complained of tiredness  No chest pain or shortness of breath  Underwent HD and received 1 unit of blood  Objective:     Vitals:   Temp (24hrs), Av °F (36 7 °C), Min:97 3 °F (36 3 °C), Max:98 7 °F (37 1 °C)    Temp:  [97 3 °F (36 3 °C)-98 7 °F (37 1 °C)] 98 3 °F (36 8 °C)  HR:  [] 93  Resp:  [16-23] 18  BP: ()/(23-73) 165/70  SpO2:  [92 %-100 %] 100 %  Body mass index is 42 3 kg/m²  Physical Exam:     Physical Exam   HENT:   Head: Normocephalic and atraumatic  Eyes: Pupils are equal, round, and reactive to light  EOM are normal    Neck: Normal range of motion  Neck supple  Cardiovascular: Normal rate and regular rhythm  Pulmonary/Chest: Effort normal and breath sounds normal  No stridor  No respiratory distress  She has no wheezes  Abdominal: Soft  Bowel sounds are normal  She exhibits no distension  There is no tenderness  There is no guarding  Musculoskeletal: She exhibits no edema  Neurological: She is alert  Skin: Skin is warm and dry  Psychiatric: She has a normal mood and affect  Additional Data:     Labs:    Results from last 7 days   Lab Units 19  1345  11/15/19  0506   WBC Thousand/uL 13 19*   < > 15 30*   HEMOGLOBIN g/dL 6 3*   < > 7 4*   HEMATOCRIT % 20 3*   < > 23 5*   PLATELETS Thousands/uL 192   < > 168   NEUTROS PCT %  --   --  84*   LYMPHS PCT %  --   --  9*   MONOS PCT %  --   --  5   EOS PCT %  --   --  0    < > = values in this interval not displayed       Results from last 7 days   Lab Units 19  1345  19  0450   SODIUM mmol/L 134*   < > 133*   POTASSIUM mmol/L 5 4*   < > 5 2   CHLORIDE mmol/L 95*   < > 92*   CO2 mmol/L 22   < > 23   BUN mg/dL 73*   < > 106*   CREATININE mg/dL 6 98*   < > 7 79*   ANION GAP mmol/L 17*   < > 18*   CALCIUM mg/dL 9 0   < > 9 2   ALBUMIN g/dL  --   --  2 3*   TOTAL BILIRUBIN mg/dL  --   --  0 67   ALK PHOS U/L  --   --  178*   ALT U/L  --   --  88*   AST U/L  --   --  115*   GLUCOSE RANDOM mg/dL 192*   < > 129    < > = values in this interval not displayed  Results from last 7 days   Lab Units 11/18/19  1028 11/18/19  0913 11/18/19  0803 11/18/19  0610 11/18/19  0423 11/18/19  0154 11/18/19  0111 11/18/19  0042 11/18/19  0022 11/18/19  0014 11/17/19  2154 11/17/19 2009   POC GLUCOSE mg/dl 122 141* 188* 294* 193* 89 76 82 21* 60* 213* 209*         Results from last 7 days   Lab Units 11/18/19  1345 11/16/19  0450 11/15/19  0755   LACTIC ACID mmol/L  --   --  2 3*   PROCALCITONIN ng/ml 3 03* 3 90* 1 13*       * I Have Reviewed All Lab Data Listed Above  * Additional Pertinent Lab Tests Reviewed: aGmal 66 Admission Reviewed      Recent Cultures (last 7 days):     Results from last 7 days   Lab Units 11/18/19  0801 11/16/19  1112 11/14/19  2347   BLOOD CULTURE  No Growth at 72 hrs   --  No Growth at 72 hrs     SPUTUM CULTURE   --  2+ Growth of Candida albicans*  2+ Growth of   --    GRAM STAIN RESULT   --  1+ Polys  No bacteria seen  --        Last 24 Hours Medication List:     Current Facility-Administered Medications:  acetaminophen 650 mg Oral Q6H PRN Lilia Cart, CRNP    albuterol 2 puff Inhalation Q4H PRN Lilia Cart, CRNP    allopurinol 100 mg Oral Daily Lilia Cart, CRNP    amiodarone 0 5 mg/min Intravenous Continuous Kenton Freeman PA-C Last Rate: 0 5 mg/min (11/18/19 1401)   amiodarone (CORDARONE) IV bolus 150 mg Intravenous Once Kevon Thomason MD    amiodarone 200 mg Oral TID With Meals Kenton Freeman PA-C    aspirin 81 mg Oral Daily Lilia Cart, CARLOS    atorvastatin 80 mg Oral Daily With [employer]    bisacodyl 5 mg Oral Daily PRN Lilia Cart, CRNP    [START ON 11/20/2019] calcitriol 0 25 mcg Oral Once per day on Mon Wed Fri Vielka Rao DO    clopidogrel 75 mg Oral Daily Unknown Maribel, CRNP    famotidine 20 mg Oral Daily Unknown Maribel, CRNP    FLUoxetine 40 mg Oral Daily Unknown [place], CRNP    fluticasone-vilanterol 1 puff Inhalation Daily Unknown Maribel, CRNP    gabapentin 300 mg Oral HS Shailesh Lazo, CARLOS    insulin glargine 38 Units Subcutaneous HS Warren Robb MD    insulin lispro 1-5 Units Subcutaneous HS Warren Robb MD    insulin lispro 1-6 Units Subcutaneous TID AC Warren Robb MD    [START ON 11/19/2019] insulin lispro 6 Units Subcutaneous TID With Meals Warrne Robb MD    insulin regular (HumuLIN R,NovoLIN R) infusion 0 3-21 Units/hr Intravenous Titrated Warren Robb MD Last Rate: 0 5 Units/hr (11/18/19 1600)   levothyroxine 75 mcg Oral Early Morning Unknown Maribel, CRNP    midodrine 5 mg Oral TID AC CARLOS Reddy    pantoprazole 40 mg Oral Early Morning Unknown Maribel, CRNP    phenol 1 spray Mouth/Throat Q4H PRN Unknown Maribel, CRNP    polyethylene glycol 17 g Oral Daily Unknown Maribel, CRNP    senna-docusate sodium 2 tablet Per NG Tube HS Unknown [place], CRNP    vancomycin 10 mg/kg (Adjusted) Intravenous After Dialysis Chasity Amato PA-C Last Rate: Stopped (11/18/19 1750)        Today, Patient Was Seen By: Wendy Mcgrath MD    ** Please Note: Dictation voice to text software may have been used in the creation of this document   **

## 2019-11-19 PROBLEM — E87.5 HYPERKALEMIA: Status: RESOLVED | Noted: 2019-11-18 | Resolved: 2019-11-19

## 2019-11-19 LAB
ABO GROUP BLD BPU: NORMAL
ANION GAP SERPL CALCULATED.3IONS-SCNC: 11 MMOL/L (ref 4–13)
BPU ID: NORMAL
BUN SERPL-MCNC: 25 MG/DL (ref 5–25)
CALCIUM SERPL-MCNC: 8.7 MG/DL (ref 8.3–10.1)
CHLORIDE SERPL-SCNC: 95 MMOL/L (ref 100–108)
CO2 SERPL-SCNC: 28 MMOL/L (ref 21–32)
CREAT SERPL-MCNC: 3.62 MG/DL (ref 0.6–1.3)
CROSSMATCH: NORMAL
ERYTHROCYTE [DISTWIDTH] IN BLOOD BY AUTOMATED COUNT: 19.9 % (ref 11.6–15.1)
GFR SERPL CREATININE-BSD FRML MDRD: 14 ML/MIN/1.73SQ M
GLUCOSE SERPL-MCNC: 160 MG/DL (ref 65–140)
GLUCOSE SERPL-MCNC: 163 MG/DL (ref 65–140)
GLUCOSE SERPL-MCNC: 177 MG/DL (ref 65–140)
GLUCOSE SERPL-MCNC: 292 MG/DL (ref 65–140)
GLUCOSE SERPL-MCNC: 299 MG/DL (ref 65–140)
GLUCOSE SERPL-MCNC: 455 MG/DL (ref 65–140)
HCT VFR BLD AUTO: 26.2 % (ref 34.8–46.1)
HEMOCCULT STL QL: NEGATIVE
HGB BLD-MCNC: 8.1 G/DL (ref 11.5–15.4)
MCH RBC QN AUTO: 32.9 PG (ref 26.8–34.3)
MCHC RBC AUTO-ENTMCNC: 30.9 G/DL (ref 31.4–37.4)
MCV RBC AUTO: 107 FL (ref 82–98)
PHOSPHATE SERPL-MCNC: 5.3 MG/DL (ref 2.7–4.5)
PLATELET # BLD AUTO: 144 THOUSANDS/UL (ref 149–390)
PMV BLD AUTO: 10.2 FL (ref 8.9–12.7)
POTASSIUM SERPL-SCNC: 3.9 MMOL/L (ref 3.5–5.3)
PTH-INTACT SERPL-MCNC: 248.7 PG/ML (ref 18.4–80.1)
RBC # BLD AUTO: 2.46 MILLION/UL (ref 3.81–5.12)
SODIUM SERPL-SCNC: 134 MMOL/L (ref 136–145)
UNIT DISPENSE STATUS: NORMAL
UNIT PRODUCT CODE: NORMAL
UNIT RH: NORMAL
WBC # BLD AUTO: 8.31 THOUSAND/UL (ref 4.31–10.16)

## 2019-11-19 PROCEDURE — 94660 CPAP INITIATION&MGMT: CPT

## 2019-11-19 PROCEDURE — 82272 OCCULT BLD FECES 1-3 TESTS: CPT | Performed by: INTERNAL MEDICINE

## 2019-11-19 PROCEDURE — 80048 BASIC METABOLIC PNL TOTAL CA: CPT | Performed by: INTERNAL MEDICINE

## 2019-11-19 PROCEDURE — 84100 ASSAY OF PHOSPHORUS: CPT | Performed by: INTERNAL MEDICINE

## 2019-11-19 PROCEDURE — 99232 SBSQ HOSP IP/OBS MODERATE 35: CPT | Performed by: INTERNAL MEDICINE

## 2019-11-19 PROCEDURE — 99232 SBSQ HOSP IP/OBS MODERATE 35: CPT | Performed by: PHYSICIAN ASSISTANT

## 2019-11-19 PROCEDURE — 83970 ASSAY OF PARATHORMONE: CPT | Performed by: INTERNAL MEDICINE

## 2019-11-19 PROCEDURE — 99232 SBSQ HOSP IP/OBS MODERATE 35: CPT | Performed by: FAMILY MEDICINE

## 2019-11-19 PROCEDURE — 94760 N-INVAS EAR/PLS OXIMETRY 1: CPT

## 2019-11-19 PROCEDURE — 85027 COMPLETE CBC AUTOMATED: CPT | Performed by: INTERNAL MEDICINE

## 2019-11-19 PROCEDURE — 82948 REAGENT STRIP/BLOOD GLUCOSE: CPT

## 2019-11-19 RX ORDER — ONDANSETRON 2 MG/ML
4 INJECTION INTRAMUSCULAR; INTRAVENOUS EVERY 6 HOURS PRN
Status: DISCONTINUED | OUTPATIENT
Start: 2019-11-19 | End: 2019-11-22 | Stop reason: HOSPADM

## 2019-11-19 RX ORDER — ASPIRIN 81 MG/1
81 TABLET, CHEWABLE ORAL DAILY
Status: DISCONTINUED | OUTPATIENT
Start: 2019-11-20 | End: 2019-11-22 | Stop reason: HOSPADM

## 2019-11-19 RX ADMIN — ACETAMINOPHEN 650 MG: 325 TABLET ORAL at 13:48

## 2019-11-19 RX ADMIN — INSULIN LISPRO 2 UNITS: 100 INJECTION, SOLUTION INTRAVENOUS; SUBCUTANEOUS at 21:36

## 2019-11-19 RX ADMIN — AMIODARONE HYDROCHLORIDE 200 MG: 200 TABLET ORAL at 12:46

## 2019-11-19 RX ADMIN — CLOPIDOGREL BISULFATE 75 MG: 75 TABLET ORAL at 08:15

## 2019-11-19 RX ADMIN — INSULIN LISPRO 1 UNITS: 100 INJECTION, SOLUTION INTRAVENOUS; SUBCUTANEOUS at 00:01

## 2019-11-19 RX ADMIN — GABAPENTIN 300 MG: 300 CAPSULE ORAL at 21:35

## 2019-11-19 RX ADMIN — LEVOTHYROXINE SODIUM 75 MCG: 75 TABLET ORAL at 06:18

## 2019-11-19 RX ADMIN — ONDANSETRON 4 MG: 2 INJECTION INTRAMUSCULAR; INTRAVENOUS at 10:44

## 2019-11-19 RX ADMIN — MIDODRINE HYDROCHLORIDE 5 MG: 5 TABLET ORAL at 06:18

## 2019-11-19 RX ADMIN — INSULIN GLARGINE 38 UNITS: 100 INJECTION, SOLUTION SUBCUTANEOUS at 21:35

## 2019-11-19 RX ADMIN — INSULIN LISPRO 4 UNITS: 100 INJECTION, SOLUTION INTRAVENOUS; SUBCUTANEOUS at 12:46

## 2019-11-19 RX ADMIN — AMIODARONE HYDROCHLORIDE 200 MG: 200 TABLET ORAL at 08:15

## 2019-11-19 RX ADMIN — ATORVASTATIN CALCIUM 80 MG: 80 TABLET, FILM COATED ORAL at 17:23

## 2019-11-19 RX ADMIN — FLUOXETINE 40 MG: 20 CAPSULE ORAL at 08:16

## 2019-11-19 RX ADMIN — AMIODARONE HYDROCHLORIDE 200 MG: 200 TABLET ORAL at 17:23

## 2019-11-19 RX ADMIN — PANTOPRAZOLE SODIUM 40 MG: 40 TABLET, DELAYED RELEASE ORAL at 06:18

## 2019-11-19 RX ADMIN — ALLOPURINOL 100 MG: 100 TABLET ORAL at 08:16

## 2019-11-19 RX ADMIN — INSULIN LISPRO 6 UNITS: 100 INJECTION, SOLUTION INTRAVENOUS; SUBCUTANEOUS at 12:47

## 2019-11-19 RX ADMIN — INSULIN LISPRO 1 UNITS: 100 INJECTION, SOLUTION INTRAVENOUS; SUBCUTANEOUS at 06:18

## 2019-11-19 RX ADMIN — INSULIN LISPRO 6 UNITS: 100 INJECTION, SOLUTION INTRAVENOUS; SUBCUTANEOUS at 17:27

## 2019-11-19 RX ADMIN — INSULIN LISPRO 6 UNITS: 100 INJECTION, SOLUTION INTRAVENOUS; SUBCUTANEOUS at 08:19

## 2019-11-19 RX ADMIN — FAMOTIDINE 20 MG: 20 TABLET ORAL at 08:16

## 2019-11-19 NOTE — PROGRESS NOTES
Progress Note - Electrophysiology-Cardiology (EP)   Gm Goldman 48 y o  female MRN: [id number]  Unit/Bed#: [facility] 402-01 Encounter: [id number]      Assessment/Plan:   1  Shock    * patient admitted to [facility] on 11-14-19 after being found down at home with admitting ECG's showing junctional bradycvardia with overall presentation of shock requiring pressor support and MICU admission    * since she has been able to step down to med surg floors    * awake, alert    * no longer on pressor support    * is on midodrine for hypotension     2  Junctional bradycardia    * since her clinical status has improved and she has been stopped on combination of BB and CCB when in sinus rhythm she has been in the mid 80's on tele    * since starting amiodarone IV and PO patient has remained in NSR without any significant bradycardia     3  Paroxysmal atrial fibrillation/flutter, symptomatic    * one day ago patient in AF w/ RVR but since starting amiodarone PO and IV she has remained in NSR with HR's in the mid 80's  Will discuss with teams regarding stopping IV and continuing PO load for now  * regarding her AC    due to hospitalization for hemorrhagic shock in September 2019 and 2nd hospitalization in October 2019 for additional GIB she was taken off of her ASA and eliquis  Agree with this as her bleeding risk at this time outweigh's her CVA risk  Plan is to continue amiodarone to maintain NSR  She is to follow up with [facility] for further management and also to discuss Watchman procedure  - these recommendations can be seen from outpatient cardiology notes from care everywhere on 11-14-19   * EF 65% on inpatient echo    * LA size is normal   * needs to work on outpatient risk factor modification to lower AF burden, can discuss in outpatient setting in more detail     4  Morbid obesity   5  ESRD on HD   6  CAD    * HEATHER to LAD    * had only been on plavix as an outpatient, ASA discontinued due to GIB in September and October 7  LISSA   8  Leukocytosis    * ID following    * no signs of clear bacteremia as blood cultures have been negative    * PNA workup negative, off of IV ABX     9  Acute anemia    * Hb around 6 on 11-18-19 and increased to 8 1 on 11-19-19   * primary teams managing     10  GIB    * admitted at [facility] for GIB with discharge 10-25-19   * admitted to [facility] in September for hemorrhagic shock    * due to this she was taken off of her ASA but continued on plavix given LAD stent in 9-2019   * per outpatient notes her GIB was 2/2 small bowel AVM's       EKG (11-18-19): Subjective/Objective   Subjective:   LD is a 48year old female w/ paroxysmal atrial fibrillation, paroxysmal atrial flutter AC w/ eliquis, ESRD on HD, CAD, hypothyroidism, LISSA, DVT who presents to B on 11-14-19 after being diagnosed with shock after an episode of syncope with admitting ECG's showing junctional bradycardia  EP was consulted on 11-15-19 for further evaluation of her bradycardia/    11-18-19: This AM patient feels "terrible" but unable to describe specific symptoms  Tele showing atrial fibrillation w/ RVR  11-19-19:  Today patient feels much improved then one day ago since starting amiodarone  Denies any LH, dizziness, SOB       Vitals: /55 (BP Location: Right leg) Comment: Map 79  Pulse 82   Temp 97 6 °F (36 4 °C) (Oral)   Resp 18   Ht 5' 5" (1 651 m)   Wt 113 kg (248 lb 10 9 oz)   SpO2 100%   BMI 41 38 kg/m²   Vitals:    11/18/19 0600 11/19/19 0600   Weight: 115 kg (254 lb 3 1 oz) 113 kg (248 lb 10 9 oz)     Orthostatic Blood Pressures      Most Recent Value   Blood Pressure  121/55 [Map 79] filed at 11/19/2019 0646   Patient Position - Orthostatic VS  Lying filed at 11/19/2019 0646            Intake/Output Summary (Last 24 hours) at 11/19/2019 1035  Last data filed at 11/19/2019 0400  Gross per 24 hour   Intake 1648 72 ml   Output 1442 ml   Net 206 72 ml       Invasive Devices     Central Venous Catheter Line            CVC Central Lines 11/18/19 Double Right Internal jugular less than 1 day          Line            Hemodialysis AV Fistula Left Upper arm -- days    Hemodialysis AV Fistula Right Upper arm -- days                            Scheduled Meds:    Current Facility-Administered Medications:  acetaminophen 650 mg Oral Q6H PRN Darral Shane, CRNP    albuterol 2 puff Inhalation Q4H PRN Darral Lars, CRNP    allopurinol 100 mg Oral Daily Darral Lars, CRNP    amiodarone 0 5 mg/min Intravenous Continuous Kenton Freeman PA-C Last Rate: 0 5 mg/min (11/18/19 1401)   amiodarone (CORDARONE) IV bolus 150 mg Intravenous Once Yordy Reynolds MD    amiodarone 200 mg Oral TID With Meals Kenton Freeman PA-C    atorvastatin 80 mg Oral Daily With [employer]CARLOS    bisacodyl 5 mg Oral Daily PRN Darral Shane, CARLOS    [START ON 11/20/2019] calcitriol 0 25 mcg Oral Once per day on Mon Wed Fri Vielka Rao, DO    clopidogrel 75 mg Oral Daily Darral Shane, CRKT    famotidine 20 mg Oral Daily Darral Shane, CRKT    FLUoxetine 40 mg Oral Daily Darral Lars, CRKT    fluticasone-vilanterol 1 puff Inhalation Daily CARLOS Reddy    gabapentin 300 mg Oral HS CARLOS Reddy    insulin glargine 38 Units Subcutaneous HS Belén Pryor MD    insulin lispro 1-5 Units Subcutaneous HS Belén Pryor MD    insulin lispro 1-6 Units Subcutaneous TID AC Patito Cabello MD    insulin lispro 6 Units Subcutaneous TID With Meals Belén Pryor MD    levothyroxine 75 mcg Oral Early Morning Darral Shane, CARLOS    midodrine 5 mg Oral TID AC CARLOS Reddy    ondansetron 4 mg Intravenous Q6H PRN Hetul Rosales, DO    pantoprazole 40 mg Oral Early Morning Darral Shane, CARLOS    phenol 1 spray Mouth/Throat Q4H PRN Darral Shane, CRNP    polyethylene glycol 17 g Oral Daily Darral Lars, CRNP    senna-docusate sodium 2 tablet Per NG Tube HS Dartiffanie Lynn, CRNP    vancomycin 10 mg/kg (Adjusted) Intravenous After Dialysis Rizwan Farooq PA-C Last Rate: Stopped (11/18/19 1750)     Continuous Infusions:    amiodarone 0 5 mg/min Last Rate: 0 5 mg/min (11/18/19 1401)     PRN Meds:   acetaminophen    albuterol    bisacodyl    ondansetron    phenol    vancomycin    Physical Exam:   GEN: NAD, alert and oriented, well appearing  SKIN: dry without significant lesions or rashes  HEENT: NCAT, PERRL, EOMs intact  NECK: No JVD or carotid bruits appreciated  CARDIOVASCULAR: regular rhythm, regular rate normal S1, S2 without murmurs, rubs, or gallops appreciated  LUNGS: Clear to auscultation bilaterally without wheezes, rhonchi, or rales  ABDOMEN: Soft, nontender, nondistended  EXTREMITIES/VASCULAR: perfused without clubbing, cyanosis, or edema b/l  PSYCH: Normal mood and affect  NEURO: CN ll-Xll grossly intact                Lab Results: I have personally reviewed pertinent lab results  Results from last 7 days   Lab Units 11/19/19 0448 11/18/19  2102 11/18/19  1345 11/17/19  0431   WBC Thousand/uL 8 31  --  13 19* 13 41*   HEMOGLOBIN g/dL 8 1* 7 1* 6 3* 6 4*   HEMATOCRIT % 26 2*  --  20 3* 20 8*   PLATELETS Thousands/uL 144*  --  192 162     Results from last 7 days   Lab Units 11/19/19  0448 11/18/19  1345 11/17/19  0431  11/14/19  1918   POTASSIUM mmol/L 3 9 5 4* 4 4   < >  --    CHLORIDE mmol/L 95* 95* 96*   < >  --    CO2 mmol/L 28 22 26   < >  --    CO2, I-STAT mmol/L  --   --   --   --  26   BUN mg/dL 25 73* 51*   < >  --    CREATININE mg/dL 3 62* 6 98* 4 87*   < >  --    GLUCOSE, ISTAT mg/dl  --   --   --   --  503*   CALCIUM mg/dL 8 7 9 0 8 9   < >  --     < > = values in this interval not displayed  Results from last 7 days   Lab Units 11/18/19  1345 11/16/19  0450 11/15/19  2253   MAGNESIUM mg/dL 2 4 2 3 2 3         Imaging: I have personally reviewed pertinent reports      Results for orders placed during the hospital encounter of 11/14/19   Echo complete with contrast if indicated    Narrative [facility]  [facility], [address] [facility]  [phone]    Transthoracic Echocardiogram  2D, M-mode, Doppler, and Color Doppler    Study date:  15-Nov-2019    Patient: Kenan Barbour  MR number: [id number]  Account number: [de-identified]  : 1965  Age: 48 years  Gender: Female  Status: Inpatient  Location: Bedside  Height: 65 in  Weight: 254 5 lb  BP: 112/ 42 mmHg    Indications: Bradycardia    Diagnoses: R00 1 - Bradycardia, unspecified    Sonographer:  Aniceto Linares RDCS  Interpreting Physician:  Ryne Campa MD  Primary Physician:  Triny Orourke DO  Referring Physician:  CARLOS Sexton  Group:  [facility]    SUMMARY    PROCEDURE INFORMATION:  This was a technically difficult study  LEFT VENTRICLE:  Systolic function was normal by visual assessment  Ejection fraction was estimated to be 65 %  There were no regional wall motion abnormalities  RIGHT VENTRICLE:  The size was normal   Systolic function was normal     HISTORY: PRIOR HISTORY: Syncope, Respiratory Failure, Hypertension, Cardiomyopathy, CHF, PAF, ESRD, Hypothyroid, Hyperlipidemia, Dialysis    PROCEDURE: The procedure was performed at the bedside  This was a routine study  The transthoracic approach was used  The study included complete 2D imaging, M-mode, complete spectral Doppler, and color Doppler  The heart rate was 80 bpm,  at the start of the study  Images were obtained from the parasternal, apical, subcostal, and suprasternal notch acoustic windows  Echocardiographic views were limited due to restricted patient mobility, decreased penetration, and lung  interference  This was a technically difficult study  LEFT VENTRICLE: Size was normal  Systolic function was normal by visual assessment  Ejection fraction was estimated to be 65 %  There were no regional wall motion abnormalities   Wall thickness was normal  DOPPLER: The study was not  technically sufficient to allow evaluation of LV diastolic function  RIGHT VENTRICLE: The size was normal  Systolic function was normal  Wall thickness was normal     LEFT ATRIUM: Size was normal     RIGHT ATRIUM: Size was normal     MITRAL VALVE: Valve structure was normal  There was normal leaflet separation  Not well visualized  AORTIC VALVE: The valve was trileaflet  Leaflets exhibited normal thickness and normal cuspal separation  DOPPLER: Transaortic velocity was within the normal range  There was no evidence for stenosis  There was no significant  regurgitation  TRICUSPID VALVE: Not well visualized  The valve structure was normal  There was normal leaflet separation  PULMONIC VALVE: Not well visualized  PERICARDIUM: There was no pericardial effusion  The pericardium was normal in appearance  AORTA: The root exhibited normal size  SYSTEMIC VEINS: IVC: The inferior vena cava was normal in size      SYSTEM MEASUREMENT TABLES    2D  Ao Diam: 1 79 cm  LA Area: 16 94 cm2  LA Diam: 3 28 cm  LVEDV MOD A4C: 90 67 ml  LVEF MOD A4C: 68 44 %  LVESV MOD A4C: 28 62 ml  LVLd A4C: 7 97 cm  LVLs A4C: 6 59 cm  RVIDd: 3 19 cm  SV MOD A4C: 62 06 ml    CW  TR Vmax: 2 92 m/s  TR maxP 2 mmHg    MM  TAPSE: 2 47 cm    PW  E': 0 08 m/s  E/E': 18 69  MV A Hunter: 0 97 m/s  MV Dec [place]: 5 29 m/s2  MV DecT: 267 59 ms  MV E Hunter: 1 42 m/s  MV E/A Ratio: 1 46  MV PHT: 77 6 ms  MVA By PHT: 2 84 cm2    [facility] Accredited Echocardiography Laboratory    Prepared and electronically signed by    Jadon Chapman MD  Signed [id number] 16:21:42

## 2019-11-19 NOTE — PROGRESS NOTES
Assessment:      Plan:  1  Uncontrolled type 1 diabetes  Her A1c in July was 8 1%  Goal is 7%  BG improved with lantus 38u last night  AM plasma glucose was 176  Will re-initiate pump with the listed settings  Basal:  9p-3a   1  1u/hr              3a-8a  1 5u/hr              8a-9p  1 7u/hr  CIR:     12a-5a   6g/u               5a-8p   5g/u                 8p-12a  6u/hr                 ISF:      12a-8a   40                  8a-10p  20                   10p-12a  40  TGB:    8p-8a     120-140         8a-8p  120-130   Follow-up as outpatient with Dr Gonzalez  Her pump settings are as follows:       2  Hypothyroidism  She is presently on levothyroxine 75 mcg q day  Note use of amiodarone  She will need repeat thyroid function tests in 6 weeks and follow up by endocrinologist   Her TSH was low with normal free T4  Could consider euthyroid sick  S:  No complaints  Eating well, feels well and planned discharge tomorrow      O:  Patient seen and examined personally  /66 (BP Location: Right leg) Comment: Map 88  Pulse 82   Temp 98 3 °F (36 8 °C) (Oral)   Resp 18   Ht 5' 5" (1 651 m)   Wt 113 kg (248 lb 10 9 oz)   SpO2 96%   BMI 41 38 kg/m²     Physical Exam   Constitutional: She is oriented to person, place, and time  HENT:   Head: Normocephalic  Mouth/Throat: Oropharynx is clear and moist    Eyes: Pupils are equal, round, and reactive to light  Neck:   Thick neck   Neurological: She is alert and oriented to person, place, and time  Asterixis positive   Skin: There is pallor  Psychiatric: She has a normal mood and affect           Current Facility-Administered Medications:  acetaminophen 650 mg Oral Q6H PRN CARLOS Sullivan    albuterol 2 puff Inhalation Q4H PRN CARLOS Sullivan    allopurinol 100 mg Oral Daily CARLOS Sullivan    amiodarone 200 mg Oral TID With Meals Kenton Freeman PA-C    atorvastatin 80 mg Oral Daily With CARLOS Davis    bisacodyl 5 mg Oral Daily [facility]N [facility], CRNP    [START ON 11/20/2019] calcitriol 0 25 mcg Oral Once per day on Mon Wed Fri Vielka Rao,     clopidogrel 75 mg Oral Daily [facility], CRNP    famotidine 20 mg Oral Daily [facility], CRNP    FLUoxetine 40 mg Oral Daily [facility], CRNP    fluticasone-vilanterol 1 puff Inhalation Daily [facility], CRNP    gabapentin 300 mg Oral HS Shailesh Lazo, CARLOS    insulin glargine 38 Units Subcutaneous HS Chandu Rosas MD    insulin lispro 1-5 Units Subcutaneous HS Chandu Rosas MD    insulin lispro 1-6 Units Subcutaneous TID AC Patito Cabello MD    insulin lispro 6 Units Subcutaneous TID With Meals Chandu Rosas MD    levothyroxine 75 mcg Oral Early Morning [place]ue [place], CRNP    midodrine 5 mg Oral TID AC Shailesh Lazo, CARLOS    ondansetron 4 mg Intravenous Q6H PRN Hetul Rosales,     pantoprazole 40 mg Oral Early Morning [facility], CRNP    phenol 1 spray Mouth/Throat Q4H PRN [facility], CRNP    polyethylene glycol 17 g Oral Daily Yue [place], CRNP    senna-docusate sodium 2 tablet Per NG Tube HS [facility], CRNP    vancomycin 10 mg/kg (Adjusted) Intravenous After Dialysis Edi Marquez PA-C Last Rate: Stopped (11/18/19 1750)        Lab, Imaging and other studies:   POC Glucose (mg/dl)   Date Value   11/19/2019 292 (H)   11/19/2019 177 (H)   11/18/2019 163 (H)   11/18/2019 198 (H)   11/18/2019 173 (H)   11/18/2019 118   11/18/2019 122   11/18/2019 141 (H)   11/18/2019 188 (H)   11/18/2019 294 (H)       D/W :   Dispo:

## 2019-11-19 NOTE — PROGRESS NOTES
Progress Note - Cardiology   Mandie Posey 48 y o  female MRN: [id number]  Unit/Bed#: [facility] 402-01 Encounter: [id number]        Principal Problem:    Shock ([place] [address] )  Active Problems:    Uncontrolled type 1 diabetes mellitus ([place] [address] )    Hyponatremia    ESRD (end stage renal disease) on dialysis ([place] [address] )    PAF (paroxysmal atrial fibrillation) ([place] [address] )    Morbid obesity ([place] [address] )    Asthma    CAD with recent stent    Hypothyroid    Sepsis ([place] [address] )    Bradycardia    Hyperphosphatemia    Anemia    Hyperkalemia    Secondary hyperparathyroidism of renal origin ([place] [address] )    Tachy-bette syndrome (HCC)    LISSA (obstructive sleep apnea)        Assessment/Plan     1  Syncope-presented with   Limited echo preserved LV and RV function, TDS     2  Junctional bradycardia- resolved   Associated with hypotension- requiring pressors- levophed followed by dopamine  ( d/c'ed d/t tachcyardia)   No longer on pressors     3  Acute hypoxic respiratory failure  Requiring intubation, now extubated  She was transferred out of ICU 2 nights ago  Yesterday on r/a, now 4 liters  100% sat, would wean down     4  Hypotension- requiring pressors , now on midodrine 5 TID  's     5  PAF- Went to back into AFib with RVR at 0700 11/18  Pt felt presyncopal and had altered mental status  IV amiodarone started  Pt NSR  EP plans to d/c IV amiodarone and continue PO  Was previously on diltiazem 180 and metoprolol tartrate 25 b i d   H O GIB and was recently evaluated at [facility] for Watchman  Eliquis held d/t recent GIB but was being given here  Held last PM until sorted through  SLIM spoke with EP  EP strongly prefers to avoid PPM d/t risk of infection      6  CAD LAD stent 9/6/2019  Plavix 75  No BB d/t tachybrady     7  ESRD/HD  Patient has both right arm and left arm HD fistula     8  Chronic diastolic heart failure-managed by HD     9  Acute on chronic anemia  Hgb 6 4  She  received unit packed red cells with response  Now at baseline       10   Sepsis- unclear source Blood cultures remain negative            Subjective/Objective   Chief Complaint/Subjective  Pt feels much better  On 4 liters, Feels "high"  Pt mentating better  No SOB,CP           Vitals: /55 (BP Location: Right leg) Comment: Map 79  Pulse 82   Temp 97 6 °F (36 4 °C) (Oral)   Resp 18   Ht 5' 5" (1 651 m)   Wt 113 kg (248 lb 10 9 oz)   SpO2 100%   BMI 41 38 kg/m²     Vitals:    11/18/19 0600 11/19/19 0600   Weight: 115 kg (254 lb 3 1 oz) 113 kg (248 lb 10 9 oz)     Orthostatic Blood Pressures      Most Recent Value   Blood Pressure  121/55 [Map 79] filed at 11/19/2019 9431   Patient Position - Orthostatic VS  Lying filed at 11/19/2019 8222            Intake/Output Summary (Last 24 hours) at 11/19/2019 1029  Last data filed at 11/19/2019 0400  Gross per 24 hour   Intake 1648 72 ml   Output 1442 ml   Net 206 72 ml       Invasive Devices     Central Venous Catheter Line            CVC Central Lines 11/18/19 Double Right Internal jugular less than 1 day          Line            Hemodialysis AV Fistula Left Upper arm -- days    Hemodialysis AV Fistula Right Upper arm -- days                Current Facility-Administered Medications   Medication Dose Route Frequency    acetaminophen (TYLENOL) tablet 650 mg  650 mg Oral Q6H PRN    albuterol (PROVENTIL HFA,VENTOLIN HFA) inhaler 2 puff  2 puff Inhalation Q4H PRN    allopurinol (ZYLOPRIM) tablet 100 mg  100 mg Oral Daily    amiodarone (CORDARONE) 900 mg in dextrose 5 % 500 mL infusion  0 5 mg/min Intravenous Continuous    amiodarone 150 mg in dextrose 5 % 100 mL IV bolus  150 mg Intravenous Once    amiodarone tablet 200 mg  200 mg Oral TID With Meals    atorvastatin (LIPITOR) tablet 80 mg  80 mg Oral Daily With Dinner    bisacodyl (DULCOLAX) EC tablet 5 mg  5 mg Oral Daily PRN    [START ON 11/20/2019] calcitriol (ROCALTROL) capsule 0 25 mcg  0 25 mcg Oral Once per day on Mon Wed Fri    clopidogrel (PLAVIX) tablet 75 mg  75 mg Oral Daily    famotidine (PEPCID) tablet 20 mg  20 mg Oral Daily    FLUoxetine (PROzac) capsule 40 mg  40 mg Oral Daily    fluticasone-vilanterol (BREO ELLIPTA) 100-25 mcg/inh inhaler 1 puff  1 puff Inhalation Daily    gabapentin (NEURONTIN) capsule 300 mg  300 mg Oral HS    insulin glargine (LANTUS) subcutaneous injection 38 Units 0 38 mL  38 Units Subcutaneous HS    insulin lispro (HumaLOG) 100 units/mL subcutaneous injection 1-5 Units  1-5 Units Subcutaneous HS    insulin lispro (HumaLOG) 100 units/mL subcutaneous injection 1-6 Units  1-6 Units Subcutaneous TID AC    insulin lispro (HumaLOG) 100 units/mL subcutaneous injection 6 Units  6 Units Subcutaneous TID With Meals    levothyroxine tablet 75 mcg  75 mcg Oral Early Morning    midodrine (PROAMATINE) tablet 5 mg  5 mg Oral TID AC    pantoprazole (PROTONIX) EC tablet 40 mg  40 mg Oral Early Morning    phenol (CHLORASEPTIC) 1 4 % mucosal liquid 1 spray  1 spray Mouth/Throat Q4H PRN    polyethylene glycol (MIRALAX) packet 17 g  17 g Oral Daily    senna-docusate sodium (SENOKOT S) 8 6-50 mg per tablet 2 tablet  2 tablet Per NG Tube HS    vancomycin (VANCOCIN) IVPB (premix) 750 mg  10 mg/kg (Adjusted) Intravenous After Dialysis         Physical Exam: /55 (BP Location: Right leg) Comment: Map 79  Pulse 82   Temp 97 6 °F (36 4 °C) (Oral)   Resp 18   Ht 5' 5" (1 651 m)   Wt 113 kg (248 lb 10 9 oz)   SpO2 100%   BMI 41 38 kg/m²     General Appearance:    Alert, cooperative, no distress, appears older than stated age   Head:    Normocephalic, no scleral icterus   Eyes:    PERRL   Nose:   Nares normal, septum midline, no drainage    Throat:   Lips, mucosa, and tongue normal   Neck:   Supple, symmetrical, trachea midline,              Lungs:     Clear to auscultation bilaterally, respirations unlabored   Chest Wall:    No tenderness or deformity    Heart:    Regular rate and rhythm, S1 and S2 normal, no murmur, rub   or gallop       Extremities:   Extremities normal, atraumatic, no cyanosis , mild  edema   Pulses:   2+ and symmetric all extremities   Skin:   Skin warm   Neurologic:   Alert and oriented to person place and time, no focal deficits                 Lab Results:   Recent Results (from the past 72 hour(s))   Fingerstick Glucose (POCT)    Collection Time: 11/16/19 11:11 AM   Result Value Ref Range    POC Glucose 89 65 - 140 mg/dl   Sputum culture and Gram stain    Collection Time: 11/16/19 11:12 AM   Result Value Ref Range    Sputum Culture 2+ Growth of Candida albicans (A)     Sputum Culture 2+ Growth of      Gram Stain Result 1+ Polys     Gram Stain Result No bacteria seen        Susceptibility    Candida albicans - DENYS     ZID Performed Yes     Fingerstick Glucose (POCT)    Collection Time: 11/16/19 12:11 PM   Result Value Ref Range    POC Glucose 104 65 - 140 mg/dl   Fingerstick Glucose (POCT)    Collection Time: 11/16/19  2:10 PM   Result Value Ref Range    POC Glucose 238 (H) 65 - 140 mg/dl   Fingerstick Glucose (POCT)    Collection Time: 11/16/19  4:02 PM   Result Value Ref Range    POC Glucose 285 (H) 65 - 140 mg/dl   Fingerstick Glucose (POCT)    Collection Time: 11/16/19  5:55 PM   Result Value Ref Range    POC Glucose 266 (H) 65 - 140 mg/dl   Fingerstick Glucose (POCT)    Collection Time: 11/16/19  7:46 PM   Result Value Ref Range    POC Glucose 224 (H) 65 - 140 mg/dl   Fingerstick Glucose (POCT)    Collection Time: 11/16/19 10:08 PM   Result Value Ref Range    POC Glucose 115 65 - 140 mg/dl   Fingerstick Glucose (POCT)    Collection Time: 11/17/19 12:29 AM   Result Value Ref Range    POC Glucose 116 65 - 140 mg/dl   Fingerstick Glucose (POCT)    Collection Time: 11/17/19 12:57 AM   Result Value Ref Range    POC Glucose 123 65 - 140 mg/dl   Fingerstick Glucose (POCT)    Collection Time: 11/17/19  2:12 AM   Result Value Ref Range    POC Glucose 125 65 - 140 mg/dl   Basic metabolic panel    Collection Time: 11/17/19  4:31 AM   Result Value Ref Range    Sodium 135 (L) 136 - 145 mmol/L    Potassium 4 4 3 5 - 5 3 mmol/L    Chloride 96 (L) 100 - 108 mmol/L    CO2 26 21 - 32 mmol/L    ANION GAP 13 4 - 13 mmol/L    BUN 51 (H) 5 - 25 mg/dL    Creatinine 4 87 (H) 0 60 - 1 30 mg/dL    Glucose 105 65 - 140 mg/dL    Calcium 8 9 8 3 - 10 1 mg/dL    eGFR 10 ml/min/1 73sq m   CBC    Collection Time: 11/17/19  4:31 AM   Result Value Ref Range    WBC 13 41 (H) 4 31 - 10 16 Thousand/uL    RBC 1 93 (L) 3 81 - 5 12 Million/uL    Hemoglobin 6 4 (LL) 11 5 - 15 4 g/dL    Hematocrit 20 8 (L) 34 8 - 46 1 %     (H) 82 - 98 fL    MCH 33 2 26 8 - 34 3 pg    MCHC 30 8 (L) 31 4 - 37 4 g/dL    RDW 18 8 (H) 11 6 - 15 1 %    Platelets 962 598 - 440 Thousands/uL    MPV 10 0 8 9 - 12 7 fL   Phosphorus    Collection Time: 11/17/19  4:31 AM   Result Value Ref Range    Phosphorus 6 5 (H) 2 7 - 4 5 mg/dL   Digoxin level    Collection Time: 11/17/19  4:31 AM   Result Value Ref Range    Digoxin Lvl 0 9 0 8 - 2 0 ng/mL   Vancomycin, random    Collection Time: 11/17/19  4:31 AM   Result Value Ref Range    Vancomycin Rm 20 8 ug/mL   Fingerstick Glucose (POCT)    Collection Time: 11/17/19  4:34 AM   Result Value Ref Range    POC Glucose 106 65 - 140 mg/dl   Fingerstick Glucose (POCT)    Collection Time: 11/17/19  6:11 AM   Result Value Ref Range    POC Glucose 103 65 - 140 mg/dl   Fingerstick Glucose (POCT)    Collection Time: 11/17/19  7:47 AM   Result Value Ref Range    POC Glucose 124 65 - 140 mg/dl   Fingerstick Glucose (POCT)    Collection Time: 11/17/19 10:03 AM   Result Value Ref Range    POC Glucose 137 65 - 140 mg/dl   Fingerstick Glucose (POCT)    Collection Time: 11/17/19 11:59 AM   Result Value Ref Range    POC Glucose 137 65 - 140 mg/dl   Fingerstick Glucose (POCT)    Collection Time: 11/17/19  2:19 PM   Result Value Ref Range    POC Glucose 228 (H) 65 - 140 mg/dl   Fingerstick Glucose (POCT)    Collection Time: 11/17/19  4:34 PM   Result Value Ref Range    POC Glucose 240 (H) 65 - 140 mg/dl Fingerstick Glucose (POCT)    Collection Time: 11/17/19  6:26 PM   Result Value Ref Range    POC Glucose 185 (H) 65 - 140 mg/dl   Fingerstick Glucose (POCT)    Collection Time: 11/17/19  8:09 PM   Result Value Ref Range    POC Glucose 209 (H) 65 - 140 mg/dl   Fingerstick Glucose (POCT)    Collection Time: 11/17/19  9:54 PM   Result Value Ref Range    POC Glucose 213 (H) 65 - 140 mg/dl   Fingerstick Glucose (POCT)    Collection Time: 11/18/19 12:14 AM   Result Value Ref Range    POC Glucose 60 (L) 65 - 140 mg/dl   Fingerstick Glucose (POCT)    Collection Time: 11/18/19 12:22 AM   Result Value Ref Range    POC Glucose 21 (LL) 65 - 140 mg/dl   Fingerstick Glucose (POCT)    Collection Time: 11/18/19 12:42 AM   Result Value Ref Range    POC Glucose 82 65 - 140 mg/dl   Glucose, random    Collection Time: 11/18/19  1:02 AM   Result Value Ref Range    Glucose 70 65 - 140 mg/dL   Fingerstick Glucose (POCT)    Collection Time: 11/18/19  1:11 AM   Result Value Ref Range    POC Glucose 76 65 - 140 mg/dl   Fingerstick Glucose (POCT)    Collection Time: 11/18/19  1:54 AM   Result Value Ref Range    POC Glucose 89 65 - 140 mg/dl   Fingerstick Glucose (POCT)    Collection Time: 11/18/19  4:23 AM   Result Value Ref Range    POC Glucose 193 (H) 65 - 140 mg/dl   Fingerstick Glucose (POCT)    Collection Time: 11/18/19  6:10 AM   Result Value Ref Range    POC Glucose 294 (H) 65 - 140 mg/dl   Fingerstick Glucose (POCT)    Collection Time: 11/18/19  8:03 AM   Result Value Ref Range    POC Glucose 188 (H) 65 - 140 mg/dl   Fingerstick Glucose (POCT)    Collection Time: 11/18/19  9:13 AM   Result Value Ref Range    POC Glucose 141 (H) 65 - 140 mg/dl   Fingerstick Glucose (POCT)    Collection Time: 11/18/19 10:28 AM   Result Value Ref Range    POC Glucose 122 65 - 140 mg/dl   Basic metabolic panel    Collection Time: 11/18/19  1:45 PM   Result Value Ref Range    Sodium 134 (L) 136 - 145 mmol/L    Potassium 5 4 (H) 3 5 - 5 3 mmol/L Chloride 95 (L) 100 - 108 mmol/L    CO2 22 21 - 32 mmol/L    ANION GAP 17 (H) 4 - 13 mmol/L    BUN 73 (H) 5 - 25 mg/dL    Creatinine 6 98 (H) 0 60 - 1 30 mg/dL    Glucose 192 (H) 65 - 140 mg/dL    Calcium 9 0 8 3 - 10 1 mg/dL    eGFR 6 ml/min/1 73sq m   Magnesium    Collection Time: 11/18/19  1:45 PM   Result Value Ref Range    Magnesium 2 4 1 6 - 2 6 mg/dL   Phosphorus    Collection Time: 11/18/19  1:45 PM   Result Value Ref Range    Phosphorus 8 8 (H) 2 7 - 4 5 mg/dL   CBC    Collection Time: 11/18/19  1:45 PM   Result Value Ref Range    WBC 13 19 (H) 4 31 - 10 16 Thousand/uL    RBC 1 87 (L) 3 81 - 5 12 Million/uL    Hemoglobin 6 3 (LL) 11 5 - 15 4 g/dL    Hematocrit 20 3 (L) 34 8 - 46 1 %     (H) 82 - 98 fL    MCH 33 7 26 8 - 34 3 pg    MCHC 31 0 (L) 31 4 - 37 4 g/dL    RDW 18 6 (H) 11 6 - 15 1 %    Platelets 241 915 - 867 Thousands/uL    MPV 10 6 8 9 - 12 7 fL   Procalcitonin    Collection Time: 11/18/19  1:45 PM   Result Value Ref Range    Procalcitonin 3 03 (H) <=0 25 ng/ml   Type and screen    Collection Time: 11/18/19  1:45 PM   Result Value Ref Range    ABO Grouping A     Rh Factor Positive     Antibody Screen Negative     Specimen Expiration Date [id number]    Vitamin B12    Collection Time: 11/18/19  1:45 PM   Result Value Ref Range    Vitamin B-12 3,619 (H) 100 - 900 pg/mL   Iron Saturation %    Collection Time: 11/18/19  1:45 PM   Result Value Ref Range    Iron Saturation 13 %    TIBC 279 250 - 450 ug/dL    Iron 37 (L) 50 - 170 ug/dL   Ferritin    Collection Time: 11/18/19  1:45 PM   Result Value Ref Range    Ferritin 2,242 (H) 8 - 388 ng/mL   Fingerstick Glucose (POCT)    Collection Time: 11/18/19  6:21 PM   Result Value Ref Range    POC Glucose 118 65 - 140 mg/dl   Fingerstick Glucose (POCT)    Collection Time: 11/18/19  8:01 PM   Result Value Ref Range    POC Glucose 173 (H) 65 - 140 mg/dl   Hemoglobin    Collection Time: 11/18/19  9:02 PM   Result Value Ref Range    Hemoglobin 7 1 (L) 11 5 - 15 4 g/dL   Fingerstick Glucose (POCT)    Collection Time: 11/18/19 10:02 PM   Result Value Ref Range    POC Glucose 198 (H) 65 - 140 mg/dl   Fingerstick Glucose (POCT)    Collection Time: 11/18/19 11:56 PM   Result Value Ref Range    POC Glucose 163 (H) 65 - 140 mg/dl   CBC    Collection Time: 11/19/19  4:48 AM   Result Value Ref Range    WBC 8 31 4 31 - 10 16 Thousand/uL    RBC 2 46 (L) 3 81 - 5 12 Million/uL    Hemoglobin 8 1 (L) 11 5 - 15 4 g/dL    Hematocrit 26 2 (L) 34 8 - 46 1 %     (H) 82 - 98 fL    MCH 32 9 26 8 - 34 3 pg    MCHC 30 9 (L) 31 4 - 37 4 g/dL    RDW 19 9 (H) 11 6 - 15 1 %    Platelets 169 (L) 821 - 390 Thousands/uL    MPV 10 2 8 9 - 12 7 fL   Phosphorus    Collection Time: 11/19/19  4:48 AM   Result Value Ref Range    Phosphorus 5 3 (H) 2 7 - 4 5 mg/dL   PTH, intact    Collection Time: 11/19/19  4:48 AM   Result Value Ref Range     7 (H) 18 4 - 80 1 pg/mL   Basic metabolic panel    Collection Time: 11/19/19  4:48 AM   Result Value Ref Range    Sodium 134 (L) 136 - 145 mmol/L    Potassium 3 9 3 5 - 5 3 mmol/L    Chloride 95 (L) 100 - 108 mmol/L    CO2 28 21 - 32 mmol/L    ANION GAP 11 4 - 13 mmol/L    BUN 25 5 - 25 mg/dL    Creatinine 3 62 (H) 0 60 - 1 30 mg/dL    Glucose 160 (H) 65 - 140 mg/dL    Calcium 8 7 8 3 - 10 1 mg/dL    eGFR 14 ml/min/1 73sq m   Prepare RBC:Has consent been obtained? Yes, 1 Units    Collection Time: 11/19/19  5:55 AM   Result Value Ref Range    Unit Product Code T5516Y93     Unit Number [id number]     Unit ABO A     Unit RH POS     Crossmatch Compatible     Unit Dispense Status Presumed Trans    Fingerstick Glucose (POCT)    Collection Time: 11/19/19  6:14 AM   Result Value Ref Range    POC Glucose 177 (H) 65 - 140 mg/dl   Blood culture    Collection Time: 11/19/19  8:01 AM   Result Value Ref Range    Blood Culture No Growth After 4 Days  Imaging: I have personally reviewed pertinent reports      Tele- NSR      Counseling / Coordination of Care  Total time spent today 25 minutes  Greater than 50% of total time was spent with the patient and / or family counseling and / or coordination of care

## 2019-11-19 NOTE — ASSESSMENT & PLAN NOTE
· Presented with junctional bradycardia   · resolved with holding home medications Metoprolol and Diltiazem  · Then developed  rapid Afib and converted to NSR with IV Digoxin  · On oral Amiodarone as per EP- continue  · Per discussion with  Ney Goyal on the phone her gastroenterologist at [address] [place] [address] [address] had advised her to be off anticoagulation indefinitely due to GI bleed   Follow CBC- if Hemoglobin drops, will consider GI consult  · Ct ASA

## 2019-11-19 NOTE — OCCUPATIONAL THERAPY NOTE
Occupational Therapy Cancellation Note        Patient Name: Shilpa HONG Date: 11/19/2019    OT orders received, chart reviewed  Spoke with pt and she states that she is mainly non-ambulatory at home and that she needs to wear a boot on her R foot when mobilizing  Boot is currently at home-pt reports that she will have her family bring it to the hospital  Will hold OT assessment this date and wait for boot in order to mobilize  OT will continue to follow and assess as medically appropriate and able      HAWA Navas, OTR/L

## 2019-11-19 NOTE — ASSESSMENT & PLAN NOTE
· Condition Improving  · Presented with multiple episodes of syncope and emesis most likely secondary to junctional bradycardia and hypotension  · Intubated in the ED on presentation   Extubated on 11/16  · Off Dopamine and Levophed  ·  Metoprolol 25 mg BID and Diltiazem 180 mg on HOLD  · Continue Midodrine 5 mg TID  · Likely cardiogenic  · BP acceptable at present - monitor  · NSR at present   · Ct telemetry

## 2019-11-19 NOTE — ASSESSMENT & PLAN NOTE
· Anemia of chronic kidney disease r/o GIB  · History of recurrent GI bleed - underwent APC treatment to 2 areas of increased vasculature in the duodenum during push enteroscopy on 11/5/19   If Hb dropped later had been advised double balloon enteroscopy  · Recent baseline Hb 7 9 to 8 2  · S/p 1 PRBC  · Recent h/h= 8 1/26 2  · Monitor H/H  · Transfuse as needed  · Eliquis is on hold  · May need GI eval

## 2019-11-19 NOTE — PROGRESS NOTES
NEPHROLOGY PROGRESS NOTE   Yamilex Adams 48 y o  female MRN: [id number]  Unit/Bed#: [facility] 402-01 Encounter: [id number]      ASSESSMENT/PLAN:  1  ESRD:  On hemodialysis Monday, Wednesday and Friday at [facility]  · Currently stable and next dialysis will be tomorrow  2  Access:  Left upper extremity AV graft working well  · Recently had right upper extremity AV fistula placed but currently has only faint bruit and wound dehiscence  · Needs vascular surgery follow-up and fistulogram when more stable  3  Symptomatic bradycardia:  Improved and now extubated   4  Shock:  Resolved and now stable off pressors  · Per ID no source found of sepsis   5  Hyperphosphatemia:  Continue renagel 3 tablets with meals and calcitriol 0 25mcg 3 times a week  · Phosphorus improved to 5 3 today  6  Anemia of CKD:  Received blood transfusion yesterday  7  Hyperkalemia:  Resolved with dialysis  8  Hyponatremia: mild and stable at 134 today         SUBJECTIVE:  Feeling okay but does have a cough and feels congested  No nausea, vomiting or diarrhea      OBJECTIVE:  Current Weight: Weight - Scale: 113 kg (248 lb 10 9 oz)  Vitals:    11/19/19 1044   BP: 130/66   Pulse: 82   Resp: 18   Temp: 98 3 °F (36 8 °C)   SpO2: 96%       Intake/Output Summary (Last 24 hours) at 11/19/2019 1250  Last data filed at 11/19/2019 0400  Gross per 24 hour   Intake 1648 72 ml   Output 1442 ml   Net 206 72 ml       General:  No acute distress  Skin:  No rash  Eyes:  Sclerae anicteric  ENT:  Moist mucous membranes  Neck:  Trachea midline, no JVD  Chest:  Coarse breath sounds   CVS:  Regular rate and rhythm  Abdomen:  Soft, nontender, nondistended  Extremities:  Bilateral edema   Neuro:  Awake and alert  Psych:  Appropriate affect      Medications:  Scheduled Meds:  Current Facility-Administered Medications:  acetaminophen 650 mg Oral Q6H PRN Rawleigh Amara, CRNP    albuterol 2 puff Inhalation Q4H PRN Rawleigh Amara, CRNP    allopurinol 100 mg Oral Daily CARLOS Lofton    amiodarone 0 5 mg/min Intravenous Continuous Luis Parham PA-C Last Rate: Stopped (11/19/19 1041)   amiodarone (CORDARONE) IV bolus 150 mg Intravenous Once Diane Pabon MD    amiodarone 200 mg Oral TID With Meals Kenton Freeman PA-C    atorvastatin 80 mg Oral Daily With [employer], CRNP    bisacodyl 5 mg Oral Daily PRN CARLOS Lofton    [START ON 11/20/2019] calcitriol 0 25 mcg Oral Once per day on Mon Wed Fri Vielka Rao DO    clopidogrel 75 mg Oral Daily CARLOS Lofton    famotidine 20 mg Oral Daily CARLOS Lofton    FLUoxetine 40 mg Oral Daily CARLOS Lofton    fluticasone-vilanterol 1 puff Inhalation Daily CARLOS Lofton    gabapentin 300 mg Oral HS CARLOS Reddy    insulin glargine 38 Units Subcutaneous HS Patito Cabello MD    insulin lispro 1-5 Units Subcutaneous HS Carlos Goldberg MD    insulin lispro 1-6 Units Subcutaneous TID AC Carlos Goldberg MD    insulin lispro 6 Units Subcutaneous TID With Meals Carlos Goldberg MD    levothyroxine 75 mcg Oral Early Morning CARLOS Lofton    midodrine 5 mg Oral TID AC CARLOS Reddy    ondansetron 4 mg Intravenous Q6H PRN Jaden Rosales DO    pantoprazole 40 mg Oral Early Morning CARLOS Lofton    phenol 1 spray Mouth/Throat Q4H PRN CARLOS Lofton    polyethylene glycol 17 g Oral Daily CARLOS Lofton    senna-docusate sodium 2 tablet Per NG Tube HS CARLOS Lofton    vancomycin 10 mg/kg (Adjusted) Intravenous After Dialysis Marilynn Ardon PA-C Last Rate: Stopped (11/18/19 1750)       PRN Meds:   acetaminophen    albuterol    bisacodyl    ondansetron    phenol    vancomycin    Continuous Infusions:  amiodarone 0 5 mg/min Last Rate: Stopped (11/19/19 1041)       Laboratory Results:  Results from last 7 days   Lab Units 11/19/19  0448 11/18/19  2102 11/18/19  1345 11/17/19  0431 11/16/19  8888 11/16/19  0449 11/15/19  2253 11/15/19  0506 11/14/19  2116 11/14/19 1921 11/14/19 1918 11/14/19 1917   WBC Thousand/uL 8 31  --  13 19* 13 41*  --  14 02*  --  15 30*  --  12 82*  --   --    HEMOGLOBIN g/dL 8 1* 7 1* 6 3* 6 4*  --  7 2*  --  7 4*  --  7 5*  --   --    I STAT HEMOGLOBIN g/dl  --   --   --   --   --   --   --   --   --   --  8 2* 8 2*   HEMATOCRIT % 26 2*  --  20 3* 20 8*  --  22 4*  --  23 5*  --  24 9*  --   --    HEMATOCRIT, ISTAT %  --   --   --   --   --   --   --   --   --   --  24* 24*   PLATELETS Thousands/uL 144*  --  192 162  --  174  --  168 215 228  --   --    SODIUM mmol/L 134*  --  134* 135* 133*  --  134* 132*  --  129*  --   --    POTASSIUM mmol/L 3 9  --  5 4* 4 4 5 2  --  5 0 4 6  --  5 2  --   --    CHLORIDE mmol/L 95*  --  95* 96* 92*  --  93* 93*  --  90*  --   --    CO2 mmol/L 28  --  22 26 23  --  25 23  --  23  --   --    CO2, I-STAT mmol/L  --   --   --   --   --   --   --   --   --   --  26 25   BUN mg/dL 25  --  73* 51* 106*  --  105* 94*  --  86*  --   --    CREATININE mg/dL 3 62*  --  6 98* 4 87* 7 79*  --  7 41* 6 56*  --  6 58*  --   --    CALCIUM mg/dL 8 7  --  9 0 8 9 9 2  --  9 2 8 8  --  9 4  --   --    MAGNESIUM mg/dL  --   --  2 4  --  2 3  --  2 3 2 3  --   --   --   --    PHOSPHORUS mg/dL 5 3*  --  8 8* 6 5* 7 7*  --  6 5* 5 3*  --   --   --   --    GLUCOSE, ISTAT mg/dl  --   --   --   --   --   --   --   --   --   --  503* 500*

## 2019-11-19 NOTE — ASSESSMENT & PLAN NOTE
Continue amiodarone as per Cardiology/EP recommendation  Eliquis discontinued due to anemia/Bleeding  Monitor Hemoglobin-if drops, we will consider GI consult    Continue Amiodarone, Plavix- as per Cardio/EP

## 2019-11-19 NOTE — ASSESSMENT & PLAN NOTE
· Presented with junctional bradycardia   · resolved with holding home medications Metoprolol and Diltiazem  · Then developed  rapid Afib and converted to NSR with IV Digoxin  · On oral Amiodarone as per EP- continue  · Per discussion with  Elijah Alvarado on the phone her gastroenterologist at [facility] had advised her to be off anticoagulation indefinitely due to GI bleed  · Restart anticoagulation when cleared by GI  · Ct ASA

## 2019-11-19 NOTE — PROGRESS NOTES
Progress Note Fredda Severance 1965, 48 y o  female MRN: [id number]    Unit/Bed#: [facility] 402-01 Encounter: [id number]    Primary Care Provider: Shun Lundberg DO   Date and time admitted to hospital: 11/14/2019  7:03 PM        * Shock [facility])  Assessment & Plan  · Condition Improving  · Presented with multiple episodes of syncope and emesis most likely secondary to junctional bradycardia and hypotension  · Intubated in the ED on presentation  Extubated on 11/16  · Off Dopamine and Levophed  ·  Metoprolol 25 mg BID and Diltiazem 180 mg discontinued  · Continue Midodrine 5 mg TID  · Likely cardiogenic  · BP acceptable at present - monitor  · NSR at present   · Ct telemetry      Tachy-bette syndrome ([facility] [address] )  Assessment & Plan  · Presented with junctional bradycardia   · resolved with holding home medications Metoprolol and Diltiazem  · Then developed  rapid Afib and converted to NSR with IV Digoxin  · On oral Amiodarone as per EP- continue and will discharge with it  · Per discussion with  Job Miranda on the phone her gastroenterologist at [facility] had advised her to be off anticoagulation indefinitely due to GI bleed  · Follow CBC- if Hemoglobin drops, will consider GI consult  · Ct ASA    PAF (paroxysmal atrial fibrillation) (HCC)  Assessment & Plan  Continue amiodarone as per Cardiology/EP recommendation  Eliquis discontinued due to anemia/Bleeding  Monitor hemoglobin- if drops, we will consider GI consult    Continue Amiodarone, Plavix-as per Cardio/EP and may discharge on these medications    ESRD (end stage renal disease) on dialysis [facility])  Assessment & Plan  · HD on Mon/Wed/Fri  · Continue as per Nephrology recommendation    CAD with recent stent  Assessment & Plan  · S/p stent to LAD on 9/6/19  · Ct Plavix  · ASA had been held as outpatient due to GIB - restarted while in ICU - monitor Hb - if drops post transfusion hold ASA  · Ct statin  · No BB due to symptomatic bradycardia  · Treatment per cardiology  · Eliquis discontinued due to anemia and possible GIB  Monitor H/H-if drops, consider GI consult  · Patient may discharge on Plavix, Amiodarone and Aspirin    Bradycardia  Assessment & Plan  Most likely secondary to medications (was on metoprolol and diltiazem)- discontinued  Symptoms improved with discontinuation of the meds  EP consult appreciated  Since patient is on Hemodialysis for ESRD and have Bilateral AV fistula- both will put the patient in high risk for severe infection from PPM  For that reason, as per EP, medical management will be tried with Amiodarone      Anemia  Assessment & Plan  · Anemia of chronic kidney disease r/o GIB  · History of recurrent GI bleed - underwent APC treatment to 2 areas of increased vasculature in the duodenum during push enteroscopy on 11/5/19   If Hb dropped later had been advised double balloon enteroscopy  · Recent baseline Hb 7 9 to 8 2  · S/p 1 PRBC  · Recent h/h= 8 1/26 2  · Monitor H/H  · Transfuse as needed  · Eliquis is on hold  · May need GI eval     Hyperkalemia  Assessment & Plan  Secondary to ESRD  Resolved    Hyperphosphatemia  Assessment & Plan  Secondary to ESRD  Recent Phosphorus is 5 3- which is trending down    Sepsis [facility])  Assessment & Plan  Unclear source  Received 3 day of antibiotics-Discontinue by ID  Appreciate ID recommendations  Follow temperature/white count/culture results    Hypothyroid  Assessment & Plan  · Continue home levothyroxine 75 mcg daily  · TSH 6 4 on 11/14 with normal free T4      Asthma  Assessment & Plan  · stable  · Advair changed to equivalent Breo as Advair non-formulary    Hyponatremia  Assessment & Plan  Secondary to ESRD  Stable  Recent Sodium level is 134  Monitor    Uncontrolled type 1 diabetes mellitus [facility])  Assessment & Plan  Lab Results   Component Value Date    HGBA1C 8 1 (H) 07/21/2019       Recent Labs     11/18/19 2001 11/18/19  2202 11/18/19  2356 11/19/19  0614   POCGLU 173* 198* 163* 177*     Blood Sugar Average: Last 72 hrs:  (P) 153 175     On insulin pump at home  Endocrinology consult appreciated- continue treatment as recommended        VTE Pharmacologic Prophylaxis:   Pharmacologic: Pharmacologic VTE Prophylaxis contraindicated due to bleeding and anemia  Mechanical VTE Prophylaxis in Place: Yes    Patient Centered Rounds: I have performed bedside rounds with nursing staff today  Time Spent for Care: 30 minutes  More than 50% of total time spent on counseling and coordination of care as described above  Current Length of Stay: 5 day(s)    Current Patient Status: Inpatient   Certification Statement: The patient will continue to require additional inpatient hospital stay due to Cardiogenic shock, A-Fib, ESRD with electrolytes abnormality    Discharge Plan: to be determined    Code Status: Level 1 - Full Code      Subjective: The patient seen and evaluated doing better now on this morning care team   Seating comfortably on chair, receiving 2-3 L oxygen via nasal cannula  Not in significant distress  Only complaint this morning is feeling nauseated  Denies any anti, length patient, soft, chest pain, difficulty in breathing, fever, cough, abdominal pain previous      Patient is sitting on chair for the first time since admitting to hospital  Haven't get chance to ambulate yet, patient use walker  Objective:     Vitals:   Temp (24hrs), Av 1 °F (36 7 °C), Min:97 6 °F (36 4 °C), Max:98 4 °F (36 9 °C)    Temp:  [97 6 °F (36 4 °C)-98 4 °F (36 9 °C)] 98 2 °F (36 8 °C)  HR:  [82-94] 83  Resp:  [18-19] 19  BP: (120-186)/(49-73) 139/62  SpO2:  [96 %-100 %] 99 %  Body mass index is 41 38 kg/m²  Input and Output Summary (last 24 hours):        Intake/Output Summary (Last 24 hours) at 2019 1611  Last data filed at 2019 1426  Gross per 24 hour   Intake 1281 81 ml   Output 1492 ml   Net -210 19 ml       Physical Exam:     Physical Exam   Constitutional: She is oriented to person, place, and time  No distress  HENT:   Head: Normocephalic  Mouth/Throat: Oropharynx is clear and moist  No oropharyngeal exudate  On 3-4 L oxygen via Nasal [place]   Eyes: Pupils are equal, round, and reactive to light  EOM are normal  No scleral icterus  Neck: Normal range of motion  Neck supple  No JVD present  No tracheal deviation present  Cardiovascular: Normal rate  Exam reveals no gallop and no friction rub  No murmur heard  Pulmonary/Chest: Effort normal and breath sounds normal  No stridor  She has no wheezes  She has no rales  Abdominal: Soft  Bowel sounds are normal    Musculoskeletal: She exhibits no edema  Lymphadenopathy:     She has no cervical adenopathy  Neurological: She is alert and oriented to person, place, and time  Skin: Skin is warm  Capillary refill takes less than 2 seconds  Psychiatric: She has a normal mood and affect  Nursing note and vitals reviewed  Additional Data:     Labs:    Results from last 7 days   Lab Units 11/19/19  0448  11/15/19  0506   WBC Thousand/uL 8 31   < > 15 30*   HEMOGLOBIN g/dL 8 1*   < > 7 4*   HEMATOCRIT % 26 2*   < > 23 5*   PLATELETS Thousands/uL 144*   < > 168   NEUTROS PCT %  --   --  84*   LYMPHS PCT %  --   --  9*   MONOS PCT %  --   --  5   EOS PCT %  --   --  0    < > = values in this interval not displayed  Results from last 7 days   Lab Units 11/19/19 0448 11/16/19  0450  11/14/19  1918   POTASSIUM mmol/L 3 9   < > 5 2   < >  --    CHLORIDE mmol/L 95*   < > 92*   < >  --    CO2 mmol/L 28   < > 23   < >  --    CO2, I-STAT mmol/L  --   --   --   --  26   BUN mg/dL 25   < > 106*   < >  --    CREATININE mg/dL 3 62*   < > 7 79*   < >  --    CALCIUM mg/dL 8 7   < > 9 2   < >  --    ALK PHOS U/L  --   --  178*   < >  --    ALT U/L  --   --  88*   < >  --    AST U/L  --   --  115*   < >  --    GLUCOSE, ISTAT mg/dl  --   --   --   --  503*    < > = values in this interval not displayed             * I Have Reviewed All Lab Data Listed Above   * Additional Pertinent Lab Tests Reviewed: All Labs Within Last 24 Hours Reviewed        Recent Cultures (last 7 days):     Results from last 7 days   Lab Units 11/19/19  0801 11/16/19  1112 11/14/19  2347   BLOOD CULTURE  No Growth After 4 Days  --  No Growth After 4 Days     SPUTUM CULTURE   --  2+ Growth of Candida albicans*  2+ Growth of   --    GRAM STAIN RESULT   --  1+ Polys  No bacteria seen  --        Last 24 Hours Medication List:     Current Facility-Administered Medications:  acetaminophen 650 mg Oral Q6H PRN Zaira Rice, CRNP    albuterol 2 puff Inhalation Q4H PRN Zaira Rice, CRNP    allopurinol 100 mg Oral Daily Zaira Rice, CRNP    amiodarone 200 mg Oral TID With Meals Kenton Freeman PA-C    atorvastatin 80 mg Oral Daily With [employer], CARLOS    bisacodyl 5 mg Oral Daily PRN Zaira Rice, CRNP    [START ON 11/20/2019] calcitriol 0 25 mcg Oral Once per day on Mon Wed Fri Vielka Rao DO    clopidogrel 75 mg Oral Daily Zaira Rice, CRNP    famotidine 20 mg Oral Daily Zaira Rice, CRNP    FLUoxetine 40 mg Oral Daily Zaira Rice, CRNP    fluticasone-vilanterol 1 puff Inhalation Daily Shailesh Lazo, CARLOS    gabapentin 300 mg Oral HS CARLOS Reddy    insulin glargine 38 Units Subcutaneous HS Germaine Ma MD    insulin lispro 1-5 Units Subcutaneous HS Germaine Ma MD    insulin lispro 1-6 Units Subcutaneous TID AC Patito Cabello MD    insulin lispro 12 Units Subcutaneous TID With Meals Germaine Ma MD    levothyroxine 75 mcg Oral Early Morning Zaira Rice, CARLOS    midodrine 5 mg Oral TID AC CARLOS Reddy    ondansetron 4 mg Intravenous Q6H PRN Hetul DO Bobby    pantoprazole 40 mg Oral Early Morning Zaira Rice, CRNP    phenol 1 spray Mouth/Throat Q4H PRN Zaira Rice, CRNP    polyethylene glycol 17 g Oral Daily Zaira Rice, CRNP    senna-docusate sodium 2 tablet Per NG Tube HS Julia Mis CARLOS Lazo    vancomycin 10 mg/kg (Adjusted) Intravenous After Dialysis Jessica Bazzi PA-C Last Rate: Stopped (11/18/19 1750)        Today, Patient Was Seen By: Abel Allen MD    ** Please Note: Dictation voice to text software may have been used in the creation of this document   **

## 2019-11-19 NOTE — PHYSICAL THERAPY NOTE
Physical Therapy Cancellation Note      Chart reviewed; pt is observed in the chair; nsg states pt did well getting OOB; pt is mainly non-ambulatory at home using a w/c for mobility; pt states she is supposed to wear a boot on (R) foot (? cam boot) when mobilizing; the boot is currently not here; pt was advised to request family bring the boot in; pt agreed; will hold PT assessment/mobilization until then; pt notified; will follow      Allyn Felty, PT

## 2019-11-19 NOTE — ASSESSMENT & PLAN NOTE
Unclear source  Received 3 day of antibiotics-Discontinue by ID  Appreciate ID recommendations  Follow temperature/white count/culture results

## 2019-11-19 NOTE — ASSESSMENT & PLAN NOTE
· S/p stent to LAD on 9/6/19  · Ct Plavix  · ASA had been held as outpatient due to GIB - restarted while in ICU - monitor Hb - if drops post transfusion hold ASA  · Ct statin  · No BB due to symptomatic bradycardia  · Treatment per cardiology  · Eliquis is discontinued due to anemia and possible GIB   Monitor H/H- if drops,   · GI consult

## 2019-11-19 NOTE — ASSESSMENT & PLAN NOTE
Most likely secondary to medications (was on metoprolol and diltiazem)- discontinued  Symptoms improved with discontinuation of the meds  EP consult appreciated  Since patient is on Hemodialysis for ESRD and have Bilateral AV fistula- both will put the patient in high risk for severe infection from PPM  For that reason, as per EP, medical management will be tried with Amiodarone

## 2019-11-19 NOTE — ASSESSMENT & PLAN NOTE
Lab Results   Component Value Date    HGBA1C 8 1 (H) 07/21/2019       Recent Labs     11/18/19 2001 11/18/19  2202 11/18/19  2356 11/19/19  0614   POCGLU 173* 198* 163* 177*     Blood Sugar Average: Last 72 hrs:  (P) 153 175     On insulin pump at home  Endocrinology consult appreciated- continue treatment as recommended

## 2019-11-19 NOTE — ASSESSMENT & PLAN NOTE
· Condition Improving  · Presented with multiple episodes of syncope and emesis most likely secondary to junctional bradycardia and hypotension  · Intubated in the ED on presentation   Extubated on 11/16  · Off Dopamine and Levophed  ·  Metoprolol 25 mg BID and Diltiazem 180 mg discontinued  · Continue Midodrine 5 mg TID  · Likely cardiogenic  · BP acceptable at present - monitor  · NSR at present   · Ct telemetry

## 2019-11-20 ENCOUNTER — ANESTHESIA EVENT (INPATIENT)
Dept: GASTROENTEROLOGY | Facility: HOSPITAL | Age: 54
DRG: 871 | End: 2019-11-20
Payer: COMMERCIAL

## 2019-11-20 ENCOUNTER — APPOINTMENT (INPATIENT)
Dept: DIALYSIS | Facility: HOSPITAL | Age: 54
DRG: 871 | End: 2019-11-20
Attending: INTERNAL MEDICINE
Payer: COMMERCIAL

## 2019-11-20 PROBLEM — E87.1 HYPONATREMIA: Status: RESOLVED | Noted: 2019-07-20 | Resolved: 2019-11-20

## 2019-11-20 LAB
ANION GAP SERPL CALCULATED.3IONS-SCNC: 12 MMOL/L (ref 4–13)
BACTERIA BLD CULT: NORMAL
BASOPHILS # BLD AUTO: 0.04 THOUSANDS/ΜL (ref 0–0.1)
BASOPHILS NFR BLD AUTO: 0 % (ref 0–1)
BUN SERPL-MCNC: 37 MG/DL (ref 5–25)
CALCIUM SERPL-MCNC: 8.9 MG/DL (ref 8.3–10.1)
CHLORIDE SERPL-SCNC: 95 MMOL/L (ref 100–108)
CO2 SERPL-SCNC: 27 MMOL/L (ref 21–32)
CREAT SERPL-MCNC: 5.33 MG/DL (ref 0.6–1.3)
EOSINOPHIL # BLD AUTO: 0.82 THOUSAND/ΜL (ref 0–0.61)
EOSINOPHIL NFR BLD AUTO: 9 % (ref 0–6)
ERYTHROCYTE [DISTWIDTH] IN BLOOD BY AUTOMATED COUNT: 19.3 % (ref 11.6–15.1)
GFR SERPL CREATININE-BSD FRML MDRD: 9 ML/MIN/1.73SQ M
GLUCOSE SERPL-MCNC: 194 MG/DL (ref 65–140)
GLUCOSE SERPL-MCNC: 195 MG/DL (ref 65–140)
GLUCOSE SERPL-MCNC: 200 MG/DL (ref 65–140)
GLUCOSE SERPL-MCNC: 253 MG/DL (ref 65–140)
GLUCOSE SERPL-MCNC: 294 MG/DL (ref 65–140)
HBV CORE AB SER QL: NORMAL
HBV CORE IGM SER QL: NORMAL
HBV SURFACE AB SER-ACNC: <3.1 MIU/ML
HBV SURFACE AG SER QL: NORMAL
HCT VFR BLD AUTO: 21.2 % (ref 34.8–46.1)
HCV AB SER QL: NORMAL
HGB BLD-MCNC: 6.4 G/DL (ref 11.5–15.4)
IMM GRANULOCYTES # BLD AUTO: 0.05 THOUSAND/UL (ref 0–0.2)
IMM GRANULOCYTES NFR BLD AUTO: 1 % (ref 0–2)
LYMPHOCYTES # BLD AUTO: 1.38 THOUSANDS/ΜL (ref 0.6–4.47)
LYMPHOCYTES NFR BLD AUTO: 15 % (ref 14–44)
MCH RBC QN AUTO: 32.8 PG (ref 26.8–34.3)
MCHC RBC AUTO-ENTMCNC: 30.2 G/DL (ref 31.4–37.4)
MCV RBC AUTO: 109 FL (ref 82–98)
MONOCYTES # BLD AUTO: 1.07 THOUSAND/ΜL (ref 0.17–1.22)
MONOCYTES NFR BLD AUTO: 12 % (ref 4–12)
NEUTROPHILS # BLD AUTO: 5.63 THOUSANDS/ΜL (ref 1.85–7.62)
NEUTS SEG NFR BLD AUTO: 63 % (ref 43–75)
NRBC BLD AUTO-RTO: 0 /100 WBCS
PLATELET # BLD AUTO: 146 THOUSANDS/UL (ref 149–390)
PMV BLD AUTO: 10.3 FL (ref 8.9–12.7)
POTASSIUM SERPL-SCNC: 4 MMOL/L (ref 3.5–5.3)
RBC # BLD AUTO: 1.95 MILLION/UL (ref 3.81–5.12)
SODIUM SERPL-SCNC: 134 MMOL/L (ref 136–145)
VANCOMYCIN SERPL-MCNC: 20.9 UG/ML
WBC # BLD AUTO: 8.99 THOUSAND/UL (ref 4.31–10.16)

## 2019-11-20 PROCEDURE — P9016 RBC LEUKOCYTES REDUCED: HCPCS

## 2019-11-20 PROCEDURE — 99232 SBSQ HOSP IP/OBS MODERATE 35: CPT | Performed by: INTERNAL MEDICINE

## 2019-11-20 PROCEDURE — 80202 ASSAY OF VANCOMYCIN: CPT | Performed by: INTERNAL MEDICINE

## 2019-11-20 PROCEDURE — 99222 1ST HOSP IP/OBS MODERATE 55: CPT | Performed by: INTERNAL MEDICINE

## 2019-11-20 PROCEDURE — 80048 BASIC METABOLIC PNL TOTAL CA: CPT | Performed by: INTERNAL MEDICINE

## 2019-11-20 PROCEDURE — 82948 REAGENT STRIP/BLOOD GLUCOSE: CPT

## 2019-11-20 PROCEDURE — 0DD58ZX EXTRACTION OF ESOPHAGUS, VIA NATURAL OR ARTIFICIAL OPENING ENDOSCOPIC, DIAGNOSTIC: ICD-10-PCS | Performed by: INTERNAL MEDICINE

## 2019-11-20 PROCEDURE — 86706 HEP B SURFACE ANTIBODY: CPT | Performed by: INTERNAL MEDICINE

## 2019-11-20 PROCEDURE — 86803 HEPATITIS C AB TEST: CPT | Performed by: INTERNAL MEDICINE

## 2019-11-20 PROCEDURE — 86704 HEP B CORE ANTIBODY TOTAL: CPT | Performed by: INTERNAL MEDICINE

## 2019-11-20 PROCEDURE — 87340 HEPATITIS B SURFACE AG IA: CPT | Performed by: INTERNAL MEDICINE

## 2019-11-20 PROCEDURE — 85025 COMPLETE CBC W/AUTO DIFF WBC: CPT | Performed by: INTERNAL MEDICINE

## 2019-11-20 PROCEDURE — 86705 HEP B CORE ANTIBODY IGM: CPT | Performed by: INTERNAL MEDICINE

## 2019-11-20 RX ORDER — MAGNESIUM HYDROXIDE/ALUMINUM HYDROXICE/SIMETHICONE 120; 1200; 1200 MG/30ML; MG/30ML; MG/30ML
15 SUSPENSION ORAL DAILY PRN
Status: DISCONTINUED | OUTPATIENT
Start: 2019-11-20 | End: 2019-11-22 | Stop reason: HOSPADM

## 2019-11-20 RX ADMIN — ALLOPURINOL 100 MG: 100 TABLET ORAL at 13:49

## 2019-11-20 RX ADMIN — MIDODRINE HYDROCHLORIDE 5 MG: 5 TABLET ORAL at 11:47

## 2019-11-20 RX ADMIN — INSULIN LISPRO 3 UNITS: 100 INJECTION, SOLUTION INTRAVENOUS; SUBCUTANEOUS at 16:32

## 2019-11-20 RX ADMIN — INSULIN LISPRO 2 UNITS: 100 INJECTION, SOLUTION INTRAVENOUS; SUBCUTANEOUS at 21:58

## 2019-11-20 RX ADMIN — AMIODARONE HYDROCHLORIDE 200 MG: 200 TABLET ORAL at 06:30

## 2019-11-20 RX ADMIN — LEVOTHYROXINE SODIUM 75 MCG: 75 TABLET ORAL at 05:36

## 2019-11-20 RX ADMIN — MIDODRINE HYDROCHLORIDE 5 MG: 5 TABLET ORAL at 16:31

## 2019-11-20 RX ADMIN — MIDODRINE HYDROCHLORIDE 5 MG: 5 TABLET ORAL at 06:25

## 2019-11-20 RX ADMIN — PANTOPRAZOLE SODIUM 40 MG: 40 TABLET, DELAYED RELEASE ORAL at 05:36

## 2019-11-20 RX ADMIN — AMIODARONE HYDROCHLORIDE 200 MG: 200 TABLET ORAL at 20:19

## 2019-11-20 RX ADMIN — FLUTICASONE FUROATE AND VILANTEROL TRIFENATATE 1 PUFF: 100; 25 POWDER RESPIRATORY (INHALATION) at 14:14

## 2019-11-20 RX ADMIN — ASPIRIN 81 MG 81 MG: 81 TABLET ORAL at 13:49

## 2019-11-20 RX ADMIN — FLUOXETINE 40 MG: 20 CAPSULE ORAL at 13:48

## 2019-11-20 RX ADMIN — FAMOTIDINE 20 MG: 20 TABLET ORAL at 13:48

## 2019-11-20 RX ADMIN — ACETAMINOPHEN 650 MG: 325 TABLET ORAL at 12:52

## 2019-11-20 RX ADMIN — CLOPIDOGREL BISULFATE 75 MG: 75 TABLET ORAL at 13:48

## 2019-11-20 RX ADMIN — INSULIN LISPRO 2 UNITS: 100 INJECTION, SOLUTION INTRAVENOUS; SUBCUTANEOUS at 14:15

## 2019-11-20 RX ADMIN — ATORVASTATIN CALCIUM 80 MG: 80 TABLET, FILM COATED ORAL at 16:31

## 2019-11-20 RX ADMIN — INSULIN LISPRO 2 UNITS: 100 INJECTION, SOLUTION INTRAVENOUS; SUBCUTANEOUS at 06:26

## 2019-11-20 RX ADMIN — INSULIN GLARGINE 38 UNITS: 100 INJECTION, SOLUTION SUBCUTANEOUS at 21:57

## 2019-11-20 RX ADMIN — GABAPENTIN 300 MG: 300 CAPSULE ORAL at 21:56

## 2019-11-20 RX ADMIN — AMIODARONE HYDROCHLORIDE 200 MG: 200 TABLET ORAL at 13:49

## 2019-11-20 RX ADMIN — CALCITRIOL 0.25 MCG: 0.25 CAPSULE, LIQUID FILLED ORAL at 13:48

## 2019-11-20 NOTE — ASSESSMENT & PLAN NOTE
· ID following- monitoring off antibiotics  · Source Pulmonary with aspiration vs R UE wound  · BP stable  · Follow cultures

## 2019-11-20 NOTE — OCCUPATIONAL THERAPY NOTE
Occupational Therapy Cancellation Note        Patient Name: Sukumar Neither  XCFGB'M Date: 11/20/2019      OT orders received, chart reviewed  Noted that pt's Hgb is 6 4 this AM and pt is now off the floor at dialysis  Will hold OT at this time and continue to follow and assess as medically appropriate and able      HAWA Wynn, OTR/L

## 2019-11-20 NOTE — PROGRESS NOTES
Assessment:  53-year-old morbidly obese female with type 1 diabetes complicated with end-stage renal disease  She is normally on [employer] 630 G insulin pump using Humalog  Plan:  1  Uncontrolled type 1 diabetes   Her A1c in July was 8 1%   Goal is 7%   BG reasonable in 170-200 range with lantus 38u qhs and Humalog 12 units t i d  A c  Will re-initiate pump with the listed settings at discharge  Basal:  9p-3a   1 1u/hr              3a-8a  1 5u/hr              8a-9p  1 7u/hr  CIR:     00F-7D   6g/u               5a-8p   5g/u                 8p-12a  6u/hr                 ISF:      12a-8a   40                  8a-10p  20                   10p-12a  40  TGB:    8p-8a     120-140         8a-8p  120-130   Follow-up as outpatient with Dr Gonzalez   Her pump settings are as follows:        2  Hypothyroidism   She is presently on levothyroxine 75 mcg q day  Advised outpatient follow-up  S:  Seen in dialysis  Anticipated EGD tomorrow for anemia  2 units of PRBC today  Eating and drinking well  O:  Patient seen and examined personally  BP (!) 109/26   Pulse 82   Temp 98 °F (36 7 °C) (Oral)   Resp 17   Ht 5' 5" (1 651 m)   Wt 113 kg (248 lb 10 9 oz)   SpO2 100%   BMI 41 38 kg/m²     Physical Exam   Constitutional: She is oriented to person, place, and time  Central obesity   HENT:   Head: Normocephalic  Mouth/Throat: Oropharynx is clear and moist    Eyes: Pupils are equal, round, and reactive to light  Neurological: She is alert and oriented to person, place, and time  Skin: There is pallor  Psychiatric: She has a normal mood and affect           Current Facility-Administered Medications:  acetaminophen 650 mg Oral Q6H PRN Jocelyn Daylin, CRNP   albuterol 2 puff Inhalation Q4H PRN Jocelyn Daylin, CRNP   allopurinol 100 mg Oral Daily Jocelyn Daylin, CRNP   amiodarone 200 mg Oral TID With Meals Kenton Freeman PA-C   aspirin 81 mg Oral Daily Makayla Murphy MD   atorvastatin 80 mg Oral Daily With [employer] Lazo, CARLOS   bisacodyl 5 mg Oral Daily PRN Darral Shane, CRKT   calcitriol 0 25 mcg Oral Once per day on Mon Wed Fri Vielka Rao, DO   clopidogrel 75 mg Oral Daily Darral Shane, CRNP   famotidine 20 mg Oral Daily Darral Shane, CRNP   FLUoxetine 40 mg Oral Daily Darral Shane, CRNP   fluticasone-vilanterol 1 puff Inhalation Daily Galloral Shane, CRNP   gabapentin 300 mg Oral HS CARLOS Reddy   insulin glargine 38 Units Subcutaneous HS Belén Pryor MD   insulin lispro 1-5 Units Subcutaneous HS Belén Pryor MD   insulin lispro 1-6 Units Subcutaneous TID AC Belén Pryor MD   insulin lispro 12 Units Subcutaneous TID With Meals Belén Pryor MD   levothyroxine 75 mcg Oral Early Morning Darral Shane, CARLOS   midodrine 5 mg Oral TID AC CARLOS Reddy   ondansetron 4 mg Intravenous Q6H PRN Hetul Bobby,    pantoprazole 40 mg Oral Early Morning Darral Shane, CRNP   phenol 1 spray Mouth/Throat Q4H PRN Galloral Shane, CRNP   polyethylene glycol 17 g Oral Daily Dwight Lynn, CARLOS   senna-docusate sodium 2 tablet Per NG Tube HS CARLOS Turk        Lab, Imaging and other studies:   POC Glucose (mg/dl)   Date Value   11/20/2019 195 (H)   11/19/2019 299 (H)   11/19/2019 455 (H)   11/19/2019 292 (H)   11/19/2019 177 (H)   11/18/2019 163 (H)   11/18/2019 198 (H)   11/18/2019 173 (H)   11/18/2019 118   11/18/2019 122       D/W :   Dispo:

## 2019-11-20 NOTE — PROGRESS NOTES
Progress Note Marleny Gram 1965, 48 y o  female MRN: [id number]    Unit/Bed#: [facility] 402-01 Encounter: [id number]    Primary Care Provider: Yuni Kuhn DO   Date and time admitted to hospital: 11/14/2019  7:03 PM        * Shock [facility])  Assessment & Plan  · Condition Improving  · Presented with multiple episodes of syncope and emesis most likely secondary to junctional bradycardia and hypotension  · Intubated in the ED on presentation  Extubated on 11/16  · Off Dopamine and Levophed  ·  Metoprolol 25 mg BID and Diltiazem 180 mg discontinued  · Continue Midodrine 5 mg TID  · Likely cardiogenic  · BP acceptable at present - monitor  · NSR at present   · Ct telemetry      Tachy-bette syndrome ([facility] [address] )  Assessment & Plan  · Presented with junctional bradycardia   · resolved with holding home medications Metoprolol and Diltiazem  · Then developed  rapid Afib and converted to NSR with IV Digoxin  · On oral Amiodarone as per EP- continue  · Per discussion with  Kristen Thomas on the phone her gastroenterologist at [facility] had advised her to be off anticoagulation indefinitely due to GI bleed   Follow CBC- if Hemoglobin drops, will consider GI consult  · Ct ASA    PAF (paroxysmal atrial fibrillation) (HCC)  Assessment & Plan  Continue amiodarone as per Cardiology/EP recommendation  Eliquis discontinued due to anemia/Bleeding  Monitor Hemoglobin-if drops, we will consider GI consult  Continue Amiodarone, Plavix- as per Cardio/EP    ESRD (end stage renal disease) on dialysis [facility])  Assessment & Plan  · HD on Mon/Wed/Fri  · Continue as per Nephrology recommendation    CAD with recent stent  Assessment & Plan  · S/p stent to LAD on 9/6/19  · Ct Plavix  · ASA had been held as outpatient due to GIB - restarted while in ICU - monitor Hb - if drops post transfusion hold ASA  · Ct statin  · No BB due to symptomatic bradycardia  · Treatment per cardiology  · Eliquis is discontinued due to anemia and possible GIB   Monitor H/H- if drops,   · GI consult    Bradycardia  Assessment & Plan  Most likely secondary to medications (was on metoprolol and diltiazem)- discontinued  Symptoms improved with discontinuation of the meds  EP consult appreciated  Since patient is on Hemodialysis for ESRD and have Bilateral AV fistula- both will put the patient in high risk for severe infection from PPM  For that reason, as per EP, medical management will be tried with Amiodarone      Anemia  Assessment & Plan  · Hemoglobin dropped  Will follow-up regarding records on prior scope on November 5th  · Monitor H/H  · Transfuse as needed  · Eliquis is on hold  · May need GI eval     LISSA (obstructive sleep apnea)  Assessment & Plan  · CPAP hs    Hyperphosphatemia  Assessment & Plan  Secondary to ESRD  Recent Phosphorus is 5 3- which is trending down    Sepsis [facility])  Assessment & Plan  Unclear source  Received 3 day of antibiotics-Discontinue by ID  Appreciate ID recommendations  Follow temperature/white count/culture results    Hypothyroid  Assessment & Plan  · Continue home levothyroxine 75 mcg daily  · TSH 6 4 on 11/14 with normal free T4      Asthma  Assessment & Plan  · stable  · Advair changed to equivalent Breo as Advair non-formulary    Morbid obesity ([address] )  Assessment & Plan  Encouraged therapeutic lifestyle changes    Hyponatremia  Assessment & Plan  Secondary to ESRD  Stable  Recent Sodium level is 134  Monitor    Uncontrolled type 1 diabetes mellitus [facility])  Assessment & Plan  Lab Results   Component Value Date    HGBA1C 8 1 (H) 07/21/2019       Recent Labs     11/19/19  1049 11/19/19  1557 11/19/19  2119 11/20/19  0606   POCGLU 292* 455* 299* 195*     Blood Sugar Average: Last 72 hrs:  (P) 168 1875     On insulin pump at home  Endocrinology consult appreciated- continue treatment as recommended        VTE Pharmacologic Prophylaxis:   Pharmacologic: Will hold given drop in hemoglobin    Patient previously on Eliquis  Mechanical VTE Prophylaxis in Place: No    Patient Centered Rounds: I have performed bedside rounds with nursing staff today  Time Spent for Care: 15 minutes  More than 50% of total time spent on counseling and coordination of care as described above  Current Length of Stay: 6 day(s)    Current Patient Status: Inpatient   Certification Statement: The patient will continue to require additional inpatient hospital stay due to Need to monitor symptoms        Code Status: Level 1 - Full Code      Subjective:   No acute distress    Objective:     Vitals:   Temp (24hrs), Av 1 °F (36 7 °C), Min:97 5 °F (36 4 °C), Max:98 3 °F (36 8 °C)    Temp:  [97 5 °F (36 4 °C)-98 3 °F (36 8 °C)] 98 2 °F (36 8 °C)  HR:  [71-83] 81  Resp:  [18-20] 20  BP: (127-158)/(55-67) 154/63  SpO2:  [96 %-100 %] 99 %  Body mass index is 41 38 kg/m²  Input and Output Summary (last 24 hours): Intake/Output Summary (Last 24 hours) at 2019 0839  Last data filed at 2019 2128  Gross per 24 hour   Intake 336 61 ml   Output 50 ml   Net 286 61 ml       Physical Exam:     Physical Exam   Constitutional: She is oriented to person, place, and time  HENT:   Head: Normocephalic and atraumatic  Cardiovascular: Exam reveals no friction rub  No murmur heard  Pulmonary/Chest: Effort normal and breath sounds normal  No respiratory distress  She has no wheezes  Neurological: She is alert and oriented to person, place, and time         Additional Data:     Labs:    Results from last 7 days   Lab Units 19  045   WBC Thousand/uL 8 99   HEMOGLOBIN g/dL 6 4*   HEMATOCRIT % 21 2*   PLATELETS Thousands/uL 146*   NEUTROS PCT % 63   LYMPHS PCT % 15   MONOS PCT % 12   EOS PCT % 9*     Results from last 7 days   Lab Units 19  0450  19  1918   POTASSIUM mmol/L 4 0   < > 5 2   < >  --    CHLORIDE mmol/L 95*   < > 92*   < >  --    CO2 mmol/L 27   < > 23   < >  --    CO2, I-STAT mmol/L  --   --   --   --  26   BUN mg/dL 37*   < > 106*   < > --    CREATININE mg/dL 5 33*   < > 7 79*   < >  --    CALCIUM mg/dL 8 9   < > 9 2   < >  --    ALK PHOS U/L  --   --  178*   < >  --    ALT U/L  --   --  88*   < >  --    AST U/L  --   --  115*   < >  --    GLUCOSE, ISTAT mg/dl  --   --   --   --  503*    < > = values in this interval not displayed  * I Have Reviewed All Lab Data Listed Above  * Additional Pertinent Lab Tests Reviewed: All Labs Within Last 24 Hours Reviewed        Recent Cultures (last 7 days):     Results from last 7 days   Lab Units 11/19/19  0801 11/16/19  1112 11/14/19  2347   BLOOD CULTURE  No Growth After 4 Days  --  No Growth After 5 Days     SPUTUM CULTURE   --  2+ Growth of Candida albicans*  2+ Growth of   --    GRAM STAIN RESULT   --  1+ Polys  No bacteria seen  --        Last 24 Hours Medication List:     Current Facility-Administered Medications:  acetaminophen 650 mg Oral Q6H PRN Vinie [place], CRNP   albuterol 2 puff Inhalation Q4H PRN Vinie [place], CRNP   allopurinol 100 mg Oral Daily Vinie [place], CRNP   amiodarone 200 mg Oral TID With Meals Kenton FriendDAVIN   aspirin 81 mg Oral Daily Marco Antonio Herrmann MD   atorvastatin 80 mg Oral Daily With [employer], CRNP   bisacodyl 5 mg Oral Daily PRN Vinie [place], CRNP   calcitriol 0 25 mcg Oral Once per day on Mon Wed Fri Vielka Rao DO   clopidogrel 75 mg Oral Daily Vinie [place], CRNP   famotidine 20 mg Oral Daily Vinie [place], CRNP   FLUoxetine 40 mg Oral Daily Vinie [place], CRNP   fluticasone-vilanterol 1 puff Inhalation Daily Shailesh Lazo, CARLOS   gabapentin 300 mg Oral HS CARLOS Reddy   insulin glargine 38 Units Subcutaneous HS Kaitlin Osborne MD   insulin lispro 1-5 Units Subcutaneous HS Kaitlin Osborne MD   insulin lispro 1-6 Units Subcutaneous TID AC Patito Cabello MD   insulin lispro 12 Units Subcutaneous TID With Meals Kaitlin Osborne MD   levothyroxine 75 mcg Oral Early Morning Simone Rodriguez CARLOS Lazo   midodrine 5 mg Oral TID AC CARLOS Reddy   ondansetron 4 mg Intravenous Q6H PRN Jaden Rosales DO   pantoprazole 40 mg Oral Early Morning CARLOS Westfall   phenol 1 spray Mouth/Throat Q4H PRN CARLOS Westfall   polyethylene glycol 17 g Oral Daily CARLOS Westfall   senna-docusate sodium 2 tablet Per NG Tube HS CARLOS Westfall        Today, Patient Was Seen By: Hattie Kim DO    ** Please Note: Dictation voice to text software may have been used in the creation of this document   **

## 2019-11-20 NOTE — ASSESSMENT & PLAN NOTE
· Pt with junctional bradycardia/afib with RVR and NSR during hospital   · Cardiology and EP following  · Continues on amiodarone 200mg TID  · Converted to NSR  · Hemodynamically stable throughout the day  · Home Metoprolol and Cardizem on hold

## 2019-11-20 NOTE — HEMODIALYSIS
Hemodialysis 4 hrs via [facility] with a BFR of 400  Patient received 2 UPRBC on treatment  Received midodrine  To keep BP elevated  Removed 3000 ml fluid     Pre wt 113 1 kg  Post wt 110 kg

## 2019-11-20 NOTE — CONSULTS
Nikki Vargas is a 48 y o  female who was receiving Vancomycin IV with management by the Pharmacy Consult service for treatment of other Shock possible septic etiology - risk for aspiration and right upper extremity wound infection  The patient's Vancomycin therapy has been completed / discontinued  Thank you for allowing us to take part in this patient's care  Pharmacy will sign-off now; please call or re-consult if there are any questions  Emma Newman, PharmD   [address] [facility]

## 2019-11-20 NOTE — PLAN OF CARE
Problem: Prexisting or High Potential for Compromised Skin Integrity  Goal: Skin integrity is maintained or improved  Description  INTERVENTIONS:  - Identify patients at risk for skin breakdown  - Assess and monitor skin integrity  - Assess and monitor nutrition and hydration status  - Monitor labs   - Assess for incontinence   - Turn and reposition patient  - Assist with mobility/ambulation  - Relieve pressure over bony prominences  - Avoid friction and shearing  - Provide appropriate hygiene as needed including keeping skin clean and dry  - Evaluate need for skin moisturizer/barrier cream  - Collaborate with interdisciplinary team   - Patient/family teaching  - Consider wound care consult   Outcome: Progressing     Problem: Potential for Falls  Goal: Patient will remain free of falls  Description  INTERVENTIONS:  - Assess patient frequently for physical needs  -  Identify cognitive and physical deficits and behaviors that affect risk of falls    -  [place] fall precautions as indicated by assessment   - Educate patient/family on patient safety including physical limitations  - Instruct patient to call for assistance with activity based on assessment  - Modify environment to reduce risk of injury  - Consider OT/PT consult to assist with strengthening/mobility  Outcome: Progressing     Problem: PAIN - ADULT  Goal: Verbalizes/displays adequate comfort level or baseline comfort level  Description  Interventions:  - Encourage patient to monitor pain and request assistance  - Assess pain using appropriate pain scale  - Administer analgesics based on type and severity of pain and evaluate response  - Implement non-pharmacological measures as appropriate and evaluate response  - Consider cultural and social influences on pain and pain management  - Notify physician/advanced practitioner if interventions unsuccessful or patient reports new pain  Outcome: Progressing     Problem: INFECTION - ADULT  Goal: Absence or prevention of progression during hospitalization  Description  INTERVENTIONS:  - Assess and monitor for signs and symptoms of infection  - Monitor lab/diagnostic results  - Monitor all insertion sites, i e  indwelling lines, tubes, and drains  - Monitor endotracheal if appropriate and nasal secretions for changes in amount and color  - Duarte appropriate cooling/warming therapies per order  - Administer medications as ordered  - Instruct and encourage patient and family to use good hand hygiene technique  - Identify and instruct in appropriate isolation precautions for identified infection/condition  Outcome: Progressing     Problem: DISCHARGE PLANNING  Goal: Discharge to home or other facility with appropriate resources  Description  INTERVENTIONS:  - Identify barriers to discharge w/patient and caregiver  - Arrange for needed discharge resources and transportation as appropriate  - Identify discharge learning needs (meds, wound care, etc )  - Arrange for interpretive services to assist at discharge as needed  - Refer to Case Management Department for coordinating discharge planning if the patient needs post-hospital services based on physician/advanced practitioner order or complex needs related to functional status, cognitive ability, or social support system  Outcome: Progressing     Problem: Knowledge Deficit  Goal: Patient/family/caregiver demonstrates understanding of disease process, treatment plan, medications, and discharge instructions  Description  Complete learning assessment and assess knowledge base    Interventions:  - Provide teaching at level of understanding  - Provide teaching via preferred learning methods  Outcome: Progressing     Problem: SAFETY,RESTRAINT: NV/NON-SELF DESTRUCTIVE BEHAVIOR  Goal: Remains free of harm/injury (restraint for non violent/non self-detsructive behavior)  Description  INTERVENTIONS:  - Instruct patient/family regarding restraint use   - Assess and monitor physiologic and psychological status   - Provide interventions and comfort measures to meet assessed patient needs   - Identify and implement measures to help patient regain control  - Assess readiness for release of restraint   Outcome: Progressing  Goal: Returns to optimal restraint-free functioning  Description  INTERVENTIONS:  - Assess the patient's behavior and symptoms that indicate continued need for restraint  - Identify and implement measures to help patient regain control  - Assess readiness for release of restraint   Outcome: Progressing     Problem: COPING  Goal: Pt/Family able to verbalize concerns and demonstrate effective coping strategies  Description  INTERVENTIONS:  - Assist patient/family to identify coping skills, available support systems and cultural and spiritual values  - Provide emotional support, including active listening and acknowledgement of concerns of patient and caregivers  - Reduce environmental stimuli, as able  - Provide patient education  - Assess for spiritual pain/suffering and initiate spiritual care, including notification of Pastoral Care or jimmie based community as needed  - Assess effectiveness of coping strategies  Outcome: Progressing  Goal: Will report anxiety at manageable levels  Description  INTERVENTIONS:  - Administer medication as ordered  - Teach and encourage coping skills  - Provide emotional support  - Assess patient/family for anxiety and ability to cope  Outcome: Progressing     Problem: SELF HARM  Goal: Effect of psychiatric condition will be minimized and patient will be protected from self harm  Description  INTERVENTIONS:  - Assess impact of patient's symptoms on level of functioning, self-care needs and offer support as indicated  - Assess patient/family knowledge of depression, impact on illness and need for teaching  - Provide emotional support, presence and reassurance  - Assess for possible suicidal thoughts, ideation or self-harm   If patient expresses suicidal thoughts or statements do not leave alone, notify physician/AP immediately, initiate suicide precautions, and determine need for continual observation    - initiate consults and referrals as appropriate (a mental health professional, Spiritual Care  Outcome: Progressing     Problem: CARDIOVASCULAR - ADULT  Goal: Maintains optimal cardiac output and hemodynamic stability  Description  INTERVENTIONS:  - Monitor I/O, vital signs and rhythm  - Monitor for S/S and trends of decreased cardiac output  - Administer and titrate ordered vasoactive medications to optimize hemodynamic stability  - Assess quality of pulses, skin color and temperature  - Assess for signs of decreased coronary artery perfusion  - Instruct patient to report change in severity of symptoms  Outcome: Progressing  Goal: Absence of cardiac dysrhythmias or at baseline rhythm  Description  INTERVENTIONS:  - Continuous cardiac monitoring, vital signs, obtain 12 lead EKG if ordered  - Administer antiarrhythmic and heart rate control medications as ordered  - Monitor electrolytes and administer replacement therapy as ordered  Outcome: Progressing     Problem: RESPIRATORY - ADULT  Goal: Achieves optimal ventilation and oxygenation  Description  INTERVENTIONS:  - Assess for changes in respiratory status  - Assess for changes in mentation and behavior  - Position to facilitate oxygenation and minimize respiratory effort  - Oxygen administered by appropriate delivery if ordered  - Initiate smoking cessation education as indicated  - Encourage broncho-pulmonary hygiene including cough, deep breathe, Incentive Spirometry  - Assess the need for suctioning and aspirate as needed  - Assess and instruct to report SOB or any respiratory difficulty  - Respiratory Therapy support as indicated  Outcome: Progressing     Problem: Nutrition/Hydration-ADULT  Goal: Nutrient/Hydration intake appropriate for improving, restoring or maintaining nutritional needs  Description  Monitor and assess patient's nutrition/hydration status for malnutrition  Collaborate with interdisciplinary team and initiate plan and interventions as ordered  Monitor patient's weight and dietary intake as ordered or per policy  Utilize nutrition screening tool and intervene as necessary  Determine patient's food preferences and provide high-protein, high-caloric foods as appropriate       INTERVENTIONS:  - Monitor oral intake, urinary output, labs, and treatment plans  - Assess nutrition and hydration status and recommend course of action  - Evaluate amount of meals eaten  - Assist patient with eating if necessary   - Allow adequate time for meals  - Recommend/ encourage appropriate diets, oral nutritional supplements, and vitamin/mineral supplements  - Order, calculate, and assess calorie counts as needed  - Recommend, monitor, and adjust tube feedings and TPN/PPN based on assessed needs  - Assess need for intravenous fluids  - Provide specific nutrition/hydration education as appropriate  - Include patient/family/caregiver in decisions related to nutrition  Outcome: Progressing     Problem: METABOLIC, FLUID AND ELECTROLYTES - ADULT  Goal: Electrolytes maintained within normal limits  Description  INTERVENTIONS:  - Monitor labs and assess patient for signs and symptoms of electrolyte imbalances  - Administer electrolyte replacement as ordered  - Monitor response to electrolyte replacements, including repeat lab results as appropriate  - Instruct patient on fluid and nutrition as appropriate  Outcome: Progressing  Goal: Fluid balance maintained  Description  INTERVENTIONS:  - Monitor labs   - Monitor I/O and WT  - Instruct patient on fluid and nutrition as appropriate  - Assess for signs & symptoms of volume excess or deficit  Outcome: Progressing  Goal: Glucose maintained within target range  Description  INTERVENTIONS:  - Monitor Blood Glucose as ordered  - Assess for signs and symptoms of hyperglycemia and hypoglycemia  - Administer ordered medications to maintain glucose within target range  - Assess nutritional intake and initiate nutrition service referral as needed  Outcome: Progressing

## 2019-11-20 NOTE — PROGRESS NOTES
Procedure Note - Nephrology   Marily Score 48 y o  female MRN: [id number]  Unit/Bed#: [facility] 402-01 Encounter: [id number]    Procedure note-hemodialysis(85100)  Assessment / Plan:  1  ESRD on HD MWF at [address] [place] [address] - patient seen examined by me on hemodialysis today at approximately 10:00 a m  2  Access - LUE AVG well functioning  3  Symptomatic bradycardia-improved  4  Shock-resolved, off pressors  5  Hyperphosphatemia-continue Renagel 3 tablets with meals, monitor phos  6  Secondary hyperparathyroidism of renal origin-continue calcitriol 0 25 mcg 3 times weekly  7  Anemia of CKD-receiving blood today, Hgb down to 6 4  8  Hypotension - on midodrine, ? D/t anemia today  Subjective:   She feels dizzy  She is receiving blood on dialysis  Patient seen examined by me on hemodialysis approximately 10:00 a m     Blood pressure 108/32, UF goal 2 5 L, blood flow 400 mL/min, dialysate flow 600 mL/min  Patient tolerating dialysis well  Objective:     Vitals: Blood pressure (!) 108/32, pulse 84, temperature 98 6 °F (37 °C), resp  rate 16, height 5' 5" (1 651 m), weight 113 kg (248 lb 10 9 oz), SpO2 99 %  ,Body mass index is 41 38 kg/m²  Temp (24hrs), Av 2 °F (36 8 °C), Min:97 5 °F (36 4 °C), Max:98 6 °F (37 °C)      Weight (last 2 days)     Date/Time   Weight    19 0600   113 (248 68)    19 0600   115 (254 19)                Intake/Output Summary (Last 24 hours) at 2019 1008  Last data filed at 2019 0830  Gross per 24 hour   Intake 536 61 ml   Output 50 ml   Net 486 61 ml     I/O last 24 hours: In: 536 6 [P O :225; I V :311 6]  Out: 50 [Urine:50]        Physical Exam:   Physical Exam   Constitutional: She appears well-developed and well-nourished  No distress  obese   HENT:   Head: Normocephalic and atraumatic  Mouth/Throat: No oropharyngeal exudate  Eyes: Right eye exhibits no discharge  Left eye exhibits no discharge  No scleral icterus     Neck: Normal range of motion  Neck supple  No thyromegaly present  Cardiovascular: Normal rate and regular rhythm  No murmur heard  Pulmonary/Chest: Effort normal and breath sounds normal  No respiratory distress  She has no wheezes  Abdominal: Soft  Bowel sounds are normal  She exhibits no distension  Musculoskeletal: She exhibits edema (trace b/l LE)  Neurological: She is alert  She exhibits normal muscle tone  awake   Skin: Skin is warm and dry  No rash noted  She is not diaphoretic  Psychiatric: She has a normal mood and affect  Her behavior is normal    Nursing note and vitals reviewed        Invasive Devices     Central Venous Catheter Line            CVC Central Lines 11/18/19 Double Right Internal jugular 1 day          Line            Hemodialysis AV Fistula Left Upper arm -- days    Hemodialysis AV Fistula Right Upper arm -- days                Medications:    Scheduled Meds:  Current Facility-Administered Medications:  acetaminophen 650 mg Oral Q6H PRN Lilia Cart, CRNP   albuterol 2 puff Inhalation Q4H PRN Lilia Cart, CRNP   allopurinol 100 mg Oral Daily Lilia Cart, CRNP   amiodarone 200 mg Oral TID With Meals Kenton Freeman PA-C   aspirin 81 mg Oral Daily Marco Antonio Sanchez MD   atorvastatin 80 mg Oral Daily With [employer], CRNP   bisacodyl 5 mg Oral Daily PRN Lilia Cart, CRNP   calcitriol 0 25 mcg Oral Once per day on Mon Wed Fri Vielka Rao, DO   clopidogrel 75 mg Oral Daily Lliia Cart, CRNP   famotidine 20 mg Oral Daily Lilia Cart, CRNP   FLUoxetine 40 mg Oral Daily Lilia Cart, CRNP   fluticasone-vilanterol 1 puff Inhalation Daily CARLOS Reddy   gabapentin 300 mg Oral HS CARLOS Reddy   insulin glargine 38 Units Subcutaneous HS Patito Cabello MD   insulin lispro 1-5 Units Subcutaneous HS Ade Piedra MD   insulin lispro 1-6 Units Subcutaneous TID AC Patito Cabello MD   insulin lispro 12 Units Subcutaneous TID With Meals Mendel Ferris, MD   levothyroxine 75 mcg Oral Early Morning CARLOS Torres   midodrine 5 mg Oral TID AC CARLOS Reddy   ondansetron 4 mg Intravenous Q6H PRN Jaden Rosales DO   pantoprazole 40 mg Oral Early Morning CARLOS Torres   phenol 1 spray Mouth/Throat Q4H PRN CARLOS Torres   polyethylene glycol 17 g Oral Daily CARLOS Torres   senna-docusate sodium 2 tablet Per NG Tube HS CARLOS Torres       PRN Meds:   acetaminophen    albuterol    bisacodyl    ondansetron    phenol    Continuous Infusions:         LAB RESULTS:      Results from last 7 days   Lab Units 11/20/19  0457 11/19/19  0448 11/18/19  2102 11/18/19  1345 11/17/19  0431 11/16/19  0450 11/16/19  0449 11/15/19  2253 11/15/19  0506 11/14/19 2116 11/14/19  1921  11/14/19  1918 11/14/19  1917   WBC Thousand/uL 8 99 8 31  --  13 19* 13 41*  --  14 02*  --  15 30*  --  12 82*  --   --   --    HEMOGLOBIN g/dL 6 4* 8 1* 7 1* 6 3* 6 4*  --  7 2*  --  7 4*  --  7 5*   < >  --   --    I STAT HEMOGLOBIN g/dl  --   --   --   --   --   --   --   --   --   --   --   --  8 2* 8 2*   HEMATOCRIT % 21 2* 26 2*  --  20 3* 20 8*  --  22 4*  --  23 5*  --  24 9*  --   --   --    HEMATOCRIT, ISTAT %  --   --   --   --   --   --   --   --   --   --   --   --  24* 24*   PLATELETS Thousands/uL 146* 144*  --  192 162  --  174  --  168 215 228   < >  --   --    NEUTROS PCT % 63  --   --   --   --   --   --   --  84*  --  62  --   --   --    LYMPHS PCT % 15  --   --   --   --   --   --   --  9*  --  25  --   --   --    MONOS PCT % 12  --   --   --   --   --   --   --  5  --  9  --   --   --    EOS PCT % 9*  --   --   --   --   --   --   --  0  --  3  --   --   --    POTASSIUM mmol/L 4 0 3 9  --  5 4* 4 4 5 2  --  5 0 4 6  --  5 2   < >  --   --    CHLORIDE mmol/L 95* 95*  --  95* 96* 92*  --  93* 93*  --  90*   < >  --   --    CO2 mmol/L 27 28  --  22 26 23  --  25 23  --  23   < >  --   --    CO2, I-STAT mmol/L  --   --   --   --   --   --   --   --   --   --   --   --  26 25   BUN mg/dL 37* 25  --  73* 51* 106*  --  105* 94*  --  86*   < >  --   --    CREATININE mg/dL 5 33* 3 62*  --  6 98* 4 87* 7 79*  --  7 41* 6 56*  --  6 58*   < >  --   --    CALCIUM mg/dL 8 9 8 7  --  9 0 8 9 9 2  --  9 2 8 8  --  9 4   < >  --   --    ALK PHOS U/L  --   --   --   --   --  178*  --   --  203*  --  202*  --   --   --    ALT U/L  --   --   --   --   --  88*  --   --  73  --  16  --   --   --    AST U/L  --   --   --   --   --  115*  --   --  138*  --  10  --   --   --    GLUCOSE, ISTAT mg/dl  --   --   --   --   --   --   --   --   --   --   --   --  503* 500*   MAGNESIUM mg/dL  --   --   --  2 4  --  2 3  --  2 3 2 3  --   --   --   --   --    PHOSPHORUS mg/dL  --  5 3*  --  8 8* 6 5* 7 7*  --  6 5* 5 3*  --   --   --   --   --     < > = values in this interval not displayed  CUTURES:  Lab Results   Component Value Date    BLOODCX No Growth After 4 Days  11/19/2019    BLOODCX No Growth After 5 Days  11/14/2019    BLOODCX No Growth After 5 Days  09/08/2019    BLOODCX No Growth After 5 Days  09/08/2019                 Portions of the record may have been created with voice recognition software  Occasional wrong word or "sound a like" substitutions may have occurred due to the inherent limitations of voice recognition software  Read the chart carefully and recognize, using context, where substitutions have occurred  If you have any questions, please contact the dictating provider

## 2019-11-20 NOTE — PROGRESS NOTES
[facility] Progress Note - Chikis Carvalho 1965, 48 y o  female MRN: [id number]    Unit/Bed#: [facility] 402-01 Encounter: [id number]    Primary Care Provider: Faith Moreland DO   Date and time admitted to hospital: 11/14/2019  7:03 PM        PAF (paroxysmal atrial fibrillation) ([facility] [address] )  Assessment & Plan  · Pt with junctional bradycardia/afib with RVR and NSR during hospital   · Cardiology and EP following  · Continues on amiodarone 200mg TID  · Converted to NSR  · Hemodynamically stable throughout the day  · Home Metoprolol and Cardizem on hold    Anemia  Assessment & Plan  · Pt with history of GIB 9/2019 at [facility]  Currently on Eliquis for PAF, and plavix for recent LAD stent at [facility]  · Hgb stable today with no need for transfusion  · Follow daily hgb, transfuse for goal hgb >7 0  · Eliquis has been d/c'd by cardiology  · If continuing downtrend in H/H, would recommend engaging GI    Sepsis ([facility] )  Assessment & Plan  · ID following- monitoring off antibiotics  · Source Pulmonary with aspiration vs R UE wound  · BP stable  · Follow cultures    * Shock ([facility] )  Assessment & Plan  · Overall improved- remained HDS over past 24hrs  · Continues on midodrine 5mg TID        ----------------------------------------------------------------------------------------  HPI/24hr events: Patient received 1u pRBC yesterday with stabilization of Hgb today  Converted to NSR and maintained on amiodarone  Overall has remained HDS without issue  Currently being followed by endocrinology, nephrology, cardiology, EP  Disposition: Patient with great improvement overnight and throughout day  Critical care will follow, but likely downgrade and sign-off tomorrow  Code Status: Level 1 - Full Code  ---------------------------------------------------------------------------------------  SUBJECTIVE  Patient reports she is feeling improved from yesterday   Admits to some continued SOB with exertion and occasional nausea, but otherwise minimal symptoms  Review of Systems  Review of systems was reviewed and negative unless stated above in HPI/24-hour events   ---------------------------------------------------------------------------------------  OBJECTIVE    Physical Exam   Constitutional: She is oriented to person, place, and time  She appears well-developed and well-nourished  HENT:   Head: Normocephalic and atraumatic  Eyes: Pupils are equal, round, and reactive to light  Conjunctivae and EOM are normal    Neck: Normal range of motion  Neck supple  Cardiovascular: Normal rate, regular rhythm, normal heart sounds and intact distal pulses  Exam reveals no friction rub  No murmur heard  Pulmonary/Chest: Effort normal  She has no wheezes  She has no rales  2L NC  Diminished breath sounds throughout   Abdominal: Soft  Bowel sounds are normal  She exhibits no distension  There is no tenderness  There is no guarding  Musculoskeletal: Normal range of motion  She exhibits no edema  Neurological: She is alert and oriented to person, place, and time  Skin: Skin is warm and dry  Capillary refill takes less than 2 seconds  Vitals   Vitals:    19 0646 19 0800 19 1044 19 1529   BP: 121/55 120/59 130/66 139/62   BP Location: Right leg Right leg Right leg Right leg   Pulse: 82  82 83   Resp:    Temp: 97 6 °F (36 4 °C)  98 3 °F (36 8 °C) 98 2 °F (36 8 °C)   TempSrc: Oral  Oral Oral   SpO2: 100%  96% 99%   Weight:       Height:         Temp (24hrs), Av °F (36 7 °C), Min:97 6 °F (36 4 °C), Max:98 3 °F (36 8 °C)  Current: Temperature: 98 2 °F (36 8 °C)      Height and Weights   Height: 5' 5" (165 1 cm)  IBW: 57 kg  Body mass index is 41 38 kg/m²  Weight (last 2 days)     Date/Time   Weight    19 0600   113 (248 68)    19 0600   115 (254 19)    19 0551   115 (254 19)                Intake and Output  I/O       701 -  0700 701 -  0700    P  O  220 225    I V  (mL/kg) 90 (8)     Blood 350     Other 100     IV Piggyback 150     Total Intake(mL/kg) 1729 (15 3) 225 (2)    Urine (mL/kg/hr) 0 (0) 50 (0)    Other 1442     Stool 0     Total Output 1442 50    Net +287 +175          Unmeasured Stool Occurrence 1 x           Nutrition       Diet Orders   (From admission, onward)             Start     Ordered    11/17/19 1017  Diet Renal; Renal [place]; No; No  Diet effective now     Question Answer Comment   Diet Type Renal    Renal Renal [place]    Should patient have a fluid restriction? No    Should patient have a protein modifier? No    RD to adjust diet per protocol?  Yes        11/17/19 1016                Laboratory and Diagnostics:  Results from last 7 days   Lab Units 11/19/19  0448 11/18/19  2102 11/18/19  1345 11/17/19  0431 11/16/19  0449 11/15/19  0506 11/14/19  2116 11/14/19  1921 11/14/19  1918   WBC Thousand/uL 8 31  --  13 19* 13 41* 14 02* 15 30*  --  12 82*  --    HEMOGLOBIN g/dL 8 1* 7 1* 6 3* 6 4* 7 2* 7 4*  --  7 5*  --    I STAT HEMOGLOBIN g/dl  --   --   --   --   --   --   --   --  8 2*   HEMATOCRIT % 26 2*  --  20 3* 20 8* 22 4* 23 5*  --  24 9*  --    HEMATOCRIT, ISTAT %  --   --   --   --   --   --   --   --  24*   PLATELETS Thousands/uL 144*  --  192 162 174 168 215 228  --    NEUTROS PCT %  --   --   --   --   --  84*  --  62  --    MONOS PCT %  --   --   --   --   --  5  --  9  --      Results from last 7 days   Lab Units 11/19/19  0448 11/18/19  1345 11/18/19  0102 11/17/19  0431 11/16/19  0450 11/15/19  2253 11/15/19  0506 11/14/19  1921   SODIUM mmol/L 134* 134*  --  135* 133* 134* 132* 129*   POTASSIUM mmol/L 3 9 5 4*  --  4 4 5 2 5 0 4 6 5 2   CHLORIDE mmol/L 95* 95*  --  96* 92* 93* 93* 90*   CO2 mmol/L 28 22  --  26 23 25 23 23   ANION GAP mmol/L 11 17*  --  13 18* 16* 16* 16*   BUN mg/dL 25 73*  --  51* 106* 105* 94* 86*   CREATININE mg/dL 3 62* 6 98*  --  4 87* 7 79* 7 41* 6 56* 6 58*   CALCIUM mg/dL 8 7 9 0  --  8 9 9 2 9 2 8 8 9 4   GLUCOSE RANDOM mg/dL 160* 192* 70 105 129 104 402* 511*   ALT U/L  --   --   --   --  88*  --  73 16   AST U/L  --   --   --   --  115*  --  138* 10   ALK PHOS U/L  --   --   --   --  178*  --  203* 202*   ALBUMIN g/dL  --   --   --   --  2 3*  --  2 3* 2 4*   TOTAL BILIRUBIN mg/dL  --   --   --   --  0 67  --  0 79 0 42     Results from last 7 days   Lab Units 11/19/19  0448 11/18/19  1345 11/17/19  0431 11/16/19  0450 11/15/19  2253 11/15/19  0506   MAGNESIUM mg/dL  --  2 4  --  2 3 2 3 2 3   PHOSPHORUS mg/dL 5 3* 8 8* 6 5* 7 7* 6 5* 5 3*           Results from last 7 days   Lab Units 11/14/19  2348 11/14/19  2116 11/14/19  1921   TROPONIN I ng/mL <0 02 <0 02 <0 02     Results from last 7 days   Lab Units 11/15/19  0755   LACTIC ACID mmol/L 2 3*     ABG:  Results from last 7 days   Lab Units 11/15/19  0343   PH ART  7 353   PCO2 ART mm Hg 41 8   PO2 ART mm Hg 99 5   HCO3 ART mmol/L 22 7   BASE EXC ART mmol/L -2 7   ABG SOURCE  Line, Arterial     VBG:  Results from last 7 days   Lab Units 11/15/19  0343 11/14/19  1921   PH CHERRY   --  7 348   PCO2 CHERRY mm Hg  --  41 2*   PO2 CHERRY mm Hg  --  43 6   HCO3 CHERRY mmol/L  --  22 1*   BASE EXC CHERRY mmol/L  --  -3 3   ABG SOURCE  Line, Arterial  --      Results from last 7 days   Lab Units 11/18/19  1345 11/16/19  0450 11/15/19  0755   PROCALCITONIN ng/ml 3 03* 3 90* 1 13*       Micro  Results from last 7 days   Lab Units 11/19/19  0801 11/16/19  1112 11/14/19  2347   BLOOD CULTURE  No Growth After 4 Days  --  No Growth After 4 Days  SPUTUM CULTURE   --  2+ Growth of Candida albicans*  2+ Growth of   --    GRAM STAIN RESULT   --  1+ Polys  No bacteria seen  --        Imaging: I have personally reviewed pertinent reports     and I have personally reviewed pertinent films in PACS    Active Medications  Scheduled Meds:  Current Facility-Administered Medications:  acetaminophen 650 mg Oral Q6H PRN CARLOS Westfall    albuterol 2 puff Inhalation Q4H PRN CARLOS Westfall allopurinol 100 mg Oral Daily [facility], CRNP    amiodarone 200 mg Oral TID With Meals [facility] [place]    [START ON 11/20/2019] aspirin 81 mg Oral Daily Marco Antonio Schneider MD    atorvastatin 80 mg Oral Daily With [employer], CRNP    bisacodyl 5 mg Oral Daily PRN [facility], CRNP    [START ON 11/20/2019] calcitriol 0 25 mcg Oral Once per day on Mon Wed Fri Vielka Rao,     clopidogrel 75 mg Oral Daily [facility], CRKT    famotidine 20 mg Oral Daily [facility], CRNP    FLUoxetine 40 mg Oral Daily [facility], CRKT    fluticasone-vilanterol 1 puff Inhalation Daily [facility], CARLOS    gabapentin 300 mg Oral HS Shailesh Lazo, CARLOS    insulin glargine 38 Units Subcutaneous HS Shnanen Valdez MD    insulin lispro 1-5 Units Subcutaneous HS Shannen Valdez MD    insulin lispro 1-6 Units Subcutaneous TID AC Shannen Valdez MD    insulin lispro 12 Units Subcutaneous TID With Meals Shannen Valdez MD    levothyroxine 75 mcg Oral Early Morning [facility], CARLOS    midodrine 5 mg Oral TID AC CARLOS Reddy    ondansetron 4 mg Intravenous Q6H PRN Jaden Rosales,     pantoprazole 40 mg Oral Early Morning [facility], CRNP    phenol 1 spray Mouth/Throat Q4H PRN [facility], CRKT    polyethylene glycol 17 g Oral Daily [facility], CARLOS    senna-docusate sodium 2 tablet Per NG Tube [facility], CRNP    vancomycin 10 mg/kg (Adjusted) Intravenous After Dialysis Brandie Barros PA-C Last Rate: Stopped (11/18/19 1750)     Continuous Infusions:     PRN Meds:     acetaminophen 650 mg Q6H PRN   albuterol 2 puff Q4H PRN   bisacodyl 5 mg Daily PRN   ondansetron 4 mg Q6H PRN   phenol 1 spray Q4H PRN   vancomycin 10 mg/kg (Adjusted) After Dialysis     ---------------------------------------------------------------------------------------  Counseling / Coordination of Care  Total time spent today 29 minutes   Greater than 50% of total time was spent with the patient and / or family counseling and / or coordination of care  A description of the counseling / coordination of care: physical, ROS, documentation      Leopoldo Sheffield PA-C        Portions of the record may have been created with voice recognition software  Occasional wrong word or "sound a like" substitutions may have occurred due to the inherent limitations of voice recognition software    Read the chart carefully and recognize, using context, where substitutions have occurred

## 2019-11-20 NOTE — ASSESSMENT & PLAN NOTE
· Pt with history of GIB 9/2019 at [facility]  Currently on Eliquis for PAF, and plavix for recent LAD stent at [facility]  · Hgb stable today with no need for transfusion  · Continue to trend H/H  · If remains stable consider restart Eliquis  · If continuing downtrend in H/H, would recommend engaging GI

## 2019-11-20 NOTE — ANESTHESIA PREPROCEDURE EVALUATION
Review of Systems/Medical History  Patient summary reviewed  Chart reviewed  No history of anesthetic complications     Cardiovascular  Hyperlipidemia, CAD , CAD status: 1VD, Cardiac stents (HEATHER x1 to LAD 9/6/19) drug eluting Dysrhythmias (symptomatic junctional bradycardia in setting of BB/CCB this admission) , atrial fibrillation, CHF ,    Pulmonary  Pneumonia (recent PNA), Asthma , seasonal/exercise induced , Sleep apnea CPAP,        GI/Hepatic    GI bleeding (setting of anticoagulation) , active, GERD ,        Kidney disease ESRD, Dialysis hemodialysis Date of last dialysis: 11/20/19,        Endo/Other  Diabetes poorly controlled type 1 , History of thyroid disease , hypothyroidism,      GYN       Hematology  Anemia (s/p 2 U pRBC with dialysis yesterday for Hgb 6 4) acute blood loss anemia,     Musculoskeletal       Neurology    No CVA , Headaches,    Psychology   Psychiatric history,          AM Glucose: 396 --> received 6 units Humalog after    EKG 11/16/19:  Atrial flutter 117, non-specific ST changes    EKG 11/15/19:  JR 62, premature JCs    TTE 11/15/19:  LEFT VENTRICLE:  Systolic function was normal by visual assessment  Ejection fraction was estimated to be 65 %    There were no regional wall motion abnormalities      RIGHT VENTRICLE:  The size was normal   Systolic function was normal     Lab Results   Component Value Date    WBC 8 99 11/20/2019    HGB 6 4 (LL) 11/20/2019    HCT 21 2 (L) 11/20/2019     (H) 11/20/2019     (L) 11/20/2019     Lab Results   Component Value Date    SODIUM 134 (L) 11/20/2019    K 4 0 11/20/2019    CL 95 (L) 11/20/2019    CO2 27 11/20/2019    BUN 37 (H) 11/20/2019    CREATININE 5 33 (H) 11/20/2019    GLUC 200 (H) 11/20/2019    CALCIUM 8 9 11/20/2019     Lab Results   Component Value Date    INR 1 05 09/08/2019    INR 0 97 08/13/2018    PROTIME 13 3 09/08/2019    PROTIME 12 6 08/13/2018     Lab Results   Component Value Date    HGBA1C 8 1 (H) 07/21/2019 Physical Exam    Airway    Mallampati score: II  TM Distance: >3 FB  Neck ROM: full     Dental       Cardiovascular  Cardiovascular exam normal    Pulmonary  Pulmonary exam normal     Other Findings        Anesthesia Plan  ASA Score- 4     Anesthesia Type- IV sedation with anesthesia with ASA Monitors  Additional Monitors:   Airway Plan:         Plan Factors-    Induction- intravenous  Postoperative Plan-     Informed Consent- Anesthetic plan and risks discussed with patient  I personally reviewed this patient with the CRNA  Discussed and agreed on the Anesthesia Plan with the CRNA  Delisa Garcia

## 2019-11-20 NOTE — ASSESSMENT & PLAN NOTE
· Pt with history of GIB 9/2019 at [facility]  Currently on Eliquis for PAF, and plavix for recent LAD stent at [facility]  · Hgb stable today with no need for transfusion  · Follow daily hgb, transfuse for goal hgb >7 0  · Eliquis has been d/c'd by cardiology  · If continuing downtrend in H/H, would recommend engaging GI

## 2019-11-20 NOTE — PHYSICAL THERAPY NOTE
Physical Therapy Cancellation Note      Chart reviewed; noted pt's Hgb is 6 4 this AM; will hold PT assessment at this time; will follow as clinical course allows      Vijaya Mcfarland, PT

## 2019-11-20 NOTE — PROGRESS NOTES
Progress Note - Infectious Disease   Shailesh Gamez 48 y o  female MRN: [id number]  Unit/Bed#: [facility] 402-01 Encounter: [id number]      IMPRESSION & RECOMMENDATIONS:   Impression/Recommendations:  1  Shock   More likely cardiogenic   Also consider septic shock although no evidence of acute infection identified   Procalcitonin level was elevated, although less reliable in an ESRD patient   Consideration for development of pneumonia based on chest x-ray findings, recent vomiting and possible aspiration   Sputum culture only showed Candida suggestive of colonization   Consideration for infection at right upper extremity AV fistula site as there is dehiscence noted but no overt evidence of infection   Blood cultures remain negative  As no clear evidence of acute bacterial infection identified, will continue to observe closely off antibiotics     -continue to observe closely off antibiotics  -monitor temperatures and hemodynamics     2  Acute respiratory failure   Patient was intubated due to concern for airway protection   Also consideration for aspiration based on recent emesis, abnormal chest x-ray   Sputum culture showed Candida albicans consistent with colonization   Low clinical suspicion for pneumonia   Patient has received 4 days of empiric IV antibiotics  She continues to remain clinically stable after discontinuation antibiotics     -continue to observe off anymore antibiotics  -monitor respiratory symptoms     3  Wound dehiscence   Noted at right upper extremity AV fistula site   No overt evidence of infection  Blood cultures all remain negative  Site remains stable      -serial exams  -daily local wound care and dressing changes     4  ESRD on HD   Currently receiving HD via left upper extremity AV graft       5  Diabetes mellitus type 1, uncontrolled   Risk factor for development of severe infection      6  Junctional Bradycardia   Off dopamine drip    Cardiology/EP follow-up ongoing      7  Acute on chronic anemia  GI evaluation noted with plan for EGD with push enteroscopy      Antibiotics:   Off antibiotic 2     No ongoing acute ID issues  We will sign off  Please call us with any new questions  Subjective:  Drop in hemoglobin down to 6 4  No fevers, chills sweats nausea, diarrhea  No melena  Objective:  Vitals:  Temp:  [97 5 °F (36 4 °C)-98 6 °F (37 °C)] 98 °F (36 7 °C)  HR:  [71-88] 82  Resp:  [14-20] 17  BP: (103-158)/(26-67) 109/26  SpO2:  [98 %-100 %] 100 %  Temp (24hrs), Av 3 °F (36 8 °C), Min:97 5 °F (36 4 °C), Max:98 6 °F (37 °C)  Current: Temperature: 98 °F (36 7 °C)    Physical Exam:   General:  Awake, alert  Eyes:  Conjunctive clear with no hemorrhages or effusions  Oropharynx:  No ulcers, no lesions  Neck:  Supple, no lymphadenopathy  Lungs:  Decreased  Cardiac:  Regular rate and rhythm, no murmurs  Abdomen:  Soft, non-tender, non-distented  Extremities:  Stable  Skin:  No rashes, no ulcers, mild stable dehiscence at right upper extremity fistula  No periwound erythema, active drainage  Lab Results:  I have personally reviewed pertinent labs    Results from last 7 days   Lab Units 19  0457 19  0448 19  1345  19  0450  11/15/19  0506 19  1921  19  1918 19  1917   POTASSIUM mmol/L 4 0 3 9 5 4*   < > 5 2   < > 4 6 5 2   < >  --   --    CHLORIDE mmol/L 95* 95* 95*   < > 92*   < > 93* 90*   < >  --   --    CO2 mmol/L 27 28 22   < > 23   < > 23 23   < >  --   --    CO2, I-STAT mmol/L  --   --   --   --   --   --   --   --   --  26 25   BUN mg/dL 37* 25 73*   < > 106*   < > 94* 86*   < >  --   --    CREATININE mg/dL 5 33* 3 62* 6 98*   < > 7 79*   < > 6 56* 6 58*   < >  --   --    EGFR ml/min/1 73sq m 9 14 6   < > 5   < > 7 7   < >  --  6   GLUCOSE, ISTAT mg/dl  --   --   --   --   --   --   --   --   --  503* 500*   CALCIUM mg/dL 8 9 8 7 9 0   < > 9 2   < > 8 8 9 4   < >  --   --    AST U/L  --   --   --   --  115*  --  138* 10  --   --   --    ALT U/L  --   --   --   --  88*  --  73 16  --   --   --    ALK PHOS U/L  --   --   --   --  178*  --  203* 202*  --   --   --     < > = values in this interval not displayed  Results from last 7 days   Lab Units 11/20/19  0457 11/19/19  0448 11/18/19  2102 11/18/19  1345   WBC Thousand/uL 8 99 8 31  --  13 19*   HEMOGLOBIN g/dL 6 4* 8 1* 7 1* 6 3*   PLATELETS Thousands/uL 146* 144*  --  192     Results from last 7 days   Lab Units 11/19/19  0801 11/16/19  1112 11/14/19  2347   BLOOD CULTURE  No Growth After 4 Days  --  No Growth After 5 Days  SPUTUM CULTURE   --  2+ Growth of Candida albicans*  2+ Growth of   --    GRAM STAIN RESULT   --  1+ Polys  No bacteria seen  --        Imaging Studies:   I have personally reviewed pertinent imaging study reports and images in PACS  EKG, Pathology, and Other Studies:   I have personally reviewed pertinent reports

## 2019-11-20 NOTE — PLAN OF CARE
Chronic hemodialysis treatment ordered for 4 hours  Using 3 mEq/L dialysate solution for serum potassium 4 this morning  Fluid goal 2500 ml to end at  5 kg  Treatment plan discussed with Dr Delmy Valenzuela at bedside

## 2019-11-20 NOTE — ASSESSMENT & PLAN NOTE
Lab Results   Component Value Date    HGBA1C 8 1 (H) 07/21/2019       Recent Labs     11/19/19  1049 11/19/19  1557 11/19/19  2119 11/20/19  0606   POCGLU 292* 455* 299* 195*     Blood Sugar Average: Last 72 hrs:  (P) [phone]     On insulin pump at home  Endocrinology consult appreciated- continue treatment as recommended

## 2019-11-20 NOTE — CONSULTS
Consultation - [facility]  Marily Score 48 y o  female MRN: [id number]  Unit/Bed#: [facility] 402-01 Encounter: [id number]              Inpatient consult to gastroenterology     Performed by  Jn Johns MD     Authorized by Del England DO              Reason for Consult / Principal Problem:     Anemia      ASSESSMENT AND PLAN:      Anemia  43-year-old female patient with end-stage renal disease on hemodialysis and chronic anemia  Anemia is macrocytic normochromic, recent iron panel showed low iron but high ferritin and normal TIBC probably a component of anemia of chronic disease  Her hemoglobin today 6 4 from 8 1 yesterday  Patient has been requiring transfusions during this admission, she was transfused 1 unit 11/18 and she is scheduled to get transfusion today during dialysis  Fecal occult blood test was performed yesterday and was negative, patient describes stool as dark brown, denies coffee-ground emesis , hematemesis, melena or hematochezia  Recent push enteroscopy at [facility] on 11/05, she was found to have 2 possible AVMs that were nonbleeding but treated with APC  Will perform EGD with push enteroscopy tomorrow      ______________________________________________________________________    HPI:    80-year-old female patient with past medical history significant for diabetes, hypothyroid CKD with end-stage renal disease on hemodialysis, CAD status post stenting on antiplatelet medication  The patient was initially admitted to hospital after presenting with syncope and change in mental status requiring admission to the ICU  After she improved the patient was transferred to the floor, while on the floor she was noticed to have worsening anemia and GI was consulted due to concern for GI bleeding    Patient states he had a recent admission to [facility] 2 weeks ago, during that admission she had enteroscopy the and she was found with 2 potential AVMs in the small bowel that were nonbleeding were treated prophylactically  Patient denies any prior history of coffee-ground emesis, hematemesis, melena, hematochezia or bright red blood per rectum  She is currently tolerating the p  o  route, denies nausea or vomiting, no abdominal pain, last bowel movement was yesterday and was described as dark brown    REVIEW OF SYSTEMS:    CONSTITUTIONAL: Denies any fever, chills, rigors, and weight loss  HEENT: No earache or tinnitus  Denies hearing loss or visual disturbances  CARDIOVASCULAR: No chest pain or palpitations  RESPIRATORY: Denies any cough, hemoptysis, shortness of breath or dyspnea on exertion  GASTROINTESTINAL: As noted in the History of Present Illness  GENITOURINARY: No problems with urination  Denies any hematuria or dysuria  NEUROLOGIC: No dizziness or vertigo, denies headaches  MUSCULOSKELETAL: Denies any muscle or joint pain  SKIN: Denies skin rashes or itching  ENDOCRINE: Denies excessive thirst  Denies intolerance to heat or cold  PSYCHOSOCIAL: Denies depression or anxiety  Denies any recent memory loss         Historical Information   Past Medical History:   Diagnosis Date    Asthma     Cardiac disease     CHF    [facility]     CPAP (continuous positive airway pressure) dependence     [facility]     type 1    [facility] patient [facility] )     Disease of thyroid gland     hypothyroidism    GERD (gastroesophageal reflux disease)     Hyperlipidemia     Hypertension     Migraine     Psychiatric disorder     PTSD, Depression, panic attacks    Renal disorder     stage 4 kidney disease     Past Surgical History:   Procedure Laterality Date    CATARACT EXTRACTION, BILATERAL       SECTION      CHOLECYSTECTOMY      HERNIA REPAIR      IR CENTRAL LINE PLACEMENT  2019    RETINOPATHY SURGERY      TONSILECTOMY AND ADNOIDECTOMY      TONSILLECTOMY      TYMPANOSTOMY TUBE PLACEMENT       Social History   Social History     Substance and Sexual Activity   Alcohol Use Not Currently    Binge frequency: Never     Social History     Substance and Sexual Activity   Drug Use No     Social History     Tobacco Use   Smoking Status Never Smoker   Smokeless Tobacco Never Used     Family History   Problem Relation Age of Onset    Pancreatic cancer Mother     Stroke Father        Meds/Allergies     Medications Prior to Admission   Medication    acetaminophen (TYLENOL) 325 mg tablet    albuterol (PROVENTIL HFA,VENTOLIN HFA) 90 mcg/act inhaler    albuterol (PROVENTIL HFA,VENTOLIN HFA) 90 mcg/act inhaler    allopurinol (ZYLOPRIM) 100 mg tablet    apixaban (ELIQUIS) 2 5 mg    Ascorbic Acid (VITAMIN C) 1000 MG tablet    aspirin 81 mg chewable tablet    atorvastatin (LIPITOR) 40 mg tablet    b complex vitamins capsule    bisacodyl (DULCOLAX) 5 mg EC tablet    calcitriol (ROCALTROL) 0 5 MCG capsule    cholecalciferol (VITAMIN D3) 1,000 units tablet    clopidogrel (PLAVIX) 75 mg tablet    diltiazem (CARDIZEM CD) 180 mg 24 hr capsule    diphenhydrAMINE (BENADRYL) 25 mg tablet    famotidine (PEPCID) 20 mg tablet    Ferric Citrate (AURYXIA) 1  MG(Fe) TABS    FLUoxetine (PROzac) 40 MG capsule    fluticasone (FLONASE) 50 mcg/act nasal spray    fluticasone-salmeterol (ADVAIR DISKUS, WIXELA INHUB) 100-50 mcg/dose inhaler    gabapentin (NEURONTIN) 300 mg capsule    insulin lispro (HumaLOG) 100 units/mL    levothyroxine 75 mcg tablet    loratadine (CLARITIN) 10 mg tablet    metoprolol tartrate (LOPRESSOR) 25 mg tablet    omeprazole (PriLOSEC) 20 mg delayed release capsule    ondansetron (ZOFRAN) 4 mg tablet    PATIENT MAINTAINED INSULIN PUMP    sevelamer carbonate (RENVELA) 800 mg tablet     Current Facility-Administered Medications   Medication Dose Route Frequency    acetaminophen (TYLENOL) tablet 650 mg  650 mg Oral Q6H PRN    albuterol (PROVENTIL HFA,VENTOLIN HFA) inhaler 2 puff  2 puff Inhalation Q4H PRN    allopurinol (ZYLOPRIM) tablet 100 mg  100 mg Oral Daily    amiodarone tablet 200 mg  200 mg Oral TID With Meals    aspirin chewable tablet 81 mg  81 mg Oral Daily    atorvastatin (LIPITOR) tablet 80 mg  80 mg Oral Daily With Dinner    bisacodyl (DULCOLAX) EC tablet 5 mg  5 mg Oral Daily PRN    calcitriol (ROCALTROL) capsule 0 25 mcg  0 25 mcg Oral Once per day on Mon Wed Fri    clopidogrel (PLAVIX) tablet 75 mg  75 mg Oral Daily    famotidine (PEPCID) tablet 20 mg  20 mg Oral Daily    FLUoxetine (PROzac) capsule 40 mg  40 mg Oral Daily    fluticasone-vilanterol (BREO ELLIPTA) 100-25 mcg/inh inhaler 1 puff  1 puff Inhalation Daily    gabapentin (NEURONTIN) capsule 300 mg  300 mg Oral HS    insulin glargine (LANTUS) subcutaneous injection 38 Units 0 38 mL  38 Units Subcutaneous HS    insulin lispro (HumaLOG) 100 units/mL subcutaneous injection 1-5 Units  1-5 Units Subcutaneous HS    insulin lispro (HumaLOG) 100 units/mL subcutaneous injection 1-6 Units  1-6 Units Subcutaneous TID AC    insulin lispro (HumaLOG) 100 units/mL subcutaneous injection 12 Units  12 Units Subcutaneous TID With Meals    levothyroxine tablet 75 mcg  75 mcg Oral Early Morning    midodrine (PROAMATINE) tablet 5 mg  5 mg Oral TID AC    ondansetron (ZOFRAN) injection 4 mg  4 mg Intravenous Q6H PRN    pantoprazole (PROTONIX) EC tablet 40 mg  40 mg Oral Early Morning    phenol (CHLORASEPTIC) 1 4 % mucosal liquid 1 spray  1 spray Mouth/Throat Q4H PRN    polyethylene glycol (MIRALAX) packet 17 g  17 g Oral Daily    senna-docusate sodium (SENOKOT S) 8 6-50 mg per tablet 2 tablet  2 tablet Per NG Tube HS       Allergies   Allergen Reactions    Codeine Edema    Shellfish Allergy Edema    Hydrocodone Itching and Vomiting    Morphine Itching and Vomiting    Oxycodone Itching and Vomiting           Objective     Blood pressure (!) 103/28, pulse 84, temperature 98 5 °F (36 9 °C), temperature source Oral, resp  rate 18, height 5' 5" (1 651 m), weight 113 kg (248 lb 10 9 oz), SpO2 99 %  Body mass index is 41 38 kg/m²  Intake/Output Summary (Last 24 hours) at 11/20/2019 7671  Last data filed at 11/20/2019 0830  Gross per 24 hour   Intake 536 61 ml   Output 50 ml   Net 486 61 ml         PHYSICAL EXAM:      General Appearance:   Alert, cooperative, no distress, she is obese   HEENT:   Normocephalic, atraumatic, anicteric  Neck:  Supple, symmetrical, trachea midline   Lungs:   Clear to auscultation bilaterally; no rales, rhonchi or wheezing; respirations unlabored    Heart[de-identified]   Regular rate and rhythm; no murmur, rub, or gallop  Abdomen:   Soft, non-tender, non-distended; normal bowel sounds; no masses, no organomegaly    Genitalia:   Deferred    Rectal:   Deferred    Extremities:  No cyanosis, clubbing or edema    Skin:  No jaundice, rashes, or lesions    Lymph nodes:  No palpable cervical lymphadenopathy          Lab Results:   No results displayed because visit has over 200 results  Imaging Studies: I have personally reviewed pertinent imaging studies

## 2019-11-20 NOTE — ASSESSMENT & PLAN NOTE
· Hemoglobin dropped  Will follow-up regarding records on prior scope on November 5th    · Monitor H/H  · Transfuse as needed  · Eliquis is on hold  · May need GI eval

## 2019-11-21 ENCOUNTER — ANESTHESIA (INPATIENT)
Dept: GASTROENTEROLOGY | Facility: HOSPITAL | Age: 54
DRG: 871 | End: 2019-11-21
Payer: COMMERCIAL

## 2019-11-21 ENCOUNTER — APPOINTMENT (INPATIENT)
Dept: GASTROENTEROLOGY | Facility: HOSPITAL | Age: 54
DRG: 871 | End: 2019-11-21
Payer: COMMERCIAL

## 2019-11-21 LAB
ABO GROUP BLD BPU: NORMAL
ABO GROUP BLD BPU: NORMAL
BASOPHILS # BLD AUTO: 0.04 THOUSANDS/ΜL (ref 0–0.1)
BASOPHILS NFR BLD AUTO: 1 % (ref 0–1)
BPU ID: NORMAL
BPU ID: NORMAL
CROSSMATCH: NORMAL
CROSSMATCH: NORMAL
EOSINOPHIL # BLD AUTO: 0.46 THOUSAND/ΜL (ref 0–0.61)
EOSINOPHIL NFR BLD AUTO: 7 % (ref 0–6)
ERYTHROCYTE [DISTWIDTH] IN BLOOD BY AUTOMATED COUNT: 19.9 % (ref 11.6–15.1)
GLUCOSE SERPL-MCNC: 105 MG/DL (ref 65–140)
GLUCOSE SERPL-MCNC: 176 MG/DL (ref 65–140)
GLUCOSE SERPL-MCNC: 199 MG/DL (ref 65–140)
GLUCOSE SERPL-MCNC: 258 MG/DL (ref 65–140)
GLUCOSE SERPL-MCNC: 304 MG/DL (ref 65–140)
GLUCOSE SERPL-MCNC: 395 MG/DL (ref 65–140)
GLUCOSE SERPL-MCNC: 396 MG/DL (ref 65–140)
HCT VFR BLD AUTO: 47.2 % (ref 34.8–46.1)
HGB BLD-MCNC: 15 G/DL (ref 11.5–15.4)
IMM GRANULOCYTES # BLD AUTO: 0.01 THOUSAND/UL (ref 0–0.2)
IMM GRANULOCYTES NFR BLD AUTO: 0 % (ref 0–2)
LYMPHOCYTES # BLD AUTO: 1.06 THOUSANDS/ΜL (ref 0.6–4.47)
LYMPHOCYTES NFR BLD AUTO: 17 % (ref 14–44)
MCH RBC QN AUTO: 31.9 PG (ref 26.8–34.3)
MCHC RBC AUTO-ENTMCNC: 31.8 G/DL (ref 31.4–37.4)
MCV RBC AUTO: 100 FL (ref 82–98)
MONOCYTES # BLD AUTO: 0.69 THOUSAND/ΜL (ref 0.17–1.22)
MONOCYTES NFR BLD AUTO: 11 % (ref 4–12)
NEUTROPHILS # BLD AUTO: 3.96 THOUSANDS/ΜL (ref 1.85–7.62)
NEUTS SEG NFR BLD AUTO: 64 % (ref 43–75)
NRBC BLD AUTO-RTO: 0 /100 WBCS
PLATELET # BLD AUTO: 91 THOUSANDS/UL (ref 149–390)
PMV BLD AUTO: 9.9 FL (ref 8.9–12.7)
RBC # BLD AUTO: 4.7 MILLION/UL (ref 3.81–5.12)
UNIT DISPENSE STATUS: NORMAL
UNIT DISPENSE STATUS: NORMAL
UNIT PRODUCT CODE: NORMAL
UNIT PRODUCT CODE: NORMAL
UNIT RH: NORMAL
UNIT RH: NORMAL
WBC # BLD AUTO: 6.22 THOUSAND/UL (ref 4.31–10.16)

## 2019-11-21 PROCEDURE — G8987 SELF CARE CURRENT STATUS: HCPCS

## 2019-11-21 PROCEDURE — G8988 SELF CARE GOAL STATUS: HCPCS

## 2019-11-21 PROCEDURE — 88305 TISSUE EXAM BY PATHOLOGIST: CPT | Performed by: PATHOLOGY

## 2019-11-21 PROCEDURE — G8978 MOBILITY CURRENT STATUS: HCPCS

## 2019-11-21 PROCEDURE — G8980 MOBILITY D/C STATUS: HCPCS

## 2019-11-21 PROCEDURE — 99232 SBSQ HOSP IP/OBS MODERATE 35: CPT | Performed by: INTERNAL MEDICINE

## 2019-11-21 PROCEDURE — G8979 MOBILITY GOAL STATUS: HCPCS

## 2019-11-21 PROCEDURE — 88112 CYTOPATH CELL ENHANCE TECH: CPT | Performed by: PATHOLOGY

## 2019-11-21 PROCEDURE — 94660 CPAP INITIATION&MGMT: CPT

## 2019-11-21 PROCEDURE — G8989 SELF CARE D/C STATUS: HCPCS

## 2019-11-21 PROCEDURE — 82948 REAGENT STRIP/BLOOD GLUCOSE: CPT

## 2019-11-21 PROCEDURE — 97162 PT EVAL MOD COMPLEX 30 MIN: CPT

## 2019-11-21 PROCEDURE — 94760 N-INVAS EAR/PLS OXIMETRY 1: CPT

## 2019-11-21 PROCEDURE — 97166 OT EVAL MOD COMPLEX 45 MIN: CPT

## 2019-11-21 PROCEDURE — 85025 COMPLETE CBC W/AUTO DIFF WBC: CPT | Performed by: INTERNAL MEDICINE

## 2019-11-21 RX ORDER — LIDOCAINE HYDROCHLORIDE 5 MG/ML
INJECTION, SOLUTION INFILTRATION; PERINEURAL AS NEEDED
Status: DISCONTINUED | OUTPATIENT
Start: 2019-11-21 | End: 2019-11-21 | Stop reason: SURG

## 2019-11-21 RX ORDER — FLUCONAZOLE 200 MG/1
400 TABLET ORAL ONCE
Status: COMPLETED | OUTPATIENT
Start: 2019-11-21 | End: 2019-11-21

## 2019-11-21 RX ORDER — PROPOFOL 10 MG/ML
INJECTION, EMULSION INTRAVENOUS AS NEEDED
Status: DISCONTINUED | OUTPATIENT
Start: 2019-11-21 | End: 2019-11-21 | Stop reason: SURG

## 2019-11-21 RX ORDER — FLUCONAZOLE 200 MG/1
200 TABLET ORAL DAILY
Status: DISCONTINUED | OUTPATIENT
Start: 2019-11-22 | End: 2019-11-22 | Stop reason: HOSPADM

## 2019-11-21 RX ORDER — SODIUM CHLORIDE 9 MG/ML
INJECTION, SOLUTION INTRAVENOUS CONTINUOUS PRN
Status: DISCONTINUED | OUTPATIENT
Start: 2019-11-21 | End: 2019-11-21 | Stop reason: SURG

## 2019-11-21 RX ORDER — PROPOFOL 10 MG/ML
INJECTION, EMULSION INTRAVENOUS CONTINUOUS PRN
Status: DISCONTINUED | OUTPATIENT
Start: 2019-11-21 | End: 2019-11-21 | Stop reason: SURG

## 2019-11-21 RX ORDER — EPHEDRINE SULFATE 50 MG/ML
INJECTION INTRAVENOUS AS NEEDED
Status: DISCONTINUED | OUTPATIENT
Start: 2019-11-21 | End: 2019-11-21 | Stop reason: SURG

## 2019-11-21 RX ADMIN — Medication 1 SPRAY: at 16:30

## 2019-11-21 RX ADMIN — EPHEDRINE SULFATE 5 MG: 50 INJECTION, SOLUTION INTRAVENOUS at 11:56

## 2019-11-21 RX ADMIN — ASPIRIN 81 MG 81 MG: 81 TABLET ORAL at 08:25

## 2019-11-21 RX ADMIN — FLUOXETINE 40 MG: 20 CAPSULE ORAL at 08:25

## 2019-11-21 RX ADMIN — ALLOPURINOL 100 MG: 100 TABLET ORAL at 08:25

## 2019-11-21 RX ADMIN — INSULIN LISPRO 6 UNITS: 100 INJECTION, SOLUTION INTRAVENOUS; SUBCUTANEOUS at 16:29

## 2019-11-21 RX ADMIN — AMIODARONE HYDROCHLORIDE 200 MG: 200 TABLET ORAL at 15:40

## 2019-11-21 RX ADMIN — LIDOCAINE HYDROCHLORIDE 10 ML: 5 INJECTION, SOLUTION INFILTRATION; PERINEURAL at 11:34

## 2019-11-21 RX ADMIN — FLUTICASONE FUROATE AND VILANTEROL TRIFENATATE 1 PUFF: 100; 25 POWDER RESPIRATORY (INHALATION) at 13:13

## 2019-11-21 RX ADMIN — FAMOTIDINE 20 MG: 20 TABLET ORAL at 08:25

## 2019-11-21 RX ADMIN — SODIUM CHLORIDE: 0.9 INJECTION, SOLUTION INTRAVENOUS at 11:31

## 2019-11-21 RX ADMIN — AMIODARONE HYDROCHLORIDE 200 MG: 200 TABLET ORAL at 13:13

## 2019-11-21 RX ADMIN — PROPOFOL 50 MG: 10 INJECTION, EMULSION INTRAVENOUS at 11:34

## 2019-11-21 RX ADMIN — ALUMINUM HYDROXIDE, MAGNESIUM HYDROXIDE, AND SIMETHICONE 15 ML: 200; 200; 20 SUSPENSION ORAL at 01:54

## 2019-11-21 RX ADMIN — PROPOFOL 120 MCG/KG/MIN: 10 INJECTION, EMULSION INTRAVENOUS at 11:34

## 2019-11-21 RX ADMIN — EPHEDRINE SULFATE 5 MG: 50 INJECTION, SOLUTION INTRAVENOUS at 11:51

## 2019-11-21 RX ADMIN — PANTOPRAZOLE SODIUM 40 MG: 40 TABLET, DELAYED RELEASE ORAL at 06:31

## 2019-11-21 RX ADMIN — EPHEDRINE SULFATE 5 MG: 50 INJECTION, SOLUTION INTRAVENOUS at 11:41

## 2019-11-21 RX ADMIN — LEVOTHYROXINE SODIUM 75 MCG: 75 TABLET ORAL at 06:31

## 2019-11-21 RX ADMIN — MIDODRINE HYDROCHLORIDE 5 MG: 5 TABLET ORAL at 13:13

## 2019-11-21 RX ADMIN — CLOPIDOGREL BISULFATE 75 MG: 75 TABLET ORAL at 08:25

## 2019-11-21 RX ADMIN — ONDANSETRON 4 MG: 2 INJECTION INTRAMUSCULAR; INTRAVENOUS at 13:42

## 2019-11-21 RX ADMIN — MIDODRINE HYDROCHLORIDE 5 MG: 5 TABLET ORAL at 15:40

## 2019-11-21 RX ADMIN — INSULIN LISPRO 2 UNITS: 100 INJECTION, SOLUTION INTRAVENOUS; SUBCUTANEOUS at 21:44

## 2019-11-21 RX ADMIN — AMIODARONE HYDROCHLORIDE 200 MG: 200 TABLET ORAL at 06:31

## 2019-11-21 RX ADMIN — INSULIN GLARGINE 38 UNITS: 100 INJECTION, SOLUTION SUBCUTANEOUS at 21:45

## 2019-11-21 RX ADMIN — GABAPENTIN 300 MG: 300 CAPSULE ORAL at 21:44

## 2019-11-21 RX ADMIN — INSULIN LISPRO 6 UNITS: 100 INJECTION, SOLUTION INTRAVENOUS; SUBCUTANEOUS at 06:31

## 2019-11-21 RX ADMIN — FLUCONAZOLE 400 MG: 200 TABLET ORAL at 16:23

## 2019-11-21 RX ADMIN — ATORVASTATIN CALCIUM 80 MG: 80 TABLET, FILM COATED ORAL at 15:40

## 2019-11-21 NOTE — ANESTHESIA POSTPROCEDURE EVALUATION
Post-Op Assessment Note    CV Status:  Stable  Pain Score: 0    Pain management: adequate     Mental Status:  Alert and awake   Hydration Status:  Euvolemic   PONV Controlled:  Controlled   Airway Patency:  Patent   Post Op Vitals Reviewed: Yes      Staff: CRNA           /63 (11/21/19 1206)    Temp      Pulse 89 (11/21/19 1206)   Resp 16 (11/21/19 1206)    SpO2 99 % (11/21/19 1206)

## 2019-11-21 NOTE — PROGRESS NOTES
Spoke to Dr Lorna Dickinson about BP of 191/77  Ordered to hold midodrine dosage, however, patient doesn't receive next dose until 11/22 at 0730  Did not order any antihypertensive medications at this time because patient has been hypotensive  Will continue to monitor BP and will notify Dr  If not resolving

## 2019-11-21 NOTE — SOCIAL WORK
Noted pt medium risk for readmission  Care now and Round the clock access to care sheets provided  Has PCP

## 2019-11-21 NOTE — PHYSICAL THERAPY NOTE
Physical Therapy Evaluation     [facility]    Admitting Diagnosis  Respiratory failure ([place] [address] ) [J96 90]  Bradycardia [R00 1]  Syncope [R55]  Vomiting [R11 10]  Hypothyroid [E03 9]    Problem List  Patient Active Problem List   Diagnosis    Uncontrolled type 1 diabetes mellitus ([place] [address] )    ESRD (end stage renal disease) on dialysis ([place] [address] )    [facility]ox[facility]    Thrombus    Morbid obesity ([place] [address] )    Neuropathy    Hyperlipidemia    Asthma    CAD with recent stent    Hypoglycemia    Hypothyroid    Sepsis ([place] [address] )    Hypothermia    Bradycardia    Hyperphosphatemia    Shock ([place] [address] )    Anemia    Secondary hyperparathyroidism of renal origin ([place] [address] )    Tachy-betet syndrome ([place] [address] )    LISSA (obstructive sleep apnea)       Past Medical History  Past Medical History:   Diagnosis Date    Asthma     Cardiac disease     CHF    CHF [facility]congestive heart failure) ([facility]     CPAP (continuous positive airway pressure) dependence     Diabetes mellitus ([place] [address] )     type 1    Dialysis patient ([place] [address] )     Disease of thyroid gland     hypothyroidism    GERD (gastroesophageal reflux disease)     Hyperlipidemia     Hypertension     Migraine     Psychiatric disorder     PTSD, Depression, panic attacks    Renal disorder     stage 4 kidney disease       Past Surgical History  Past Surgical History:   Procedure Laterality Date    CATARACT EXTRACTION, BILATERAL       SECTION      CHOLECYSTECTOMY      HERNIA REPAIR      IR CENTRAL LINE PLACEMENT  2019    RETINOPATHY SURGERY      TONSILECTOMY AND ADNOIDECTOMY      TONSILLECTOMY      TYMPANOSTOMY TUBE PLACEMENT              19 3214   Note Type   Note type Eval only   Pain Assessment   Pain Assessment No/denies pain   Home Living   Type of Home House   Home Layout Two level; Able to live on main level with bedroom/bathroom; Ramped entrance  (1st floor set-up)   Home Equipment Walker;Cane;Wheelchair-manual  [facility]; chair lift) Prior Function   Level of [place] Modified independent with wheelchair  (limited ambulation at home (trial w/ PT); w/c level mainly)   Lives With Spouse   Receives Help From Family  (pt was receiving home PT)   Restrictions/Precautions   Braces or Orthoses Other (Comment)  ([place] brace (R) ankle; (B) shoes on)   Other Precautions Fall Risk;Limb alert  (wears (L) ankle soft brace and knee brace for comfort PRN)   General   Additional Pertinent History Cleared for assessment (spoke to nsg)   Cognition   Overall Cognitive Status [facility]   Arousal/Participation Alert   Attention Attends with cues to redirect   Orientation Level Oriented to person;Oriented to place;Oriented to situation   Memory Decreased recall of recent events   Following Commands Follows one step commands without difficulty   Comments Pt is in the chair; reclined; responds to questions appropriately; pleasant and cooperative; agreeable to demonstrate mobility skills; reports she has been amb to the BR on her own w/ rw and also into the hallway w/ staff; pt did not wear her ankle brace at that time; agreeable to apply the brace and shoe now prior to mobilization; pt's (L) LE ankle and knee braces are currently not available but pt reports she can walk w/o them  RUE Assessment   RUE Assessment WFL  (AROM)   LUE Assessment   LUE Assessment WFL  (AROM)   RLE Assessment   RLE Assessment WFL  (AROM)   Strength RLE   RLE Overall Strength   (fair (grossly))   LLE Assessment   LLE Assessment WFL  (AROM)   Strength LLE   LLE Overall Strength   (fair (grossly))   Transfers   Sit to Stand 6  Modified independent   Stand to Sit 6  Modified independent   Ambulation/Elevation   Gait pattern Excessively slow; Short stride; Inconsistent terri   Gait Assistance 6  Modified independent   Additional items Verbal cues   Assistive Device Rolling walker   Distance 12 ft total distance; further amb was not performed due to pt expressive apprehension about her (L) knee stability reporting she needs her brace;   Balance   Static Sitting Good   Static Standing Fair +   Ambulatory Fair   Activity Tolerance   Activity Tolerance Patient tolerated treatment well   Medical Staff Made Aware spoke to Dr Walker Cruz spoke to Zeb Presaud RN   Assessment   Assessment Pt is 48 y o  female admitted with hx of multiple episodes of syncope and emesis at home and Dx of bradycardia, hypotension, sepsis, and shock  Pt 's comorbidities affecting POC include: ESRD on HD, DM1, CAD with stent placement, CHF, A fib, PTSD, HTN and hx of (L) knee (fem condyle) and (R) foot fx (per prior admission's pt report) in March 2019 and personal factors of: mainly uses w/c at home for mobility and wears a brace on (R) foot/ankle (applied prior to mobility) as well as knee and ankle braces on (L) LE (currently not here)  Pt's clinical presentation is currently evolving which is evident in ongoing telem monitoring and abn lab values  Pt presents w/ min generalized weakness, min deconditioning, and inconsistent gait patterns w/ use of rw (limited household distance as pt reports decreased (L) knee control but no overt uncorrected LOB, gross knee buckling, or excessive swaying observed) and w/ overall observed mobility status on level surfaces appearing to be at or near premorbid level; based on above, anticipate pt will return home w/ family support upon D/C from PT/mobility stand point and when medically cleared; no other immediate PT needs otherwise identified while in the hospital; home PT follow up is recommended to assess mobility in pt's own environment and to cont progressing w/ functional ambulation as appropriate (pt reports she is now allowed to ambulate and was working on amb w/ home PT); will otherwise sign off; pt informed/ concurred     Goals   Patient Goals to return home   Plan   Treatment/Interventions Spoke to nursing;Spoke to case management;OT;Spoke to MD   Recommendation   Recommendation Home PT; Home with family support;D/C PT   Modified [place] Scale   Modified [place] Scale 2   Barthel Index   Feeding 10   Bathing 0   Grooming Score 5   Dressing Score 5   Bladder Score 10   Bowels Score 10   Toilet Use Score 10   Transfers (Bed/Chair) Score 15   Mobility (Level Surface) Score 0   Stairs Score 0   Barthel Index Score 65       Nelly Arzate, PT

## 2019-11-21 NOTE — PROGRESS NOTES
Progress Note Ace Alanis 1965, 48 y o  female MRN: [id number]    Unit/Bed#: [facility] 402-01 Encounter: [id number]    Primary Care Provider: Vera Marcano DO   Date and time admitted to hospital: 11/14/2019  7:03 PM        * Shock [facility])  Assessment & Plan  · Condition Improving  · Presented with multiple episodes of syncope and emesis most likely secondary to junctional bradycardia and hypotension  · Intubated in the ED on presentation  Extubated on 11/16  · Off Dopamine and Levophed  ·  Metoprolol 25 mg BID and Diltiazem 180 mg discontinued  · Continue Midodrine 5 mg TID  · Likely cardiogenic  · BP acceptable at present - monitor  · NSR at present   · Ct telemetry      Tachy-bette syndrome ([facility] [address] )  Assessment & Plan  · Presented with junctional bradycardia   · resolved with holding home medications Metoprolol and Diltiazem  · Then developed  rapid Afib and converted to NSR with IV Digoxin  · On oral Amiodarone as per EP- continue  · Per discussion with  Jacklyn Silva on the phone her gastroenterologist at [facility] AT THE [facility] had advised her to be off anticoagulation indefinitely due to GI bleed   Follow CBC- if Hemoglobin drops, will consider GI consult  · Ct ASA    PAF (paroxysmal atrial fibrillation) (HCC)  Assessment & Plan  Continue amiodarone as per Cardiology/EP recommendation  Eliquis discontinued due to anemia/Bleeding  Monitor Hemoglobin-if drops, we will consider GI consult  Continue Amiodarone, Plavix- as per Cardio/EP    ESRD (end stage renal disease) on dialysis [facility])  Assessment & Plan  · HD on Mon/Wed/Fri  · Continue as per Nephrology recommendation    CAD with recent stent  Assessment & Plan  · S/p stent to LAD on 9/6/19  · Ct Plavix  · ASA had been held as outpatient due to GIB - restarted while in ICU - monitor Hb - if drops post transfusion hold ASA  · Ct statin  · No BB due to symptomatic bradycardia  · Treatment per cardiology  · Eliquis is discontinued due to anemia and possible GIB   Monitor H/H- if drops,   · GI consult noted  Plan is for endoscopy    Bradycardia  Assessment & Plan  Most likely secondary to medications (was on metoprolol and diltiazem)- discontinued  Symptoms improved with discontinuation of the meds  EP consult appreciated  Since patient is on Hemodialysis for ESRD and have Bilateral AV fistula- both will put the patient in high risk for severe infection from PPM  For that reason, as per EP, medical management will be tried with Amiodarone      Anemia  Assessment & Plan  · Hemoglobin dropped  Patient was seen at [facility] on November 5th for prior GI studies  · Monitor H/H  · Transfuse as needed  · Eliquis is on hold  · May need GI eval     LISSA (obstructive sleep apnea)  Assessment & Plan  · CPAP hs    Sepsis ([facility] [address] )  Assessment & Plan  Unclear source  Received 3 day of antibiotics-Discontinue by ID  Appreciate ID recommendations  Follow temperature/white count/culture results    Hypothyroid  Assessment & Plan  · Continue home levothyroxine 75 mcg daily  · TSH 6 4 on 11/14 with normal free T4      Asthma  Assessment & Plan  · stable  · Advair changed to equivalent Breo as Advair non-formulary    Morbid obesity ([facility] [address] )  Assessment & Plan  Encouraged therapeutic lifestyle changes    Uncontrolled type 1 diabetes mellitus [facility])  Assessment & Plan  Lab Results   Component Value Date    HGBA1C 8 1 (H) 07/21/2019       Recent Labs     11/20/19  1342 11/20/19  1524 11/20/19  2048 11/21/19  0554   POCGLU 194* 253* 294* 396*     Blood Sugar Average: Last 72 hrs:  (P) 194 [id number]     On insulin pump at home  Endocrinology consult appreciated- continue treatment as recommended      VTE Pharmacologic Prophylaxis:   Pharmacologic: ABLA  Mechanical VTE Prophylaxis in Place: No    Patient Centered Rounds: I have performed bedside rounds with nursing staff today  Time Spent for Care: 15 minutes    More than 50% of total time spent on counseling and coordination of care as described above     Current Length of Stay: 7 day(s)    Current Patient Status: Inpatient   Certification Statement: The patient will continue to require additional inpatient hospital stay due to Need to monitor symptoms        Code Status: Level 1 - Full Code      Subjective:   No acute distress    Objective:     Vitals:   Temp (24hrs), Av 3 °F (36 8 °C), Min:97 9 °F (36 6 °C), Max:98 9 °F (37 2 °C)    Temp:  [97 9 °F (36 6 °C)-98 9 °F (37 2 °C)] 98 7 °F (37 1 °C)  HR:  [79-89] 88  Resp:  [16-20] 18  BP: (112-167)/(52-72) 115/53  SpO2:  [93 %-100 %] 97 %  Body mass index is 41 38 kg/m²  Input and Output Summary (last 24 hours): Intake/Output Summary (Last 24 hours) at 2019 1331  Last data filed at 2019 1158  Gross per 24 hour   Intake 1080 ml   Output    Net 1080 ml       Physical Exam:     Physical Exam   Constitutional: She is oriented to person, place, and time  HENT:   Head: Normocephalic and atraumatic  Eyes: Pupils are equal, round, and reactive to light  EOM are normal    Neck: Normal range of motion  Neck supple  Pulmonary/Chest: Effort normal and breath sounds normal  No stridor  No respiratory distress  Abdominal: Soft  Bowel sounds are normal  She exhibits no distension  There is no tenderness  Neurological: She is alert and oriented to person, place, and time  Skin: Skin is warm and dry         Additional Data:     Labs:    Results from last 7 days   Lab Units 19  045   WBC Thousand/uL 8 99   HEMOGLOBIN g/dL 6 4*   HEMATOCRIT % 21 2*   PLATELETS Thousands/uL 146*   NEUTROS PCT % 63   LYMPHS PCT % 15   MONOS PCT % 12   EOS PCT % 9*     Results from last 7 days   Lab Units 19  04519  1918   POTASSIUM mmol/L 4 0   < > 5 2   < >  --    CHLORIDE mmol/L 95*   < > 92*   < >  --    CO2 mmol/L 27   < > 23   < >  --    CO2, I-STAT mmol/L  --   --   --   --  26   BUN mg/dL 37*   < > 106*   < >  --    CREATININE mg/dL 5 33*   < > 7 79*   < >  -- CALCIUM mg/dL 8 9   < > 9 2   < >  --    ALK PHOS U/L  --   --  178*   < >  --    ALT U/L  --   --  88*   < >  --    AST U/L  --   --  115*   < >  --    GLUCOSE, ISTAT mg/dl  --   --   --   --  503*    < > = values in this interval not displayed  * I Have Reviewed All Lab Data Listed Above  * Additional Pertinent Lab Tests Reviewed: All Labs Within Last 24 Hours Reviewed      Recent Cultures (last 7 days):     Results from last 7 days   Lab Units 11/19/19  0801 11/16/19  1112 11/14/19  2347   BLOOD CULTURE  No Growth After 4 Days  --  No Growth After 5 Days     SPUTUM CULTURE   --  2+ Growth of Candida albicans*  2+ Growth of   --    GRAM STAIN RESULT   --  1+ Polys  No bacteria seen  --        Last 24 Hours Medication List:     Current Facility-Administered Medications:  acetaminophen 650 mg Oral Q6H PRN Isacc Madrigal MD   albuterol 2 puff Inhalation Q4H PRN Isacc Madrigal MD   allopurinol 100 mg Oral Daily Isacc Madrigal MD   aluminum-magnesium hydroxide-simethicone 15 mL Oral Daily PRN Isacc Madrigal MD   amiodarone 200 mg Oral TID With Meals Isacc Madrigal MD   aspirin 81 mg Oral Daily Isacc Madrigal MD   atorvastatin 80 mg Oral Daily With Silvia Luong MD   bisacodyl 5 mg Oral Daily PRN Isacc Madrigal MD   calcitriol 0 25 mcg Oral Once per day on Mon Wed Fri Isacc Madrigal MD   clopidogrel 75 mg Oral Daily Isacc Madrigal MD   famotidine 20 mg Oral Daily MD Enrique Kwok ON 11/22/2019] fluconazole 200 mg Oral Daily Isacc Madrigal MD   fluconazole 400 mg Oral Once Isacc Madrigal MD   FLUoxetine 40 mg Oral Daily Isacc Madrigal MD   fluticasone-vilanterol 1 puff Inhalation Daily Isacc Madrigal MD   gabapentin 300 mg Oral HS Isacc Madrigal MD   insulin glargine 38 Units Subcutaneous HS Isacc Madrigal MD   insulin lispro 1-5 Units Subcutaneous HS Isacc Madrigal MD   insulin lispro 1-6 Units Subcutaneous TID AC Isacc Madrigal MD   insulin lispro 12 Units Subcutaneous TID With Meals Severo Ayala MD   levothyroxine 75 mcg Oral Early Morning Isacc Potter MD   midodrine 5 mg Oral TID AC Isacc Potter MD   ondansetron 4 mg Intravenous Q6H PRN Isacc Potter MD   pantoprazole 40 mg Oral Early Morning Isacc Potter MD   phenol 1 spray Mouth/Throat Q4H PRN Isacc Potter MD   polyethylene glycol 17 g Oral Daily Isacc Potter MD   senna-docusate sodium 2 tablet Per NG Tube HS Isacc Potter MD        Today, Patient Was Seen By: Anne Ricks DO    ** Please Note: Dictation voice to text software may have been used in the creation of this document   **

## 2019-11-21 NOTE — PHYSICAL THERAPY NOTE
Physical Therapy Cancellation Note      Chart reviewed; attempted to see pt in AM; pt is initially in the BR and then was leaving for EGD; will follow as clinical course allows      Ruma Thakkar, PT

## 2019-11-21 NOTE — PROGRESS NOTES
Progress Note - Shailesh Gamez 48 y o  female MRN: [id number]    Unit/Bed#: [facility] 402-01 Encounter: [id number]      Assessment:  57yof with T1DM and ESRD, now s/p EGD  Plan:  Assessment and Plan  1  T1DM, uncontrolled with hyperglycemia complciated by ESRD: She feels the elvated Bgs were due to extra food and possibel anxiety about procedure  She is comfortable continuing the  At this time, I recommend   Lantus 38 units once daily and Humalog to12 units bf / lunch/dinner  Use correctional lispro as needed      Subjective:   Cc: f/u T1DM  Interval hx: 48 yof with hx of uncontrolled R6UM, complicated by ESRD  She just returned to her room following EGD  She is waiting to hear about results from GI  She denies pain and hopes to eat  She ates additional food prior to MN due to the need to take a medication with food  She recalls having juice and applesauce  ROS: reports fatigue  Meds and all reviewed        Objective:       Vitals: Blood pressure 115/53, pulse 88, temperature 98 7 °F (37 1 °C), temperature source Oral, resp  rate 18, height 5' 5" (1 651 m), weight 113 kg (248 lb 10 9 oz), SpO2 97 %  ,Body mass index is 41 38 kg/m²  Physical Exam:   O: vitals as noted  Gen NAD, awake and alert  E/n/m nl facies, hearing intact grossly  Eyes no stare or proptosis   Neuro: no tremor  Skin: warm and dry  Psych nl mood and affect, no gross lapses in memory      Lab, Imaging and other studies: I have personally reviewed pertinent reports       POC;  11/21  5a   11/20 2048   1524     11/20 h/h 6 4/21 2

## 2019-11-21 NOTE — PLAN OF CARE
Problem: Prexisting or High Potential for Compromised Skin Integrity  Goal: Skin integrity is maintained or improved  Description  INTERVENTIONS:  - Identify patients at risk for skin breakdown  - Assess and monitor skin integrity  - Assess and monitor nutrition and hydration status  - Monitor labs   - Assess for incontinence   - Turn and reposition patient  - Assist with mobility/ambulation  - Relieve pressure over bony prominences  - Avoid friction and shearing  - Provide appropriate hygiene as needed including keeping skin clean and dry  - Evaluate need for skin moisturizer/barrier cream  - Collaborate with interdisciplinary team   - Patient/family teaching  - Consider wound care consult   Outcome: Progressing     Problem: Potential for Falls  Goal: Patient will remain free of falls  Description  INTERVENTIONS:  - Assess patient frequently for physical needs  -  Identify cognitive and physical deficits and behaviors that affect risk of falls    -  [place] fall precautions as indicated by assessment   - Educate patient/family on patient safety including physical limitations  - Instruct patient to call for assistance with activity based on assessment  - Modify environment to reduce risk of injury  - Consider OT/PT consult to assist with strengthening/mobility  Outcome: Progressing     Problem: PAIN - ADULT  Goal: Verbalizes/displays adequate comfort level or baseline comfort level  Description  Interventions:  - Encourage patient to monitor pain and request assistance  - Assess pain using appropriate pain scale  - Administer analgesics based on type and severity of pain and evaluate response  - Implement non-pharmacological measures as appropriate and evaluate response  - Consider cultural and social influences on pain and pain management  - Notify physician/advanced practitioner if interventions unsuccessful or patient reports new pain  Outcome: Progressing     Problem: INFECTION - ADULT  Goal: Absence or prevention of progression during hospitalization  Description  INTERVENTIONS:  - Assess and monitor for signs and symptoms of infection  - Monitor lab/diagnostic results  - Monitor all insertion sites, i e  indwelling lines, tubes, and drains  - Monitor endotracheal if appropriate and nasal secretions for changes in amount and color  - [place] appropriate cooling/warming therapies per order  - Administer medications as ordered  - Instruct and encourage patient and family to use good hand hygiene technique  - Identify and instruct in appropriate isolation precautions for identified infection/condition  Outcome: Progressing     Problem: DISCHARGE PLANNING  Goal: Discharge to home or other facility with appropriate resources  Description  INTERVENTIONS:  - Identify barriers to discharge w/patient and caregiver  - Arrange for needed discharge resources and transportation as appropriate  - Identify discharge learning needs (meds, wound care, etc )  - Arrange for interpretive services to assist at discharge as needed  - Refer to Case Management Department for coordinating discharge planning if the patient needs post-hospital services based on physician/advanced practitioner order or complex needs related to functional status, cognitive ability, or social support system  Outcome: Progressing     Problem: Knowledge Deficit  Goal: Patient/family/caregiver demonstrates understanding of disease process, treatment plan, medications, and discharge instructions  Description  Complete learning assessment and assess knowledge base    Interventions:  - Provide teaching at level of understanding  - Provide teaching via preferred learning methods  Outcome: Progressing     Problem: SAFETY,RESTRAINT: NV/NON-SELF DESTRUCTIVE BEHAVIOR  Goal: Remains free of harm/injury (restraint for non violent/non self-detsructive behavior)  Description  INTERVENTIONS:  - Instruct patient/family regarding restraint use   - Assess and monitor physiologic and psychological status   - Provide interventions and comfort measures to meet assessed patient needs   - Identify and implement measures to help patient regain control  - Assess readiness for release of restraint   Outcome: Progressing  Goal: Returns to optimal restraint-free functioning  Description  INTERVENTIONS:  - Assess the patient's behavior and symptoms that indicate continued need for restraint  - Identify and implement measures to help patient regain control  - Assess readiness for release of restraint   Outcome: Progressing     Problem: COPING  Goal: Pt/Family able to verbalize concerns and demonstrate effective coping strategies  Description  INTERVENTIONS:  - Assist patient/family to identify coping skills, available support systems and cultural and spiritual values  - Provide emotional support, including active listening and acknowledgement of concerns of patient and caregivers  - Reduce environmental stimuli, as able  - Provide patient education  - Assess for spiritual pain/suffering and initiate spiritual care, including notification of Pastoral Care or jimmie based community as needed  - Assess effectiveness of coping strategies  Outcome: Progressing  Goal: Will report anxiety at manageable levels  Description  INTERVENTIONS:  - Administer medication as ordered  - Teach and encourage coping skills  - Provide emotional support  - Assess patient/family for anxiety and ability to cope  Outcome: Progressing     Problem: SELF HARM  Goal: Effect of psychiatric condition will be minimized and patient will be protected from self harm  Description  INTERVENTIONS:  - Assess impact of patient's symptoms on level of functioning, self-care needs and offer support as indicated  - Assess patient/family knowledge of depression, impact on illness and need for teaching  - Provide emotional support, presence and reassurance  - Assess for possible suicidal thoughts, ideation or self-harm   If patient expresses suicidal thoughts or statements do not leave alone, notify physician/AP immediately, initiate suicide precautions, and determine need for continual observation    - initiate consults and referrals as appropriate (a mental health professional, Spiritual Care  Outcome: Progressing     Problem: CARDIOVASCULAR - ADULT  Goal: Maintains optimal cardiac output and hemodynamic stability  Description  INTERVENTIONS:  - Monitor I/O, vital signs and rhythm  - Monitor for S/S and trends of decreased cardiac output  - Administer and titrate ordered vasoactive medications to optimize hemodynamic stability  - Assess quality of pulses, skin color and temperature  - Assess for signs of decreased coronary artery perfusion  - Instruct patient to report change in severity of symptoms  Outcome: Progressing  Goal: Absence of cardiac dysrhythmias or at baseline rhythm  Description  INTERVENTIONS:  - Continuous cardiac monitoring, vital signs, obtain 12 lead EKG if ordered  - Administer antiarrhythmic and heart rate control medications as ordered  - Monitor electrolytes and administer replacement therapy as ordered  Outcome: Progressing     Problem: RESPIRATORY - ADULT  Goal: Achieves optimal ventilation and oxygenation  Description  INTERVENTIONS:  - Assess for changes in respiratory status  - Assess for changes in mentation and behavior  - Position to facilitate oxygenation and minimize respiratory effort  - Oxygen administered by appropriate delivery if ordered  - Initiate smoking cessation education as indicated  - Encourage broncho-pulmonary hygiene including cough, deep breathe, Incentive Spirometry  - Assess the need for suctioning and aspirate as needed  - Assess and instruct to report SOB or any respiratory difficulty  - Respiratory Therapy support as indicated  Outcome: Progressing     Problem: Nutrition/Hydration-ADULT  Goal: Nutrient/Hydration intake appropriate for improving, restoring or maintaining nutritional needs  Description  Monitor and assess patient's nutrition/hydration status for malnutrition  Collaborate with interdisciplinary team and initiate plan and interventions as ordered  Monitor patient's weight and dietary intake as ordered or per policy  Utilize nutrition screening tool and intervene as necessary  Determine patient's food preferences and provide high-protein, high-caloric foods as appropriate       INTERVENTIONS:  - Monitor oral intake, urinary output, labs, and treatment plans  - Assess nutrition and hydration status and recommend course of action  - Evaluate amount of meals eaten  - Assist patient with eating if necessary   - Allow adequate time for meals  - Recommend/ encourage appropriate diets, oral nutritional supplements, and vitamin/mineral supplements  - Order, calculate, and assess calorie counts as needed  - Recommend, monitor, and adjust tube feedings and TPN/PPN based on assessed needs  - Assess need for intravenous fluids  - Provide specific nutrition/hydration education as appropriate  - Include patient/family/caregiver in decisions related to nutrition  Outcome: Progressing     Problem: METABOLIC, FLUID AND ELECTROLYTES - ADULT  Goal: Electrolytes maintained within normal limits  Description  INTERVENTIONS:  - Monitor labs and assess patient for signs and symptoms of electrolyte imbalances  - Administer electrolyte replacement as ordered  - Monitor response to electrolyte replacements, including repeat lab results as appropriate  - Instruct patient on fluid and nutrition as appropriate  Outcome: Progressing  Goal: Fluid balance maintained  Description  INTERVENTIONS:  - Monitor labs   - Monitor I/O and WT  - Instruct patient on fluid and nutrition as appropriate  - Assess for signs & symptoms of volume excess or deficit  Outcome: Progressing  Goal: Glucose maintained within target range  Description  INTERVENTIONS:  - Monitor Blood Glucose as ordered  - Assess for signs and symptoms of hyperglycemia and hypoglycemia  - Administer ordered medications to maintain glucose within target range  - Assess nutritional intake and initiate nutrition service referral as needed  Outcome: Progressing

## 2019-11-21 NOTE — ASSESSMENT & PLAN NOTE
Lab Results   Component Value Date    HGBA1C 8 1 (H) 07/21/2019       Recent Labs     11/20/19  1342 11/20/19  1524 11/20/19  2048 11/21/19  0554   POCGLU 194* 253* 294* 396*     Blood Sugar Average: Last 72 hrs:  (P) 194 [id number]     On insulin pump at home  Endocrinology consult appreciated- continue treatment as recommended

## 2019-11-21 NOTE — ASSESSMENT & PLAN NOTE
· Hemoglobin dropped    Patient was seen at [facility] on November 5th for prior GI studies  · Monitor H/H  · Transfuse as needed  · Eliquis is on hold  · May need GI eval

## 2019-11-21 NOTE — ASSESSMENT & PLAN NOTE
· S/p stent to LAD on 9/6/19  · Ct Plavix  · ASA had been held as outpatient due to GIB - restarted while in ICU - monitor Hb - if drops post transfusion hold ASA  · Ct statin  · No BB due to symptomatic bradycardia  · Treatment per cardiology  · Eliquis is discontinued due to anemia and possible GIB  Monitor H/H- if drops,   · GI consult noted    Plan is for endoscopy

## 2019-11-21 NOTE — ASSESSMENT & PLAN NOTE
· Presented with junctional bradycardia   · resolved with holding home medications Metoprolol and Diltiazem  · Then developed  rapid Afib and converted to NSR with IV Digoxin  · On oral Amiodarone as per EP- continue  · Per discussion with  Del Mittal on the phone her gastroenterologist at [facility] THE [facility] had advised her to be off anticoagulation indefinitely due to GI bleed   Follow CBC- if Hemoglobin drops, will consider GI consult  · Ct ASA

## 2019-11-21 NOTE — OCCUPATIONAL THERAPY NOTE
[address] [facility]     Patient Name: Cammie Lindsey  VTAWC'J Date: 2019  Problem List  Principal Problem:    Shock ([place] [address] )  Active Problems:    Uncontrolled type 1 diabetes mellitus ([facility]  )    ESRD (end stage renal disease) on dialysis ([facility] )    PAF (paroxysmal atrial fibrillation) ([facility] )    Morbid obesity ([place] [address] )    Asthma    CAD with recent stent    Hypothyroid    Sepsis ([place] [address] )    Bradycardia    Hyperphosphatemia    Anemia    Secondary hyperparathyroidism of renal origin ([place] [address] )    Tachy-bette syndrome (HCC)    LISSA (obstructive sleep apnea)    Past Medical History  Past Medical History:   Diagnosis Date    Asthma     Cardiac disease     CHF    CH[facility]congestive heart failure) ([facility])     CPAP (continuous positive airway pressure) dependence     Diabetes mellitus ([place] [address] )     type 1    Dialysis patient ([place] [address] )     Disease of thyroid gland     hypothyroidism    GERD (gastroesophageal reflux disease)     Hyperlipidemia     Hypertension     Migraine     Psychiatric disorder     PTSD, Depression, panic attacks    Renal disorder     stage 4 kidney disease     Past Surgical History  Past Surgical History:   Procedure Laterality Date    CATARACT EXTRACTION, BILATERAL       SECTION      CHOLECYSTECTOMY      HERNIA REPAIR      IR CENTRAL LINE PLACEMENT  2019    RETINOPATHY SURGERY      TONSILECTOMY AND ADNOIDECTOMY      TONSILLECTOMY      TYMPANOSTOMY TUBE PLACEMENT           19 1437   Note Type   Note type Eval only   Restrictions/Precautions   Weight Bearing Precautions Per Order No   Braces or Orthoses AFO   Other Precautions   (L knee brace and ankle brace for comfort)   Pain Assessment   Pain Assessment No/denies pain   Home Living   Type of [place] [address] to live on main level with bedroom/bathroom   Bathroom Shower/Tub Tub/shower unit   Bathroom Toilet Standard   Bathroom Equipment Shower chair;Commode   Home Equipment Walker; Wheelchair-manual   Prior Function Level of [place] Needs assistance with ADLs and functional mobility   Lives With Spouse   Receives Help From Family   ADL Assistance Needs assistance   IADLs Needs assistance   Falls in the last 6 months 1 to 4   Vocational On disability   Lifestyle   Autonomy pta pt reports spouse assists w/ LB ADLs and IADLs; pt only walks minimal distance; mainly transfers to w/c   Reciprocal Relationships supportive spouse   Service to Others retired   [address] enjoys watching tv   Psychosocial   Psychosocial (WDL) WDL   Subjective   Subjective "I'm not worried about going home"   ADL   Where Assessed Chair   Eating Assistance 6  Modified independent   Grooming Assistance 6  Modified Independent   UB Bathing Assistance 6  Modified Independent   LB Bathing Assistance 4  Minimal Assistance   [address] 6  Modified independent   [address] 4  [address] to Stand 6  Modified independent   Stand to Sit 6  Modified independent   Functional Mobility   Functional Mobility 6  Modified independent   Additional items Rolling walker   Balance   Static Sitting Normal   Dynamic Sitting Good   Static Standing Good   Dynamic Standing Good   Ambulatory Good   Activity Tolerance   Activity Tolerance Patient tolerated treatment well   Medical Staff Made Aware PT Scott   Nurse Made Aware okay to see per RN   RUE Assessment   RUE Assessment WFL   LUE Assessment   LUE Assessment WFL   Hand Function   Gross Motor Coordination Functional   Fine Motor Coordination Functional   Cognition   Overall Cognitive Status WFL   Arousal/Participation Arousable   Attention Attends with cues to redirect   Orientation Level Oriented X4   Memory Decreased recall of precautions   Following Commands Follows one step commands with increased time or repetition   Comments inconsistent reporting about bracing needs at times   Assessment   Limitation Decreased endurance;Decreased Safe judgement during ADL   Prognosis Good   Assessment Pt is a 48 y o  YO  female [facility]14[facility] w/ shock likely due to syncope of emesis  Comorbidities include a h/o PAF, ESRD, CAD w/ recent stent, anemia, LISSA, DM type 1 uncontrolled, and hypothryoid   Pt with active OT orders and ambulate  orders    Pt resides in a house w/ first floor setup with spouse  Pt required assist for LB ADLs and IADLs  Pt mainly using w/c only using RW for short distances  Currently pt is Mod I for UB ADLs and functional mobility  Pt is Min A for LB ADLS  Anabella Console Pt is limited at this time 2*: decreased I w/ ADLS/IADLS and decreased safety awareness  The following Occupational Performance Areas to address include: bathing/shower, toilet hygiene and dressing  Pt scored overall  70/100 on the Barthel Index  Based on the aforementioned OT evaluation, functional performance deficits, and assessments, pt has been identified as a moderate complexity evaluation  From OT standpoint, anticipate d/c home with family support  Recommend continued participation in ADLs and functional mobility w/ staff  No further acute OT needs, d/c OT      Goals   Patient Goals go home   Recommendation   OT Discharge Recommendation Home with family support   OT - OK to Discharge Yes   Barthel Index   Feeding 10   Bathing 0   Grooming Score 5   Dressing Score 5   Bladder Score 10   Bowels Score 10   Toilet Use Score 10   Transfers (Bed/Chair) Score 15   Mobility (Level Surface) Score 5   Stairs Score 0   Barthel Index Score 70   Modified Wibaux Scale   Modified Wibaux Scale 2     Ralf Tavares MS, OTR/L

## 2019-11-22 ENCOUNTER — APPOINTMENT (INPATIENT)
Dept: DIALYSIS | Facility: HOSPITAL | Age: 54
DRG: 871 | End: 2019-11-22
Attending: INTERNAL MEDICINE
Payer: COMMERCIAL

## 2019-11-22 VITALS
OXYGEN SATURATION: 97 % | SYSTOLIC BLOOD PRESSURE: 188 MMHG | TEMPERATURE: 98.3 F | HEART RATE: 85 BPM | RESPIRATION RATE: 16 BRPM | WEIGHT: 253.75 LBS | HEIGHT: 65 IN | DIASTOLIC BLOOD PRESSURE: 58 MMHG | BODY MASS INDEX: 42.28 KG/M2

## 2019-11-22 LAB
BASOPHILS # BLD AUTO: 0.06 THOUSANDS/ΜL (ref 0–0.1)
BASOPHILS NFR BLD AUTO: 1 % (ref 0–1)
EOSINOPHIL # BLD AUTO: 0.89 THOUSAND/ΜL (ref 0–0.61)
EOSINOPHIL NFR BLD AUTO: 7 % (ref 0–6)
ERYTHROCYTE [DISTWIDTH] IN BLOOD BY AUTOMATED COUNT: 18.8 % (ref 11.6–15.1)
GLUCOSE SERPL-MCNC: 113 MG/DL (ref 65–140)
GLUCOSE SERPL-MCNC: 157 MG/DL (ref 65–140)
GLUCOSE SERPL-MCNC: 198 MG/DL (ref 65–140)
HCT VFR BLD AUTO: 28.2 % (ref 34.8–46.1)
HGB BLD-MCNC: 8.8 G/DL (ref 11.5–15.4)
IMM GRANULOCYTES # BLD AUTO: 0.05 THOUSAND/UL (ref 0–0.2)
IMM GRANULOCYTES NFR BLD AUTO: 0 % (ref 0–2)
LYMPHOCYTES # BLD AUTO: 1.71 THOUSANDS/ΜL (ref 0.6–4.47)
LYMPHOCYTES NFR BLD AUTO: 14 % (ref 14–44)
MCH RBC QN AUTO: 32 PG (ref 26.8–34.3)
MCHC RBC AUTO-ENTMCNC: 31.2 G/DL (ref 31.4–37.4)
MCV RBC AUTO: 103 FL (ref 82–98)
MONOCYTES # BLD AUTO: 1.25 THOUSAND/ΜL (ref 0.17–1.22)
MONOCYTES NFR BLD AUTO: 10 % (ref 4–12)
NEUTROPHILS # BLD AUTO: 8.21 THOUSANDS/ΜL (ref 1.85–7.62)
NEUTS SEG NFR BLD AUTO: 68 % (ref 43–75)
NRBC BLD AUTO-RTO: 0 /100 WBCS
PLATELET # BLD AUTO: 151 THOUSANDS/UL (ref 149–390)
PMV BLD AUTO: 10.2 FL (ref 8.9–12.7)
RBC # BLD AUTO: 2.75 MILLION/UL (ref 3.81–5.12)
WBC # BLD AUTO: 12.17 THOUSAND/UL (ref 4.31–10.16)

## 2019-11-22 PROCEDURE — 99238 HOSP IP/OBS DSCHRG MGMT 30/<: CPT | Performed by: INTERNAL MEDICINE

## 2019-11-22 PROCEDURE — 90935 HEMODIALYSIS ONE EVALUATION: CPT | Performed by: INTERNAL MEDICINE

## 2019-11-22 PROCEDURE — 82948 REAGENT STRIP/BLOOD GLUCOSE: CPT

## 2019-11-22 PROCEDURE — 85025 COMPLETE CBC W/AUTO DIFF WBC: CPT | Performed by: STUDENT IN AN ORGANIZED HEALTH CARE EDUCATION/TRAINING PROGRAM

## 2019-11-22 RX ORDER — AMOXICILLIN 250 MG
2 CAPSULE ORAL
Qty: 60 TABLET | Refills: 0 | Status: SHIPPED | OUTPATIENT
Start: 2019-11-22

## 2019-11-22 RX ORDER — MIDODRINE HYDROCHLORIDE 5 MG/1
5 TABLET ORAL
Qty: 90 TABLET | Refills: 0 | Status: SHIPPED | OUTPATIENT
Start: 2019-11-22 | End: 2019-11-22 | Stop reason: HOSPADM

## 2019-11-22 RX ORDER — AMIODARONE HYDROCHLORIDE 200 MG/1
200 TABLET ORAL 3 TIMES DAILY
Qty: 60 TABLET | Refills: 0 | Status: SHIPPED | OUTPATIENT
Start: 2019-11-22 | End: 2019-12-03

## 2019-11-22 RX ORDER — FLUCONAZOLE 200 MG/1
200 TABLET ORAL DAILY
Qty: 7 TABLET | Refills: 0 | Status: SHIPPED | OUTPATIENT
Start: 2019-11-22 | End: 2019-11-29

## 2019-11-22 RX ORDER — ATORVASTATIN CALCIUM 80 MG/1
80 TABLET, FILM COATED ORAL
Qty: 30 TABLET | Refills: 0 | Status: SHIPPED | OUTPATIENT
Start: 2019-11-22

## 2019-11-22 RX ORDER — FLUTICASONE FUROATE AND VILANTEROL 100; 25 UG/1; UG/1
1 POWDER RESPIRATORY (INHALATION)
Qty: 1 INHALER | Refills: 0 | Status: SHIPPED | OUTPATIENT
Start: 2019-11-22

## 2019-11-22 RX ADMIN — PANTOPRAZOLE SODIUM 40 MG: 40 TABLET, DELAYED RELEASE ORAL at 05:34

## 2019-11-22 RX ADMIN — CLOPIDOGREL BISULFATE 75 MG: 75 TABLET ORAL at 13:39

## 2019-11-22 RX ADMIN — AMIODARONE HYDROCHLORIDE 200 MG: 200 TABLET ORAL at 13:40

## 2019-11-22 RX ADMIN — ALLOPURINOL 100 MG: 100 TABLET ORAL at 13:40

## 2019-11-22 RX ADMIN — FLUCONAZOLE 200 MG: 200 TABLET ORAL at 13:39

## 2019-11-22 RX ADMIN — CALCITRIOL 0.25 MCG: 0.25 CAPSULE, LIQUID FILLED ORAL at 13:39

## 2019-11-22 RX ADMIN — MIDODRINE HYDROCHLORIDE 5 MG: 5 TABLET ORAL at 07:41

## 2019-11-22 RX ADMIN — ASPIRIN 81 MG 81 MG: 81 TABLET ORAL at 13:40

## 2019-11-22 RX ADMIN — FLUTICASONE FUROATE AND VILANTEROL TRIFENATATE 1 PUFF: 100; 25 POWDER RESPIRATORY (INHALATION) at 13:39

## 2019-11-22 RX ADMIN — INSULIN LISPRO 1 UNITS: 100 INJECTION, SOLUTION INTRAVENOUS; SUBCUTANEOUS at 06:16

## 2019-11-22 RX ADMIN — AMIODARONE HYDROCHLORIDE 200 MG: 200 TABLET ORAL at 07:41

## 2019-11-22 RX ADMIN — FLUOXETINE 40 MG: 20 CAPSULE ORAL at 13:40

## 2019-11-22 RX ADMIN — FAMOTIDINE 20 MG: 20 TABLET ORAL at 13:39

## 2019-11-22 RX ADMIN — LEVOTHYROXINE SODIUM 75 MCG: 75 TABLET ORAL at 05:34

## 2019-11-22 NOTE — ASSESSMENT & PLAN NOTE
· Presented with junctional bradycardia   · resolved with holding home medications Metoprolol and Diltiazem  · Then developed  rapid Afib and converted to NSR with IV Digoxin  · On oral Amiodarone as per EP- continue  · Per prior discussion with  Jacklyn Silva on the phone her gastroenterologist at [address] [place] [address] [address] had advised her to be off anticoagulation indefinitely due to GI bleed   Follow CBC- if Hemoglobin drops  · Ct ASA

## 2019-11-22 NOTE — PROGRESS NOTES
Procedure Note - Nephrology   Georges Aguilar 48 y o  female MRN: [id number]  Unit/Bed#: [facility] 402-01 Encounter: [id number]    Procedure note-hemodialysis(06622)  Assessment / Plan:  1  ESRD on HD MWF at [address] [place] [address] - patient seen & examined by me on hemodialysis today at 9am   5kg  2  Access - LUE AVG well functioning  3  Symptomatic bradycardia-resolved  4  Shock-resolved, off pressors  5  Hyperphosphatemia-continue Renagel 3 tablets with meals, monitor phos, last phos 5 3  6  Secondary hyperparathyroidism of renal origin-continue calcitriol 0 25 mcg 3 times weekly, last -at goal for ESRD  7  Anemia of CKD-Hgb improved to high 8s s/p blood transfusion  8  Hypotension - BP improved on midodrine, ? D/t anemia  Subjective:   Patient seen & examined by me on hemodialysis approximately 9:00 a m     Blood pressure 146/52, UF goal 3 5 L, blood flow 400 mL/min, dialysate flow 600 mL/min  Patient had rating dialysis well  She denies any chest pain or shortness of breath  She does have a sore throat and was diagnosed with fungal infection of her throat  Objective:     Vitals: Blood pressure (!) 150/46, pulse 85, temperature 98 4 °F (36 9 °C), temperature source Oral, resp  rate 18, height 5' 5" (1 651 m), weight 115 kg (253 lb 12 oz), SpO2 97 %  ,Body mass index is 42 23 kg/m²  Temp (24hrs), Av 2 °F (36 8 °C), Min:97 5 °F (36 4 °C), Max:98 7 °F (37 1 °C)      Weight (last 2 days)     Date/Time   Weight    19 0600   115 (253 75)                Intake/Output Summary (Last 24 hours) at 2019 0912  Last data filed at 2019 0830  Gross per 24 hour   Intake 1200 ml   Output    Net 1200 ml     I/O last 24 hours: In: 1200 [P O :700; I V :500]  Out: -         Physical Exam:   Physical Exam   Constitutional: She appears well-developed and well-nourished  No distress  obese   HENT:   Head: Normocephalic and atraumatic  Mouth/Throat: No oropharyngeal exudate     Eyes: Right eye exhibits no discharge  Left eye exhibits no discharge  No scleral icterus  Neck: Normal range of motion  No thyromegaly present  Cardiovascular: Normal rate and regular rhythm  No murmur heard  Pulmonary/Chest: Effort normal and breath sounds normal  No respiratory distress  She has no wheezes  Abdominal: Soft  Bowel sounds are normal  She exhibits no distension  Musculoskeletal: She exhibits edema (b/l LE)  Right ankle in brace   Neurological: She is alert  She exhibits normal muscle tone  awake   Skin: Skin is warm and dry  No rash noted  She is not diaphoretic  Psychiatric: She has a normal mood and affect  Her behavior is normal    Nursing note and vitals reviewed        Invasive Devices     Peripherally Inserted Central Catheter Line            PICC Line 11/18/19 Right Other (Comment) 3 days          Line            Hemodialysis AV Fistula Left Upper arm -- days    Hemodialysis AV Fistula Right Upper arm -- days                Medications:    Scheduled Meds:  Current Facility-Administered Medications:  acetaminophen 650 mg Oral Q6H PRN Isacc Conception Fears, MD   albuterol 2 puff Inhalation Q4H PRN Isacc Conception Fears, MD   allopurinol 100 mg Oral Daily Isacc Conception Fears, MD   aluminum-magnesium hydroxide-simethicone 15 mL Oral Daily PRN Isacc Conception Fears, MD   amiodarone 200 mg Oral TID With Meals Isacc Conception Fears, MD   aspirin 81 mg Oral Daily Isacc Conception Fears, MD   atorvastatin 80 mg Oral Daily With Glenna Jeffries MD   bisacodyl 5 mg Oral Daily PRN Isacc Conception Fears, MD   calcitriol 0 25 mcg Oral Once per day on Mon Wed Fri Isacc Conception Fears, MD   clopidogrel 75 mg Oral Daily Isacc Conception Fears, MD   famotidine 20 mg Oral Daily Isacc Conception Fears, MD   fluconazole 200 mg Oral Daily Isacc Conception Fears, MD   FLUoxetine 40 mg Oral Daily Isacc Conception Fears, MD   fluticasone-vilanterol 1 puff Inhalation Daily Isacc Conception Fears, MD   gabapentin 300 mg Oral HS Isacc Conception Fears, MD   insulin glargine 38 Units Subcutaneous HS Maile Mota MD insulin lispro 1-5 Units Subcutaneous HS Isacc Conception Fears, MD   insulin lispro 1-6 Units Subcutaneous TID AC Isacc Conception Fears, MD   insulin lispro 12 Units Subcutaneous TID With Meals Isacc Conception Fears, MD   levothyroxine 75 mcg Oral Early Morning Isacc Conception Fears, MD   midodrine 5 mg Oral TID AC Isacc Conception Fears, MD   ondansetron 4 mg Intravenous Q6H PRN Isacc Conception Fears, MD   pantoprazole 40 mg Oral Early Morning Isacc Conception Fears, MD   phenol 1 spray Mouth/Throat Q4H PRN Isacc Conception Fears, MD   polyethylene glycol 17 g Oral Daily Isacc Conception Fears, MD   senna-docusate sodium 2 tablet Per NG Tube HS Isacc Conception Fears, MD       PRN Meds:   acetaminophen    albuterol    aluminum-magnesium hydroxide-simethicone    bisacodyl    ondansetron    phenol    Continuous Infusions:         LAB RESULTS:      Results from last 7 days   Lab Units 11/22/19  0630 11/21/19  1353 11/20/19  0457 11/19/19  0448 11/18/19  2102 11/18/19  1345 11/17/19  0431 11/16/19  0450 11/16/19  0449  11/15/19  2253   WBC Thousand/uL 12 17* 6 22 8 99 8 31  --  13 19* 13 41*  --  14 02*  --   --    HEMOGLOBIN g/dL 8 8* 15 0 6 4* 8 1* 7 1* 6 3* 6 4*  --  7 2*   < >  --    HEMATOCRIT % 28 2* 47 2* 21 2* 26 2*  --  20 3* 20 8*  --  22 4*  --   --    PLATELETS Thousands/uL 151 91* 146* 144*  --  192 162  --  174  --   --    NEUTROS PCT % 68 64 63  --   --   --   --   --   --   --   --    LYMPHS PCT % 14 17 15  --   --   --   --   --   --   --   --    MONOS PCT % 10 11 12  --   --   --   --   --   --   --   --    EOS PCT % 7* 7* 9*  --   --   --   --   --   --   --   --    POTASSIUM mmol/L  --   --  4 0 3 9  --  5 4* 4 4 5 2  --   --  5 0   CHLORIDE mmol/L  --   --  95* 95*  --  95* 96* 92*  --   --  93*   CO2 mmol/L  --   --  27 28  --  22 26 23  --   --  25   BUN mg/dL  --   --  37* 25  --  73* 51* 106*  --   --  105*   CREATININE mg/dL  --   --  5 33* 3 62*  --  6 98* 4 87* 7 79*  --   --  7 41*   CALCIUM mg/dL  --   --  8 9 8 7  --  9 0 8 9 9 2  --   --  9 2   ALK PHOS U/L  -- --   --   --   --   --   --  178*  --   --   --    ALT U/L  --   --   --   --   --   --   --  88*  --   --   --    AST U/L  --   --   --   --   --   --   --  115*  --   --   --    MAGNESIUM mg/dL  --   --   --   --   --  2 4  --  2 3  --   --  2 3   PHOSPHORUS mg/dL  --   --   --  5 3*  --  8 8* 6 5* 7 7*  --   --  6 5*    < > = values in this interval not displayed  CUTURES:  Lab Results   Component Value Date    BLOODCX No Growth After 4 Days  11/19/2019    BLOODCX No Growth After 5 Days  11/14/2019    BLOODCX No Growth After 5 Days  09/08/2019    BLOODCX No Growth After 5 Days  09/08/2019                 Portions of the record may have been created with voice recognition software  Occasional wrong word or "sound a like" substitutions may have occurred due to the inherent limitations of voice recognition software  Read the chart carefully and recognize, using context, where substitutions have occurred  If you have any questions, please contact the dictating provider

## 2019-11-22 NOTE — SOCIAL WORK
Pt  Returned from HD and will discharge home today with   No HHC needs, has HD on 11/25 at 0700 and PCP appt at 1300  Pt  With good understanding

## 2019-11-22 NOTE — DISCHARGE SUMMARY
Discharge- Cheryle Deem 1965, 48 y o  female MRN: [id number]    Unit/Bed#: [facility] 402-01 Encounter: [id number]    Primary Care Provider: Nikhil Arevalo DO   Date and time admitted to hospital: 11/14/2019  7:03 PM        * Shock [facility])  Assessment & Plan  · Condition Improving  · Presented with multiple episodes of syncope and emesis most likely secondary to junctional bradycardia and hypotension  · Intubated in the ED on presentation  Extubated on 11/16  · Off Dopamine and Levophed  ·  Metoprolol 25 mg BID and Diltiazem 180 mg discontinued  · Continue Midodrine 5 mg TID  · Likely cardiogenic  · BP acceptable at present - monitor  · NSR at present   · Ct telemetry      Tachy-bette syndrome ([facility] )  Assessment & Plan  · Presented with junctional bradycardia   · resolved with holding home medications Metoprolol and Diltiazem  · Then developed  rapid Afib and converted to NSR with IV Digoxin  · On oral Amiodarone as per EP- continue  · Per prior discussion with  Shanti Novak on the phone her gastroenterologist at [facility] had advised her to be off anticoagulation indefinitely due to GI bleed   Follow CBC- if Hemoglobin drops  · Ct ASA    PAF (paroxysmal atrial fibrillation) (HCC)  Assessment & Plan  Continue amiodarone as per Cardiology/EP recommendation  Eliquis discontinued due to anemia/Bleeding  Monitor Hemoglobin-if drops, we will consider GI consult  Continue Amiodarone, Plavix- as per Cardio/EP    ESRD (end stage renal disease) on dialysis [facility])  Assessment & Plan  · HD on Mon/Wed/Fri  · Continue as per Nephrology recommendation    CAD with recent stent  Assessment & Plan  · S/p stent to LAD on 9/6/19  · Ct Plavix  · ASA had been held as outpatient due to GIB - restarted while in ICU - monitor Hb - if drops post transfusion hold ASA  · Ct statin  · No BB due to symptomatic bradycardia  · Treatment per cardiology  · Eliquis is discontinued due to anemia and possible GIB   Monitor H/H- if drops,   · GI consult noted     · Esophageal candidiasis noted on endoscopy    Anemia  Assessment & Plan  · Hemoglobin dropped    Patient was seen at [facility] on November 5th for prior GI studies  · Monitor H/H  · Transfuse as needed  · Eliquis is on hold  ·     LISSA (obstructive sleep apnea)  Assessment & Plan  · CPAP hs    Hyperphosphatemia  Assessment & Plan  Secondary to ESRD  Recent Phosphorus is 5 3- which is trending down    Sepsis [facility])  Assessment & Plan  Unclear source  Received 3 day of antibiotics-Discontinue by ID  Appreciate ID recommendations  Follow temperature/white count/culture results    Hypothyroid  Assessment & Plan  · Continue home levothyroxine 75 mcg daily  · TSH 6 4 on 11/14 with normal free T4      Morbid obesity (HCC)  Assessment & Plan  Encouraged therapeutic lifestyle changes    Uncontrolled type 1 diabetes mellitus ([facility] [address] [address] )  Assessment & Plan  Lab Results   Component Value Date    HGBA1C 8 1 (H) 07/21/2019       Recent Labs     11/21/19  1938 11/21/19  2052 11/22/19  0105 11/22/19  0536   POCGLU 304* 258* 198* 157*     Blood Sugar Average: Last 72 hrs:  (P) 131 [id number]     On insulin pump at home  Endocrinology consult appreciated- continue treatment as recommended                Resolved Problems  Date Reviewed: 11/22/2019          Resolved    Hyponatremia 11/20/2019     Resolved by  Cassius Kaur DO    Acute respiratory failure ([facility] [address] ) 11/17/2019     Resolved by  CARLOS Haile    Encephalopathy 11/16/2019     Resolved by  CARLOS Lazar    Hyperkalemia 11/19/2019     Resolved by  Cassius Kaur DO          Admission Date:   Admission Orders (From admission, onward)     Ordered        11/14/19 2104  Inpatient Admission  Once                     Admitting Diagnosis: Respiratory failure ([facility] [address] [address] ) [J96 90]  Bradycardia [R00 1]  Syncope [R55]  Vomiting [R11 10]  Hypothyroid [E03 9]        Procedures Performed:   Orders Placed This Encounter   Procedures   [address] Critical Care    Intubation       Summary of Hospital Course: This is a 42-year-old female who presents on November 14th following several episodes of syncope and emesis at home  She was reported to have a passable history of coronary artery disease type 1 diabetes hypothyroidism GERD hyperlipidemia hypertension end-stage renal disease on dialysis and depression  On arrival to the hospital she presented bradycardic and hypotensive  Given her hemodynamic instability she was intubated and managed in the critical care unit  She was followed by Nephrology team initially placed on pressor agents  She ultimately was stabilized and transition to the medical-surgical floor  · Paroxysmal atrial fibrillation with bradyarrhythmia  Patient was observed off of Eliquis given that this was stopped previously  EP recommends amiodarone therapy  She appears to be tolerating this regimen  Amiodarone for 2 weeks at t i d  Dosing and then dropped to 200 daily  · End-stage renal disease-continue with dialysis treatment  Last dialysis was today  · Anemia-status post EGD  Esophageal candidiasis noted  Trial of Diflucan as per GI  · Type 1 diabetes-discussed with Endocrine  Will have patient back on insulin pump at discharge  · Morbid obesity-continue with therapeutic lifestyle changes  · Hypothyroidism-levothyroxine  · Anemia of chronic disease  · Possible sepsis  Patient was empirically started antibiotic therapy  However this was discontinued by Infectious Disease  · Hypertension-midodrine on hold until seen by Nephrology      Condition at Discharge: fair         Discharge instructions/Information to patient and family:   See after visit summary for information provided to patient and family  Provisions for Follow-Up Care:  See after visit summary for information related to follow-up care and any pertinent home health orders        PCP: Francois Hugo DO    Disposition: Home    Planned Readmission: No    Discharge Statement   I spent 35 minutes discharging the patient  This time was spent on the day of discharge  I had direct contact with the patient on the day of discharge  Additional documentation is required if more than 30 minutes were spent on discharge  Discharge Medications:  See after visit summary for reconciled discharge medications provided to patient and family

## 2019-11-22 NOTE — ASSESSMENT & PLAN NOTE
Lab Results   Component Value Date    HGBA1C 8 1 (H) 07/21/2019       Recent Labs     11/21/19  1938 11/21/19 2052 11/22/19  0105 11/22/19  0536   POCGLU 304* 258* 198* 157*     Blood Sugar Average: Last 72 hrs:  (P) 405 [id number]     On insulin pump at home  Endocrinology consult appreciated- continue treatment as recommended

## 2019-11-22 NOTE — HEMODIALYSIS
Stable 4 hour Hemodialysis treatment completed    Pre wt using chair scale 115 3kg  Post wt using chair scale 112 5kg  Loss of -2 8

## 2019-11-22 NOTE — ASSESSMENT & PLAN NOTE
· Hemoglobin dropped    Patient was seen at [facility] on November 5th for prior GI studies  · Monitor H/H  · Transfuse as needed  · Eliquis is on hold  ·

## 2019-11-22 NOTE — PROGRESS NOTES
Assessment:  61-year-old morbidly obese female with type 1 diabetes complicated with end-stage renal disease  She is normally on [employer] 630 G insulin pump using Humalog      Plan:    1  Uncontrolled type 1 diabetes   Her A1c in July was 8 1%   Goal is 7%   BG reasonable in 150-250 range in last 24 hours with lantus 38u qhs and Humalog 12 units t i d  A c  Will re-initiate pump with the listed settings at discharge  Discussed with primary service  Basal:  9p-3a   1 1u/hr              3a-8a  1 5u/hr              8a-9p  1 7u/hr  CIR:     41R-3I   6g/u               5a-8p   5g/u                 8p-12a  6u/hr                 ISF:      12a-8a   40                  8a-10p  20                   10p-12a  40  TGB:    8p-8a     120-140         8a-8p  120-130   Follow-up as outpatient with Dr Gonzalez   Her pump settings are as follows:        2  Hypothyroidism   She is presently on levothyroxine 75 mcg q day  Advised outpatient follow-up  S:  No issues reported overnight  Being anticipated for discharge today      O:  Patient seen and examined personally  BP (!) 124/43   Pulse 83   Temp 98 4 °F (36 9 °C) (Oral)   Resp 18   Ht 5' 5" (1 651 m)   Wt 115 kg (253 lb 12 oz)   SpO2 97%   BMI 42 23 kg/m²     Physical Exam   Constitutional: She is oriented to person, place, and time  Morbid central obesity   HENT:   Head: Normocephalic  Mouth/Throat: Oropharynx is clear and moist    Eyes: Pupils are equal, round, and reactive to light  Neck:   Short thick neck   Neurological: She is alert and oriented to person, place, and time  Skin: There is pallor           Current Facility-Administered Medications:  acetaminophen 650 mg Oral Q6H PRN Isacc Bellamy MD   albuterol 2 puff Inhalation Q4H PRN Isacc Bellamy MD   allopurinol 100 mg Oral Daily Isacc Bellamy MD   aluminum-magnesium hydroxide-simethicone 15 mL Oral Daily PRN Isacc Bellamy MD   amiodarone 200 mg Oral TID With Meals Isacc Bellamy MD   aspirin 81 mg Oral Daily Dhruv Terrilyn Dance, MD   atorvastatin 80 mg Oral Daily With Fred White MD   bisacodyl 5 mg Oral Daily PRN Dhruv Terrilyn Dance, MD   calcitriol 0 25 mcg Oral Once per day on Mon Wed Fri Dhruv Terrilyn Dance, MD   clopidogrel 75 mg Oral Daily Dhruv Terrilyn Dance, MD   famotidine 20 mg Oral Daily Dhruv Terrilyn Dance, MD   fluconazole 200 mg Oral Daily Dhruv Terrilyn Dance, MD   FLUoxetine 40 mg Oral Daily Dhruv Terrilyn Dance, MD   fluticasone-vilanterol 1 puff Inhalation Daily Dhruv Terrilyn Dance, MD   gabapentin 300 mg Oral HS Dhruv Terrilyn Dance, MD   insulin glargine 38 Units Subcutaneous HS Dhruv Terrilyn Dance, MD   insulin lispro 1-5 Units Subcutaneous HS Dhruv Terrilyn Dance, MD   insulin lispro 1-6 Units Subcutaneous TID AC Dhruv Terrilyn Dance, MD   insulin lispro 12 Units Subcutaneous TID With Meals Dhruv Terrilyn Dance, MD   levothyroxine 75 mcg Oral Early Morning Dhruv Terrilyn Dance, MD   midodrine 5 mg Oral TID AC Dhruv Terrilyn Dance, MD   ondansetron 4 mg Intravenous Q6H PRN Dhruv Terrilyn Dance, MD   pantoprazole 40 mg Oral Early Morning Dhruv Terrilyn Dance, MD   phenol 1 spray Mouth/Throat Q4H PRN Dhruv Terrilyn Dance, MD   polyethylene glycol 17 g Oral Daily Dhruv Terrilyn Dance, MD   senna-docusate sodium 2 tablet Per NG Tube HS Dhruv Terrilyn Dance, MD        Lab, Imaging and other studies:   POC Glucose (mg/dl)   Date Value   11/22/2019 157 (H)   11/22/2019 198 (H)   11/21/2019 258 (H)   11/21/2019 304 (H)   11/21/2019 395 (H)   11/21/2019 396 (H)   11/20/2019 294 (H)   11/20/2019 253 (H)   11/20/2019 194 (H)   11/20/2019 176 (H)       D/W :   Dispo:

## 2019-11-22 NOTE — ASSESSMENT & PLAN NOTE
· S/p stent to LAD on 9/6/19  · Ct Plavix  · ASA had been held as outpatient due to GIB - restarted while in ICU - monitor Hb - if drops post transfusion hold ASA  · Ct statin  · No BB due to symptomatic bradycardia  · Treatment per cardiology  · Eliquis is discontinued due to anemia and possible GIB  Monitor H/H- if drops,   · GI consult noted      · Esophageal candidiasis noted on endoscopy

## 2019-11-27 LAB — BACTERIA BLD CULT: NORMAL

## 2020-01-25 ENCOUNTER — HOSPITAL ENCOUNTER (EMERGENCY)
Facility: HOSPITAL | Age: 55
Discharge: HOME | End: 2020-01-25
Attending: EMERGENCY MEDICINE
Payer: COMMERCIAL

## 2020-01-25 ENCOUNTER — APPOINTMENT (EMERGENCY)
Dept: RADIOLOGY | Facility: HOSPITAL | Age: 55
End: 2020-01-25
Payer: COMMERCIAL

## 2020-01-25 VITALS
RESPIRATION RATE: 22 BRPM | WEIGHT: 243.61 LBS | SYSTOLIC BLOOD PRESSURE: 92 MMHG | TEMPERATURE: 97.4 F | OXYGEN SATURATION: 92 % | DIASTOLIC BLOOD PRESSURE: 44 MMHG | BODY MASS INDEX: 40.54 KG/M2 | HEART RATE: 67 BPM

## 2020-01-25 DIAGNOSIS — E16.2 HYPOGLYCEMIA: Primary | ICD-10-CM

## 2020-01-25 DIAGNOSIS — R41.82 ALTERED MENTAL STATUS: ICD-10-CM

## 2020-01-25 LAB
ALBUMIN SERPL BCP-MCNC: 2.7 G/DL (ref 3.5–5)
ALP SERPL-CCNC: 186 U/L (ref 46–116)
ALT SERPL W P-5'-P-CCNC: 19 U/L (ref 12–78)
ANION GAP SERPL CALCULATED.3IONS-SCNC: 9 MMOL/L (ref 4–13)
APAP SERPL-MCNC: <2 UG/ML (ref 10–20)
AST SERPL W P-5'-P-CCNC: 11 U/L (ref 5–45)
BASE EX.OXY STD BLDV CALC-SCNC: 92.4 % (ref 60–80)
BASE EXCESS BLDV CALC-SCNC: 7.4 MMOL/L
BASOPHILS # BLD AUTO: 0.08 THOUSANDS/ΜL (ref 0–0.1)
BASOPHILS NFR BLD AUTO: 1 % (ref 0–1)
BILIRUB SERPL-MCNC: 0.4 MG/DL (ref 0.2–1)
BUN SERPL-MCNC: 44 MG/DL (ref 5–25)
CALCIUM SERPL-MCNC: 9.5 MG/DL (ref 8.3–10.1)
CHLORIDE SERPL-SCNC: 100 MMOL/L (ref 100–108)
CO2 SERPL-SCNC: 33 MMOL/L (ref 21–32)
CREAT SERPL-MCNC: 4.36 MG/DL (ref 0.6–1.3)
EOSINOPHIL # BLD AUTO: 0.71 THOUSAND/ΜL (ref 0–0.61)
EOSINOPHIL NFR BLD AUTO: 6 % (ref 0–6)
ERYTHROCYTE [DISTWIDTH] IN BLOOD BY AUTOMATED COUNT: 18.6 % (ref 11.6–15.1)
ETHANOL SERPL-MCNC: <3 MG/DL (ref 0–3)
GFR SERPL CREATININE-BSD FRML MDRD: 11 ML/MIN/1.73SQ M
GLUCOSE SERPL-MCNC: 226 MG/DL (ref 65–140)
GLUCOSE SERPL-MCNC: 233 MG/DL (ref 65–140)
GLUCOSE SERPL-MCNC: 238 MG/DL (ref 65–140)
GLUCOSE SERPL-MCNC: 52 MG/DL (ref 65–140)
GLUCOSE SERPL-MCNC: 69 MG/DL (ref 65–140)
HCO3 BLDV-SCNC: 33.7 MMOL/L (ref 24–30)
HCT VFR BLD AUTO: 31 % (ref 34.8–46.1)
HGB BLD-MCNC: 9.5 G/DL (ref 11.5–15.4)
IMM GRANULOCYTES # BLD AUTO: 0.05 THOUSAND/UL (ref 0–0.2)
IMM GRANULOCYTES NFR BLD AUTO: 0 % (ref 0–2)
LYMPHOCYTES # BLD AUTO: 4.45 THOUSANDS/ΜL (ref 0.6–4.47)
LYMPHOCYTES NFR BLD AUTO: 38 % (ref 14–44)
MCH RBC QN AUTO: 33 PG (ref 26.8–34.3)
MCHC RBC AUTO-ENTMCNC: 30.6 G/DL (ref 31.4–37.4)
MCV RBC AUTO: 108 FL (ref 82–98)
MONOCYTES # BLD AUTO: 1.71 THOUSAND/ΜL (ref 0.17–1.22)
MONOCYTES NFR BLD AUTO: 15 % (ref 4–12)
NEUTROPHILS # BLD AUTO: 4.78 THOUSANDS/ΜL (ref 1.85–7.62)
NEUTS SEG NFR BLD AUTO: 40 % (ref 43–75)
NRBC BLD AUTO-RTO: 0 /100 WBCS
O2 CT BLDV-SCNC: 13.1 ML/DL
PCO2 BLDV: 57.4 MM HG (ref 42–50)
PH BLDV: 7.39 [PH] (ref 7.3–7.4)
PLATELET # BLD AUTO: 192 THOUSANDS/UL (ref 149–390)
PMV BLD AUTO: 10.7 FL (ref 8.9–12.7)
PO2 BLDV: 83.2 MM HG (ref 35–45)
POTASSIUM SERPL-SCNC: 3.3 MMOL/L (ref 3.5–5.3)
PROT SERPL-MCNC: 7.6 G/DL (ref 6.4–8.2)
RBC # BLD AUTO: 2.88 MILLION/UL (ref 3.81–5.12)
SALICYLATES SERPL-MCNC: <3 MG/DL (ref 3–20)
SODIUM SERPL-SCNC: 142 MMOL/L (ref 136–145)
TSH SERPL DL<=0.05 MIU/L-ACNC: 3.04 UIU/ML (ref 0.36–3.74)
WBC # BLD AUTO: 11.78 THOUSAND/UL (ref 4.31–10.16)

## 2020-01-25 PROCEDURE — 70486 CT MAXILLOFACIAL W/O DYE: CPT

## 2020-01-25 PROCEDURE — 99285 EMERGENCY DEPT VISIT HI MDM: CPT

## 2020-01-25 PROCEDURE — 82805 BLOOD GASES W/O2 SATURATION: CPT | Performed by: EMERGENCY MEDICINE

## 2020-01-25 PROCEDURE — 82948 REAGENT STRIP/BLOOD GLUCOSE: CPT

## 2020-01-25 PROCEDURE — 84443 ASSAY THYROID STIM HORMONE: CPT | Performed by: EMERGENCY MEDICINE

## 2020-01-25 PROCEDURE — 93005 ELECTROCARDIOGRAM TRACING: CPT

## 2020-01-25 PROCEDURE — 70450 CT HEAD/BRAIN W/O DYE: CPT

## 2020-01-25 PROCEDURE — 80320 DRUG SCREEN QUANTALCOHOLS: CPT | Performed by: EMERGENCY MEDICINE

## 2020-01-25 PROCEDURE — 80329 ANALGESICS NON-OPIOID 1 OR 2: CPT | Performed by: EMERGENCY MEDICINE

## 2020-01-25 PROCEDURE — 36415 COLL VENOUS BLD VENIPUNCTURE: CPT | Performed by: EMERGENCY MEDICINE

## 2020-01-25 PROCEDURE — 85025 COMPLETE CBC W/AUTO DIFF WBC: CPT | Performed by: EMERGENCY MEDICINE

## 2020-01-25 PROCEDURE — 80053 COMPREHEN METABOLIC PANEL: CPT | Performed by: EMERGENCY MEDICINE

## 2020-01-25 PROCEDURE — 99285 EMERGENCY DEPT VISIT HI MDM: CPT | Performed by: EMERGENCY MEDICINE

## 2020-01-25 RX ORDER — LIDOCAINE HCL 20 MG/ML
1 SYRINGE (ML) INTRAVENOUS ONCE
Status: COMPLETED | OUTPATIENT
Start: 2020-01-25 | End: 2020-01-25

## 2020-01-25 NOTE — ED PROVIDER NOTES
History  Chief Complaint   Patient presents with    Hypoglycemia - Symptomatic     pt found unresponsive snoring on couch at home by family  Blood glucose of 22 per EMS     47 yo female with history of tachy-bette syndrome, type 1 diabetes, coronary artery disease presents the ED via EMS for evaluation of altered mental status  Per the report, patient was found unresponsive snoring on a couch at home by family, blood glucose was noted to be 22 upon EMS arrival   EMS had some initial difficulty obtaining access, so an IO was placed into her right leg  She was given glucagon and dextrose EN route  When the patient arrived to the emergency department, she appeared somnolent, but responded to commands  She was alert and oriented x3  She has stated that she was unsure of what happened  She does remember going to her dialysis appointment yesterday and felt fine immediately following  She denies any headache, fever, chills, neck pain, neck stiffness, chest pain, shortness of breath, nausea, vomiting, or diarrhea  She denies any abdominal pain  Prior to Admission Medications   Prescriptions Last Dose Informant Patient Reported? Taking?    Ascorbic Acid (VITAMIN C) 1000 MG tablet   Yes No   Sig: Take 1,000 mg by mouth daily   FLUoxetine (PROzac) 40 MG capsule   Yes No   Sig: Take 40 mg by mouth daily   PATIENT MAINTAINED INSULIN PUMP   No No   Sig: Inject 1 each under the skin every 8 (eight) hours   acetaminophen (TYLENOL) 325 mg tablet   Yes No   Sig: Take 650 mg by mouth every 6 (six) hours as needed for mild pain   albuterol (PROVENTIL HFA,VENTOLIN HFA) 90 mcg/act inhaler   Yes No   Sig: Inhale 2 puffs every 6 (six) hours as needed for wheezing   albuterol (PROVENTIL HFA,VENTOLIN HFA) 90 mcg/act inhaler   No No   Sig: Inhale 2 puffs every 4 (four) hours as needed for wheezing   allopurinol (ZYLOPRIM) 100 mg tablet   Yes No   Sig: Take 100 mg by mouth daily   amiodarone 200 mg tablet   No No   Sig: Take 1 tablet (200 mg total) by mouth 3 (three) times a day for 11 days Then reduce to 1 tablet daily   aspirin 81 mg chewable tablet   No No   Sig: Chew 1 tablet (81 mg total) daily   atorvastatin (LIPITOR) 80 mg tablet   No No   Sig: Take 1 tablet (80 mg total) by mouth daily with dinner   b complex vitamins capsule   Yes No   Sig: Take 1 capsule by mouth daily   bisacodyl (DULCOLAX) 5 mg EC tablet   Yes No   Sig: Take 5 mg by mouth daily as needed for constipation   calcitriol (ROCALTROL) 0 5 MCG capsule   Yes No   Sig: Take 0 5 mcg by mouth daily   cholecalciferol (VITAMIN D3) 1,000 units tablet   Yes No   Sig: Take 1,000 Units by mouth daily   clopidogrel (PLAVIX) 75 mg tablet   Yes No   Sig: Take 75 mg by mouth daily   famotidine (PEPCID) 20 mg tablet   Yes No   Sig: Take 20 mg by mouth daily   fluticasone (FLONASE) 50 mcg/act nasal spray   Yes No   Si spray into each nostril 2 (two) times a day   fluticasone-salmeterol (ADVAIR DISKUS, WIXELA INHUB) 100-50 mcg/dose inhaler   Yes No   Sig: Inhale 1 puff 2 (two) times a day Rinse mouth after use     fluticasone-vilanterol (BREO ELLIPTA) 100-25 mcg/inh inhaler   No No   Sig: Inhale 1 puff once daily Rinse mouth after use    gabapentin (NEURONTIN) 300 mg capsule   No No   Sig: Take 1 capsule (300 mg total) by mouth daily at bedtime   insulin lispro (HumaLOG) 100 units/mL   No No   Si Units by Subcutaneous Insulin Pump route as needed (when pump is empty)   Patient taking differently: 300 Units by Subcutaneous Insulin Pump route as needed (when pump is empty)    levothyroxine 75 mcg tablet   Yes No   Sig: Take 75 mcg by mouth daily   loratadine (CLARITIN) 10 mg tablet   Yes No   Sig: Take 10 mg by mouth daily   metoprolol tartrate (LOPRESSOR) 25 mg tablet   No No   Sig: Take 1 tablet (25 mg total) by mouth every 12 (twelve) hours   omeprazole (PriLOSEC) 20 mg delayed release capsule   Yes No   Sig: Take 40 mg by mouth daily    senna-docusate sodium (SENOKOT S) 8 6-50 mg per tablet   No No   Sig: Take 2 tablets by mouth daily at bedtime   sevelamer carbonate (RENVELA) 800 mg tablet   Yes No   Sig: Take 800 mg by mouth 2 (two) times a day      Facility-Administered Medications: None       Past Medical History:   Diagnosis Date    Asthma     Cardiac disease     CHF    [facility]     CPAP (continuous positive airway pressure) dependence     Diabetes mellitus ([facility] )     type 1    Dialysis patient ([place] [address] )     Disease of thyroid gland     hypothyroidism    GERD (gastroesophageal reflux disease)     Hyperlipidemia     Hypertension     Migraine     Psychiatric disorder     PTSD, Depression, panic attacks    Renal disorder     stage 4 kidney disease       Past Surgical History:   Procedure Laterality Date    CATARACT EXTRACTION, BILATERAL       SECTION      CHOLECYSTECTOMY      HERNIA REPAIR      IR CENTRAL LINE PLACEMENT  2019    RETINOPATHY SURGERY      TONSILECTOMY AND ADNOIDECTOMY      TONSILLECTOMY      TYMPANOSTOMY TUBE PLACEMENT         Family History   Problem Relation Age of Onset    Pancreatic cancer Mother     Stroke Father      I have reviewed and agree with the history as documented  Social History     Tobacco Use    Smoking status: Never Smoker    Smokeless tobacco: Never Used   Substance Use Topics    Alcohol use: Not Currently     Binge frequency: Never    Drug use: No        Review of Systems   Constitutional: Positive for activity change  Negative for chills and fever  HENT: Negative for mouth sores, nosebleeds, postnasal drip and rhinorrhea  Eyes: Negative for pain and redness  Respiratory: Negative for cough, chest tightness and shortness of breath  Cardiovascular: Negative for chest pain, palpitations and leg swelling  Gastrointestinal: Negative for abdominal pain, constipation, nausea and vomiting  Genitourinary: Negative for dysuria, enuresis and flank pain     Musculoskeletal: Negative for neck pain and neck stiffness  Skin: Negative for pallor, rash and wound  Neurological: Negative for tremors, seizures, speech difficulty and weakness  Hematological: Negative  Psychiatric/Behavioral: Negative  Physical Exam  ED Triage Vitals   Temperature Pulse Respirations Blood Pressure SpO2   01/25/20 0830 01/25/20 0713 01/25/20 0713 01/25/20 0713 01/25/20 0713   (!) 93 5 °F (34 2 °C) 61 16 102/52 97 %      Temp Source Heart Rate Source Patient Position - Orthostatic VS BP Location FiO2 (%)   01/25/20 0830 -- -- -- --   Rectal          Pain Score       01/25/20 0713       No Pain             Orthostatic Vital Signs  Vitals:    01/25/20 0915 01/25/20 1015 01/25/20 1100 01/25/20 1115   BP: 94/55 (!) 84/53 106/53 (!) 92/44   Pulse: 60 63 68 67       Physical Exam   Constitutional: She is oriented to person, place, and time  She appears well-developed and well-nourished  No distress  Face mask in place  Sleepy apearing   HENT:   Head: Normocephalic  Head is without raccoon's eyes, without Massey's sign, without abrasion, without contusion, without right periorbital erythema and without left periorbital erythema  Right Ear: Hearing, tympanic membrane, external ear and ear canal normal  No hemotympanum  Left Ear: Hearing, tympanic membrane, external ear and ear canal normal  No hemotympanum  Nose: Nose normal  No sinus tenderness, nasal deformity, septal deviation or nasal septal hematoma  Right sinus exhibits no maxillary sinus tenderness and no frontal sinus tenderness  Left sinus exhibits no maxillary sinus tenderness and no frontal sinus tenderness  Mouth/Throat: Uvula is midline, oropharynx is clear and moist and mucous membranes are normal    Dried blood from left nare   Eyes: Pupils are equal, round, and reactive to light  Conjunctivae and EOM are normal  Right eye exhibits no discharge  Left eye exhibits no discharge  Neck: Normal range of motion  Neck supple   No JVD present  No thyromegaly present  Cardiovascular: Normal rate, regular rhythm, normal heart sounds and intact distal pulses  No murmur heard  Pulmonary/Chest: Effort normal and breath sounds normal  No stridor  No respiratory distress  She has no wheezes  She has no rales  Abdominal: Soft  Bowel sounds are normal  She exhibits no distension  There is no tenderness  There is no rebound and no guarding  Musculoskeletal: Normal range of motion  She exhibits no edema, tenderness or deformity  Right IO in place in lower extremity   Neurological: She is alert and oriented to person, place, and time  No cranial nerve deficit  GCS eye subscore is 4  GCS verbal subscore is 5  GCS motor subscore is 6  Skin: Skin is warm and dry  Capillary refill takes less than 2 seconds  She is not diaphoretic  Psychiatric: She has a normal mood and affect  Her behavior is normal    Nursing note and vitals reviewed  ED Medications  Medications   lidocaine (cardiac) (FOR EMS ONLY) 20 mg/mL injection 100 mg (0 mg Does not apply Given to EMS 1/25/20 0727)   glucagon (FOR EMS ONLY) (GLUCAGEN) injection 1 mg (0 mg Does not apply Given to EMS 1/25/20 0727)       Diagnostic Studies  Results Reviewed     Procedure Component Value Units Date/Time    Fingerstick Glucose (POCT) [[id number]]  (Abnormal) Collected:  01/25/20 0839    Lab Status:  Final result Updated:  01/25/20 0840     POC Glucose 233 mg/dl     TSH [[id number]]  (Normal) Collected:  01/25/20 0731    Lab Status:  Final result Specimen:  Blood from Arm, Right Updated:  01/25/20 0814     TSH 3RD GENERATON 3 040 uIU/mL     Narrative:       Patients undergoing fluorescein dye angiography may retain small amounts of fluorescein in the body for 48-72 hours post procedure  Samples containing fluorescein can produce falsely depressed TSH values  If the patient had this procedure,a specimen should be resubmitted post fluorescein clearance        Fingerstick Glucose (POCT) [[id number]]  (Abnormal) Collected:  01/25/20 0807    Lab Status:  Final result Updated:  01/25/20 0808     POC Glucose 226 mg/dl     Comprehensive metabolic panel [[id number]]  (Abnormal) Collected:  01/25/20 0731    Lab Status:  Final result Specimen:  Blood from Arm, Right Updated:  01/25/20 0805     Sodium 142 mmol/L      Potassium 3 3 mmol/L      Chloride 100 mmol/L      CO2 33 mmol/L      ANION GAP 9 mmol/L      BUN 44 mg/dL      Creatinine 4 36 mg/dL      Glucose 52 mg/dL      Calcium 9 5 mg/dL      AST 11 U/L      ALT 19 U/L      Alkaline Phosphatase 186 U/L      Total Protein 7 6 g/dL      Albumin 2 7 g/dL      Total Bilirubin 0 40 mg/dL      eGFR 11 ml/min/1 73sq m     Narrative:       [place] guidelines for Chronic Kidney Disease (CKD):     Stage 1 with normal or high GFR (GFR > 90 mL/min/1 73 square meters)    Stage 2 Mild CKD (GFR = 60-89 mL/min/1 73 square meters)    Stage 3A Moderate CKD (GFR = 45-59 mL/min/1 73 square meters)    Stage 3B Moderate CKD (GFR = 30-44 mL/min/1 73 square meters)    Stage 4 Severe CKD (GFR = 15-29 mL/min/1 73 square meters)    Stage 5 End Stage CKD (GFR <15 mL/min/1 73 square meters)  Note: GFR calculation is accurate only with a steady state creatinine    Salicylate level [[id number]]  (Abnormal) Collected:  01/25/20 0731    Lab Status:  Final result Specimen:  Blood from Arm, Right Updated:  48/21/30 5522     Salicylate Lvl <3 mg/dL     Acetaminophen level-If concentration is detectable, please discuss with medical  on call   [[id number]]  (Abnormal) Collected:  01/25/20 0731    Lab Status:  Final result Specimen:  Blood from Arm, Right Updated:  01/25/20 0805     Acetaminophen Level <2 ug/mL     Ethanol [[id number]]  (Normal) Collected:  01/25/20 0731    Lab Status:  Final result Specimen:  Blood from Arm, Right Updated:  01/25/20 0758     Ethanol Lvl <3 mg/dL     Blood gas, venous [[id number]]  (Abnormal) Collected:  01/25/20 0731 Lab Status:  Final result Specimen:  Blood from Arm, Right Updated:  01/25/20 0744     pH, Mario 7 386     pCO2, Mario 57 4 mm Hg      pO2, Mario 83 2 mm Hg      HCO3, Mario 33 7 mmol/L      Base Excess, Mario 7 4 mmol/L      O2 Content, Mario 13 1 ml/dL      O2 HGB, VENOUS 92 4 %     CBC and differential [[id number]]  (Abnormal) Collected:  01/25/20 0731    Lab Status:  Final result Specimen:  Blood from Arm, Right Updated:  01/25/20 0743     WBC 11 78 Thousand/uL      RBC 2 88 Million/uL      Hemoglobin 9 5 g/dL      Hematocrit 31 0 %       fL      MCH 33 0 pg      MCHC 30 6 g/dL      RDW 18 6 %      MPV 10 7 fL      Platelets 108 Thousands/uL      nRBC 0 /100 WBCs      Neutrophils Relative 40 %      Immat GRANS % 0 %      Lymphocytes Relative 38 %      Monocytes Relative 15 %      Eosinophils Relative 6 %      Basophils Relative 1 %      Neutrophils Absolute 4 78 Thousands/µL      Immature Grans Absolute 0 05 Thousand/uL      Lymphocytes Absolute 4 45 Thousands/µL      Monocytes Absolute 1 71 Thousand/µL      Eosinophils Absolute 0 71 Thousand/µL      Basophils Absolute 0 08 Thousands/µL     Fingerstick Glucose (POCT) [[id number]]  (Abnormal) Collected:  01/25/20 0737    Lab Status:  Final result Updated:  01/25/20 0741     POC Glucose 238 mg/dl     Fingerstick Glucose (POCT) [[id number]]  (Normal) Collected:  01/25/20 0709    Lab Status:  Final result Updated:  01/25/20 0711     POC Glucose 69 mg/dl                  CT head without contrast   Final Result by Dia Clifford DO (01/25 5745)   Mild cerebral atrophy with chronic small vessel ischemic white matter disease  No acute intracranial abnormality  Workstation performed: [id number]         CT facial bones without contrast   Final Result by Dia Clifford DO (01/25 6048)   1  Mild pansinus disease  2   No traumatic facial bone injury        Workstation performed: [id number]               Procedures  ECG 12 Lead Documentation Only  Date/Time: 1/25/2020 7:31 AM  Performed by: Torito Castillo MD  Authorized by: Torito Castillo MD     ECG reviewed by me, the ED Provider: yes    Patient location:  ED  Previous ECG:     Previous ECG:  Compared to current    Similarity:  Changes noted  Rate:     ECG rate:  61    ECG rate assessment: normal    Rhythm:     Rhythm: sinus rhythm    QRS:     QRS axis:  Normal          ED Course  ED Course as of Jan 25 1632   Sat Jan 25, 2020   0711 POC Glucose: 69   0735 Blood Pressure: 102/52   0735 Pulse: 61   0735 Respirations: 16   0735 SpO2: 97 %   0753 POC Glucose(!): 238   0843 POC Glucose(!): 233   0843 CT head and facial bones negative for any acute intracranial or facial process      0843 Temperature(!): 93 5 °F (34 2 °C)   0843 Placed on Gino Hugger      0843 Exam unchanged, patient is still sleepy however responsive to voice continues to follow commands, remains oriented      1007 Temperature(!): 97 4 °F (36 3 °C)                               MDM  Number of Diagnoses or Management Options  Altered mental status:   Hypoglycemia:   Diagnosis management comments: 51-year-old female with history of diabetes on dialysis Monday Wednesday and Friday presents the ED for evaluation of altered mental status after she was found unresponsive on her couch at home  When EMS arrived her glucose was noted to be 22  When patient arrived she was more alert however still somnolent  Will check basic labs, TSH, coma panel, and VBG  Patient noted to be hypothermic, will place patient under bear hugger  Will give dextrose  Will check CT head and facial bones given possible trauma with dried blood at the nares  CT scans grossly unremarkable for any acute abnormality  Labs grossly unremarkable around the patient's baseline  On reassessment, patient's mental status significantly improved, she was sitting up in bed following all commands and answering questions appropriately    She states she feels much better than she did upon arrival   Patient was asking the eat, she had an entire turkey sandwich and tolerated it well  She was able to ambulate without difficulty  She will be discharged home  She remained hemodynamically stable throughout ED course  She was given strict return precautions  Disposition  Final diagnoses:   Hypoglycemia   Altered mental status     Time reflects when diagnosis was documented in both MDM as applicable and the Disposition within this note     Time User Action Codes Description Comment    1/25/2020 11:34 AM Giovanna Choe Add [E16 2] Hypoglycemia     1/25/2020 11:34 AM Check, Giovanna Velasco Add [R41 82] Altered mental status       ED Disposition     ED Disposition Condition Date/Time Comment    Discharge Stable Sat Jan 25, 2020 11:34 AM Mary Strange discharge to home/self care              Follow-up Information     Follow up With Specialties Details Why Contact Info Additional Information    Shun Lundberg, DO Family Medicine  As needed [address] [place] [address]  [phone]       [facility] Emergency Department Emergency Medicine  If symptoms worsen [address]  [phone]  ED, [address] [place] [address], [facility]   [phone]          Discharge Medication List as of 1/25/2020 11:36 AM      CONTINUE these medications which have NOT CHANGED    Details   acetaminophen (TYLENOL) 325 mg tablet Take 650 mg by mouth every 6 (six) hours as needed for mild pain, Historical Med      !! albuterol (PROVENTIL HFA,VENTOLIN HFA) 90 mcg/act inhaler Inhale 2 puffs every 6 (six) hours as needed for wheezing, Historical Med      !! albuterol (PROVENTIL HFA,VENTOLIN HFA) 90 mcg/act inhaler Inhale 2 puffs every 4 (four) hours as needed for wheezing, Starting Tue 7/23/2019, Normal      allopurinol (ZYLOPRIM) 100 mg tablet Take 100 mg by mouth daily, Historical Med      amiodarone 200 mg tablet Take 1 tablet (200 mg total) by mouth 3 (three) times a day for 11 days Then reduce to 1 tablet daily, Starting Fri 11/22/2019, Until Tue 12/3/2019, Normal      Ascorbic Acid (VITAMIN C) 1000 MG tablet Take 1,000 mg by mouth daily, Historical Med      aspirin 81 mg chewable tablet Chew 1 tablet (81 mg total) daily, Starting Wed 9/11/2019, No Print      atorvastatin (LIPITOR) 80 mg tablet Take 1 tablet (80 mg total) by mouth daily with dinner, Starting Fri 11/22/2019, Normal      b complex vitamins capsule Take 1 capsule by mouth daily, Historical Med      bisacodyl (DULCOLAX) 5 mg EC tablet Take 5 mg by mouth daily as needed for constipation, Historical Med      calcitriol (ROCALTROL) 0 5 MCG capsule Take 0 5 mcg by mouth daily, Historical Med      cholecalciferol (VITAMIN D3) 1,000 units tablet Take 1,000 Units by mouth daily, Historical Med      clopidogrel (PLAVIX) 75 mg tablet Take 75 mg by mouth daily, Starting Sat 9/7/2019, Until Sun 9/6/2020, Historical Med      famotidine (PEPCID) 20 mg tablet Take 20 mg by mouth daily, Historical Med      FLUoxetine (PROzac) 40 MG capsule Take 40 mg by mouth daily, Historical Med      fluticasone (FLONASE) 50 mcg/act nasal spray 1 spray into each nostril 2 (two) times a day, Historical Med      fluticasone-salmeterol (ADVAIR DISKUS, WIXELA INHUB) 100-50 mcg/dose inhaler Inhale 1 puff 2 (two) times a day Rinse mouth after use , Historical Med      fluticasone-vilanterol (BREO ELLIPTA) 100-25 mcg/inh inhaler Inhale 1 puff once daily Rinse mouth after use , Starting Fri 11/22/2019, Normal      gabapentin (NEURONTIN) 300 mg capsule Take 1 capsule (300 mg total) by mouth daily at bedtime, Starting Tue 7/23/2019, Normal      insulin lispro (HumaLOG) 100 units/mL 300 Units by Subcutaneous Insulin Pump route as needed (when pump is empty), Starting Tue 7/23/2019, Normal      levothyroxine 75 mcg tablet Take 75 mcg by mouth daily, Historical Med      loratadine (CLARITIN) 10 mg tablet Take 10 mg by mouth daily, Historical Med      metoprolol tartrate (LOPRESSOR) 25 mg tablet Take 1 tablet (25 mg total) by mouth every 12 (twelve) hours, Starting Tue 9/10/2019, Print      omeprazole (PriLOSEC) 20 mg delayed release capsule Take 40 mg by mouth daily , Historical Med      PATIENT MAINTAINED INSULIN PUMP Inject 1 each under the skin every 8 (eight) hours, Starting Tue 7/23/2019, Normal      senna-docusate sodium (SENOKOT S) 8 6-50 mg per tablet Take 2 tablets by mouth daily at bedtime, Starting Fri 11/22/2019, Normal      sevelamer carbonate (RENVELA) 800 mg tablet Take 800 mg by mouth 2 (two) times a day, Historical Med       !! - Potential duplicate medications found  Please discuss with provider  No discharge procedures on file  ED Provider  Attending physically available and evaluated [facility]  I managed the patient along with the ED Attending      Electronically Signed by         Jayne Kee MD  01/25/20  Mindi Prajapati MD  02/05/20 Brittani Queen

## 2020-01-25 NOTE — DISCHARGE INSTRUCTIONS
Return to the emergency department if you experience worsening of symptoms including episodes of losing consciousness, seizure-like activity, difficulty breathing, uncontrollable nausea and vomiting, if you develop signs of dehydration      Follow-up with your primary care doctor as needed    Continue to receive dialysis as scheduled

## 2020-01-25 NOTE — ED NOTES
Pt states her  should be here in about 10 minutes to take her home       Xochitl Posada RN  01/25/20 9700

## 2020-01-26 NOTE — ED ATTENDING ATTESTATION
1/25/2020  IAbbe MD, saw and evaluated the patient  I have discussed the patient with the resident/non-physician practitioner and agree with the resident's/non-physician practitioner's findings, Plan of Care, and MDM as documented in the resident's/non-physician practitioner's note, except where noted  All available labs and Radiology studies were reviewed  I was present for key portions of any procedure(s) performed by the resident/non-physician practitioner and I was immediately available to provide assistance  At this point I agree with the current assessment done in the Emergency Department  I have conducted an independent evaluation of this patient a history and physical is as follows:    Patient presents with altered mental status hypoglycemic ESRD on HD due on Monday  Blood sugar noted from EMS  Difficult IV access  IO access established started on D10 also given IM glucagon EN route  With improvement in mental status and blood sugar      Patient id to be hypothermic likely due to hypoglycemia passively warmed    Patient fed reassessed normal mental status with improved blood pressure blood sugar requesting discharge to home    Stable for discharge    ED Course         Critical Care Time  Procedures

## 2020-01-27 LAB
ATRIAL RATE: 61 BPM
P AXIS: 52 DEGREES
PR INTERVAL: 182 MS
QRS AXIS: 29 DEGREES
QRSD INTERVAL: 82 MS
QT INTERVAL: 488 MS
QTC INTERVAL: 491 MS
T WAVE AXIS: 77 DEGREES
VENTRICULAR RATE: 61 BPM

## 2020-01-27 PROCEDURE — 93010 ELECTROCARDIOGRAM REPORT: CPT | Performed by: INTERNAL MEDICINE

## 2021-01-03 NOTE — CONSULTS
Consult - [address] 48 y o  female MRN: [id number]  Unit/Bed#: MICU 02 Encounter: [id number]    Physician Requesting Consult: Otis Jacobson DO    Reason for Consultation / Chief Complaint: hypothermia, hypoglycemia    History of Present Illness:  Judith Sumner is a 48 y o  female with PHM ESRD (HD MWF), DM1, CAD with stent placement , CHF, A fib (eliquis)  Recently the patient has been not feeling well, was hospitalizaed at [facility] for CAD and stent placement on  in [facility]   in the am patient was found to be unresponsive by family who called EMS  On arrival they noted her glucose to be in the 40s, dextrose given and mental status improved  Once in the ED she was noted to be hypothermic, lethargic, glucose initially 47, and started on a Dextrose infusion  History obtained from chart review and the patient      Past Medical History:  Past Medical History:   Diagnosis Date    Asthma     Cardiac disease     CHF    CHF [facility]congestive heart failure) ([facility])     CPAP (continuous positive airway pressure) dependence     Diabetes mellitus (HCC)     type 1    [facility]     Disease of thyroid gland     hypothyroidism    GERD (gastroesophageal reflux disease)     Hyperlipidemia     Hypertension     Migraine     Psychiatric disorder     PTSD, Depression, panic attacks    Renal disorder     stage 4 kidney disease       Past Surgical History:  Past Surgical History:   Procedure Laterality Date    CATARACT EXTRACTION, BILATERAL       SECTION      CHOLECYSTECTOMY      HERNIA REPAIR      RETINOPATHY SURGERY      TONSILECTOMY AND ADNOIDECTOMY      TONSILLECTOMY      TYMPANOSTOMY TUBE PLACEMENT         Past Family History:  Family History   Problem Relation Age of Onset    Pancreatic cancer Mother     Stroke Father        Social History:  Social History     Tobacco Use   Smoking Status Never Smoker   Smokeless Tobacco Never Used     Social History     Substance and Sexual Activity   Alcohol Use Not Currently     Social History     Substance and Sexual Activity   Drug Use No     Marital Status: /Civil Union    Home Medications:   Prior to Admission medications    Medication Sig Start Date End Date Taking?  Authorizing Provider   acetaminophen (TYLENOL) 325 mg tablet Take 650 mg by mouth every 6 (six) hours as needed for mild pain   Yes Historical Provider, MD   albuterol (PROVENTIL HFA,VENTOLIN HFA) 90 mcg/act inhaler Inhale 2 puffs every 4 (four) hours as needed for wheezing 7/23/19  Yes Angelique CABALLERO PA-C   allopurinol (ZYLOPRIM) 100 mg tablet Take 100 mg by mouth daily   Yes Historical Provider, MD   apixaban (ELIQUIS) 2 5 mg Take 1 tablet (2 5 mg total) by mouth 2 (two) times a day  Patient taking differently: Take 5 mg by mouth 2 (two) times a day  7/23/19  Yes Angelique CABALLERO PA-C   atorvastatin (LIPITOR) 40 mg tablet Take 1 tablet (40 mg total) by mouth daily with dinner  Patient taking differently: Take 80 mg by mouth daily with dinner  7/23/19  Yes Angelique CABALLERO PA-C   b complex vitamins capsule Take 1 capsule by mouth daily   Yes Historical Provider, MD   bisacodyl (DULCOLAX) 5 mg EC tablet Take 5 mg by mouth daily as needed for constipation   Yes Historical Provider, MD   calcitriol (ROCALTROL) 0 5 MCG capsule Take 0 5 mcg by mouth daily   Yes Historical Provider, MD   cholecalciferol (VITAMIN D3) 1,000 units tablet Take 1,000 Units by mouth daily   Yes Historical Provider, MD   clopidogrel (PLAVIX) 75 mg tablet Take 75 mg by mouth daily 9/7/19 9/6/20 Yes Historical Provider, MD   diltiazem (CARDIZEM CD) 180 mg 24 hr capsule Take 1 capsule (180 mg total) by mouth daily 7/24/19  Yes Angelique CABALLERO PA-C   diltiazem (TIAZAC) 180 MG 24 hr capsule Take 180 mg by mouth daily   Yes Historical Provider, MD   Ferric Citrate (AURYXIA) 1  MG(Fe) TABS Take by mouth   Yes Historical Provider, MD   FLUoxetine (PROzac) 40 MG capsule Take 40 mg by mouth daily   Yes Historical Provider, MD   fluticasone (FLONASE) 50 mcg/act nasal spray 1 spray into each nostril 2 (two) times a day   Yes Historical Provider, MD   insulin lispro (HumaLOG) 100 units/mL 300 Units by Subcutaneous Insulin Pump route as needed (when pump is empty)  Patient taking differently: 300 Units by Subcutaneous Insulin Pump route as needed (when pump is empty)  7/23/19  Yes Angelique CABALLERO PA-C   levothyroxine 75 mcg tablet Take 75 mcg by mouth daily   Yes Historical Provider, MD   loratadine (CLARITIN) 10 mg tablet Take 10 mg by mouth daily   Yes Historical Provider, MD   PATIENT MAINTAINED INSULIN PUMP Inject 1 each under the skin every 8 (eight) hours 7/23/19  Yes Angelique CABALLERO PA-C   albuterol (PROVENTIL HFA,VENTOLIN HFA) 90 mcg/act inhaler Inhale 2 puffs every 6 (six) hours as needed for wheezing    Historical Provider, MD   Ascorbic Acid (VITAMIN C) 1000 MG tablet Take 1,000 mg by mouth daily    Historical Provider, MD   diphenhydrAMINE (BENADRYL) 25 mg tablet Take 25 mg by mouth every 6 (six) hours as needed for itching    Historical Provider, MD   famotidine (PEPCID) 20 mg tablet Take 20 mg by mouth daily    Historical Provider, MD   fluticasone-salmeterol (ADVAIR DISKUS, WIXELA INHUB) 100-50 mcg/dose inhaler Inhale 1 puff 2 (two) times a day Rinse mouth after use      Historical Provider, MD   gabapentin (NEURONTIN) 300 mg capsule Take 1 capsule (300 mg total) by mouth daily at bedtime 7/23/19   Angelique CABALLERO PA-C   omeprazole (PriLOSEC) 20 mg delayed release capsule Take 40 mg by mouth daily     Historical Provider, MD   ondansetron (ZOFRAN) 4 mg tablet Take 4 mg by mouth every 6 (six) hours as needed for nausea or vomiting    Historical Provider, MD   sevelamer carbonate (RENVELA) 800 mg tablet Take 800 mg by mouth 2 (two) times a day    Historical Provider, MD       Inpatient Medications:  Scheduled Meds:  Current Facility-Administered Medications:  dextrose 10 % and sodium chloride 0 9 % 75 mL/hr Intravenous Continuous Melly Mora MD Last Rate: 75 mL/hr (19 1324)     Continuous Infusions:  dextrose 10 % and sodium chloride 0 9 % 75 mL/hr Last Rate: 75 mL/hr (19)     PRN Meds:       Allergies: Allergies   Allergen Reactions    Codeine Edema    Shellfish Allergy Edema    Hydrocodone Itching and Vomiting    Morphine Itching and Vomiting    Oxycodone Itching and Vomiting       ROS:   Review of Systems   Constitutional: Negative  HENT: Negative  Eyes: Negative  Respiratory: Negative  Cardiovascular: Negative  Gastrointestinal: Negative  Genitourinary: Negative  All other systems reviewed and are negative  Vitals:  Vitals:    19 1257 19 1330 19 1415 19 1445   BP: 108/54 103/51 113/54 113/56   BP Location: Right leg Right leg     Pulse: (!) 53 55 56 58   Resp: 16 16     Temp: (!) 92 7 °F (33 7 °C) (!) 93 °F (33 9 °C) (!) 93 6 °F (34 2 °C) (!) 94 3 °F (34 6 °C)   TempSrc: Bladder Bladder     SpO2: 100% 98% 99% 98%   Weight:       Height:         Temperature:   Temp (24hrs), Av 9 °F (33 8 °C), Min:91 6 °F (33 1 °C), Max:94 3 °F (34 6 °C)    Current Temperature: (!) 94 3 °F (34 6 °C)    Weights:   IBW: 54 7 kg  Body mass index is 44 8 kg/m²  Hemodynamic Monitoring:  N/A     Non-Invasive/Invasive Ventilation Settings:  Respiratory    Lab Data (Last 4 hours)    None         O2/Vent Data (Last 4 hours)    None              No results found for: PHART, QXG7HSN, PO2ART, IWF7BMC, E6FZJNHJ, BEART, SOURCE  SpO2: SpO2: 98 %     Physical Exam:  Physical Exam   Constitutional: She is oriented to person, place, and time  She appears well-developed  No distress  HENT:   Head: Normocephalic and atraumatic  Eyes: Pupils are equal, round, and reactive to light  Conjunctivae are normal    Neck: Normal range of motion  Neck supple  Cardiovascular: Normal rate, regular rhythm, normal heart sounds and intact distal pulses     No murmur heard   Pulmonary/Chest: Effort normal and breath sounds normal  No respiratory distress  She has no wheezes  Abdominal: Soft  Bowel sounds are normal    Musculoskeletal: Normal range of motion  Neurological: She is alert and oriented to person, place, and time  Skin: Skin is warm  Capillary refill takes less than 2 seconds  She is not diaphoretic  Labs:  Results from last 7 days   Lab Units 19  1140   WBC Thousand/uL 15 15*   HEMOGLOBIN g/dL 9 7*   HEMATOCRIT % 31 3*   PLATELETS Thousands/uL 249   NEUTROS PCT % 54   MONOS PCT % 12    Results from last 7 days   Lab Units 19  1140   SODIUM mmol/L 137   POTASSIUM mmol/L 3 6   CHLORIDE mmol/L 104   CO2 mmol/L 20*   BUN mg/dL 94*   CREATININE mg/dL 7 90*   CALCIUM mg/dL 8 9   ALK PHOS U/L 181*   ALT U/L 34   AST U/L 22              Results from last 7 days   Lab Units 19  1140   INR  1 05   PTT seconds 26     Results from last 7 days   Lab Units 19  1238   LACTIC ACID mmol/L 0 6     No results found for: TROPONINI    Imaging:CXR: No acute disease awaiting final read I have personally reviewed pertinent films in PACS    EK18 NSR, NSR on rhythm on monitor  This was personally reviewed by myself  Micro:  Blood Culture: No results found for: BLOODCX  Urine Culture: No results found for: URINECX  Sputum Culture: No components found for: SPUTUMCX  Wound Culure: No results found for: WOUNDCULT      ______________________________________________________________________    Assessment and Plan:   Principal Problem:    Sepsis ([facility] )  Active Problems:    Uncontrolled type 1 diabetes mellitus ([facility] )    ESRD (end stage renal disease) on dialysis ([facility] )    PAF (paroxysmal atrial fibrillation) ([facility] )    Morbid obesity ([facility] )    Neuropathy    CAD with recent stent    Hypoglycemia    Hypothyroid    Hypothermia  Resolved Problems:    * No resolved hospital problems   *      Hypothermia  Assessment & Plan  - Warm with [facility] to normothermia  - May be d/t hypothyroidism    Hypothyroid  Assessment & Plan  - continue synthroid 75mcg    Hypoglycemia  Assessment & Plan  - Likely d/t poor PO intake as of recent without insulin pump changes  - Pt given 150ml of D50 by EMS on scene, started on NSS+D10 in ED, glucose stable  - Start Glucose check q6h and prn if pt symptomatic  - If Glucose remains elevated consider d/c dextrose solution, monitor and potentially restart short acting insulin  - Monitor PO intake closely    CAD with recent stent  Assessment & Plan  - 9/7 PCI with Stenting of Proximal and Distal LAD at [address] [place] [address]  - continue gabapentin    PAF (paroxysmal atrial fibrillation) ([facility])  Assessment & Plan  - Continue home dose eliquis 2 5 mg BID  - continue Cardizem XR 180mg QD    ESRD (end stage renal disease) on dialysis [facility])  Assessment & Plan  - Dialysis MWF, last session (9/6/19)      Uncontrolled type 1 diabetes mellitus ([facility] )  Assessment & Plan  Lab Results   Component Value Date    HGBA1C 8 1 (H) 07/21/2019       Recent Labs     09/08/19  1114 09/08/19  1147   POCGLU 47* 178*       Blood Sugar Average: Last 72 hrs:  (P) 112 5     - Hold insulin for now, start q6h glucose checks    * Sepsis ([facility] )  Assessment & Plan  - Unknown source, broad spectrum abx started after cultures obtained  - UA negative  - Pt denies any recent symptoms  Lactic 0 6, HD stable  Neuro:   · AOX3  · Maintain normal sleep/wake cycle    CV:   · stable  · CAD: Stent placed 9/7, continue asa plavix and atorvastatin  · Check EKG for baseline status  · 8/30 Echo: EF 75% Normal LV size and function, normal RV function and size      Pulm:   · Stable  · CXR: Not impressive for any acute illness    GI:   · NPO for now, if sugar remains stable consider advancing diet  · BM prior to admission    FEN:   · Dextrose containing solution, consider d/c'd fluids once glucose proven stable     :   · Produces minimal urine at danitza  · ESRD dialysis MWF    ID:   · Given cefepime in ED empirically  · UA clean  · Blood cultures taken prior to ABX, continue to follow  · Lactic 0 6 on admission      Heme:   · stable    Endo:   · Hypothyroid: coninue synthroid  · Hypoglycemia: resolved, continue to follow q6h glucose checks monitor for need to start IV insulin  Msk/Skin:   · Activity as tolerated    Family notified: no     Disposition: Admit to Stepdown level 2    Counseling / Coordination of Care  Total time spent today 30 minutes  Greater than 50% of total time was spent with the patient and / or family counseling and / or coordination of care  A description of the counseling / coordination of care:    ______________________________________________________________________    VTE Pharmacologic Prophylaxis: Eliquis  VTE Mechanical Prophylaxis: sequential compression device    Invasive lines and devices: Invasive Devices     Central Venous Catheter Line            CVC Central Lines 09/08/19 Triple Right Internal jugular less than 1 day          Peripheral Intravenous Line            Peripheral IV 09/08/19 Right Wrist less than 1 day          Line            Hemodialysis AV Fistula Left Upper arm -- days          Drain            Urethral Catheter Temperature probe less than 1 day                Code Status: Prior  POA:    POLST:      Given critical illness, patient length of stay will require greater than two midnights      Signature: Nathan Ríos  Date: 9/8/2019      Consults Statement Selected

## 2022-07-01 NOTE — HEMODIALYSIS
Patient completed 4 hour HD treatment  using STEPHANIE AVF without difficulty  BFR to 400 as prescribed  Total fluid removed:  4500 ml  Pre-weight:  116 2 kg  Post-weight:  112 kg    Patient denied dizziness, cramping, and SOB  R mid pleuritic back pain, Patient attributed Dyspnea from unable to take a deep breath due to the back pain, spo2 98-99% on RA, CTAP neg for central PE, but limited study due to patient's body habits, high risk given obesity, unclear etiology for pain   - VA duplex b/l LE negative  - UA neg, no evidence of PNA on imaging, EKG unchanged from past   Pain: lidocaine patch, tylenol PRN mild pain, oxycodone 5 mg Q6H PRN mod pain and oxycodone 10 mg Q6h PRN severe pain  Will start muscle relaxant for spasm  Pain management consult

## 2024-03-12 NOTE — SOCIAL WORK
Pt is recommended to have in-home skilled nursing care and in-home PT, both via [place] [address] services for her aftercare plan  CM met w/ pt to discuss recommendation  Pt is agreeable  CM provided pt w/ freedom of choice for [place] [address] providers  Pt requested referral to [facility]  CM made Ecin referral to [facility]  CM to follow  [Home] : at home, [unfilled] , at the time of the visit. [Medical Office: ([facility])___] : at the medical office located in  [Verbal consent obtained from patient] : the patient, [unfilled] [Follow-Up: _____] : a [unfilled] follow-up visit